# Patient Record
Sex: FEMALE | Race: BLACK OR AFRICAN AMERICAN | Employment: FULL TIME | URBAN - METROPOLITAN AREA
[De-identification: names, ages, dates, MRNs, and addresses within clinical notes are randomized per-mention and may not be internally consistent; named-entity substitution may affect disease eponyms.]

---

## 2017-05-01 ENCOUNTER — HOSPITAL ENCOUNTER (EMERGENCY)
Age: 37
Discharge: HOME | End: 2017-05-01
Attending: EMERGENCY MEDICINE
Payer: MEDICAID

## 2017-05-01 VITALS
DIASTOLIC BLOOD PRESSURE: 77 MMHG | HEART RATE: 86 BPM | TEMPERATURE: 98.7 F | SYSTOLIC BLOOD PRESSURE: 136 MMHG | WEIGHT: 205 LBS | OXYGEN SATURATION: 100 % | HEIGHT: 60 IN | BODY MASS INDEX: 40.25 KG/M2 | RESPIRATION RATE: 18 BRPM

## 2017-05-01 DIAGNOSIS — B96.89 BV (BACTERIAL VAGINOSIS): Primary | ICD-10-CM

## 2017-05-01 DIAGNOSIS — N76.0 BV (BACTERIAL VAGINOSIS): Primary | ICD-10-CM

## 2017-05-01 LAB
APPEARANCE UR: CLEAR
BILIRUB UR QL: NEGATIVE
COLOR UR: YELLOW
GLUCOSE UR STRIP.AUTO-MCNC: NEGATIVE MG/DL
HGB UR QL STRIP: NEGATIVE
KETONES UR QL STRIP.AUTO: NEGATIVE MG/DL
LEUKOCYTE ESTERASE UR QL STRIP.AUTO: NEGATIVE
NITRITE UR QL STRIP.AUTO: NEGATIVE
PH UR STRIP: 5.5 [PH] (ref 5–8)
PROT UR STRIP-MCNC: NEGATIVE MG/DL
SERVICE CMNT-IMP: NORMAL
SP GR UR REFRACTOMETRY: 1.03 (ref 1–1.03)
UROBILINOGEN UR QL STRIP.AUTO: 1 EU/DL (ref 0.2–1)
WET PREP GENITAL: NORMAL

## 2017-05-01 PROCEDURE — 87491 CHLMYD TRACH DNA AMP PROBE: CPT | Performed by: EMERGENCY MEDICINE

## 2017-05-01 PROCEDURE — 87210 SMEAR WET MOUNT SALINE/INK: CPT | Performed by: EMERGENCY MEDICINE

## 2017-05-01 PROCEDURE — 81003 URINALYSIS AUTO W/O SCOPE: CPT | Performed by: EMERGENCY MEDICINE

## 2017-05-01 PROCEDURE — 99284 EMERGENCY DEPT VISIT MOD MDM: CPT

## 2017-05-01 RX ORDER — METRONIDAZOLE 500 MG/1
500 TABLET ORAL 3 TIMES DAILY
Qty: 21 TAB | Refills: 0 | Status: SHIPPED | OUTPATIENT
Start: 2017-05-01 | End: 2017-05-08

## 2017-05-01 NOTE — ED TRIAGE NOTES
Patient reports vaginal discharge for 1 weeks with some odor noted.  Patient denies urination problem, patient denies abdominal pain  LMP 4/20/2017

## 2017-05-01 NOTE — DISCHARGE INSTRUCTIONS
Bacterial Vaginosis: Care Instructions  Your Care Instructions    Bacterial vaginosis is a type of vaginal infection. It is caused by excess growth of certain bacteria that are normally found in the vagina. Symptoms can include itching, swelling, pain when you urinate or have sex, and a gray or yellow discharge with a \"fishy\" odor. It is not considered an infection that is spread through sexual contact. Although symptoms can be annoying and uncomfortable, bacterial vaginosis does not usually cause other health problems. However, if you have it while you are pregnant, it can cause complications. While the infection may go away on its own, most doctors use antibiotics to treat it. You may have been prescribed pills or vaginal cream. With treatment, bacterial vaginosis usually clears up in 5 to 7 days. Follow-up care is a key part of your treatment and safety. Be sure to make and go to all appointments, and call your doctor if you are having problems. It's also a good idea to know your test results and keep a list of the medicines you take. How can you care for yourself at home? · Take your antibiotics as directed. Do not stop taking them just because you feel better. You need to take the full course of antibiotics. · Do not eat or drink anything that contains alcohol if you are taking metronidazole (Flagyl). · Keep using your medicine if you start your period. Use pads instead of tampons while using a vaginal cream or suppository. Tampons can absorb the medicine. · Wear loose cotton clothing. Do not wear nylon and other materials that hold body heat and moisture close to the skin. · Do not scratch. Relieve itching with a cold pack or a cool bath. · Do not wash your vaginal area more than once a day. Use plain water or a mild, unscented soap. Do not douche. When should you call for help?   Watch closely for changes in your health, and be sure to contact your doctor if:  · You have unexpected vaginal bleeding. · You have a fever. · You have new or increased pain in your vagina or pelvis. · You are not getting better after 1 week. · Your symptoms return after you finish the course of your medicine. Where can you learn more? Go to http://kim-tristin.info/. Angi Lackey in the search box to learn more about \"Bacterial Vaginosis: Care Instructions. \"  Current as of: October 13, 2016  Content Version: 11.2  © 2529-1474 [employer]. Care instructions adapted under license by [employer] (which disclaims liability or warranty for this information). If you have questions about a medical condition or this instruction, always ask your healthcare professional. [address] any warranty or liability for your use of this information. Vaginitis: Care Instructions  Your Care Instructions    Vaginitis is soreness or infection of the vagina. This common problem can cause itching and burning. And it can cause a change in vaginal discharge. Sometimes it can cause pain during sex. Vaginitis may be caused by bacteria, yeast, or other germs. Some infections that cause it are caught from a sexual partner. Bath products, spermicides, and douches can irritate the vagina too. Some women have this problem during and after menopause. A drop in estrogen levels during this time can cause dryness, soreness, and pain during sex. Your doctor can give you medicine to treat an infection. And home care may help you feel better. For certain types of infections, your sex partner must be treated too. Follow-up care is a key part of your treatment and safety. Be sure to make and go to all appointments, and call your doctor if you are having problems. It's also a good idea to know your test results and keep a list of the medicines you take. How can you care for yourself at home? · If your doctor prescribed antibiotics, take them as directed.  Do not stop taking them just because you feel better. You need to take the full course of antibiotics. · Take your medicines exactly as prescribed. Call your doctor if you think you are having a problem with your medicine. · Do not eat or drink anything that has alcohol if you are taking metronidazole (Flagyl). · If you have a yeast infection, use over-the-counter products as your doctor tells you to. Or take medicine your doctor prescribes exactly as directed. · Wash your vaginal area daily with water. You also can use a mild, unscented soap if you want. · Do not use scented bath products. And do not use vaginal sprays or douches. · Put a washcloth soaked in cool water on the area to relieve itching. Or you can take cool baths. · If you have dryness because of menopause, use estrogen cream or pills that your doctor prescribes. · Ask your doctor about when it is okay to have sex. · Use a personal lubricant before sex if you have dryness. Examples are Astroglide, K-Y Jelly, and Wet Lubricant Gel. · Ask your doctor if your sex partner also needs treatment. When should you call for help? Call your doctor now or seek immediate medical care if:  · You have a fever and pelvic pain. Watch closely for changes in your health, and be sure to contact your doctor if:  · You have bleeding other than your period. · You do not get better as expected. Where can you learn more? Go to http://kim-tristin.info/. Enter F613 in the search box to learn more about \"Vaginitis: Care Instructions. \"  Current as of: October 13, 2016  Content Version: 11.2  © 7062-7723 [employer]. Care instructions adapted under license by [employer] (which disclaims liability or warranty for this information). If you have questions about a medical condition or this instruction, always ask your healthcare professional. [address] any warranty or liability for your use of this information.

## 2017-05-01 NOTE — ED PROVIDER NOTES
Patient is a 43 y.o. female presenting with vaginal discharge. The history is provided by the patient. Vaginal Discharge    This is a new problem. The current episode started more than 1 week ago. The problem occurs constantly. The problem has been gradually worsening. The discharge was malodorous, clear, copious and thin. She is not pregnant. She has not missed her period. Associated symptoms include perineal odor. Pertinent negatives include no anorexia, no diaphoresis, no fever, no abdominal swelling, no abdominal pain, no constipation, no diarrhea, no nausea, no vomiting, no dyspareunia, no dysuria, no frequency, no genital burning, no genital itching, no genital lesions, no perineal pain and no painful intercourse. She has tried a warm bath for the symptoms. The treatment provided mild relief. Past Medical History:   Diagnosis Date    Cardiac echocardiogram 2016    Sm LV cavity. EF 65-70%. No RWMA. Mod-marked LVH w/dynamic obstruction, mid-cavity obliteration & peak grad of 25 mmHg. Indeterminate diastolic fx. No significant valvular pathology.  Cardiac nuclear imaging test 2016    Low risk. Very sm distal anterior & apical partially reversible defect, likely artifact, but sm area of ischemia not excluded. No RWMA. EF 66%.   Neg EKG on pharm stress test.    Diverticulitis     GERD (gastroesophageal reflux disease)     Hypertension     Infectious disease     Bacteria vaginosis    Non-ST elevated myocardial infarction [facility])        Past Surgical History:   Procedure Laterality Date    HX  SECTION  2008    HX ORTHOPAEDIC      HX TUBAL LIGATION  2008         Family History:   Problem Relation Age of Onset    Depression Mother     Asthma Mother     Migraines Mother     Hypertension Mother     Stroke Mother     Heart Attack Mother     Arthritis-osteo Mother     Depression Brother     Alcohol abuse Father     Substance Abuse Father      tobacco    Drug Abuse Father     Hypertension Father     High Cholesterol Father     Stroke Father     Rashes/Skin Problems Father     Substance Abuse Brother      tobacco    Drug Abuse Brother     Asthma Brother     Migraines Brother     Hypertension Brother     High Cholesterol Brother     Hypertension Paternal Grandmother     Diabetes Paternal Grandmother     Hypertension Maternal Grandmother     Colon Cancer Maternal Grandmother        Social History     Social History    Marital status: SINGLE     Spouse name: N/A    Number of children: N/A    Years of education: N/A     Occupational History    Not on file. Social History Main Topics    Smoking status: Current Every Day Smoker     Packs/day: 0.25     Years: 20.00    Smokeless tobacco: Never Used    Alcohol use Yes      Comment: rare/holidays    Drug use: No    Sexual activity: Yes     Partners: Male     Birth control/ protection: Surgical      Comment: tubal ligation     Other Topics Concern    Not on file     Social History Narrative         ALLERGIES: Vaccine adjuvant emulsion combination no. 1    Review of Systems   Constitutional: Negative for chills, diaphoresis and fever. HENT: Negative for ear pain, rhinorrhea and sore throat. Eyes: Negative for pain and redness. Respiratory: Negative for cough and shortness of breath. Cardiovascular: Negative for chest pain. Gastrointestinal: Negative for abdominal pain, anorexia, constipation, diarrhea, nausea and vomiting. Genitourinary: Positive for vaginal discharge. Negative for dyspareunia, dysuria, frequency, vaginal bleeding and vaginal pain. Musculoskeletal: Negative for back pain, myalgias, neck pain and neck stiffness. Skin: Negative. Neurological: Negative for dizziness, light-headedness and headaches. Psychiatric/Behavioral: Negative.         Vitals:    05/01/17 1225   BP: 136/77   Pulse: 86   Resp: 18   Temp: 98.7 °F (37.1 °C)   SpO2: 100%   Weight: 93 kg (205 lb) Height: 5' (1.524 m)            Physical Exam   Constitutional: She is oriented to person, place, and time. She appears well-developed and well-nourished. No distress. HENT:   Head: Normocephalic and atraumatic. Right Ear: Tympanic membrane, external ear and ear canal normal.   Left Ear: Tympanic membrane, external ear and ear canal normal.   Nose: Nose normal.   Mouth/Throat: Oropharynx is clear and moist and mucous membranes are normal. No oropharyngeal exudate. Eyes: Conjunctivae and EOM are normal. Pupils are equal, round, and reactive to light. Neck: Normal range of motion. Neck supple. Cardiovascular: Normal rate, regular rhythm, normal heart sounds and intact distal pulses. Exam reveals no gallop and no friction rub. No murmur heard. Pulmonary/Chest: Effort normal and breath sounds normal. No respiratory distress. She has no wheezes. She has no rales. Abdominal: Soft. Bowel sounds are normal. She exhibits no distension. There is no tenderness. There is no rebound and no guarding. Musculoskeletal: Normal range of motion. She exhibits no edema. Lymphadenopathy:     She has no cervical adenopathy. Neurological: She is alert and oriented to person, place, and time. Skin: Skin is warm and dry. She is not diaphoretic. Psychiatric: She has a normal mood and affect. Her behavior is normal. Judgment and thought content normal.   Nursing note and vitals reviewed. MDM  Number of Diagnoses or Management Options  BV (bacterial vaginosis): new and requires workup  Diagnosis management comments: ED COURSE:  vaginal specimen(s) may have been collected to check for gonorrhea and chlamydia.      DDx: UTI, BV, yeast, STD (herpes, GC, trich), PID, Xhgp-Cqxe-Amzpbt syndrome, pyelo, preg, ectopic, ovarian cyst, ovarian torsion, tubo-ovarian abscess, FB, malignancy      IMPRESSION AND MEDICAL DECISION MAKING:  Based on the patient's history of presenting illness, physical examination, laboratory, radiographic, and/or tests results, and response to medical interventions, I believe the patient most likely is having bacterial vaginosis. Follow-up Activity limitations:  None  Condition on Discharge:  Stable    SPECIFIC PATIENT INSTRUCTIONS FROM THE PROVIDER WHO TREATED YOU IN THE ER TODAY:  Finish Flagyl as directed. DO NOT consume alcohol when taking this medication until at least 24 hours after completion of this medication! Reevaluation by your OB/GYN doctor or regular doctor if symptoms persist after finishing flagyl. Return to emergency room for worsening or new symptoms. Pt results have been reviewed with them. They have been counseled regarding diagnosis, treatment, and plan. Pt verbally conveys understanding and agreement of the signs, symptoms, diagnosis, treatment and prognosis and additionally agrees to follow up as discussed. Pt also agrees with the care-plan and conveys that all of their questions have been answered. I have also provided discharge instructions for them that include: educational information regarding their diagnosis and treatment, and list of reasons why they would want to return to the ED prior to their follow-up appointment, should their condition change.    Tye Alas PA-C 2:15 PM          Amount and/or Complexity of Data Reviewed  Clinical lab tests: ordered and reviewed  Tests in the medicine section of CPT®: ordered and reviewed  Discussion of test results with the performing providers: yes  Decide to obtain previous medical records or to obtain history from someone other than the patient: yes  Obtain history from someone other than the patient: yes  Review and summarize past medical records: yes  Discuss the patient with other providers: yes  Independent visualization of images, tracings, or specimens: yes    Risk of Complications, Morbidity, and/or Mortality  Presenting problems: moderate  Diagnostic procedures: moderate  Management options: moderate    Patient Progress  Patient progress: stable    ED Course       Pelvic Exam  Date/Time: 5/1/2017 1:59 PM  Performed by: PA  Procedure duration:  5 minutes. Exam assisted by:  ALECIA Ennis. Type of exam performed: bimanual and speculum. External genitalia appearance: normal.    Vaginal exam:  discharge. The amount of discharge was:  mild. The discharge was milky and malodorous. Cervical exam:  normal and no cervical motion tenderness. Specimen(s) collected:  GC and vaginal culture. Bimanual exam:  normal.    Patient tolerance: Patient tolerated the procedure well with no immediate complications        Labs Reviewed   WET PREP   URINALYSIS W/ RFLX MICROSCOPIC   CHLAMYDIA/NEISSERIA AMPLIFICATION       Diagnosis:   1. BV (bacterial vaginosis)          Disposition: home    Follow-up Information     Follow up With Details Comments [place] [id number] EMERGENCY DEPT  As needed, If symptoms worsen [address] [place] [address] [place] [facility]    Alcides Turner MD Go in 3 days  [address]  [phone]            Patient's Medications   Start Taking    METRONIDAZOLE (FLAGYL) 500 MG TABLET    Take 1 Tab by mouth three (3) times daily for 7 days. Continue Taking    ASPIRIN 81 MG CHEWABLE TABLET    Take 1 Tab by mouth daily. CARVEDILOL (COREG) 6.25 MG TABLET    Take 1 Tab by mouth every twelve (12) hours. CLOPIDOGREL (PLAVIX) 75 MG TABLET    Take 1 Tab by mouth daily. NITROGLYCERIN (NITROSTAT) 0.4 MG SL TABLET    1 Tab by SubLINGual route every five (5) minutes as needed for Chest Pain. These Medications have changed    No medications on file   Stop Taking    LISINOPRIL (PRINIVIL, ZESTRIL) 20 MG TABLET    Take 1 Tab by mouth daily. METRONIDAZOLE (FLAGYL) 500 MG TABLET    Take 1 Tab by mouth two (2) times a day. NORGESTREL-ETHINYL ESTRADIOL (LO/OVRAL) 0.3-30 MG-MCG TAB    Take 1 Tab by mouth daily.  Indications: MENORRHAGIA

## 2017-05-02 LAB
C TRACH RRNA SPEC QL NAA+PROBE: POSITIVE
N GONORRHOEA RRNA SPEC QL NAA+PROBE: NEGATIVE
SPECIMEN SOURCE: ABNORMAL

## 2017-05-03 NOTE — PROGRESS NOTES
Mary Celeste PA-C May 3, 2017 4:00 PM  Pt with +chlamydia, called patient and gave her results. She wanted rx called in to [facility] in [place], [address] [facility] Called in Doxycycline 100mg BID orally for 7 days. Pt to go and  today. Pt given STD instructions and precautions via phone, indicated understanding.

## 2017-05-03 NOTE — ED NOTES
Tye Alas PA-C May 3, 2017 4:00 PM  Pt with +chlamydia, called patient and gave her results. [facility] [place] [place]. Called in Doxycycline 100mg BID orally for 7 days. Pt to go and  today. Pt given STD instructions and precautions via phone, indicated understanding.

## 2017-06-16 ENCOUNTER — HOSPITAL ENCOUNTER (EMERGENCY)
Age: 37
Discharge: HOME | End: 2017-06-16
Attending: EMERGENCY MEDICINE
Payer: MEDICAID

## 2017-06-16 VITALS
WEIGHT: 210 LBS | OXYGEN SATURATION: 98 % | TEMPERATURE: 98.2 F | RESPIRATION RATE: 19 BRPM | DIASTOLIC BLOOD PRESSURE: 89 MMHG | HEART RATE: 72 BPM | SYSTOLIC BLOOD PRESSURE: 150 MMHG | HEIGHT: 60 IN | BODY MASS INDEX: 41.23 KG/M2

## 2017-06-16 DIAGNOSIS — N76.0 BV (BACTERIAL VAGINOSIS): ICD-10-CM

## 2017-06-16 DIAGNOSIS — K08.89 DENTALGIA: Primary | ICD-10-CM

## 2017-06-16 DIAGNOSIS — B96.89 BV (BACTERIAL VAGINOSIS): ICD-10-CM

## 2017-06-16 PROCEDURE — 99282 EMERGENCY DEPT VISIT SF MDM: CPT

## 2017-06-16 RX ORDER — PENICILLIN V POTASSIUM 500 MG/1
500 TABLET, FILM COATED ORAL 2 TIMES DAILY
Qty: 20 TAB | Refills: 0 | Status: SHIPPED | OUTPATIENT
Start: 2017-06-16 | End: 2017-06-26

## 2017-06-16 RX ORDER — HYDROCODONE BITARTRATE AND ACETAMINOPHEN 5; 325 MG/1; MG/1
TABLET ORAL
Qty: 6 TAB | Refills: 0 | Status: SHIPPED | OUTPATIENT
Start: 2017-06-16 | End: 2017-10-19

## 2017-06-16 RX ORDER — METRONIDAZOLE 500 MG/1
500 TABLET ORAL 2 TIMES DAILY
Qty: 14 TAB | Refills: 0 | Status: SHIPPED | OUTPATIENT
Start: 2017-06-16 | End: 2017-06-23

## 2017-06-16 RX ORDER — NAPROXEN 500 MG/1
500 TABLET ORAL 2 TIMES DAILY WITH MEALS
Qty: 20 TAB | Refills: 0 | Status: SHIPPED | OUTPATIENT
Start: 2017-06-16 | End: 2017-10-19

## 2017-06-16 NOTE — DISCHARGE INSTRUCTIONS
Bacterial Vaginosis: Care Instructions  Your Care Instructions    Bacterial vaginosis is a type of vaginal infection. It is caused by excess growth of certain bacteria that are normally found in the vagina. Symptoms can include itching, swelling, pain when you urinate or have sex, and a gray or yellow discharge with a \"fishy\" odor. It is not considered an infection that is spread through sexual contact. Although symptoms can be annoying and uncomfortable, bacterial vaginosis does not usually cause other health problems. However, if you have it while you are pregnant, it can cause complications. While the infection may go away on its own, most doctors use antibiotics to treat it. You may have been prescribed pills or vaginal cream. With treatment, bacterial vaginosis usually clears up in 5 to 7 days. Follow-up care is a key part of your treatment and safety. Be sure to make and go to all appointments, and call your doctor if you are having problems. It's also a good idea to know your test results and keep a list of the medicines you take. How can you care for yourself at home? · Take your antibiotics as directed. Do not stop taking them just because you feel better. You need to take the full course of antibiotics. · Do not eat or drink anything that contains alcohol if you are taking metronidazole (Flagyl). · Keep using your medicine if you start your period. Use pads instead of tampons while using a vaginal cream or suppository. Tampons can absorb the medicine. · Wear loose cotton clothing. Do not wear nylon and other materials that hold body heat and moisture close to the skin. · Do not scratch. Relieve itching with a cold pack or a cool bath. · Do not wash your vaginal area more than once a day. Use plain water or a mild, unscented soap. Do not douche. When should you call for help?   Watch closely for changes in your health, and be sure to contact your doctor if:  · You have unexpected vaginal bleeding. · You have a fever. · You have new or increased pain in your vagina or pelvis. · You are not getting better after 1 week. · Your symptoms return after you finish the course of your medicine. Where can you learn more? Go to http://kim-tristin.info/. [address] in the search box to learn more about \"Bacterial Vaginosis: Care Instructions. \"  Current as of: October 13, 2016  Content Version: 11.2  © 8572-4500 [employer]. Care instructions adapted under license by [employer] (which disclaims liability or warranty for this information). If you have questions about a medical condition or this instruction, always ask your healthcare professional. [address] any warranty or liability for your use of this information. Dental Pain: After Your Visit  Your Care Instructions  The most common cause of dental pain is tooth decay. It can also be caused by an infection of the tooth (abscess) or gum, a tooth that has not broken all the way through the gum (impacted tooth), or a problem with the nerve-filled center of the tooth. Follow-up care is a key part of your treatment and safety. Be sure to make and go to all appointments, and call your doctor if you are having problems. Its also a good idea to know your test results and keep a list of the medicines you take. How can you care for yourself at home? · Contact a dentist for follow-up care. · Put ice or a cold pack on the outside of your mouth for 10 to 20 minutes at a time to reduce pain and swelling. Put a thin cloth between the ice and your skin. · Take an over-the-counter pain medicine, such as acetaminophen (Tylenol), ibuprofen (Advil, Motrin), or naproxen (Aleve). Read and follow all instructions on the label. · Do not take two or more pain medicines at the same time unless the doctor told you to. Many pain medicines have acetaminophen, which is Tylenol.  Too much acetaminophen (Tylenol) can be harmful. · Rinse your mouth with warm salt water every 2 hours to help relieve pain and swelling from an infected tooth. Mix 1 teaspoon of salt in 8 ounces of water. · If your doctor prescribed antibiotics, take them as directed. Do not stop taking them just because you feel better. You need to take the full course of antibiotics. When should you call for help? Call your doctor now or seek immediate medical care if:  · You have signs of infection, such as:  ¨ Increased pain, swelling, warmth, or redness. ¨ Pus draining from the gum, tooth, or face. ¨ A fever. Watch closely for changes in your health, and be sure to contact your doctor if:  · You do not get better as expected. Where can you learn more? Go to ALPHAThrottle.com.be  Enter V264 in the search box to learn more about \"Dental Pain: After Your Visit. \"   © 4132-6036 [employer]. Care instructions adapted under license by [employer] (which disclaims liability or warranty for this information). This care instruction is for use with your licensed healthcare professional. If you have questions about a medical condition or this instruction, always ask your healthcare professional. [place] [address] any warranty or liability for your use of this information. Content Version: 71.6.925929;  Last Revised: May 17, 2013

## 2017-06-16 NOTE — ED PROVIDER NOTES
HPI Comments: 1:13 AM Kayleigh Golden is a 43 y.o. Female with a hx of a tubal ligation, GERD, and an MI who presents to the ED c/o left lower and right upper dental pain that started three days ago. She reports that she chipped the tooth \"a while ago\" but it was not bothering her till recently. She has additional complaints of malodorous vaginal discharge that started a week ago. She thought the discharge may have been due to her period starting the following day; however, when her period ended the discharge persisted. She states that the discharge is similar to her past episodes of BV. She made an appointment with her OB/GYN next month. The pt denies fever, chest pain, cough, dysuria, hematuria, and any further sx. The history is provided by the patient. Past Medical History:   Diagnosis Date    Cardiac echocardiogram 2016    Sm LV cavity. EF 65-70%. No RWMA. Mod-marked LVH w/dynamic obstruction, mid-cavity obliteration & peak grad of 25 mmHg. Indeterminate diastolic fx. No significant valvular pathology.  Cardiac nuclear imaging test 2016    Low risk. Very sm distal anterior & apical partially reversible defect, likely artifact, but sm area of ischemia not excluded. No RWMA. EF 66%.   Neg EKG on pharm stress test.    Diverticulitis     GERD (gastroesophageal reflux disease)     Hypertension     Infectious disease     Bacteria vaginosis    Non-ST elevated myocardial infarction [facility])        Past Surgical History:   Procedure Laterality Date    HX  SECTION  2008    HX ORTHOPAEDIC      HX TUBAL LIGATION  2008         Family History:   Problem Relation Age of Onset    Depression Mother     Asthma Mother     Migraines Mother     Hypertension Mother     Stroke Mother     Heart Attack Mother     Arthritis-osteo Mother     Depression Brother     Alcohol abuse Father     Substance Abuse Father      tobacco    Drug Abuse Father     Hypertension Father     High Cholesterol Father     Stroke Father     Rashes/Skin Problems Father     Substance Abuse Brother      tobacco    Drug Abuse Brother     Asthma Brother     Migraines Brother     Hypertension Brother     High Cholesterol Brother     Hypertension Paternal Grandmother     Diabetes Paternal Grandmother     Hypertension Maternal Grandmother     Colon Cancer Maternal Grandmother        Social History     Social History    Marital status: SINGLE     Spouse name: N/A    Number of children: N/A    Years of education: N/A     Occupational History    Not on file. Social History Main Topics    Smoking status: Current Every Day Smoker     Packs/day: 0.25     Years: 20.00    Smokeless tobacco: Never Used    Alcohol use Yes      Comment: rare/holidays    Drug use: No    Sexual activity: Yes     Partners: Male     Birth control/ protection: Surgical      Comment: tubal ligation     Other Topics Concern    Not on file     Social History Narrative         ALLERGIES: Vaccine adjuvant emulsion combination no. 1    Review of Systems   Constitutional: Negative for chills and fever. HENT: Positive for dental problem. Negative for congestion and sore throat. Respiratory: Negative for cough and shortness of breath. Cardiovascular: Negative for chest pain and leg swelling. Gastrointestinal: Negative for abdominal pain and nausea. Genitourinary: Positive for vaginal discharge. Negative for dysuria and hematuria. Musculoskeletal: Negative for back pain. Skin: Negative for rash and wound. Neurological: Negative for syncope, light-headedness and headaches. Psychiatric/Behavioral: Negative for behavioral problems. The patient is not nervous/anxious. All other systems reviewed and are negative. There were no vitals filed for this visit. Physical Exam   Constitutional: She appears well-developed and well-nourished. Non-toxic appearance.  She does not have a sickly appearance. She does not appear ill. No distress. HENT:   Head: Normocephalic and atraumatic. Mouth/Throat: Uvula is midline, oropharynx is clear and moist and mucous membranes are normal. Abnormal dentition. Dental caries present. No dental abscesses or uvula swelling. No oropharyngeal exudate, posterior oropharyngeal edema, posterior oropharyngeal erythema or tonsillar abscesses. Eyes: Conjunctivae and EOM are normal. Pupils are equal, round, and reactive to light. No scleral icterus. Neck: Normal range of motion. Neck supple. No hepatojugular reflux and no JVD present. No tracheal deviation present. No thyromegaly present. Cardiovascular: Normal rate, regular rhythm, S1 normal, S2 normal, normal heart sounds, intact distal pulses and normal pulses. Exam reveals no gallop, no S3 and no S4. No murmur heard. Pulses:       Radial pulses are 2+ on the right side, and 2+ on the left side. Dorsalis pedis pulses are 2+ on the right side, and 2+ on the left side. Pulmonary/Chest: Effort normal and breath sounds normal. No respiratory distress. She has no decreased breath sounds. She has no wheezes. She has no rhonchi. She has no rales. Abdominal: Soft. Normal appearance and bowel sounds are normal. She exhibits no distension and no mass. There is no hepatosplenomegaly. There is no tenderness. There is no rigidity, no rebound, no guarding, no CVA tenderness, no tenderness at McBurney's point and negative Schneider's sign. Musculoskeletal: Normal range of motion. Strength 5/5 throughout    Lymphadenopathy:        Head (right side): No submental, no submandibular, no preauricular and no occipital adenopathy present. Head (left side): No submental, no submandibular, no preauricular and no occipital adenopathy present. She has no cervical adenopathy. Right: No supraclavicular adenopathy present. Left: No supraclavicular adenopathy present. Neurological: She is alert. She has normal strength and normal reflexes. She is not disoriented. No cranial nerve deficit or sensory deficit. Coordination and gait normal. GCS eye subscore is 4. GCS verbal subscore is 5. GCS motor subscore is 6. Grossly intact    Skin: Skin is warm, dry and intact. No rash noted. She is not diaphoretic. Psychiatric: She has a normal mood and affect. Her speech is normal and behavior is normal. Judgment and thought content normal. Cognition and memory are normal.   Nursing note and vitals reviewed. MDM  Number of Diagnoses or Management Options  BV (bacterial vaginosis):   Dentalgia:     ED Course       Procedures    Vitals:  Patient Vitals for the past 12 hrs:   Temp Pulse Resp BP SpO2   06/16/17 0116 98.2 °F (36.8 °C) 72 19 150/89 98 %     Pulse ox reviewed     Progress notes, Consult notes or additional Procedure notes:     Patient will be prescribed Flagyl, Naproxen,Veetid, and Norco.  Patient was discharged in stable condition. Patient is to return to emergency department if any new or worsening condition. Disposition:  Diagnosis:   1. Dentalgia        Disposition: Discharge    SCRIBE ATTESTATION STATEMENT  Documented by: Mariola Manning for, and in the presence of,Doron Small DO   1:20 AM     Signed by: Anita Mojica, 06/16/17 1:20 AM    PROVIDER ATTESTATION STATEMENT  I personally performed the services described in the documentation, reviewed the documentation, as recorded by the scribe in my presence, and it accurately and completely records my words and actions.   Daniele Thomas DO

## 2017-10-19 ENCOUNTER — HOSPITAL ENCOUNTER (EMERGENCY)
Age: 37
Discharge: HOME | End: 2017-10-19
Attending: EMERGENCY MEDICINE
Payer: MEDICAID

## 2017-10-19 VITALS
SYSTOLIC BLOOD PRESSURE: 155 MMHG | WEIGHT: 205 LBS | OXYGEN SATURATION: 99 % | DIASTOLIC BLOOD PRESSURE: 88 MMHG | TEMPERATURE: 98.7 F | HEART RATE: 90 BPM | BODY MASS INDEX: 40.25 KG/M2 | RESPIRATION RATE: 18 BRPM | HEIGHT: 60 IN

## 2017-10-19 DIAGNOSIS — K04.7 INFECTED DENTAL CARIES: ICD-10-CM

## 2017-10-19 DIAGNOSIS — K08.89 DENTALGIA: Primary | ICD-10-CM

## 2017-10-19 DIAGNOSIS — K02.9 INFECTED DENTAL CARIES: ICD-10-CM

## 2017-10-19 PROCEDURE — 99282 EMERGENCY DEPT VISIT SF MDM: CPT

## 2017-10-19 RX ORDER — IBUPROFEN 800 MG/1
800 TABLET ORAL
Qty: 20 TAB | Refills: 0 | Status: SHIPPED | OUTPATIENT
Start: 2017-10-19 | End: 2017-10-26

## 2017-10-19 RX ORDER — PENICILLIN V POTASSIUM 500 MG/1
500 TABLET, FILM COATED ORAL 4 TIMES DAILY
Qty: 28 TAB | Refills: 0 | Status: SHIPPED | OUTPATIENT
Start: 2017-10-19 | End: 2017-10-26

## 2017-10-19 RX ORDER — CHLORHEXIDINE GLUCONATE 1.2 MG/ML
15 RINSE ORAL 2 TIMES DAILY
Qty: 420 ML | Refills: 0 | Status: SHIPPED | OUTPATIENT
Start: 2017-10-19 | End: 2017-11-02

## 2017-10-19 RX ORDER — FLUCONAZOLE 150 MG/1
150 TABLET ORAL
Qty: 1 TAB | Refills: 0 | Status: SHIPPED | OUTPATIENT
Start: 2017-10-19 | End: 2017-10-19

## 2017-10-19 RX ORDER — ACETAMINOPHEN AND CODEINE PHOSPHATE 300; 30 MG/1; MG/1
1 TABLET ORAL
Qty: 12 TAB | Refills: 0 | Status: SHIPPED | OUTPATIENT
Start: 2017-10-19 | End: 2017-11-14

## 2017-10-19 NOTE — ED PROVIDER NOTES
Patient is a 43 y.o. female presenting with dental problem. The history is provided by the patient. Dental Pain    This is a new problem. The current episode started 2 days ago. The problem has been gradually worsening. The pain is located in the left lower mouth. The quality of the pain is constant and aching. The pain is at a severity of 9/10. Associated symptoms include swelling and gum redness. There was no vomiting, no nausea, no fever, no chest pain, no shortness of breath, no headaches and no drainage. Treatments tried: Aleve, tramadol. The treatment provided no relief. Past Medical History:   Diagnosis Date    Cardiac echocardiogram 2016    Sm LV cavity. EF 65-70%. No RWMA. Mod-marked LVH w/dynamic obstruction, mid-cavity obliteration & peak grad of 25 mmHg. Indeterminate diastolic fx. No significant valvular pathology.  Cardiac nuclear imaging test 2016    Low risk. Very sm distal anterior & apical partially reversible defect, likely artifact, but sm area of ischemia not excluded. No RWMA. EF 66%.   Neg EKG on pharm stress test.    Diverticulitis     GERD (gastroesophageal reflux disease)     Hypertension     Infectious disease     Bacteria vaginosis    Non-ST elevated myocardial infarction [facility])        Past Surgical History:   Procedure Laterality Date    HX  SECTION  2008    HX ORTHOPAEDIC      HX TUBAL LIGATION  2008         Family History:   Problem Relation Age of Onset    Depression Mother     Asthma Mother     Migraines Mother     Hypertension Mother     Stroke Mother     Heart Attack Mother     Alida Fontenot Mother     Depression Brother     Alcohol abuse Father     Substance Abuse Father      tobacco    Drug Abuse Father     Hypertension Father     High Cholesterol Father     Stroke Father     Rashes/Skin Problems Father     Substance Abuse Brother      tobacco    Drug Abuse Brother     Asthma Brother     Migraines Brother     Hypertension Brother     High Cholesterol Brother     Hypertension Paternal Grandmother     Diabetes Paternal Grandmother     Hypertension Maternal Grandmother     Colon Cancer Maternal Grandmother        Social History     Social History    Marital status: SINGLE     Spouse name: N/A    Number of children: N/A    Years of education: N/A     Occupational History    Not on file. Social History Main Topics    Smoking status: Current Every Day Smoker     Packs/day: 0.25     Years: 20.00    Smokeless tobacco: Never Used    Alcohol use Yes      Comment: rare/holidays    Drug use: No    Sexual activity: Yes     Partners: Male     Birth control/ protection: Surgical      Comment: tubal ligation     Other Topics Concern    Not on file     Social History Narrative         ALLERGIES: Vaccine adjuvant emulsion combination no. 1    Review of Systems   Constitutional: Negative for chills and fever. HENT: Positive for dental problem. Negative for congestion, drooling, ear pain, facial swelling, rhinorrhea, sore throat, trouble swallowing and voice change. Eyes: Negative for pain and redness. Respiratory: Negative for cough and shortness of breath. Cardiovascular: Negative for chest pain. Gastrointestinal: Negative for abdominal pain, constipation, diarrhea, nausea and vomiting. Genitourinary: Negative for dysuria. Musculoskeletal: Negative for neck pain and neck stiffness. Skin: Negative. Neurological: Negative for dizziness, light-headedness and headaches. Psychiatric/Behavioral: Negative. Vitals:    10/19/17 1908   BP: 155/88   Pulse: 90   Resp: 18   Temp: 98.7 °F (37.1 °C)   SpO2: 99%   Weight: 93 kg (205 lb)   Height: 5' (1.524 m)            Physical Exam   Constitutional: She is oriented to person, place, and time. She appears well-developed and well-nourished. No distress. HENT:   Head: Normocephalic and atraumatic.    Right Ear: Tympanic membrane, external ear and ear canal normal.   Left Ear: Tympanic membrane, external ear and ear canal normal.   Nose: Nose normal.   Mouth/Throat: Uvula is midline, oropharynx is clear and moist and mucous membranes are normal. No trismus in the jaw. Abnormal dentition. Dental caries present. No dental abscesses or uvula swelling. No oropharyngeal exudate, posterior oropharyngeal edema or posterior oropharyngeal erythema. Dental pain at # 18. There IS evidence of dental decay. There IS tenderness on palpation. The airway IS patent. NO adjacent mucobuccal soft tissue swelling, fluctuance or gingival bleeding. NO pus/drainage. NO swelling or elevation of the tongue. NO sublingual swelling or TTP. NO facial swelling. NO neck swelling. Eyes: Conjunctivae and EOM are normal. Pupils are equal, round, and reactive to light. Neck: Normal range of motion. Neck supple. Cardiovascular: Normal rate, regular rhythm and normal heart sounds. Pulmonary/Chest: Effort normal and breath sounds normal.   Musculoskeletal: Normal range of motion. Lymphadenopathy:     She has no cervical adenopathy. Neurological: She is alert and oriented to person, place, and time. Skin: Skin is warm. She is not diaphoretic. Psychiatric: She has a normal mood and affect. Her behavior is normal. Judgment and thought content normal.   Nursing note and vitals reviewed. MDM  Number of Diagnoses or Management Options  Dentalgia: new and requires workup  Infected dental caries: new and requires workup  Diagnosis management comments: DDx: Odontalgia, Dental caries,  Periodontal disease, Periapical abscess, Trigeminal neuralgia,  space infection, Jose's angina, Retropharyngeal space infection, Infection after root canal, Dry Socket, Acute Necrotizing Ulcerative Gingivitis (ANUG). Pt requests rx for diflucan to take after pcn as it commonly gives her yeast infection.      IMPRESSION AND MEDICAL DECISION MAKING:  Based upon the patient's presentation with noted HPI and PE, along with the work up done in the emergency department, I believe that the patient is having a toothache at tooth # 18 due to dental decay. No evidence of abscess at this time. No airway compromise. Will discharge to home with antibiotic coverage and symptomatic treatment. Discussed care, f/u and s/s warranting return to ED. Pt understood and agreed to plan. Collin Wing PA-C     DIAGNOSIS:  Dentalgia  Dental Decay    SPECIFIC PATIENT INSTRUCTIONS FROM THE PROVIDER WHO TREATED YOU IN THE ER TODAY  Finish antibiotics as directed. Rinse your mouth with mouth wash after each meal or more often. Rinse mouth with Peridex as prescribed, once in the morning and once in the evening after brushing your teeth. Take Motrin as prescribed for pain as needed. Take Tylenol 3 for pain not controlled by motrin. Return for any concerns, changes in condition, or as needed. Follow-up in 7-10 days with your dentist or one of the provided dental clinics below:  [facility] [address] [phone]  [address] [place] [address] [phone]  [facility] [phone]  [address] [place] [address] [phone]   [facility] [phone]   [address] .. [phone]   *Cleaning only  [address] .. [phone]  *Preventive care only  *No insurance required-cash/check only    Call to schedule an appointment. Pt results have been reviewed with them. They have been counseled regarding diagnosis, treatment, and plan. Pt verbally conveys understanding and agreement of the signs, symptoms, diagnosis, treatment and prognosis and additionally agrees to follow up as discussed. Pt also agrees with the care-plan and conveys that all of their questions have been answered.  I have also provided discharge instructions for them that include: educational information regarding their diagnosis and treatment, and list of reasons why they would want to return to the ED prior to their follow-up appointment, should their condition change. SHOLA Day PA-C         Amount and/or Complexity of Data Reviewed  Review and summarize past medical records: yes  Discuss the patient with other providers: yes    Risk of Complications, Morbidity, and/or Mortality  Presenting problems: low  Diagnostic procedures: low  Management options: low    Patient Progress  Patient progress: stable    ED Course       Procedures      Diagnosis:   1. Dentalgia    2. Infected dental caries          Disposition: Discharge to home. Follow-up Information     Follow up With Details Comments [facility] 5 EMERGENCY DEPT  As needed, If symptoms worsen [address]    Angela Izaguirre MD Go in 2 days  Count includes the [facility] [address]  [address] in 1 week  [facility] [id number]  [phone]          Patient's Medications   Start Taking    ACETAMINOPHEN-CODEINE (TYLENOL-CODEINE #3) 300-30 MG PER TABLET    Take 1 Tab by mouth every four (4) hours as needed for Pain. Max Daily Amount: 6 Tabs. CHLORHEXIDINE (PERIDEX) 0.12 % SOLUTION    15 mL by Swish and Spit route two (2) times a day for 14 days. FLUCONAZOLE (DIFLUCAN) 150 MG TABLET    Take 1 Tab by mouth now for 1 dose. FDA advises cautious prescribing of oral fluconazole in pregnancy. IBUPROFEN (MOTRIN) 800 MG TABLET    Take 1 Tab by mouth every six (6) hours as needed for Pain for up to 7 days. PENICILLIN V POTASSIUM (VEETID) 500 MG TABLET    Take 1 Tab by mouth four (4) times daily for 7 days. Continue Taking    ASPIRIN 81 MG CHEWABLE TABLET    Take 1 Tab by mouth daily. CARVEDILOL (COREG) 6.25 MG TABLET    Take 1 Tab by mouth every twelve (12) hours. CLOPIDOGREL (PLAVIX) 75 MG TABLET    Take 1 Tab by mouth daily.     LISINOPRIL PO    Take by mouth daily. NITROGLYCERIN (NITROSTAT) 0.4 MG SL TABLET    1 Tab by SubLINGual route every five (5) minutes as needed for Chest Pain. These Medications have changed    No medications on file   Stop Taking    HYDROCODONE-ACETAMINOPHEN (NORCO) 5-325 MG PER TABLET    Take 1-2 tablets PO every 4-6 hours as needed for pain control. If over the counter ibuprofen or acetaminophen was suggested, then only take the vicodin for pain not well controlled with the over the counter medication. NAPROXEN (NAPROSYN) 500 MG TABLET    Take 1 Tab by mouth two (2) times daily (with meals).

## 2017-10-19 NOTE — ED TRIAGE NOTES
C/o left bottom rear tooth pain x 2 days progressively worsening. States tried Tramadol & Aleve without relief from pain.   Pt does not have a dentist.

## 2017-11-14 ENCOUNTER — HOSPITAL ENCOUNTER (EMERGENCY)
Age: 37
Discharge: HOME | End: 2017-11-14
Attending: EMERGENCY MEDICINE
Payer: MEDICAID

## 2017-11-14 VITALS
HEART RATE: 81 BPM | RESPIRATION RATE: 16 BRPM | HEIGHT: 60 IN | TEMPERATURE: 98.2 F | WEIGHT: 205 LBS | BODY MASS INDEX: 40.25 KG/M2 | OXYGEN SATURATION: 100 % | SYSTOLIC BLOOD PRESSURE: 157 MMHG | DIASTOLIC BLOOD PRESSURE: 96 MMHG

## 2017-11-14 DIAGNOSIS — K04.7 INFECTED DENTAL CARIES: ICD-10-CM

## 2017-11-14 DIAGNOSIS — K02.9 INFECTED DENTAL CARIES: ICD-10-CM

## 2017-11-14 DIAGNOSIS — K08.89 DENTALGIA: Primary | ICD-10-CM

## 2017-11-14 PROCEDURE — 99282 EMERGENCY DEPT VISIT SF MDM: CPT

## 2017-11-14 RX ORDER — PENICILLIN V POTASSIUM 500 MG/1
500 TABLET, FILM COATED ORAL 4 TIMES DAILY
Qty: 28 TAB | Refills: 0 | Status: SHIPPED | OUTPATIENT
Start: 2017-11-14 | End: 2017-11-21

## 2017-11-14 RX ORDER — CHLORHEXIDINE GLUCONATE 1.2 MG/ML
15 RINSE ORAL 2 TIMES DAILY
Qty: 420 ML | Refills: 0 | Status: SHIPPED | OUTPATIENT
Start: 2017-11-14 | End: 2017-11-28

## 2017-11-14 NOTE — ED NOTES
Current Discharge Medication List      START taking these medications    Details   penicillin v potassium (VEETID) 500 mg tablet Take 1 Tab by mouth four (4) times daily for 7 days. Qty: 28 Tab, Refills: 0      chlorhexidine (PERIDEX) 0.12 % solution 15 mL by Swish and Spit route two (2) times a day for 14 days. Qty: 420 mL, Refills: 0           Patient armband removed and shredded  Prescriptions given and reviewed with patient.

## 2017-11-14 NOTE — ED TRIAGE NOTES
C/O dental pain. Pt states that she was seen here a few weeks ago for same. Pt states that pain is now radiating to right ear and sinuses.

## 2017-11-14 NOTE — ED PROVIDER NOTES
Patient is a 43 y.o. female presenting with dental problem. The history is provided by the patient. Dental Pain    This is a recurrent problem. The current episode started 2 days ago. The problem occurs constantly. The problem has been gradually worsening (now radiating to ear and jaw). The pain is located in the right lower mouth. The quality of the pain is aching. The pain is at a severity of 9/10. Associated symptoms include swelling and gum redness. There was no vomiting, no nausea, no fever, no chest pain, no shortness of breath, no headaches and no drainage. Treatments tried: Was seen 1 month ago, took PCN, resolved, now returned, has now tried [facility] [employer] powder with moderate relief. Past Medical History:   Diagnosis Date    Cardiac echocardiogram 2016    Sm LV cavity. EF 65-70%. No RWMA. Mod-marked LVH w/dynamic obstruction, mid-cavity obliteration & peak grad of 25 mmHg. Indeterminate diastolic fx. No significant valvular pathology.  Cardiac nuclear imaging test 2016    Low risk. Very sm distal anterior & apical partially reversible defect, likely artifact, but sm area of ischemia not excluded. No RWMA. EF 66%.   Neg EKG on pharm stress test.    Diverticulitis     GERD (gastroesophageal reflux disease)     Hypertension     Infectious disease     Bacteria vaginosis    Non-ST elevated myocardial infarction [facility])        Past Surgical History:   Procedure Laterality Date    HX  SECTION  2008    HX ORTHOPAEDIC      HX TUBAL LIGATION  2008         Family History:   Problem Relation Age of Onset    Depression Mother     Asthma Mother     Migraines Mother     Hypertension Mother     Stroke Mother     Heart Attack Mother     Arthritis-osteo Mother     Depression Brother     Alcohol abuse Father     Substance Abuse Father      tobacco    Drug Abuse Father     Hypertension Father     High Cholesterol Father     Stroke Father     Rashes/Skin Problems Father     Substance Abuse Brother      tobacco    Drug Abuse Brother     Asthma Brother     Migraines Brother     Hypertension Brother     High Cholesterol Brother     Hypertension Paternal Grandmother     Diabetes Paternal Grandmother     Hypertension Maternal Grandmother     Colon Cancer Maternal Grandmother        Social History     Social History    Marital status: SINGLE     Spouse name: N/A    Number of children: N/A    Years of education: N/A     Occupational History    Not on file. Social History Main Topics    Smoking status: Current Every Day Smoker     Packs/day: 0.25     Years: 20.00    Smokeless tobacco: Never Used    Alcohol use Yes      Comment: rare/holidays    Drug use: No    Sexual activity: Yes     Partners: Male     Birth control/ protection: Surgical      Comment: tubal ligation     Other Topics Concern    Not on file     Social History Narrative         ALLERGIES: Vaccine adjuvant emulsion combination no. 1    Review of Systems   Constitutional: Negative for chills and fever. HENT: Positive for dental problem, ear pain and sinus pain. Negative for congestion, ear discharge, facial swelling, rhinorrhea, sinus pressure, sore throat, trouble swallowing and voice change. Eyes: Negative for pain and redness. Respiratory: Negative for cough, shortness of breath and wheezing. Cardiovascular: Negative for chest pain and palpitations. Gastrointestinal: Negative for abdominal pain, constipation, diarrhea, nausea and vomiting. Genitourinary: Negative for dysuria. Musculoskeletal: Negative for neck pain and neck stiffness. Skin: Negative. Neurological: Negative for dizziness, weakness, light-headedness and headaches. Psychiatric/Behavioral: Negative.         Vitals:    11/14/17 1115   BP: (!) 157/96   Pulse: 81   Resp: 16   Temp: 98.2 °F (36.8 °C)   SpO2: 100%   Weight: 93 kg (205 lb)   Height: 5' (1.524 m)            Physical Exam   Constitutional: She is oriented to person, place, and time. She appears well-developed and well-nourished. No distress. HENT:   Head: Normocephalic and atraumatic. Right Ear: Hearing, tympanic membrane, external ear and ear canal normal. Tympanic membrane is not erythematous and not bulging. Left Ear: Tympanic membrane, external ear and ear canal normal. Tympanic membrane is not erythematous and not bulging. Nose: Nose normal. No rhinorrhea or sinus tenderness. Right sinus exhibits no maxillary sinus tenderness and no frontal sinus tenderness. Left sinus exhibits no maxillary sinus tenderness and no frontal sinus tenderness. Mouth/Throat: Uvula is midline, oropharynx is clear and moist and mucous membranes are normal. Abnormal dentition. Dental caries present. No dental abscesses. No oropharyngeal exudate, posterior oropharyngeal edema or posterior oropharyngeal erythema. Dental pain at # 31. Missing multiple teeth. There IS evidence of dental decay. There IS tenderness on palpation. The airway IS patent. NO adjacent mucobuccal soft tissue swelling, fluctuance or gingival bleeding. NO pus/drainage. NO swelling or elevation of the tongue. NO sublingual swelling or TTP. NO facial swelling. NO neck swelling. Eyes: Conjunctivae and EOM are normal. Pupils are equal, round, and reactive to light. Neck: Normal range of motion. Neck supple. Cardiovascular: Normal rate, regular rhythm, normal heart sounds and intact distal pulses. Exam reveals no gallop and no friction rub. No murmur heard. Pulmonary/Chest: Effort normal and breath sounds normal. No respiratory distress. She has no wheezes. She has no rales. Abdominal: Soft. Bowel sounds are normal. There is no tenderness. Musculoskeletal: Normal range of motion. Lymphadenopathy:     She has no cervical adenopathy. Neurological: She is alert and oriented to person, place, and time. Skin: Skin is warm and dry. She is not diaphoretic.    Psychiatric: She has a normal mood and affect. Her behavior is normal. Judgment and thought content normal.   Nursing note and vitals reviewed. MDM  Number of Diagnoses or Management Options  Dentalgia: new and requires workup  Infected dental caries: new and requires workup  Diagnosis management comments: DDx: Odontalgia, Dental caries,  Periodontal disease, Periapical abscess, Trigeminal neuralgia,  space infection, Jose's angina, Retropharyngeal space infection, Infection after root canal, Dry Socket, Acute Necrotizing Ulcerative Gingivitis (ANUG). IMPRESSION AND MEDICAL DECISION MAKING:  Based upon the patients presentation with noted HPI and PE, along with the work up done in the emergency department, I believe that the patient is having a toothache at tooth # 31 due to dental decay. No evidence of abscess at this time. No airway compromise. Patient given non-narcotic pain control medications in the ED. Will discharge to home with antibiotic coverage and symptomatic treatment. Discussed care, f/u and s/s warranting return to ED. Pt understood and agreed to plan. Marcy Craft PA-C     DIAGNOSIS:  Dentalgia  Dental Decay    SPECIFIC PATIENT INSTRUCTIONS FROM THE PROVIDER WHO TREATED YOU IN THE ER TODAY  Finish antibiotics as directed. Rinse your mouth with mouth wash after each meal or more often. Rinse mouth with Peridex as prescribed, once in the morning and once in the evening after brushing your teeth. Take over the counter BC powder or motrin for pain as needed. Return for any concerns, changes in condition, or as needed. Follow-up in 7-10 days with your dentist or one of the provided dental clinics below:  [facility] [phone]  [facility] [phone]  [facility] [phone] [address] [facility] [phone]   [facility] [phone]   [address] .. [phone]   *Cleaning only  [facility] [address]. .. [phone]  *Preventive care only  *No insurance required-cash/check only    Call to schedule an appointment. Pt results have been reviewed with them. They have been counseled regarding diagnosis, treatment, and plan. Pt verbally conveys understanding and agreement of the signs, symptoms, diagnosis, treatment and prognosis and additionally agrees to follow up as discussed. Pt also agrees with the care-plan and conveys that all of their questions have been answered. I have also provided discharge instructions for them that include: educational information regarding their diagnosis and treatment, and list of reasons why they would want to return to the ED prior to their follow-up appointment, should their condition change. Jose Manuel Hudson PA-C          Amount and/or Complexity of Data Reviewed  Review and summarize past medical records: yes  Discuss the patient with other providers: yes    Risk of Complications, Morbidity, and/or Mortality  Presenting problems: low  Diagnostic procedures: low  Management options: low    Patient Progress  Patient progress: stable    ED Course       Procedures    Diagnosis:   1. Dentalgia    2. Infected dental caries          Disposition: Discharge to home. Follow-up Information     Follow up With Details Comments [place] [address] EMERGENCY DEPT  As needed, If symptoms worsen [id number] [address]    Sally White MD Go in 2 days  [address] Go in 3 days  [facility] [address] [id number] [phone]          Patient's Medications   Start Taking    CHLORHEXIDINE (PERIDEX) 0.12 % SOLUTION    15 mL by Swish and Spit route two (2) times a day for 14 days. PENICILLIN V POTASSIUM (VEETID) 500 MG TABLET    Take 1 Tab by mouth four (4) times daily for 7 days.    Continue Taking    ASPIRIN 81 MG CHEWABLE TABLET Take 1 Tab by mouth daily. CARVEDILOL (COREG) 6.25 MG TABLET    Take 1 Tab by mouth every twelve (12) hours. CLOPIDOGREL (PLAVIX) 75 MG TABLET    Take 1 Tab by mouth daily. LISINOPRIL PO    Take  by mouth daily. NITROGLYCERIN (NITROSTAT) 0.4 MG SL TABLET    1 Tab by SubLINGual route every five (5) minutes as needed for Chest Pain. These Medications have changed    No medications on file   Stop Taking    ACETAMINOPHEN-CODEINE (TYLENOL-CODEINE #3) 300-30 MG PER TABLET    Take 1 Tab by mouth every four (4) hours as needed for Pain. Max Daily Amount: 6 Tabs.

## 2018-01-10 ENCOUNTER — APPOINTMENT (OUTPATIENT)
Dept: GENERAL RADIOLOGY | Age: 38
End: 2018-01-10
Attending: PHYSICIAN ASSISTANT
Payer: MEDICAID

## 2018-01-10 ENCOUNTER — HOSPITAL ENCOUNTER (EMERGENCY)
Age: 38
Discharge: HOME | End: 2018-01-10
Attending: EMERGENCY MEDICINE
Payer: MEDICAID

## 2018-01-10 VITALS
TEMPERATURE: 98.2 F | RESPIRATION RATE: 18 BRPM | OXYGEN SATURATION: 99 % | WEIGHT: 200 LBS | SYSTOLIC BLOOD PRESSURE: 146 MMHG | HEART RATE: 84 BPM | DIASTOLIC BLOOD PRESSURE: 81 MMHG | BODY MASS INDEX: 39.27 KG/M2 | HEIGHT: 60 IN

## 2018-01-10 DIAGNOSIS — R10.33 PERIUMBILICAL ABDOMINAL PAIN: Primary | ICD-10-CM

## 2018-01-10 LAB
ALBUMIN SERPL-MCNC: 3.1 G/DL (ref 3.4–5)
ALBUMIN/GLOB SERPL: 0.7 {RATIO} (ref 0.8–1.7)
ALP SERPL-CCNC: 79 U/L (ref 45–117)
ALT SERPL-CCNC: 24 U/L (ref 13–56)
ANION GAP SERPL CALC-SCNC: 7 MMOL/L (ref 3–18)
APPEARANCE UR: CLEAR
AST SERPL-CCNC: 17 U/L (ref 15–37)
BASOPHILS # BLD: 0 K/UL (ref 0–0.06)
BASOPHILS NFR BLD: 0 % (ref 0–2)
BILIRUB SERPL-MCNC: 0.2 MG/DL (ref 0.2–1)
BILIRUB UR QL: NEGATIVE
BUN SERPL-MCNC: 7 MG/DL (ref 7–18)
BUN/CREAT SERPL: 11 (ref 12–20)
CALCIUM SERPL-MCNC: 8.6 MG/DL (ref 8.5–10.1)
CHLORIDE SERPL-SCNC: 104 MMOL/L (ref 100–108)
CO2 SERPL-SCNC: 29 MMOL/L (ref 21–32)
COLOR UR: YELLOW
CREAT SERPL-MCNC: 0.62 MG/DL (ref 0.6–1.3)
DIFFERENTIAL METHOD BLD: ABNORMAL
EOSINOPHIL # BLD: 0.2 K/UL (ref 0–0.4)
EOSINOPHIL NFR BLD: 3 % (ref 0–5)
ERYTHROCYTE [DISTWIDTH] IN BLOOD BY AUTOMATED COUNT: 18.1 % (ref 11.6–14.5)
GLOBULIN SER CALC-MCNC: 4.2 G/DL (ref 2–4)
GLUCOSE SERPL-MCNC: 102 MG/DL (ref 74–99)
GLUCOSE UR STRIP.AUTO-MCNC: NEGATIVE MG/DL
HCG UR QL: NEGATIVE
HCT VFR BLD AUTO: 34.3 % (ref 35–45)
HGB BLD-MCNC: 10.2 G/DL (ref 12–16)
HGB UR QL STRIP: NEGATIVE
KETONES UR QL STRIP.AUTO: NEGATIVE MG/DL
LEUKOCYTE ESTERASE UR QL STRIP.AUTO: NEGATIVE
LYMPHOCYTES # BLD: 1.7 K/UL (ref 0.9–3.6)
LYMPHOCYTES NFR BLD: 21 % (ref 21–52)
MCH RBC QN AUTO: 22 PG (ref 24–34)
MCHC RBC AUTO-ENTMCNC: 29.7 G/DL (ref 31–37)
MCV RBC AUTO: 74.1 FL (ref 74–97)
MONOCYTES # BLD: 0.6 K/UL (ref 0.05–1.2)
MONOCYTES NFR BLD: 7 % (ref 3–10)
NEUTS SEG # BLD: 5.5 K/UL (ref 1.8–8)
NEUTS SEG NFR BLD: 69 % (ref 40–73)
NITRITE UR QL STRIP.AUTO: NEGATIVE
PH UR STRIP: 6 [PH] (ref 5–8)
PLATELET # BLD AUTO: 335 K/UL (ref 135–420)
PLATELET COMMENTS,PCOM: ABNORMAL
PMV BLD AUTO: 10.2 FL (ref 9.2–11.8)
POTASSIUM SERPL-SCNC: 3.8 MMOL/L (ref 3.5–5.5)
PROT SERPL-MCNC: 7.3 G/DL (ref 6.4–8.2)
PROT UR STRIP-MCNC: NEGATIVE MG/DL
RBC # BLD AUTO: 4.63 M/UL (ref 4.2–5.3)
RBC MORPH BLD: ABNORMAL
RBC MORPH BLD: ABNORMAL
SODIUM SERPL-SCNC: 140 MMOL/L (ref 136–145)
SP GR UR REFRACTOMETRY: 1.02 (ref 1–1.03)
UROBILINOGEN UR QL STRIP.AUTO: 1 EU/DL (ref 0.2–1)
WBC # BLD AUTO: 8 K/UL (ref 4.6–13.2)

## 2018-01-10 PROCEDURE — 99283 EMERGENCY DEPT VISIT LOW MDM: CPT

## 2018-01-10 PROCEDURE — 81003 URINALYSIS AUTO W/O SCOPE: CPT

## 2018-01-10 PROCEDURE — 85025 COMPLETE CBC W/AUTO DIFF WBC: CPT

## 2018-01-10 PROCEDURE — 81025 URINE PREGNANCY TEST: CPT

## 2018-01-10 PROCEDURE — 74022 RADEX COMPL AQT ABD SERIES: CPT

## 2018-01-10 PROCEDURE — 80053 COMPREHEN METABOLIC PANEL: CPT

## 2018-01-10 NOTE — ED TRIAGE NOTES
\"I think my diverticulitis is flaring up again\". Patient states she has had abdominal cramping and gas x 2 days.  BM has been normal

## 2018-01-10 NOTE — ED PROVIDER NOTES
EMERGENCY DEPARTMENT HISTORY AND PHYSICAL EXAM    10:25 AM      Date: 1/10/2018  Patient Name: Chante Weston    History of Presenting Illness     Chief Complaint   Patient presents with    Abdominal Cramping    Gas         History Provided By: Patient    Chief Complaint: abdominal discomfort mid region \"cramping\" x 3 days. Duration:  as noted above   Timing:  Gradual  Location: as noted above   Quality: Cramping  Severity: 6 out of 10  Modifying Factors: NONE  Associated Symptoms: NONE       Additional History (Context): Chante Weston is a 42 y.o. female with hx of MI and HTN who presents with abdominal cramping x 3 days gradual onset intermittent but denies of any fever, chills, n/v diarrhea, chest pain, SOB , dysuria, vaginal discharge or bleeding. LMP Dec 28 th, [place]. Denies being on any new medication over the past 1 week. PCP: Bhavik Renee MD    Current Outpatient Prescriptions   Medication Sig Dispense Refill    aspirin 81 mg chewable tablet Take 1 Tab by mouth daily. 90 Tab 3    LISINOPRIL PO Take  by mouth daily.  carvedilol (COREG) 6.25 mg tablet Take 1 Tab by mouth every twelve (12) hours. 180 Tab 3    clopidogrel (PLAVIX) 75 mg tablet Take 1 Tab by mouth daily. 90 Tab 3    nitroglycerin (NITROSTAT) 0.4 mg SL tablet 1 Tab by SubLINGual route every five (5) minutes as needed for Chest Pain. 25 Tab 3       Past History     Past Medical History:  Past Medical History:   Diagnosis Date    Cardiac echocardiogram 07/21/2016    Sm LV cavity. EF 65-70%. No RWMA. Mod-marked LVH w/dynamic obstruction, mid-cavity obliteration & peak grad of 25 mmHg. Indeterminate diastolic fx. No significant valvular pathology.  Cardiac nuclear imaging test 07/22/2016    Low risk. Very sm distal anterior & apical partially reversible defect, likely artifact, but sm area of ischemia not excluded. No RWMA. EF 66%.   Neg EKG on pharm stress test.    Diverticulitis     GERD (gastroesophageal reflux disease)     Hypertension     Infectious disease     Bacteria vaginosis    Non-ST elevated myocardial infarction [facility])        Past Surgical History:  Past Surgical History:   Procedure Laterality Date    HX  SECTION  2008    HX ORTHOPAEDIC      HX TUBAL LIGATION  2008       Family History:  Family History   Problem Relation Age of Onset    Depression Mother     Asthma Mother    [facility] Migraines Mother     Hypertension Mother    [facility] Stroke Mother     Heart Attack Mother     Arthritis-osteo Mother     Depression Brother     Alcohol abuse Father     Substance Abuse Father      tobacco    Drug Abuse Father     Hypertension Father     High Cholesterol Father     Stroke Father     Rashes/Skin Problems Father     Substance Abuse Brother      tobacco    Drug Abuse Brother     Asthma Brother     Migraines Brother     Hypertension Brother     High Cholesterol Brother     Hypertension Paternal Grandmother     Diabetes Paternal Grandmother     Hypertension Maternal Grandmother     Colon Cancer Maternal Grandmother        Social History:  Social History   Substance Use Topics    Smoking status: Current Every Day Smoker     Packs/day: 0.25     Years: 20.00    Smokeless tobacco: Never Used    Alcohol use Yes      Comment: rare/holidays       Allergies: Allergies   Allergen Reactions    Vaccine Adjuvant Emulsion Combination No. 1 Seizures     MMR         Review of Systems       Review of Systems   Gastrointestinal: Positive for abdominal distention. All other systems reviewed and are negative. Physical Exam     Visit Vitals    /81 (BP 1 Location: Left arm, BP Patient Position: At rest)    Pulse 84    Temp 98.2 °F (36.8 °C)    Resp 18    Ht 5' (1.524 m)    Wt 90.7 kg (200 lb)    SpO2 99%    BMI 39.06 kg/m2         Physical Exam   Constitutional: She is oriented to person, place, and time. She appears well-developed and well-nourished. No distress.    HENT: Head: Normocephalic and atraumatic. Eyes: Conjunctivae and EOM are normal. Pupils are equal, round, and reactive to light. Neck: Normal range of motion. Cardiovascular: Normal rate, regular rhythm and normal heart sounds. Pulmonary/Chest: Effort normal and breath sounds normal. No respiratory distress. She has no wheezes. She has no rales. She exhibits no tenderness. Abdominal: Soft. Bowel sounds are normal. She exhibits no distension. There is no rebound and no guarding. Mild tenderness noted over the umbilical region but no guarding    No mc soco point tenderness or shields sign noted. Musculoskeletal: Normal range of motion. Neurological: She is alert and oriented to person, place, and time. Skin: Skin is warm. She is not diaphoretic. Psychiatric: She has a normal mood and affect.  Her behavior is normal. Judgment and thought content normal.         Diagnostic Study Results     Labs -  Recent Results (from the past 12 hour(s))   URINALYSIS W/ RFLX MICROSCOPIC    Collection Time: 01/10/18 10:40 AM   Result Value Ref Range    Color YELLOW      Appearance CLEAR      Specific gravity 1.018 1.005 - 1.030      pH (UA) 6.0 5.0 - 8.0      Protein NEGATIVE  NEG mg/dL    Glucose NEGATIVE  NEG mg/dL    Ketone NEGATIVE  NEG mg/dL    Bilirubin NEGATIVE  NEG      Blood NEGATIVE  NEG      Urobilinogen 1.0 0.2 - 1.0 EU/dL    Nitrites NEGATIVE  NEG      Leukocyte Esterase NEGATIVE  NEG     HCG URINE, QL    Collection Time: 01/10/18 10:40 AM   Result Value Ref Range    HCG urine, Ql. NEGATIVE  NEG     CBC WITH AUTOMATED DIFF    Collection Time: 01/10/18 10:55 AM   Result Value Ref Range    WBC 8.0 4.6 - 13.2 K/uL    RBC 4.63 4.20 - 5.30 M/uL    HGB 10.2 (L) 12.0 - 16.0 g/dL    HCT 34.3 (L) 35.0 - 45.0 %    MCV 74.1 74.0 - 97.0 FL    MCH 22.0 (L) 24.0 - 34.0 PG    MCHC 29.7 (L) 31.0 - 37.0 g/dL    RDW 18.1 (H) 11.6 - 14.5 %    PLATELET 463 681 - 465 K/uL    MPV 10.2 9.2 - 11.8 FL    NEUTROPHILS 69 40 - 73 % LYMPHOCYTES 21 21 - 52 %    MONOCYTES 7 3 - 10 %    EOSINOPHILS 3 0 - 5 %    BASOPHILS 0 0 - 2 %    ABS. NEUTROPHILS 5.5 1.8 - 8.0 K/UL    ABS. LYMPHOCYTES 1.7 0.9 - 3.6 K/UL    ABS. MONOCYTES 0.6 0.05 - 1.2 K/UL    ABS. EOSINOPHILS 0.2 0.0 - 0.4 K/UL    ABS. BASOPHILS 0.0 0.0 - 0.06 K/UL    DF SMEAR SCANNED      PLATELET COMMENTS ADEQUATE PLATELETS      RBC COMMENTS ANISOCYTOSIS  1+        RBC COMMENTS OVALOCYTES  1+       METABOLIC PANEL, COMPREHENSIVE    Collection Time: 01/10/18 10:55 AM   Result Value Ref Range    Sodium 140 136 - 145 mmol/L    Potassium 3.8 3.5 - 5.5 mmol/L    Chloride 104 100 - 108 mmol/L    CO2 29 21 - 32 mmol/L    Anion gap 7 3.0 - 18 mmol/L    Glucose 102 (H) 74 - 99 mg/dL    BUN 7 7.0 - 18 MG/DL    Creatinine 0.62 0.6 - 1.3 MG/DL    BUN/Creatinine ratio 11 (L) 12 - 20      GFR est AA >60 >60 ml/min/1.73m2    GFR est non-AA >60 >60 ml/min/1.73m2    Calcium 8.6 8.5 - 10.1 MG/DL    Bilirubin, total 0.2 0.2 - 1.0 MG/DL    ALT (SGPT) 24 13 - 56 U/L    AST (SGOT) 17 15 - 37 U/L    Alk. phosphatase 79 45 - 117 U/L    Protein, total 7.3 6.4 - 8.2 g/dL    Albumin 3.1 (L) 3.4 - 5.0 g/dL    Globulin 4.2 (H) 2.0 - 4.0 g/dL    A-G Ratio 0.7 (L) 0.8 - 1.7       Radiologic Studies -   XR ABD ACUTE W 1 V CHEST   Final Result            Medical Decision Making   I am the first provider for this patient. I reviewed the vital signs, available nursing notes, past medical history, past surgical history, family history and social history. Vital Signs-Reviewed the patient's vital signs. Provider Notes (Medical Decision Making):     Updated patient on the labs and finding. Will discharge. Diagnosis     Clinical Impression:   1.  Periumbilical abdominal pain        Disposition: HOME    Follow-up Information     Follow up With Details Em MD Juju In 1 day  [facility] [address]  [address] [facility]      [id number] [place] [address] EMERGENCY DEPT  As needed [address] [facility]  [phone]           Discharge Medication List as of 1/10/2018 12:25 PM      CONTINUE these medications which have NOT CHANGED    Details   aspirin 81 mg chewable tablet Take 1 Tab by mouth daily. , Normal, Disp-90 Tab, R-3      LISINOPRIL PO Take  by mouth daily. , Historical Med      carvedilol (COREG) 6.25 mg tablet Take 1 Tab by mouth every twelve (12) hours. , Normal, Disp-180 Tab, R-3      clopidogrel (PLAVIX) 75 mg tablet Take 1 Tab by mouth daily. , Normal, Disp-90 Tab, R-3      nitroglycerin (NITROSTAT) 0.4 mg SL tablet 1 Tab by SubLINGual route every five (5) minutes as needed for Chest Pain., Normal, Disp-25 Tab, R-3

## 2018-03-18 ENCOUNTER — APPOINTMENT (OUTPATIENT)
Dept: GENERAL RADIOLOGY | Age: 38
End: 2018-03-18
Attending: EMERGENCY MEDICINE
Payer: MEDICAID

## 2018-03-18 ENCOUNTER — HOSPITAL ENCOUNTER (EMERGENCY)
Age: 38
Discharge: HOME | End: 2018-03-18
Attending: EMERGENCY MEDICINE
Payer: MEDICAID

## 2018-03-18 VITALS
TEMPERATURE: 98.3 F | DIASTOLIC BLOOD PRESSURE: 65 MMHG | OXYGEN SATURATION: 100 % | RESPIRATION RATE: 18 BRPM | BODY MASS INDEX: 40.25 KG/M2 | HEIGHT: 60 IN | SYSTOLIC BLOOD PRESSURE: 124 MMHG | HEART RATE: 85 BPM | WEIGHT: 205 LBS

## 2018-03-18 DIAGNOSIS — S99.922A INJURY OF LEFT FOOT, INITIAL ENCOUNTER: ICD-10-CM

## 2018-03-18 DIAGNOSIS — G58.9 MONONEUROPATHY: Primary | ICD-10-CM

## 2018-03-18 PROCEDURE — 73630 X-RAY EXAM OF FOOT: CPT

## 2018-03-18 PROCEDURE — 99282 EMERGENCY DEPT VISIT SF MDM: CPT

## 2018-03-18 RX ORDER — GABAPENTIN 100 MG/1
100 CAPSULE ORAL 2 TIMES DAILY
Qty: 30 CAP | Refills: 0 | Status: SHIPPED | OUTPATIENT
Start: 2018-03-18 | End: 2018-04-26

## 2018-03-19 NOTE — ED PROVIDER NOTES
EMERGENCY DEPARTMENT HISTORY AND PHYSICAL EXAM    10:49 PM      Date: 3/18/2018  Patient Name: Bassam Menjivar    History of Presenting Illness     Chief Complaint   Patient presents with    Foot Injury     left         History Provided By: Patient    Chief Complaint: Foot Injury; Foot Pain   Duration:  Months  Timing:  Worsening  Location: Left foot   Quality: N/A  Severity: N/A  Modifying Factors: Unable to bear weight    Associated Symptoms: denies any other associated signs or symptoms      Additional History (Context): Bassam Menjivar is a 42 y.o. female presenting to the ED c/o worsening left foot pain after she dropped a clock on her left foot 1.5 months ago. States she is now unable to bear weight on her left foot. Denies any other symptoms or complaints. PCP: Mino Scales MD    Current Outpatient Prescriptions   Medication Sig Dispense Refill    gabapentin (NEURONTIN) 100 mg capsule Take 1 Cap by mouth two (2) times a day. 30 Cap 0    aspirin 81 mg chewable tablet Take 1 Tab by mouth daily. 90 Tab 3    carvedilol (COREG) 6.25 mg tablet Take 1 Tab by mouth every twelve (12) hours. 180 Tab 3    clopidogrel (PLAVIX) 75 mg tablet Take 1 Tab by mouth daily. 90 Tab 3    nitroglycerin (NITROSTAT) 0.4 mg SL tablet 1 Tab by SubLINGual route every five (5) minutes as needed for Chest Pain. 25 Tab 3       Past History     Past Medical History:  Past Medical History:   Diagnosis Date    Cardiac echocardiogram 07/21/2016    Sm LV cavity. EF 65-70%. No RWMA. Mod-marked LVH w/dynamic obstruction, mid-cavity obliteration & peak grad of 25 mmHg. Indeterminate diastolic fx. No significant valvular pathology.  Cardiac nuclear imaging test 07/22/2016    Low risk. Very sm distal anterior & apical partially reversible defect, likely artifact, but sm area of ischemia not excluded. No RWMA. EF 66%.   Neg EKG on pharm stress test.    Diverticulitis     GERD (gastroesophageal reflux disease)     Hypertension     Infectious disease     Bacteria vaginosis    Non-ST elevated myocardial infarction ([facility] [place] [address]     STEMI (ST elevation myocardial infarction) ([facility] [place] [address]        Past Surgical History:  Past Surgical History:   Procedure Laterality Date    HX  SECTION  2008    HX CORONARY STENT PLACEMENT      HX ORTHOPAEDIC      HX TUBAL LIGATION  2008       Family History:  Family History   Problem Relation Age of Onset    Depression Mother     Asthma Mother    [facility] Migraines Mother     Hypertension Mother    [facility] Stroke Mother     Heart Attack Mother     Arthritis-osteo Mother     Depression Brother     Alcohol abuse Father     Substance Abuse Father      tobacco    Drug Abuse Father     Hypertension Father     High Cholesterol Father     Stroke Father     Rashes/Skin Problems Father     Substance Abuse Brother      tobacco    Drug Abuse Brother     Asthma Brother     Migraines Brother     Hypertension Brother     High Cholesterol Brother     Hypertension Paternal Grandmother     Diabetes Paternal Grandmother     Hypertension Maternal Grandmother     Colon Cancer Maternal Grandmother        Social History:  Social History   Substance Use Topics    Smoking status: Current Every Day Smoker     Packs/day: 0.25     Years: 20.00    Smokeless tobacco: Never Used    Alcohol use Yes      Comment: rare/holidays       Allergies: Allergies   Allergen Reactions    Vaccine Adjuvant Emulsion Combination No. 1 Seizures     MMR         Review of Systems       Review of Systems   Constitutional: Negative. Negative for fever. HENT: Negative. Eyes: Negative. Respiratory: Negative. Cardiovascular: Negative. Gastrointestinal: Negative. Endocrine: Negative. Genitourinary: Negative. Musculoskeletal:        + Left foot pain   Skin: Negative. Allergic/Immunologic: Negative. Neurological: Negative. Hematological: Negative. Psychiatric/Behavioral: Negative.     All other systems reviewed and are negative. Physical Exam     Visit Vitals    /65 (BP 1 Location: Left arm, BP Patient Position: At rest)    Pulse 85    Temp 98.3 °F (36.8 °C)    Resp 18    Ht 5' (1.524 m)    Wt 93 kg (205 lb)    LMP 03/11/2018    SpO2 100%    BMI 40.04 kg/m2         Physical Exam   Constitutional: She is oriented to person, place, and time. She appears well-developed and well-nourished. No distress. HENT:   Head: Normocephalic. Right Ear: External ear normal.   Left Ear: External ear normal.   Mouth/Throat: No oropharyngeal exudate. Eyes: Conjunctivae and EOM are normal. Pupils are equal, round, and reactive to light. Right eye exhibits no discharge. Left eye exhibits no discharge. No scleral icterus. Neck: Normal range of motion. Neck supple. No JVD present. No tracheal deviation present. No thyromegaly present. Cardiovascular: Normal rate, regular rhythm, normal heart sounds and intact distal pulses. Exam reveals no gallop and no friction rub. No murmur heard. Pulmonary/Chest: Effort normal and breath sounds normal. No stridor. No respiratory distress. She has no wheezes. She has no rales. She exhibits no tenderness. Abdominal: Soft. Bowel sounds are normal. She exhibits no distension and no mass. There is no tenderness. There is no rebound and no guarding. Musculoskeletal: Normal range of motion. She exhibits no edema. Tenderness over dorsal surface of left foot. No erythema. No edema. Normal pulse and sensory. Lymphadenopathy:     She has no cervical adenopathy. Neurological: She is alert and oriented to person, place, and time. She displays normal reflexes. No cranial nerve deficit. She exhibits normal muscle tone. Coordination normal.   Skin: Skin is warm and dry. No rash noted. She is not diaphoretic. No erythema. No pallor. Nursing note and vitals reviewed.         Diagnostic Study Results     Labs -  No results found for this or any previous visit (from the past 12 hour(s)). Radiologic Studies -   XR FOOT LT MIN 3 V    (Results Pending)   10:52 PM No fracture. Interpreted by Gagandeep Montague MD       Medical Decision Making   I am the first provider for this patient. I reviewed the vital signs, available nursing notes, past medical history, past surgical history, family history and social history. Vital Signs-Reviewed the patient's vital signs. Pulse Oximetry Analysis -  100% on room air, normal     Records Reviewed: Nursing Notes (Time of Review: 10:49 PM)    ED Course: Progress Notes, Reevaluation, and Consults:  Patient remained stable'    Provider Notes (Medical Decision Making): neuropathy, contusion, fracture    Diagnosis     Clinical Impression:   1. Mononeuropathy    2. Injury of left foot, initial encounter        Disposition: Discharged     Follow-up Information     Follow up With Details Comments Peter Albrecht MD Call in 2 days For follow up  [address]  [facility]      [id number] [place] [address] EMERGENCY DEPT  As needed, If symptoms worsen [address] [place] [address] [place] [phone]           Patient's Medications   Start Taking    GABAPENTIN (NEURONTIN) 100 MG CAPSULE    Take 1 Cap by mouth two (2) times a day. Continue Taking    ASPIRIN 81 MG CHEWABLE TABLET    Take 1 Tab by mouth daily. CARVEDILOL (COREG) 6.25 MG TABLET    Take 1 Tab by mouth every twelve (12) hours. CLOPIDOGREL (PLAVIX) 75 MG TABLET    Take 1 Tab by mouth daily. NITROGLYCERIN (NITROSTAT) 0.4 MG SL TABLET    1 Tab by SubLINGual route every five (5) minutes as needed for Chest Pain. These Medications have changed    No medications on file   Stop Taking    LISINOPRIL PO    Take  by mouth daily.      _______________________________    Attestations:  Scribe Attestation     Mike Cintron acting as a scribe for and in the presence of Gagandeep Montague MD      March 18, 2018 at 10:52 PM       Provider Attestation:      I personally performed the services described in the documentation, reviewed the documentation, as recorded by the scribe in my presence, and it accurately and completely records my words and actions.  March 18, 2018 at 10:52 PM - Mellisa Lange MD    _______________________________

## 2018-03-19 NOTE — ED NOTES
Chante Weston is a 42 y.o. female that was discharged in stable condition. The patients diagnosis, condition and treatment were explained to  patient and aftercare instructions were given. The patient verbalized understanding. Patient armband removed and shredded.

## 2018-03-19 NOTE — ED TRIAGE NOTES
Patient states she dropped an clock on left foot 1.5 months ago and feels as though pain and swelling not improving and needs an xray

## 2018-03-19 NOTE — DISCHARGE INSTRUCTIONS
Neuropathic Pain: Care Instructions  Your Care Instructions    Neuropathic pain is caused by pressure on or damage to your nerves. It's often simply called nerve pain. Some people feel this type of pain all the time. For others, it comes and goes. Diabetes, shingles, or an injury can cause nerve pain. Many people say the pain feels sharp, burning, or stabbing. But some people feel it as a dull ache. In some cases, it makes your skin very sensitive. So touch, pressure, and other sensations that did not hurt before may now cause pain. It's important to know that this kind of pain is real and can affect your quality of life. It's also important to know that treatment can help. Treatment includes pain medicines, exercise, and physical therapy. Medicines can help reduce the number of pain signals that travel over the nerves. This can make the painful areas less sensitive. It can also help you sleep better and improve your mood. But medicines are only one part of successful treatment. Most people do best with more than one kind of treatment. Your doctor may recommend that you try cognitive-behavioral therapy and stress management. Or, if needed, you may decide to try to quit smoking, lower your blood pressure, or better control blood sugar. These kinds of healthy changes can also make a difference. If you feel that your treatment is not working, talk to your doctor. And be sure to tell your doctor if you think you might be depressed or anxious. These are common problems that can also be treated. Follow-up care is a key part of your treatment and safety. Be sure to make and go to all appointments, and call your doctor if you are having problems. It's also a good idea to know your test results and keep a list of the medicines you take. How can you care for yourself at home? · Be safe with medicines. Read and follow all instructions on the label.   ¨ If the doctor gave you a prescription medicine for pain, take it as prescribed. ¨ If you are not taking a prescription pain medicine, ask your doctor if you can take an over-the-counter medicine. · Save hard tasks for days when you have less pain. Follow a hard task with an easy task. And remember to take breaks. · Relax, and reduce stress. You may want to try deep breathing or meditation. These can help. · Keep moving. Gentle, daily exercise can help reduce pain. Your doctor or physical therapist can tell you what type of exercise is best for you. This may include walking, swimming, and stationary biking. It may also include stretches and range-of-motion exercises. · Try heat, cold packs, and massage. · Get enough sleep. Constant pain can make you more tired. If the pain makes it hard to sleep, talk with your doctor. · Think positively. Your thoughts can affect your pain. Do fun things to distract yourself from the pain. See a movie, read a book, listen to music, or spend time with a friend. · Keep a pain diary. Try to write down how strong your pain is and what it feels like. Also try to notice and write down how your moods, thoughts, sleep, activities, and medicine affect your pain. These notes can help you and your doctor find the best ways to treat your pain. Reducing constipation caused by pain medicine  Pain medicines often cause constipation. To reduce constipation:  · Include fruits, vegetables, beans, and whole grains in your diet each day. These foods are high in fiber. · Drink plenty of fluids, enough so that your urine is light yellow or clear like water. If you have kidney, heart, or liver disease and have to limit fluids, talk with your doctor before you increase the amount of fluids you drink. · Get some exercise every day. Build up slowly to 30 to 60 minutes a day on 5 or more days of the week. · Take a fiber supplement, such as Citrucel or Metamucil, every day if needed. Read and follow all instructions on the label.   · Schedule time each day for a bowel movement. Having a daily routine may help. Take your time and do not strain when having a bowel movement. · Ask your doctor about a laxative. The goal is to have one easy bowel movement every 1 to 2 days. Do not let constipation go untreated for more than 3 days. When should you call for help? Call your doctor now or seek immediate medical care if:  ? · You feel sad, anxious, or hopeless for more than a few days. This could mean you are depressed. Depression is common in people who have a lot of pain. But it can be treated. ? · You have trouble with bowel movements, such as:  ¨ No bowel movement in 3 days. ¨ Blood in the anal area, in your stool, or on the toilet paper. ¨ Diarrhea for more than 24 hours. ? Watch closely for changes in your health, and be sure to contact your doctor if:  ? · Your pain is getting worse. ? · You can't sleep because of pain. ? · You are very worried or anxious about your pain. ? · You have trouble taking your pain medicine. ? · You have any concerns about your pain medicine or its side effects. ? · You have vomiting or cramps for more than 2 hours. Where can you learn more? Go to http://kim-tristin.info/. Enter D646 in the search box to learn more about \"Neuropathic Pain: Care Instructions. \"  Current as of: October 14, 2016  Content Version: 11.4  © 0649-3313 [employer]. Care instructions adapted under license by [employer] (which disclaims liability or warranty for this information). If you have questions about a medical condition or this instruction, always ask your healthcare professional. [place] [address] any warranty or liability for your use of this information.

## 2018-04-26 ENCOUNTER — OFFICE VISIT (OUTPATIENT)
Dept: FAMILY MEDICINE CLINIC | Age: 38
End: 2018-04-26

## 2018-04-26 ENCOUNTER — TELEPHONE (OUTPATIENT)
Dept: FAMILY MEDICINE CLINIC | Age: 38
End: 2018-04-26

## 2018-04-26 VITALS
WEIGHT: 210 LBS | HEART RATE: 76 BPM | HEIGHT: 60 IN | DIASTOLIC BLOOD PRESSURE: 78 MMHG | OXYGEN SATURATION: 100 % | TEMPERATURE: 98.1 F | SYSTOLIC BLOOD PRESSURE: 124 MMHG | RESPIRATION RATE: 16 BRPM | BODY MASS INDEX: 41.23 KG/M2

## 2018-04-26 DIAGNOSIS — D64.9 NORMOCYTIC ANEMIA: ICD-10-CM

## 2018-04-26 DIAGNOSIS — R73.03 PREDIABETES: ICD-10-CM

## 2018-04-26 DIAGNOSIS — Z72.0 TOBACCO USE: ICD-10-CM

## 2018-04-26 DIAGNOSIS — I10 ESSENTIAL HYPERTENSION: ICD-10-CM

## 2018-04-26 DIAGNOSIS — I25.118 CORONARY ARTERY DISEASE OF NATIVE ARTERY OF NATIVE HEART WITH STABLE ANGINA PECTORIS (HCC): ICD-10-CM

## 2018-04-26 DIAGNOSIS — I21.3 ST ELEVATION MYOCARDIAL INFARCTION (STEMI), UNSPECIFIED ARTERY (HCC): Primary | ICD-10-CM

## 2018-04-26 PROBLEM — E66.01 OBESITY, MORBID (HCC): Status: ACTIVE | Noted: 2018-04-26

## 2018-04-26 RX ORDER — AMLODIPINE BESYLATE 5 MG/1
5 TABLET ORAL
COMMUNITY
Start: 2018-01-30 | End: 2018-11-21 | Stop reason: SDUPTHER

## 2018-04-26 RX ORDER — METOPROLOL SUCCINATE 50 MG/1
25 TABLET, EXTENDED RELEASE ORAL DAILY
COMMUNITY
Start: 2018-03-22 | End: 2018-12-14

## 2018-04-26 RX ORDER — NITROGLYCERIN 0.4 MG/1
0.4 TABLET SUBLINGUAL
COMMUNITY
Start: 2016-07-22

## 2018-04-26 RX ORDER — SIMVASTATIN 40 MG/1
40 TABLET, FILM COATED ORAL
COMMUNITY
Start: 2018-01-29 | End: 2018-12-14

## 2018-04-26 RX ORDER — GUAIFENESIN 100 MG/5ML
81 LIQUID (ML) ORAL DAILY
COMMUNITY
Start: 2018-01-30 | End: 2022-10-05 | Stop reason: SDUPTHER

## 2018-04-26 RX ORDER — CLOPIDOGREL BISULFATE 75 MG/1
75 TABLET ORAL
COMMUNITY
Start: 2018-01-30 | End: 2019-12-30

## 2018-04-26 NOTE — TELEPHONE ENCOUNTER
Pt called and stated she didn't received her script for her muscle relaxer as discussed. Please advise.

## 2018-04-26 NOTE — PROGRESS NOTES
Danae Painter, 42 y.o.,  female    SUBJECTIVE  CAD    Have not seen pt for > 2 years    Presented with vomiting, chest pain, found to have STEMI s/p PCI 1/18 did not get to pursue cardiac rehab. Following dr. Napoleon Ramirez. Denies CP, sob, palpitations. Continues to smoke 1/2  ppd. HTN- says better with med compliance    H/o prediabetes    ROS:  See HPI, all others negative        Patient Active Problem List   Diagnosis Code    Tobacco use Z72.0    Obesity E66.9    Hypertension I10    Prediabetes R73.03    Low HDL (under 40) E78.6    Obesity, morbid (HCC) E66.01    ST elevation myocardial infarction (STEMI) (HCC) I21.3       Current Outpatient Prescriptions   Medication Sig Dispense Refill    clopidogrel (PLAVIX) 75 mg tab 75 mg.      amLODIPine (NORVASC) 5 mg tablet 5 mg.  metoprolol succinate (TOPROL-XL) 50 mg XL tablet 25 mg daily.  simvastatin (ZOCOR) 40 mg tablet 40 mg.      aspirin 81 mg chewable tablet 81 mg.      nitroglycerin (NITROSTAT) 0.4 mg SL tablet 0.4 mg. Allergies   Allergen Reactions    Lisinopril Cough    Vaccine Adjuvant Emulsion Combination No. 1 Seizures     MMR       Past Medical History:   Diagnosis Date    Cardiac echocardiogram 07/21/2016    Sm LV cavity. EF 65-70%. No RWMA. Mod-marked LVH w/dynamic obstruction, mid-cavity obliteration & peak grad of 25 mmHg. Indeterminate diastolic fx. No significant valvular pathology.  Cardiac nuclear imaging test 07/22/2016    Low risk. Very sm distal anterior & apical partially reversible defect, likely artifact, but sm area of ischemia not excluded. No RWMA. EF 66%.   Neg EKG on pharm stress test.    Diverticulitis     GERD (gastroesophageal reflux disease)     Hypertension     Infectious disease     Bacteria vaginosis    Non-ST elevated myocardial infarction [facility])     STEMI (ST elevation myocardial infarction) ([facility] [place] [address] 01/2018       Social History     Social History    Marital status: SINGLE     Spouse name: N/A    Number of children: N/A    Years of education: N/A     Occupational History    Not on file. Social History Main Topics    Smoking status: Current Every Day Smoker     Packs/day: 0.25     Years: 20.00    Smokeless tobacco: Never Used    Alcohol use Yes      Comment: rare/holidays    Drug use: No    Sexual activity: Yes     Partners: Male     Birth control/ protection: Surgical      Comment: tubal ligation     Other Topics Concern    Not on file     Social History Narrative       Family History   Problem Relation Age of Onset   [facility] Depression Mother     Asthma Mother    [facility] Migraines Mother     Hypertension Mother     Stroke Mother     Heart Attack Mother     Arthritis-osteo Mother     Depression Brother     Alcohol abuse Father     Substance Abuse Father      tobacco    Drug Abuse Father     Hypertension Father     High Cholesterol Father     Stroke Father     Rashes/Skin Problems Father     Substance Abuse Brother      tobacco    Drug Abuse Brother     Asthma Brother     Migraines Brother     Hypertension Brother     High Cholesterol Brother     Hypertension Paternal Grandmother     Diabetes Paternal Grandmother     Hypertension Maternal Grandmother     Colon Cancer Maternal Grandmother          OBJECTIVE    Physical Exam:     Visit Vitals    /78 (BP 1 Location: Left arm, BP Patient Position: Sitting)    Pulse 76    Temp 98.1 °F (36.7 °C) (Oral)    Resp 16    Ht 5' (1.524 m)    Wt 210 lb (95.3 kg)    SpO2 100%    BMI 41.01 kg/m2       General: alert, well-appearing, obese,AA, in no apparent distress or pain  Head: atraumatic.  Non-tender maxillary and frontal sinuses  Neck: supple, no adenopathy palpated  CVS: normal rate, regular rhythm, distinct S1 and S2  Lungs:clear to ausculation bilaterally, no crackles, wheezing or rhonchi noted  Abdomen: normoactive bowel sounds, soft, non-tender  Extremities: no edema, no cyanosis,  Skin: warm, no lesions, rashes noted  Psych:  mood and affect normal        ASSESSMENT/PLAN  Diagnoses and all orders for this visit:    1. ST elevation myocardial infarction (STEMI), unspecified artery (HCC)  S/p stent  Asymptomatic  Cont BB, nitrates, ASA, plavix  Following dr. Parsi Chinchilla smoking cessation, weight loss    2. BMI 40.0-44.9, adult ([facility])  Encouraged wt loss    3. Prediabetes  -     CBC WITH AUTOMATED DIFF; Future  -     HEMOGLOBIN A1C W/O EAG; Future    4. Tobacco use  Counseled on cessation    5. Essential hypertension  Controlled, cont BB  -     CBC WITH AUTOMATED DIFF; Future  -     METABOLIC PANEL, COMPREHENSIVE; Future  -     LIPID PANEL; Future    6. Normocytic anemia  -     CBC WITH AUTOMATED DIFF; Future  -     IRON PROFILE; Future  -     FERRITIN; Future  -     VITAMIN B12 & FOLATE; Future    7. Coronary artery disease of native artery of native heart with stable angina pectoris ([facility] [address]        Follow-up Disposition:  Return in about 2 weeks (around 5/10/2018), or if symptoms worsen or fail to improve. Patient understands plan of care. Patient has provided input and agrees with goals.

## 2018-04-26 NOTE — PATIENT INSTRUCTIONS

## 2018-04-26 NOTE — MR AVS SNAPSHOT
[address] [place] [address] 
[phone] Patient: Jessica Carvalho MRN: [id number] Visit Information Date & Time Provider Department Dept. Phone Encounter #  
 4/26/2018  1:45 PM Kristi Rodriguez, [address] [id number] Follow-up Instructions Return in about 2 weeks (around 5/10/2018), or if symptoms worsen or fail to improve. Upcoming Health Maintenance Date Due Influenza Age 5 to Adult 8/1/2017 PAP AKA CERVICAL CYTOLOGY 9/1/2017 DTaP/Tdap/Td series (2 - Td) 4/1/2021 Allergies as of 4/26/2018  Review Complete On: 4/26/2018 By: Kristi Rodriguez MD  
  
 Severity Noted Reaction Type Reactions Lisinopril  04/26/2018    Cough Vaccine Adjuvant Emulsion Combination No. 1  12/23/2012    Seizures MMR Current Immunizations  Never Reviewed Name Date Tdap 4/1/2011 Not reviewed this visit You Were Diagnosed With   
  
 Codes Comments ST elevation myocardial infarction (STEMI), unspecified artery ([facility] [address]    -  Primary ICD-10-CM: I21.3 ICD-9-CM: 410.90 BMI 40.0-44.9, adult [facility])     ICD-10-CM: Z68.41 
ICD-9-CM: V85.41 Prediabetes     ICD-10-CM: R73.03 
ICD-9-CM: 790.29 Tobacco use     ICD-10-CM: Z72.0 ICD-9-CM: 305.1 Essential hypertension     ICD-10-CM: I10 
ICD-9-CM: 401.9 Normocytic anemia     ICD-10-CM: D64.9 ICD-9-CM: 294. 9 Vitals BP Pulse Temp Resp Height(growth percentile) Weight(growth percentile) 124/78 (BP 1 Location: Left arm, BP Patient Position: Sitting) 76 98.1 °F (36.7 °C) (Oral) 16 5' (1.524 m) 210 lb (95.3 kg) SpO2 BMI OB Status Smoking Status 100% 41.01 kg/m2 Having regular periods Current Every Day Smoker Vitals History BMI and BSA Data Body Mass Index Body Surface Area 41.01 kg/m 2 2.01 m 2 Preferred Pharmacy Pharmacy Name Phone [phone] [phone] Your Updated Medication List  
  
   
This list is accurate as of 4/26/18  2:40 PM.  Always use your most recent med list. amLODIPine 5 mg tablet Commonly known as:  Piter Nichelle 5 mg. aspirin 81 mg chewable tablet 81 mg.  
  
 clopidogrel 75 mg Tab Commonly known as:  PLAVIX 75 mg.  
  
 metoprolol succinate 50 mg XL tablet Commonly known as:  TOPROL-XL 25 mg daily. nitroglycerin 0.4 mg SL tablet Commonly known as:  NITROSTAT  
0.4 mg.  
  
 simvastatin 40 mg tablet Commonly known as:  ZOCOR 40 mg. Follow-up Instructions Return in about 2 weeks (around 5/10/2018), or if symptoms worsen or fail to improve. To-Do List   
 04/26/2018 Lab:  CBC WITH AUTOMATED DIFF   
  
 04/26/2018 Lab:  FERRITIN   
  
 04/26/2018 Lab:  HEMOGLOBIN A1C W/O EAG   
  
 04/26/2018 Lab:  IRON PROFILE   
  
 04/26/2018 Lab:  LIPID PANEL   
  
 04/26/2018 Lab:  METABOLIC PANEL, COMPREHENSIVE   
  
 04/26/2018 Lab:  VITAMIN B12 & FOLATE Patient Instructions Stopping Smoking: Care Instructions Your Care Instructions Cigarette smokers crave the nicotine in cigarettes. Giving it up is much harder than simply changing a habit. Your body has to stop craving the nicotine. It is hard to quit, but you can do it. There are many tools that people use to quit smoking. You may find that combining tools works best for you. There are several steps to quitting. First you get ready to quit. Then you get support to help you. After that, you learn new skills and behaviors to become a nonsmoker. For many people, a necessary step is getting and using medicine. Your doctor will help you set up the plan that best meets your needs. You may want to attend a smoking cessation program to help you quit smoking. When you choose a program, look for one that has proven success. Ask your doctor for ideas. You will greatly increase your chances of success if you take medicine as well as get counseling or join a cessation program. 
Some of the changes you feel when you first quit tobacco are uncomfortable. Your body will miss the nicotine at first, and you may feel short-tempered and grumpy. You may have trouble sleeping or concentrating. Medicine can help you deal with these symptoms. You may struggle with changing your smoking habits and rituals. The last step is the tricky one: Be prepared for the smoking urge to continue for a time. This is a lot to deal with, but keep at it. You will feel better. Follow-up care is a key part of your treatment and safety. Be sure to make and go to all appointments, and call your doctor if you are having problems. It's also a good idea to know your test results and keep a list of the medicines you take. How can you care for yourself at home? · Ask your family, friends, and coworkers for support. You have a better chance of quitting if you have help and support. · Join a support group, such as Nicotine Anonymous, for people who are trying to quit smoking. · Consider signing up for a smoking cessation program, such as the American Lung Association's Freedom from Smoking program. 
· Set a quit date. Pick your date carefully so that it is not right in the middle of a big deadline or stressful time. Once you quit, do not even take a puff. Get rid of all ashtrays and lighters after your last cigarette. Clean your house and your clothes so that they do not smell of smoke. · Learn how to be a nonsmoker. Think about ways you can avoid those things that make you reach for a cigarette. ¨ Avoid situations that put you at greatest risk for smoking. For some people, it is hard to have a drink with friends without smoking. For others, they might skip a coffee break with coworkers who smoke. ¨ Change your daily routine. Take a different route to work or eat a meal in a different place. · Cut down on stress. Calm yourself or release tension by doing an activity you enjoy, such as reading a book, taking a hot bath, or gardening. · Talk to your doctor or pharmacist about nicotine replacement therapy, which replaces the nicotine in your body. You still get nicotine but you do not use tobacco. Nicotine replacement products help you slowly reduce the amount of nicotine you need. These products come in several forms, many of them available over-the-counter: ¨ Nicotine patches ¨ Nicotine gum and lozenges ¨ Nicotine inhaler · Ask your doctor about bupropion (Wellbutrin) or varenicline (Chantix), which are prescription medicines. They do not contain nicotine. They help you by reducing withdrawal symptoms, such as stress and anxiety. · Some people find hypnosis, acupuncture, and massage helpful for ending the smoking habit. · Eat a healthy diet and get regular exercise. Having healthy habits will help your body move past its craving for nicotine. · Be prepared to keep trying. Most people are not successful the first few times they try to quit. Do not get mad at yourself if you smoke again. Make a list of things you learned and think about when you want to try again, such as next week, next month, or next year. Where can you learn more? Go to http://kim-tristin.info/. Enter R900 in the search box to learn more about \"Stopping Smoking: Care Instructions. \" Current as of: March 20, 2017 Content Version: 11.4 © 5386-4798 Healthwise, Incorporated. Care instructions adapted under license by [employer] (which disclaims liability or warranty for this information). If you have questions about a medical condition or this instruction, always ask your healthcare professional. [address] any warranty or liability for your use of this information. Introducing [facility]! Eduardo Bowers introduces [employer] patient portal. Now you can access parts of your medical record, email your doctor's office, and request medication refills online. 1. In your internet browser, go to [email or web address] 2. Click on the First Time User? Click Here link in the Sign In box. You will see the New Member Sign Up page. 3. Enter your [employer] Access Code exactly as it appears below. You will not need to use this code after youve completed the sign-up process. If you do not sign up before the expiration date, you must request a new code. · [employer] Access Code: [email or web address] Expires: 6/16/2018 10:52 PM 
 
4. Enter the last four digits of your Social Security Number (xxxx) and Date of Birth (mm/dd/yyyy) as indicated and click Submit. You will be taken to the next sign-up page. 5. Create a [employer] ID. This will be your [employer] login ID and cannot be changed, so think of one that is secure and easy to remember. 6. Create a [employer] password. You can change your password at any time. 7. Enter your Password Reset Question and Answer. This can be used at a later time if you forget your password. 8. Enter your e-mail address. You will receive e-mail notification when new information is available in [address] 9. Click Sign Up. You can now view and download portions of your medical record. 10. Click the Download Summary menu link to download a portable copy of your medical information. If you have questions, please visit the Frequently Asked Questions section of the [employer] website. Remember, [employer] is NOT to be used for urgent needs. For medical emergencies, dial 911. Now available from your iPhone and Android! Please provide this summary of care documentation to your next provider. Your primary care clinician is listed as Jn Vaca. If you have any questions after today's visit, please call [phone].

## 2018-04-27 RX ORDER — CYCLOBENZAPRINE HCL 10 MG
10 TABLET ORAL
Qty: 12 TAB | Refills: 0 | Status: SHIPPED | OUTPATIENT
Start: 2018-04-27 | End: 2018-07-05

## 2018-05-03 ENCOUNTER — HOSPITAL ENCOUNTER (OUTPATIENT)
Dept: LAB | Age: 38
Discharge: HOME | End: 2018-05-03

## 2018-05-03 PROCEDURE — 99001 SPECIMEN HANDLING PT-LAB: CPT | Performed by: FAMILY MEDICINE

## 2018-05-04 LAB
ALBUMIN SERPL-MCNC: 3.8 G/DL (ref 3.5–5.5)
ALBUMIN/GLOB SERPL: 1.3 {RATIO} (ref 1.2–2.2)
ALP SERPL-CCNC: 69 IU/L (ref 39–117)
ALT SERPL-CCNC: 15 IU/L (ref 0–32)
AST SERPL-CCNC: 13 IU/L (ref 0–40)
BASOPHILS # BLD AUTO: 0 X10E3/UL (ref 0–0.2)
BASOPHILS NFR BLD AUTO: 0 %
BILIRUB SERPL-MCNC: <0.2 MG/DL (ref 0–1.2)
BUN SERPL-MCNC: 8 MG/DL (ref 6–20)
BUN/CREAT SERPL: 14 (ref 9–23)
CALCIUM SERPL-MCNC: 8.9 MG/DL (ref 8.7–10.2)
CHLORIDE SERPL-SCNC: 102 MMOL/L (ref 96–106)
CHOLEST SERPL-MCNC: 85 MG/DL (ref 100–199)
CO2 SERPL-SCNC: 25 MMOL/L (ref 18–29)
CREAT SERPL-MCNC: 0.58 MG/DL (ref 0.57–1)
EOSINOPHIL # BLD AUTO: 0.2 X10E3/UL (ref 0–0.4)
EOSINOPHIL NFR BLD AUTO: 4 %
ERYTHROCYTE [DISTWIDTH] IN BLOOD BY AUTOMATED COUNT: 17.4 % (ref 12.3–15.4)
FERRITIN SERPL-MCNC: 8 NG/ML (ref 15–150)
FOLATE SERPL-MCNC: 6.5 NG/ML
GFR SERPLBLD CREATININE-BSD FMLA CKD-EPI: 116 ML/MIN/1.73
GFR SERPLBLD CREATININE-BSD FMLA CKD-EPI: 134 ML/MIN/1.73
GLOBULIN SER CALC-MCNC: 2.9 G/DL (ref 1.5–4.5)
GLUCOSE SERPL-MCNC: 90 MG/DL (ref 65–99)
HBA1C MFR BLD: 5.7 % (ref 4.8–5.6)
HCT VFR BLD AUTO: 33 % (ref 34–46.6)
HDLC SERPL-MCNC: 35 MG/DL
HGB BLD-MCNC: 9.7 G/DL (ref 11.1–15.9)
IMM GRANULOCYTES # BLD: 0 X10E3/UL (ref 0–0.1)
IMM GRANULOCYTES NFR BLD: 0 %
INTERPRETATION, 910389: NORMAL
IRON SATN MFR SERPL: 7 % (ref 15–55)
IRON SERPL-MCNC: 22 UG/DL (ref 27–159)
LDLC SERPL CALC-MCNC: 35 MG/DL (ref 0–99)
LYMPHOCYTES # BLD AUTO: 2.6 X10E3/UL (ref 0.7–3.1)
LYMPHOCYTES NFR BLD AUTO: 48 %
MCH RBC QN AUTO: 22.4 PG (ref 26.6–33)
MCHC RBC AUTO-ENTMCNC: 29.4 G/DL (ref 31.5–35.7)
MCV RBC AUTO: 76 FL (ref 79–97)
MONOCYTES # BLD AUTO: 0.2 X10E3/UL (ref 0.1–0.9)
MONOCYTES NFR BLD AUTO: 4 %
NEUTROPHILS # BLD AUTO: 2.4 X10E3/UL (ref 1.4–7)
NEUTROPHILS NFR BLD AUTO: 44 %
PLATELET # BLD AUTO: 465 X10E3/UL (ref 150–379)
POTASSIUM SERPL-SCNC: 4.3 MMOL/L (ref 3.5–5.2)
PROT SERPL-MCNC: 6.7 G/DL (ref 6–8.5)
RBC # BLD AUTO: 4.34 X10E6/UL (ref 3.77–5.28)
SODIUM SERPL-SCNC: 139 MMOL/L (ref 134–144)
TIBC SERPL-MCNC: 338 UG/DL (ref 250–450)
TRIGL SERPL-MCNC: 75 MG/DL (ref 0–149)
UIBC SERPL-MCNC: 316 UG/DL (ref 131–425)
VIT B12 SERPL-MCNC: 467 PG/ML (ref 232–1245)
VLDLC SERPL CALC-MCNC: 15 MG/DL (ref 5–40)
WBC # BLD AUTO: 5.4 X10E3/UL (ref 3.4–10.8)

## 2018-05-07 ENCOUNTER — TELEPHONE (OUTPATIENT)
Dept: FAMILY MEDICINE CLINIC | Age: 38
End: 2018-05-07

## 2018-05-08 NOTE — PROGRESS NOTES
Labs + iron deficiency anemia and prediabetes  Cholesterol looks good  Will discuss further on next appt 5/10/18  pls notify pt.

## 2018-05-08 NOTE — PROGRESS NOTES
Patient identified with 2 identifiers (name and ). Patient aware of Labs + iron deficiency anemia and prediabetes   Cholesterol looks good.  Dr. Adrianna Najera will discuss further on 05/10/2018 appt

## 2018-05-10 ENCOUNTER — OFFICE VISIT (OUTPATIENT)
Dept: FAMILY MEDICINE CLINIC | Age: 38
End: 2018-05-10

## 2018-05-10 VITALS
OXYGEN SATURATION: 98 % | BODY MASS INDEX: 41.82 KG/M2 | RESPIRATION RATE: 15 BRPM | HEART RATE: 79 BPM | TEMPERATURE: 98.3 F | SYSTOLIC BLOOD PRESSURE: 126 MMHG | DIASTOLIC BLOOD PRESSURE: 78 MMHG | WEIGHT: 213 LBS | HEIGHT: 60 IN

## 2018-05-10 DIAGNOSIS — N92.0 MENORRHAGIA WITH REGULAR CYCLE: ICD-10-CM

## 2018-05-10 DIAGNOSIS — G89.29 CHRONIC MIDLINE LOW BACK PAIN WITHOUT SCIATICA: ICD-10-CM

## 2018-05-10 DIAGNOSIS — D50.9 IRON DEFICIENCY ANEMIA, UNSPECIFIED IRON DEFICIENCY ANEMIA TYPE: Primary | ICD-10-CM

## 2018-05-10 DIAGNOSIS — K08.89 PAIN, DENTAL: ICD-10-CM

## 2018-05-10 DIAGNOSIS — R73.03 PREDIABETES: ICD-10-CM

## 2018-05-10 DIAGNOSIS — I21.3 ST ELEVATION MYOCARDIAL INFARCTION (STEMI), UNSPECIFIED ARTERY (HCC): ICD-10-CM

## 2018-05-10 DIAGNOSIS — M54.50 CHRONIC MIDLINE LOW BACK PAIN WITHOUT SCIATICA: ICD-10-CM

## 2018-05-10 DIAGNOSIS — Z72.0 TOBACCO USE: ICD-10-CM

## 2018-05-10 RX ORDER — FERROUS SULFATE 325(65) MG
325 TABLET, DELAYED RELEASE (ENTERIC COATED) ORAL
Qty: 90 TAB | Refills: 1 | Status: SHIPPED | OUTPATIENT
Start: 2018-05-10 | End: 2018-12-14

## 2018-05-10 RX ORDER — CLINDAMYCIN HYDROCHLORIDE 300 MG/1
300 CAPSULE ORAL 3 TIMES DAILY
Qty: 30 CAP | Refills: 0 | Status: SHIPPED | OUTPATIENT
Start: 2018-05-10 | End: 2018-05-20

## 2018-05-10 RX ORDER — ASCORBIC ACID 500 MG
500 TABLET ORAL 3 TIMES DAILY
Qty: 90 TAB | Refills: 0 | Status: SHIPPED | OUTPATIENT
Start: 2018-05-10 | End: 2018-06-28 | Stop reason: SDUPTHER

## 2018-05-10 RX ORDER — PENICILLIN V POTASSIUM 500 MG/1
TABLET, FILM COATED ORAL 4 TIMES DAILY
COMMUNITY
End: 2018-07-05

## 2018-05-10 NOTE — PROGRESS NOTES
Chief Complaint   Patient presents with    Coronary Artery Disease    Hypertension    Abdominal Pain     x 3 days, on/off         1. Have you been to the ER, urgent care clinic since your last visit? Hospitalized since your last visit? Bhavin Jaimes, 5/7/18 knot in mouth    2. Have you seen or consulted any other health care providers outside of the [facility] since your last visit? Include any pap smears or colon screening.  No

## 2018-05-10 NOTE — MR AVS SNAPSHOT
[facility] [address] 
[phone] Patient: Naz Kay MRN: [id number] Visit Information Date & Time Provider Department Dept. Phone Encounter #  
 5/10/2018 11:30 AM Bebo Diallo, [address] [place] [address] Road [id number] Follow-up Instructions Return in about 4 weeks (around 6/7/2018). Upcoming Health Maintenance Date Due  
 PAP AKA CERVICAL CYTOLOGY 9/1/2017 Influenza Age 5 to Adult 8/1/2018 DTaP/Tdap/Td series (2 - Td) 4/1/2021 Allergies as of 5/10/2018  Review Complete On: 5/10/2018 By: Ernie Marvin LPN Severity Noted Reaction Type Reactions Lisinopril  04/26/2018    Cough Vaccine Adjuvant Emulsion Combination No. 1  12/23/2012    Seizures MMR Current Immunizations  Never Reviewed Name Date Tdap 4/1/2011 Not reviewed this visit You Were Diagnosed With   
  
 Codes Comments Iron deficiency anemia, unspecified iron deficiency anemia type    -  Primary ICD-10-CM: D50.9 ICD-9-CM: 280.9 Prediabetes     ICD-10-CM: R73.03 
ICD-9-CM: 790.29 Tobacco use     ICD-10-CM: Z72.0 ICD-9-CM: 305.1 BMI 40.0-44.9, adult [facility])     ICD-10-CM: Z68.41 
ICD-9-CM: V85.41 ST elevation myocardial [facility] ([facility]I), [facility]     ICD-10-CM: I21.3 ICD-9-CM: 410.90 Menorrhagia with regular cycle     ICD-10-CM: N92.0 ICD-9-CM: 626.2 Chronic midline low back pain without sciatica     ICD-10-CM: M54.5, G89.29 ICD-9-CM: 724.2, 338.29 Vitals BP Pulse Temp Resp Height(growth percentile) Weight(growth percentile) 126/78 (BP 1 Location: Left arm, BP Patient Position: Sitting) 79 98.3 °F (36.8 °C) (Oral) 15 5' (1.524 m) 213 lb (96.6 kg) SpO2 BMI OB Status Smoking Status 98% 41.6 kg/m2 Having regular periods Current Every Day Smoker Vitals History BMI and BSA Data Body Mass Index Body Surface Area  
 41.6 kg/m 2 2.02 m 2 Preferred Pharmacy Pharmacy Name Phone [facility] [phone] Your Updated Medication List  
  
   
This list is accurate as of 5/10/18 11:55 AM.  Always use your most recent med list. amLODIPine 5 mg tablet Commonly known as:  Evi Gerry 5 mg.  
  
 ascorbic acid (vitamin C) 500 mg tablet Commonly known as:  VITAMIN C Take 1 Tab by mouth three (3) times daily. aspirin 81 mg chewable tablet 81 mg.  
  
 clindamycin 300 mg capsule Commonly known as:  CLEOCIN Take 1 Cap by mouth three (3) times daily for 10 days. clopidogrel 75 mg Tab Commonly known as:  PLAVIX 75 mg.  
  
 cyclobenzaprine 10 mg tablet Commonly known as:  FLEXERIL Take 1 Tab by mouth three (3) times daily as needed for Muscle Spasm(s). ferrous sulfate 325 mg (65 mg iron) EC tablet Commonly known as:  IRON Take 1 Tab by mouth three (3) times daily (with meals). metoprolol succinate 50 mg XL tablet Commonly known as:  TOPROL-XL 25 mg daily. nitroglycerin 0.4 mg SL tablet Commonly known as:  NITROSTAT  
0.4 mg.  
  
 penicillin v potassium 500 mg tablet Commonly known as:  VEETID Take  by mouth four (4) times daily. simvastatin 40 mg tablet Commonly known as:  ZOCOR 40 mg.  
  
  
  
  
Prescriptions Sent to Pharmacy Refills  
 ferrous sulfate (IRON) 325 mg (65 mg iron) EC tablet 1 Sig: Take 1 Tab by mouth three (3) times daily (with meals). Class: Normal  
 Pharmacy: [address] [place] [address] [address] [place] [address] #: [phone] Route: Oral  
 ascorbic acid, vitamin C, (VITAMIN C) 500 mg tablet 0 Sig: Take 1 Tab by mouth three (3) times daily.   
 Class: Normal  
 Pharmacy: [facility] [address] AT [facility] [address] Ph #: [phone] Route: Oral  
 clindamycin (CLEOCIN) 300 mg capsule 0 Sig: Take 1 Cap by mouth three (3) times daily for 10 days. Class: Normal  
 Pharmacy: [id number] [facility], [address] [place] [address] Ph #: [phone] Route: Oral  
  
We Performed the Following REFERRAL TO OBSTETRICS AND GYNECOLOGY [REF51 Custom] Follow-up Instructions Return in about 4 weeks (around 6/7/2018). To-Do List   
 05/10/2018 Imaging:  XR SPINE LUMB 2 OR 3 V Referral Information Referral ID Referred By Referred To  
  
 [id number] [address], [address] [place] [address] Street Phone: [phone] Fax: [phone] Visits Status Start Date End Date 1 New Request 5/10/18 5/10/19 If your referral has a status of pending review or denied, additional information will be sent to support the outcome of this decision. Patient Instructions Low Back Pain: Exercises Your Care Instructions Here are some examples of typical rehabilitation exercises for your condition. Start each exercise slowly. Ease off the exercise if you start to have pain. Your doctor or physical therapist will tell you when you can start these exercises and which ones will work best for you. How to do the exercises Press-up 1. Lie on your stomach, supporting your body with your forearms. 2. Press your elbows down into the floor to raise your upper back. As you do this, relax your stomach muscles and allow your back to arch without using your back muscles. As your press up, do not let your hips or pelvis come off the floor. 3. Hold for 15 to 30 seconds, then relax. 4. Repeat 2 to 4 times. Alternate arm and leg (bird dog) exercise Do this exercise slowly.  Try to keep your body straight at all times, and do not let one hip drop lower than the other. 1. Start on the floor, on your hands and knees. 2. Tighten your belly muscles. 3. Raise one leg off the floor, and hold it straight out behind you. Be careful not to let your hip drop down, because that will twist your trunk. 4. Hold for about 6 seconds, then lower your leg and switch to the other leg. 5. Repeat 8 to 12 times on each leg. 6. Over time, work up to holding for 10 to 30 seconds each time. 7. If you feel stable and secure with your leg raised, try raising the opposite arm straight out in front of you at the same time. Knee-to-chest exercise 1. Lie on your back with your knees bent and your feet flat on the floor. 2. Bring one knee to your chest, keeping the other foot flat on the floor (or keeping the other leg straight, whichever feels better on your lower back). 3. Keep your lower back pressed to the floor. Hold for at least 15 to 30 seconds. 4. Relax, and lower the knee to the starting position. 5. Repeat with the other leg. Repeat 2 to 4 times with each leg. 6. To get more stretch, put your other leg flat on the floor while pulling your knee to your chest. 
Curl-ups 1. Lie on the floor on your back with your knees bent at a 90-degree angle. Your feet should be flat on the floor, about 12 inches from your buttocks. 2. Cross your arms over your chest. If this bothers your neck, try putting your hands behind your neck (not your head), with your elbows spread apart. 3. Slowly tighten your belly muscles and raise your shoulder blades off the floor. 4. Keep your head in line with your body, and do not press your chin to your chest. 
5. Hold this position for 1 or 2 seconds, then slowly lower yourself back down to the floor. 6. Repeat 8 to 12 times. Pelvic tilt exercise 1. Lie on your back with your knees bent. 2. \"Brace\" your stomach.  This means to tighten your muscles by pulling in and imagining your belly button moving toward your spine. You should feel like your back is pressing to the floor and your hips and pelvis are rocking back. 3. Hold for about 6 seconds while you breathe smoothly. 4. Repeat 8 to 12 times. Heel dig bridging 1. Lie on your back with both knees bent and your ankles bent so that only your heels are digging into the floor. Your knees should be bent about 90 degrees. 2. Then push your heels into the floor, squeeze your buttocks, and lift your hips off the floor until your shoulders, hips, and knees are all in a straight line. 3. Hold for about 6 seconds as you continue to breathe normally, and then slowly lower your hips back down to the floor and rest for up to 10 seconds. 4. Do 8 to 12 repetitions. Hamstring stretch in doorway 1. Lie on your back in a doorway, with one leg through the open door. 2. Slide your leg up the wall to straighten your knee. You should feel a gentle stretch down the back of your leg. 3. Hold the stretch for at least 15 to 30 seconds. Do not arch your back, point your toes, or bend either knee. Keep one heel touching the floor and the other heel touching the wall. 4. Repeat with your other leg. 5. Do 2 to 4 times for each leg. Hip flexor stretch 1. Kneel on the floor with one knee bent and one leg behind you. Place your forward knee over your foot. Keep your other knee touching the floor. 2. Slowly push your hips forward until you feel a stretch in the upper thigh of your rear leg. 3. Hold the stretch for at least 15 to 30 seconds. Repeat with your other leg. 4. Do 2 to 4 times on each side. Wall sit 1. Stand with your back 10 to 12 inches away from a wall. 2. Lean into the wall until your back is flat against it. 3. Slowly slide down until your knees are slightly bent, pressing your lower back into the wall. 4. Hold for about 6 seconds, then slide back up the wall. 5. Repeat 8 to 12 times. Follow-up care is a key part of your treatment and safety. Be sure to make and go to all appointments, and call your doctor if you are having problems. It's also a good idea to know your test results and keep a list of the medicines you take. Where can you learn more? Go to http://kim-tristin.info/. Enter J221 in the search box to learn more about \"Low Back Pain: Exercises. \" Current as of: March 21, 2017 Content Version: 11.4 © 0657-6732 [employer]. Care instructions adapted under license by [employer] (which disclaims liability or warranty for this information). If you have questions about a medical condition or this instruction, always ask your healthcare professional. [address] any warranty or liability for your use of this information. Introducing [facility]! [employer] introduces [employer] patient portal. Now you can access parts of your medical record, email your doctor's office, and request medication refills online. 1. In your internet browser, go to [email or web address] 2. Click on the First Time User? Click Here link in the Sign In box. You will see the New Member Sign Up page. 3. Enter your [employer] Access Code exactly as it appears below. You will not need to use this code after youve completed the sign-up process. If you do not sign up before the expiration date, you must request a new code. · [employer] Access Code: [email or web address] Expires: 6/16/2018 10:52 PM 
 
4. Enter the last four digits of your Social Security Number (xxxx) and Date of Birth (mm/dd/yyyy) as indicated and click Submit. You will be taken to the next sign-up page. 5. Create a [employer] ID. This will be your [employer] login ID and cannot be changed, so think of one that is secure and easy to remember. 6. Create a [employer] password. You can change your password at any time. 7. Enter your Password Reset Question and Answer. This can be used at a later time if you forget your password. 8. Enter your e-mail address. You will receive e-mail notification when new information is available in [address] 9. Click Sign Up. You can now view and download portions of your medical record. 10. Click the Download Summary menu link to download a portable copy of your medical information. If you have questions, please visit the Frequently Asked Questions section of the [employer] website. Remember, [employer] is NOT to be used for urgent needs. For medical emergencies, dial 911. Now available from your iPhone and Android! Please provide this summary of care documentation to your next provider. Your primary care clinician is listed as Frida Taveras. If you have any questions after today's visit, please call [phone].

## 2018-05-10 NOTE — PROGRESS NOTES
Jong Londono, 42 y.o.,  female    SUBJECTIVE  Ff-up    CAD, STEMI s/p PCI 1/18 - denies chest pain, sob. Following dr. Maggie Rondon. Continues to smoke 1/2  ppd. HTN- taking medication, reviewed labs. Prediabetes persistent. Anemia noted during admission- on ff-up labs c/w iron def. She reports heavy menses, multiple pads, clots. Monthly usually 5-7 days. Minimal cramping. Denies melena. Denies fatigue, sob, pica. C/o low back pain on and off for years. Says worse past few months. No previous injury, no paresthesia, weakness, incontinence. She was taking ibuprofen with relief previously, however now being plavix was told to d/c. Tried flexeril last month with some relief. Seen in ED for dental pain, was given penicillin and norco, says had nausea and upset stomach. Says discontinued both medication. No fever, says initially had discharge on R lower molar that has resolved. She follows a dentist for dental caries, hold off on intervention until cleared by cardiologist she says. ROS:  See HPI, all others negative        Patient Active Problem List   Diagnosis Code    Tobacco use Z72.0    Obesity E66.9    Hypertension I10    Prediabetes R73.03    Low HDL (under 40) E78.6    Obesity, morbid (HCC) E66.01    ST elevation myocardial infarction (STEMI) (HCC) I21.3       Current Outpatient Prescriptions   Medication Sig Dispense Refill    penicillin v potassium (VEETID) 500 mg tablet Take  by mouth four (4) times daily.  ferrous sulfate (IRON) 325 mg (65 mg iron) EC tablet Take 1 Tab by mouth three (3) times daily (with meals). 90 Tab 1    ascorbic acid, vitamin C, (VITAMIN C) 500 mg tablet Take 1 Tab by mouth three (3) times daily. 90 Tab 0    clindamycin (CLEOCIN) 300 mg capsule Take 1 Cap by mouth three (3) times daily for 10 days. 30 Cap 0    cyclobenzaprine (FLEXERIL) 10 mg tablet Take 1 Tab by mouth three (3) times daily as needed for Muscle Spasm(s).  12 Tab 0    clopidogrel (PLAVIX) 75 mg tab 75 mg.      amLODIPine (NORVASC) 5 mg tablet 5 mg.  metoprolol succinate (TOPROL-XL) 50 mg XL tablet 25 mg daily.  simvastatin (ZOCOR) 40 mg tablet 40 mg.      aspirin 81 mg chewable tablet 81 mg.      nitroglycerin (NITROSTAT) 0.4 mg SL tablet 0.4 mg. Allergies   Allergen Reactions    Lisinopril Cough    Vaccine Adjuvant Emulsion Combination No. 1 Seizures     MMR       Past Medical History:   Diagnosis Date    Cardiac echocardiogram 07/21/2016    Sm LV cavity. EF 65-70%. No RWMA. Mod-marked LVH w/dynamic obstruction, mid-cavity obliteration & peak grad of 25 mmHg. Indeterminate diastolic fx. No significant valvular pathology.  Cardiac nuclear imaging test 07/22/2016    Low risk. Very sm distal anterior & apical partially reversible defect, likely artifact, but sm area of ischemia not excluded. No RWMA. EF 66%. Neg EKG on pharm stress test.    Diverticulitis     GERD (gastroesophageal reflux disease)     Hypertension     Infectious disease     Bacteria vaginosis    Non-ST elevated myocardial infarction ([facility] [address]     STEMI (ST elevation myocardial infarction) ([facility] [address] 01/2018       Social History     Social History    Marital status: SINGLE     Spouse name: N/A    Number of children: N/A    Years of education: N/A     Occupational History    Not on file.      Social History Main Topics    Smoking status: Current Every Day Smoker     Packs/day: 0.25     Years: 20.00    Smokeless tobacco: Never Used    Alcohol use Yes      Comment: rare/holidays    Drug use: No    Sexual activity: Yes     Partners: Male     Birth control/ protection: Surgical      Comment: tubal ligation     Other Topics Concern    Not on file     Social History Narrative       Family History   Problem Relation Age of Onset    Depression Mother     Asthma Mother     Migraines Mother     Hypertension Mother     Stroke Mother     Heart Attack Mother     Arthritis-osteo Mother     Depression Brother     Alcohol abuse Father     Substance Abuse Father      tobacco    Drug Abuse Father     Hypertension Father     High Cholesterol Father     Stroke Father     Rashes/Skin Problems Father     Substance Abuse Brother      tobacco    Drug Abuse Brother     Asthma Brother     Migraines Brother     Hypertension Brother     High Cholesterol Brother     Hypertension Paternal Grandmother     Diabetes Paternal Grandmother     Hypertension Maternal Grandmother     Colon Cancer Maternal Grandmother          OBJECTIVE    Physical Exam:     Visit Vitals    /78 (BP 1 Location: Left arm, BP Patient Position: Sitting)    Pulse 79    Temp 98.3 °F (36.8 °C) (Oral)    Resp 15    Ht 5' (1.524 m)    Wt 213 lb (96.6 kg)    SpO2 98%    BMI 41.6 kg/m2       General: alert, well-appearing, obese,AA, in no apparent distress or pain  Head: atraumatic. Non-tender maxillary and frontal sinuses,  Neck: supple, no adenopathy palpated  Mouth: poor dentition R lower molar broken, without discharge  CVS: normal rate, regular rhythm, distinct S1 and S2  Lungs:clear to ausculation bilaterally, no crackles, wheezing or rhonchi noted  Abdomen: normoactive bowel sounds, soft, non-tender  Back: no gross deformity, no tenderness  Extremities: no edema, no cyanosis,  Skin: warm, no lesions, rashes noted  Psych:  mood and affect normal    Results for orders placed or performed in visit on 04/26/18   CBC WITH AUTOMATED DIFF   Result Value Ref Range    WBC 5.4 3.4 - 10.8 x10E3/uL    RBC 4.34 3.77 - 5.28 x10E6/uL    HGB 9.7 (L) 11.1 - 15.9 g/dL    HCT 33.0 (L) 34.0 - 46.6 %    MCV 76 (L) 79 - 97 fL    MCH 22.4 (L) 26.6 - 33.0 pg    MCHC 29.4 (L) 31.5 - 35.7 g/dL    RDW 17.4 (H) 12.3 - 15.4 %    PLATELET 580 (H) 407 - 379 x10E3/uL    NEUTROPHILS 44 Not Estab. %    Lymphocytes 48 Not Estab. %    MONOCYTES 4 Not Estab. %    EOSINOPHILS 4 Not Estab. %    BASOPHILS 0 Not Estab. %    ABS.  NEUTROPHILS 2.4 1.4 - 7.0 x10E3/uL    Abs Lymphocytes 2.6 0.7 - 3.1 x10E3/uL    ABS. MONOCYTES 0.2 0.1 - 0.9 x10E3/uL    ABS. EOSINOPHILS 0.2 0.0 - 0.4 x10E3/uL    ABS. BASOPHILS 0.0 0.0 - 0.2 x10E3/uL    IMMATURE GRANULOCYTES 0 Not Estab. %    ABS. IMM. GRANS. 0.0 0.0 - 0.1 U79E5/XR   METABOLIC PANEL, COMPREHENSIVE   Result Value Ref Range    Glucose 90 65 - 99 mg/dL    BUN 8 6 - 20 mg/dL    Creatinine 0.58 0.57 - 1.00 mg/dL    GFR est non- >59 mL/min/1.73    GFR est  >59 mL/min/1.73    BUN/Creatinine ratio 14 9 - 23    Sodium 139 134 - 144 mmol/L    Potassium 4.3 3.5 - 5.2 mmol/L    Chloride 102 96 - 106 mmol/L    CO2 25 18 - 29 mmol/L    Calcium 8.9 8.7 - 10.2 mg/dL    Protein, total 6.7 6.0 - 8.5 g/dL    Albumin 3.8 3.5 - 5.5 g/dL    GLOBULIN, TOTAL 2.9 1.5 - 4.5 g/dL    A-G Ratio 1.3 1.2 - 2.2    Bilirubin, total <0.2 0.0 - 1.2 mg/dL    Alk.  phosphatase 69 39 - 117 IU/L    AST (SGOT) 13 0 - 40 IU/L    ALT (SGPT) 15 0 - 32 IU/L   LIPID PANEL   Result Value Ref Range    Cholesterol, total 85 (L) 100 - 199 mg/dL    Triglyceride 75 0 - 149 mg/dL    HDL Cholesterol 35 (L) >39 mg/dL    VLDL, calculated 15 5 - 40 mg/dL    LDL, calculated 35 0 - 99 mg/dL   HEMOGLOBIN A1C W/O EAG   Result Value Ref Range    Hemoglobin A1c 5.7 (H) 4.8 - 5.6 %   IRON PROFILE   Result Value Ref Range    TIBC 338 250 - 450 ug/dL    UIBC 316 131 - 425 ug/dL    Iron 22 (L) 27 - 159 ug/dL    Iron % saturation 7 (LL) 15 - 55 %   FERRITIN   Result Value Ref Range    Ferritin 8 (L) 15 - 150 ng/mL   VITAMIN B12 & FOLATE   Result Value Ref Range    Vitamin B12 467 232 - 1245 pg/mL    Folate 6.5 >3.0 ng/mL   CVD REPORT   Result Value Ref Range    INTERPRETATION Note          ASSESSMENT/PLAN  Diagnoses and all orders for this visit:    ST elevation myocardial infarction (STEMI), unspecified artery (HCC)  S/p stent 1/18  Asymptomatic  Cont BB, nitrates, ASA, plavix  Following dr. Evi Marques smoking cessation, weight loss     BMI 40.0-44.9, adult ([facility] [address]  Encouraged wt loss     Prediabetes  Persistent, encouraged wt loss     Tobacco use  Counseled on cessation  declines PCV vaccine     Essential hypertension  Controlled, cont BB    Iron deficiency anemia  Likely due to heavy menses, referral to gyne  Start po replacement feso4 325 mg tid with ascorbic acid  monitoring    Coronary artery disease of native artery of native heart with stable angina pectoris (HCC)    Low back pain  Lumbosacral xray  HEP, cont prn flexeril    Dental pain  Switch penicillin to clindamycin  Cont care with dentist    Follow-up Disposition:  Return in about 4 weeks (around 6/7/2018). Patient understands plan of care. Patient has provided input and agrees with goals.

## 2018-05-10 NOTE — PATIENT INSTRUCTIONS

## 2018-05-14 ENCOUNTER — HOSPITAL ENCOUNTER (OUTPATIENT)
Dept: GENERAL RADIOLOGY | Age: 38
Discharge: HOME | End: 2018-05-14
Payer: MEDICAID

## 2018-05-14 DIAGNOSIS — M54.50 CHRONIC MIDLINE LOW BACK PAIN WITHOUT SCIATICA: ICD-10-CM

## 2018-05-14 DIAGNOSIS — G89.29 CHRONIC MIDLINE LOW BACK PAIN WITHOUT SCIATICA: ICD-10-CM

## 2018-05-14 PROCEDURE — 72100 X-RAY EXAM L-S SPINE 2/3 VWS: CPT

## 2018-05-15 ENCOUNTER — TELEPHONE (OUTPATIENT)
Dept: FAMILY MEDICINE CLINIC | Age: 38
End: 2018-05-15

## 2018-05-15 NOTE — TELEPHONE ENCOUNTER
Patient called returning nurse phone call regarding recent lab results. Please call patient back at your earliest convenience.

## 2018-06-07 ENCOUNTER — OFFICE VISIT (OUTPATIENT)
Dept: FAMILY MEDICINE CLINIC | Age: 38
End: 2018-06-07

## 2018-06-07 VITALS
RESPIRATION RATE: 16 BRPM | WEIGHT: 215.2 LBS | HEIGHT: 60 IN | BODY MASS INDEX: 42.25 KG/M2 | DIASTOLIC BLOOD PRESSURE: 80 MMHG | OXYGEN SATURATION: 98 % | HEART RATE: 69 BPM | TEMPERATURE: 98.1 F | SYSTOLIC BLOOD PRESSURE: 126 MMHG

## 2018-06-07 DIAGNOSIS — R73.03 PREDIABETES: ICD-10-CM

## 2018-06-07 DIAGNOSIS — I10 ESSENTIAL HYPERTENSION: Primary | ICD-10-CM

## 2018-06-07 DIAGNOSIS — G47.9 SLEEPING DIFFICULTY: ICD-10-CM

## 2018-06-07 DIAGNOSIS — N92.0 MENORRHAGIA WITH REGULAR CYCLE: ICD-10-CM

## 2018-06-07 DIAGNOSIS — I21.3 ST ELEVATION MYOCARDIAL INFARCTION (STEMI), UNSPECIFIED ARTERY (HCC): ICD-10-CM

## 2018-06-07 DIAGNOSIS — R06.83 SNORING: ICD-10-CM

## 2018-06-07 DIAGNOSIS — D50.0 IRON DEFICIENCY ANEMIA DUE TO CHRONIC BLOOD LOSS: ICD-10-CM

## 2018-06-07 NOTE — PATIENT INSTRUCTIONS

## 2018-06-07 NOTE — PROGRESS NOTES
Vita Michelle, 42 y.o.,  female    SUBJECTIVE  Ff-up    CAD, STEMI s/p PCI 1/18 - denies chest pain, sob. Following dr. China Lemon. She has quit smoking since last visit  Noticed difficutly sleeping, waking up about every 1-2 hours for a few months now. She says she snores and wakes up tired and exhausted. HTN- taking medication, reviewed labs. Prediabetes persistent. Anemia-being evaluated by dr Justo Mandel for heavy menses, hgb 9.7, she has upcoming pelvic US. She is tolerating po fe. Low back pain and dental pain has resolved. ROS:  See HPI, all others negative        Patient Active Problem List   Diagnosis Code    Tobacco use Z72.0    Obesity E66.9    Hypertension I10    Prediabetes R73.03    Low HDL (under 40) E78.6    Obesity, morbid (HCC) E66.01    ST elevation myocardial infarction (STEMI) (HCC) I21.3    Iron deficiency anemia due to chronic blood loss D50.0       Current Outpatient Prescriptions   Medication Sig Dispense Refill    ferrous sulfate (IRON) 325 mg (65 mg iron) EC tablet Take 1 Tab by mouth three (3) times daily (with meals). 90 Tab 1    ascorbic acid, vitamin C, (VITAMIN C) 500 mg tablet Take 1 Tab by mouth three (3) times daily. 90 Tab 0    clopidogrel (PLAVIX) 75 mg tab 75 mg.      amLODIPine (NORVASC) 5 mg tablet 5 mg.  metoprolol succinate (TOPROL-XL) 50 mg XL tablet 25 mg daily.  simvastatin (ZOCOR) 40 mg tablet 40 mg.      aspirin 81 mg chewable tablet 81 mg.      nitroglycerin (NITROSTAT) 0.4 mg SL tablet 0.4 mg.  penicillin v potassium (VEETID) 500 mg tablet Take  by mouth four (4) times daily.  cyclobenzaprine (FLEXERIL) 10 mg tablet Take 1 Tab by mouth three (3) times daily as needed for Muscle Spasm(s). 12 Tab 0       Allergies   Allergen Reactions    Lisinopril Cough    Vaccine Adjuvant Emulsion Combination No. 1 Seizures     MMR       Past Medical History:   Diagnosis Date    Cardiac echocardiogram 07/21/2016    Sm LV cavity.   EF 65-70%. No RWMA. Mod-marked LVH w/dynamic obstruction, mid-cavity obliteration & peak grad of 25 mmHg. Indeterminate diastolic fx. No significant valvular pathology.  Cardiac nuclear imaging test 07/22/2016    Low risk. Very sm distal anterior & apical partially reversible defect, likely artifact, but sm area of ischemia not excluded. No RWMA. EF 66%. Neg EKG on pharm stress test.    Diverticulitis     GERD (gastroesophageal reflux disease)     Hypertension     Infectious disease     Bacteria vaginosis    Non-ST elevated myocardial infarction ([facility] [address]     STEMI (ST elevation myocardial infarction) ([facility] [address] 01/2018       Social History     Social History    Marital status: SINGLE     Spouse name: N/A    Number of children: N/A    Years of education: N/A     Occupational History    Not on file.      Social History Main Topics    Smoking status: Current Every Day Smoker     Packs/day: 0.25     Years: 20.00    Smokeless tobacco: Never Used    Alcohol use Yes      Comment: rare/holidays    Drug use: No    Sexual activity: Yes     Partners: Male     Birth control/ protection: Surgical      Comment: tubal ligation     Other Topics Concern    Not on file     Social History Narrative       Family History   Problem Relation Age of Onset    Depression Mother     Asthma Mother     Migraines Mother     Hypertension Mother    Jannell Erb Stroke Mother     Heart Attack Mother     Arthritis-osteo Mother     Depression Brother     Alcohol abuse Father     Substance Abuse Father      tobacco    Drug Abuse Father     Hypertension Father     High Cholesterol Father     Stroke Father     Rashes/Skin Problems Father     Substance Abuse Brother      tobacco    Drug Abuse Brother     Asthma Brother     Migraines Brother     Hypertension Brother     High Cholesterol Brother     Hypertension Paternal Grandmother     Diabetes Paternal Grandmother     Hypertension Maternal Grandmother     Colon Cancer Maternal Grandmother          OBJECTIVE    Physical Exam:     Visit Vitals    /80 (BP 1 Location: Left arm, BP Patient Position: Sitting)    Pulse 69    Temp 98.1 °F (36.7 °C) (Oral)    Resp 16    Ht 5' (1.524 m)    Wt 215 lb 3.2 oz (97.6 kg)    SpO2 98%    BMI 42.03 kg/m2       General: alert, well-appearing, obese,AA, in no apparent distress or pain  CVS: normal rate, regular rhythm, distinct S1 and S2  Lungs:clear to ausculation bilaterally, no crackles, wheezing or rhonchi noted  Extremities: no edema, no cyanosis,  Skin: warm, no lesions, rashes noted  Psych:  mood and affect normal    Results for orders placed or performed in visit on 04/26/18   CBC WITH AUTOMATED DIFF   Result Value Ref Range    WBC 5.4 3.4 - 10.8 x10E3/uL    RBC 4.34 3.77 - 5.28 x10E6/uL    HGB 9.7 (L) 11.1 - 15.9 g/dL    HCT 33.0 (L) 34.0 - 46.6 %    MCV 76 (L) 79 - 97 fL    MCH 22.4 (L) 26.6 - 33.0 pg    MCHC 29.4 (L) 31.5 - 35.7 g/dL    RDW 17.4 (H) 12.3 - 15.4 %    PLATELET 328 (H) 399 - 379 x10E3/uL    NEUTROPHILS 44 Not Estab. %    Lymphocytes 48 Not Estab. %    MONOCYTES 4 Not Estab. %    EOSINOPHILS 4 Not Estab. %    BASOPHILS 0 Not Estab. %    ABS. NEUTROPHILS 2.4 1.4 - 7.0 x10E3/uL    Abs Lymphocytes 2.6 0.7 - 3.1 x10E3/uL    ABS. MONOCYTES 0.2 0.1 - 0.9 x10E3/uL    ABS. EOSINOPHILS 0.2 0.0 - 0.4 x10E3/uL    ABS. BASOPHILS 0.0 0.0 - 0.2 x10E3/uL    IMMATURE GRANULOCYTES 0 Not Estab. %    ABS. IMM.  GRANS. 0.0 0.0 - 0.1 [id number]/VF   METABOLIC PANEL, COMPREHENSIVE   Result Value Ref Range    Glucose 90 65 - 99 mg/dL    BUN 8 6 - 20 mg/dL    Creatinine 0.58 0.57 - 1.00 mg/dL    GFR est non- >59 mL/min/1.73    GFR est  >59 mL/min/1.73    BUN/Creatinine ratio 14 9 - 23    Sodium 139 134 - 144 mmol/L    Potassium 4.3 3.5 - 5.2 mmol/L    Chloride 102 96 - 106 mmol/L    CO2 25 18 - 29 mmol/L    Calcium 8.9 8.7 - 10.2 mg/dL    Protein, total 6.7 6.0 - 8.5 g/dL    Albumin 3.8 3.5 - 5.5 g/dL    GLOBULIN, TOTAL 2.9 1.5 - 4.5 g/dL A-G Ratio 1.3 1.2 - 2.2    Bilirubin, total <0.2 0.0 - 1.2 mg/dL    Alk. phosphatase 69 39 - 117 IU/L    AST (SGOT) 13 0 - 40 IU/L    ALT (SGPT) 15 0 - 32 IU/L   LIPID PANEL   Result Value Ref Range    Cholesterol, total 85 (L) 100 - 199 mg/dL    Triglyceride 75 0 - 149 mg/dL    HDL Cholesterol 35 (L) >39 mg/dL    VLDL, calculated 15 5 - 40 mg/dL    LDL, calculated 35 0 - 99 mg/dL   HEMOGLOBIN A1C W/O EAG   Result Value Ref Range    Hemoglobin A1c 5.7 (H) 4.8 - 5.6 %   IRON PROFILE   Result Value Ref Range    TIBC 338 250 - 450 ug/dL    UIBC 316 131 - 425 ug/dL    Iron 22 (L) 27 - 159 ug/dL    Iron % saturation 7 (LL) 15 - 55 %   FERRITIN   Result Value Ref Range    Ferritin 8 (L) 15 - 150 ng/mL   VITAMIN B12 & FOLATE   Result Value Ref Range    Vitamin B12 467 232 - 1245 pg/mL    Folate 6.5 >3.0 ng/mL   CVD REPORT   Result Value Ref Range    INTERPRETATION Note          ASSESSMENT/PLAN  Diagnoses and all orders for this visit:    ST elevation myocardial infarction (STEMI), unspecified artery (HCC)  S/p stent 1/18  Asymptomatic  Cont BB, nitrates, ASA, plavix  Following dr. Alysia Boxer on smoking cessation     BMI 40.0-44.9, adult ([facility] [address]  Encouraged wt loss     Prediabetes  Persistent, encouraged wt loss     Tobacco use  Counseled on cessation  declines PCV vaccine     Essential hypertension  Controlled, cont BB    Iron deficiency anemia  Likely due to heavy menses, now following gyne dr. Christopher Shown  Cont  replacement feso4 325 mg tid with ascorbic acid  monitoring  Recheck CBC next month prior to next visit    Coronary artery disease of native artery of native heart with stable angina pectoris (HCC)    Sleep difficulty  Sleep hygiene discussed    Snoring  Referral to dr. Noelle Flores, concern for ARTUR      Follow-up Disposition:  Return in about 4 weeks (around 7/5/2018). Patient understands plan of care. Patient has provided input and agrees with goals.

## 2018-06-07 NOTE — PROGRESS NOTES
1. Have you been to the ER, urgent care clinic since your last visit? Hospitalized since your last visit? No    2. Have you seen or consulted any other health care providers outside of the [facility] since your last visit? Include any pap smears or colon screening.  Dr. Yeimi Shaw 5/14/18 OB/GYN    Chief Complaint   Patient presents with    Dental Pain     improved    Other     iron deficiency    Other     Pre-diabetes    Hypertension    Sleep Problem     \"up every hour at night\", times 2 weeks

## 2018-06-07 NOTE — MR AVS SNAPSHOT
[address] [address] 068 [facility] 
[phone] Patient: Alejandra Gardner MRN: [id number] OVC:[id number] Visit Information Date & Time Provider Department Dept. Phone Encounter #  
 6/7/2018 10:00 AM Vianey Tovar, [address] [id number] Follow-up Instructions Return in about 4 weeks (around 7/5/2018). Upcoming Health Maintenance Date Due  
 PAP AKA CERVICAL CYTOLOGY 9/1/2017 Influenza Age 5 to Adult 8/1/2018 DTaP/Tdap/Td series (2 - Td) 4/1/2021 Allergies as of 6/7/2018  Review Complete On: 6/7/2018 By: Fidel Larsen LPN Severity Noted Reaction Type Reactions Lisinopril  04/26/2018    Cough Vaccine Adjuvant Emulsion Combination No. 1  12/23/2012    Seizures MMR Current Immunizations  Never Reviewed Name Date Tdap 4/1/2011 Not reviewed this visit You Were Diagnosed With   
  
 Codes Comments Essential hypertension    -  Primary ICD-10-CM: I10 
ICD-9-CM: 401.9 ST elevation myocardial infarction (STEMI), unspecified artery ([facility] [address]     ICD-10-CM: I21.3 ICD-9-CM: 410.90 Prediabetes     ICD-10-CM: R73.03 
ICD-9-CM: 790.29 Iron deficiency anemia due to chronic blood loss     ICD-10-CM: D50.0 ICD-9-CM: 280.0 Menorrhagia with regular cycle     ICD-10-CM: N92.0 ICD-9-CM: 626.2 Snoring     ICD-10-CM: R06.83 
ICD-9-CM: 786.09 Sleeping difficulty     ICD-10-CM: G47.9 ICD-9-CM: 780.50 Vitals BP Pulse Temp Resp Height(growth percentile) Weight(growth percentile) 126/80 (BP 1 Location: Left arm, BP Patient Position: Sitting) 69 98.1 °F (36.7 °C) (Oral) 16 5' (1.524 m) 215 lb 3.2 oz (97.6 kg) SpO2 BMI OB Status Smoking Status 98% 42.03 kg/m2 Having regular periods Current Every Day Smoker Vitals History BMI and BSA Data Body Mass Index Body Surface Area  42.03 kg/m 2 2.03 m 2  
 Preferred Pharmacy Pharmacy Name Phone [facility], [address] [facility] [address] [facility] [phone] Your Updated Medication List  
  
   
This list is accurate as of 6/7/18 10:42 AM.  Always use your most recent med list. amLODIPine 5 mg tablet Commonly known as:  Leigh Leo 5 mg.  
  
 ascorbic acid (vitamin C) 500 mg tablet Commonly known as:  VITAMIN C Take 1 Tab by mouth three (3) times daily. aspirin 81 mg chewable tablet 81 mg.  
  
 clopidogrel 75 mg Tab Commonly known as:  PLAVIX 75 mg.  
  
 cyclobenzaprine 10 mg tablet Commonly known as:  FLEXERIL Take 1 Tab by mouth three (3) times daily as needed for Muscle Spasm(s). ferrous sulfate 325 mg (65 mg iron) EC tablet Commonly known as:  IRON Take 1 Tab by mouth three (3) times daily (with meals). metoprolol succinate 50 mg XL tablet Commonly known as:  TOPROL-XL 25 mg daily. nitroglycerin 0.4 mg SL tablet Commonly known as:  NITROSTAT  
0.4 mg.  
  
 penicillin v potassium 500 mg tablet Commonly known as:  VEETID Take  by mouth four (4) times daily. simvastatin 40 mg tablet Commonly known as:  ZOCOR 40 mg. We Performed the Following REFERRAL TO SLEEP STUDIES [REF99 Custom] Follow-up Instructions Return in about 4 weeks (around 7/5/2018). To-Do List   
 06/07/2018 Lab:  CBC WITH AUTOMATED DIFF Referral Information Referral ID Referred By Referred To  
  
 [id number] Abby Moody, DO   
   [address] [place] [address], [address] [facility] [address] Phone: [phone] Fax: [phone] Visits Status Start Date End Date 1 New Request 6/7/18 6/7/19 If your referral has a status of pending review or denied, additional information will be sent to support the outcome of this decision. Patient Instructions Learning About Sleeping Well What does sleeping well mean? Sleeping well means getting enough sleep. How much sleep is enough varies among people. The number of hours you sleep is not as important as how you feel when you wake up. If you do not feel refreshed, you probably need more sleep. Another sign of not getting enough sleep is feeling tired during the day. The average total nightly sleep time is 7½ to 8 hours. Healthy adults may need a little more or a little less than this. Why is getting enough sleep important? Getting enough quality sleep is a basic part of good health. When your sleep suffers, your mood and your thoughts can suffer too. You may find yourself feeling more grumpy or stressed. Not getting enough sleep also can lead to serious problems, including injury, accidents, anxiety, and depression. What might cause poor sleeping? Many things can cause sleep problems, including: · Stress. Stress can be caused by fear about a single event, such as giving a speech. Or you may have ongoing stress, such as worry about work or school. · Depression, anxiety, and other mental or emotional conditions. · Changes in your sleep habits or surroundings. This includes changes that happen where you sleep, such as noise, light, or sleeping in a different bed. It also includes changes in your sleep pattern, such as having jet lag or working a late shift. · Health problems, such as pain, breathing problems, and restless legs syndrome. · Lack of regular exercise. How can you help yourself? Here are some tips that may help you sleep more soundly and wake up feeling more refreshed. Your sleeping area · Use your bedroom only for sleeping and sex. A bit of light reading may help you fall asleep. But if it doesn't, do your reading elsewhere in the house. Don't watch TV in bed. · Be sure your bed is big enough to stretch out comfortably, especially if you have a sleep partner. · Keep your bedroom quiet, dark, and cool. Use curtains, blinds, or a sleep mask to block out light. To block out noise, use earplugs, soothing music, or a \"white noise\" machine. Your evening and bedtime routine · Create a relaxing bedtime routine. You might want to take a warm shower or bath, listen to soothing music, or drink a cup of noncaffeinated tea. · Go to bed at the same time every night. And get up at the same time every morning, even if you feel tired. What to avoid · Limit caffeine (coffee, tea, caffeinated sodas) during the day, and don't have any for at least 4 to 6 hours before bedtime. · Don't drink alcohol before bedtime. Alcohol can cause you to wake up more often during the night. · Don't smoke or use tobacco, especially in the evening. Nicotine can keep you awake. · Don't take naps during the day, especially close to bedtime. · Don't lie in bed awake for too long. If you can't fall asleep, or if you wake up in the middle of the night and can't get back to sleep within 15 minutes or so, get out of bed and go to another room until you feel sleepy. · Don't take medicine right before bed that may keep you awake or make you feel hyper or energized. Your doctor can tell you if your medicine may do this and if you can take it earlier in the day. If you can't sleep · Imagine yourself in a peaceful, pleasant scene. Focus on the details and feelings of being in a place that is relaxing. · Get up and do a quiet or boring activity until you feel sleepy. · Don't drink any liquids after 6 p.m. if you wake up often because you have to go to the bathroom. Where can you learn more? Go to http://kim-tristin.info/. Enter S901 in the search box to learn more about \"Learning About Sleeping Well. \" Current as of: May 12, 2017 Content Version: 11.4 © 4543-1531 Healthwise, [employer].  Care instructions adapted under license by [address] (which disclaims liability or warranty for this information). If you have questions about a medical condition or this instruction, always ask your healthcare professional. [address] any warranty or liability for your use of this information. Introducing [facility] & HEALTH SERVICES! [employer] introduces Wish patient portal. Now you can access parts of your medical record, email your doctor's office, and request medication refills online. 1. In your internet browser, go to [email or web address] 2. Click on the First Time User? Click Here link in the Sign In box. You will see the New Member Sign Up page. 3. Enter your Wish Access Code exactly as it appears below. You will not need to use this code after youve completed the sign-up process. If you do not sign up before the expiration date, you must request a new code. · Wish Access Code: [email or web address] Expires: 6/16/2018 10:52 PM 
 
4. Enter the last four digits of your Social Security Number (xxxx) and Date of Birth (mm/dd/yyyy) as indicated and click Submit. You will be taken to the next sign-up page. 5. Create a Wish ID. This will be your Wish login ID and cannot be changed, so think of one that is secure and easy to remember. 6. Create a Wish password. You can change your password at any time. 7. Enter your Password Reset Question and Answer. This can be used at a later time if you forget your password. 8. Enter your e-mail address. You will receive e-mail notification when new information is available in [address] 9. Click Sign Up. You can now view and download portions of your medical record. 10. Click the Download Summary menu link to download a portable copy of your medical information. If you have questions, please visit the Frequently Asked Questions section of the Wish website.  Remember, Wish is NOT to be used for urgent needs. For medical emergencies, dial 911. Now available from your iPhone and Android! Please provide this summary of care documentation to your next provider. Your primary care clinician is listed as Bebo Diallo. If you have any questions after today's visit, please call [phone].

## 2018-06-15 ENCOUNTER — TELEPHONE (OUTPATIENT)
Dept: FAMILY MEDICINE CLINIC | Age: 38
End: 2018-06-15

## 2018-06-15 NOTE — TELEPHONE ENCOUNTER
Patient identified with 2 identifiers (name and ). Patient is requesting something for pain due to passing large clots. Patient wanted to know if she should call her gyn or could Dr. Moe Byrd call something into pharmacy. Patient was advised to call her GYN for pain medication.

## 2018-06-15 NOTE — TELEPHONE ENCOUNTER
Pt called and would like to discuss with Dr. Jeff Barakat about the gyn medication that was discussed during the last appt. Please call pt at your earliest convenience.

## 2018-06-28 NOTE — TELEPHONE ENCOUNTER
This pharmacy faxed over request for the following prescriptions to be filled:    Medication requested :   Requested Prescriptions     Pending Prescriptions Disp Refills    ascorbic acid, vitamin C, (VITAMIN C) 500 mg tablet 90 Tab 0     Sig: Take 1 Tab by mouth three (3) times daily.        PCP: [address] [place] [address] Street or Print: [facility]   Mail order or Local pharmacy [address]     Scheduled appointment if not seen by current providers in office: LOV 6/7/2018 f/u 7/5/2018

## 2018-06-29 RX ORDER — ASCORBIC ACID 500 MG
500 TABLET ORAL 3 TIMES DAILY
Qty: 90 TAB | Refills: 0 | Status: SHIPPED | OUTPATIENT
Start: 2018-06-29 | End: 2018-12-14

## 2018-07-05 ENCOUNTER — HOSPITAL ENCOUNTER (OUTPATIENT)
Dept: LAB | Age: 38
Discharge: HOME | End: 2018-07-05
Payer: MEDICAID

## 2018-07-05 ENCOUNTER — OFFICE VISIT (OUTPATIENT)
Dept: FAMILY MEDICINE CLINIC | Age: 38
End: 2018-07-05

## 2018-07-05 ENCOUNTER — HOSPITAL ENCOUNTER (OUTPATIENT)
Dept: GENERAL RADIOLOGY | Age: 38
Discharge: HOME | End: 2018-07-05
Payer: MEDICAID

## 2018-07-05 VITALS
RESPIRATION RATE: 16 BRPM | WEIGHT: 217 LBS | DIASTOLIC BLOOD PRESSURE: 74 MMHG | OXYGEN SATURATION: 99 % | HEIGHT: 60 IN | BODY MASS INDEX: 42.6 KG/M2 | SYSTOLIC BLOOD PRESSURE: 120 MMHG | TEMPERATURE: 98.3 F | HEART RATE: 66 BPM

## 2018-07-05 DIAGNOSIS — M79.672 LEFT FOOT PAIN: Primary | ICD-10-CM

## 2018-07-05 DIAGNOSIS — Z87.891 FORMER SMOKER: ICD-10-CM

## 2018-07-05 DIAGNOSIS — E66.01 OBESITY, MORBID (HCC): ICD-10-CM

## 2018-07-05 DIAGNOSIS — R73.03 PREDIABETES: ICD-10-CM

## 2018-07-05 DIAGNOSIS — I10 ESSENTIAL HYPERTENSION: ICD-10-CM

## 2018-07-05 DIAGNOSIS — I25.119 CORONARY ARTERY DISEASE INVOLVING NATIVE CORONARY ARTERY OF NATIVE HEART WITH ANGINA PECTORIS (HCC): ICD-10-CM

## 2018-07-05 DIAGNOSIS — M79.672 LEFT FOOT PAIN: ICD-10-CM

## 2018-07-05 DIAGNOSIS — D50.0 IRON DEFICIENCY ANEMIA DUE TO CHRONIC BLOOD LOSS: ICD-10-CM

## 2018-07-05 PROCEDURE — 99001 SPECIMEN HANDLING PT-LAB: CPT | Performed by: FAMILY MEDICINE

## 2018-07-05 PROCEDURE — 73630 X-RAY EXAM OF FOOT: CPT

## 2018-07-05 NOTE — MR AVS SNAPSHOT
[address] 
[phone] Patient: Philipp Valdez MRN: [id number] FAA:85/[id number] Visit Information Date & Time Provider Department Dept. Phone Encounter #  
 7/5/2018 11:00 AM Joey Alicea, [address] [facility] [address] [id number] Follow-up Instructions Return in about 3 months (around 10/5/2018), or if symptoms worsen or fail to improve. Upcoming Health Maintenance Date Due  
 PAP AKA CERVICAL CYTOLOGY 9/1/2017 Influenza Age 5 to Adult 8/1/2018 DTaP/Tdap/Td series (2 - Td) 4/1/2021 Allergies as of 7/5/2018  Review Complete On: 7/5/2018 By: Joey Alicea MD  
  
 Severity Noted Reaction Type Reactions Lisinopril  04/26/2018    Cough Vaccine Adjuvant Emulsion Combination No. 1  12/23/2012    Seizures MMR Current Immunizations  Never Reviewed Name Date Tdap 4/1/2011 Not reviewed this visit You Were Diagnosed With   
  
 Codes Comments Left foot pain    -  Primary ICD-10-CM: W91.768 ICD-9-CM: 729.5 Obesity, morbid ([facility] [address]     ICD-10-CM: E66.01 
ICD-9-CM: 278.01 Iron deficiency anemia due to chronic blood loss     ICD-10-CM: D50.0 ICD-9-CM: 280.0 Prediabetes     ICD-10-CM: R73.03 
ICD-9-CM: 790.29 Essential hypertension     ICD-10-CM: I10 
ICD-9-CM: 401.9 Former smoker     ICD-10-CM: C46.396 ICD-9-CM: V15.82 Vitals BP Pulse Temp Resp Height(growth percentile) Weight(growth percentile) 120/74 (BP 1 Location: Left arm, BP Patient Position: Sitting) 66 98.3 °F (36.8 °C) (Oral) 16 5' (1.524 m) 217 lb (98.4 kg) LMP SpO2 BMI OB Status Smoking Status 06/15/2018 99% 42.38 kg/m2 Having regular periods Current Every Day Smoker Vitals History BMI and BSA Data Body Mass Index Body Surface Area  
 42.38 kg/m 2 2.04 m 2 Preferred Pharmacy Pharmacy Name Phone [address], [address] [phone] Your Updated Medication List  
  
   
This list is accurate as of 7/5/18 11:22 AM.  Always use your most recent med list. amLODIPine 5 mg tablet Commonly known as:  Wandra Munira 5 mg.  
  
 ascorbic acid (vitamin C) 500 mg tablet Commonly known as:  VITAMIN C Take 1 Tab by mouth three (3) times daily. aspirin 81 mg chewable tablet 81 mg.  
  
 clopidogrel 75 mg Tab Commonly known as:  PLAVIX 75 mg.  
  
 ferrous sulfate 325 mg (65 mg iron) EC tablet Commonly known as:  IRON Take 1 Tab by mouth three (3) times daily (with meals). metoprolol succinate 50 mg XL tablet Commonly known as:  TOPROL-XL 25 mg daily. nitroglycerin 0.4 mg SL tablet Commonly known as:  NITROSTAT  
0.4 mg.  
  
 simvastatin 40 mg tablet Commonly known as:  ZOCOR 40 mg. Follow-up Instructions Return in about 3 months (around 10/5/2018), or if symptoms worsen or fail to improve. To-Do List   
 07/05/2018 Imaging:  XR FOOT LT MIN 3 V Patient Instructions Heart-Healthy Diet: Care Instructions Your Care Instructions A heart-healthy diet has lots of vegetables, fruits, nuts, beans, and whole grains, and is low in salt. It limits foods that are high in saturated fat, such as meats, cheeses, and fried foods. It may be hard to change your diet, but even small changes can lower your risk of heart attack and heart disease. Follow-up care is a key part of your treatment and safety. Be sure to make and go to all appointments, and call your doctor if you are having problems. It's also a good idea to know your test results and keep a list of the medicines you take. How can you care for yourself at home? Watch your portions · Learn what a serving is.  A \"serving\" and a \"portion\" are not always the same thing. Make sure that you are not eating larger portions than are recommended. For example, a serving of pasta is ½ cup. A serving size of meat is 2 to 3 ounces. A 3-ounce serving is about the size of a deck of cards. Measure serving sizes until you are good at [place]" them. Keep in mind that restaurants often serve portions that are 2 or 3 times the size of one serving. · To keep your energy level up and keep you from feeling hungry, eat often but in smaller portions. · Eat only the number of calories you need to stay at a healthy weight. If you need to lose weight, eat fewer calories than your body burns (through exercise and other physical activity). Eat more fruits and vegetables · Eat a variety of fruit and vegetables every day. Dark green, deep orange, red, or yellow fruits and vegetables are especially good for you. Examples include spinach, carrots, peaches, and berries. · Keep carrots, celery, and other veggies handy for snacks. Buy fruit that is in season and store it where you can see it so that you will be tempted to eat it. · Cook dishes that have a lot of veggies in them, such as stir-fries and soups. Limit saturated and trans fat · Read food labels, and try to avoid saturated and trans fats. They increase your risk of heart disease. Trans fat is found in many processed foods such as cookies and crackers. · Use olive or canola oil when you cook. Try cholesterol-lowering spreads, such as Benecol or Take Control. · Bake, broil, grill, or steam foods instead of frying them. · Choose lean meats instead of high-fat meats such as hot dogs and sausages. Cut off all visible fat when you prepare meat. · Eat fish, skinless poultry, and meat alternatives such as soy products instead of high-fat meats. Soy products, such as tofu, may be especially good for your heart. · Choose low-fat or fat-free milk and dairy products. Eat fish · Eat at least two servings of fish a week. Certain fish, such as salmon and tuna, contain omega-3 fatty acids, which may help reduce your risk of heart attack. Eat foods high in fiber · Eat a variety of grain products every day. Include whole-grain foods that have lots of fiber and nutrients. Examples of whole-grain foods include oats, whole wheat bread, and brown rice. · Buy whole-grain breads and cereals, instead of white bread or pastries. Limit salt and sodium · Limit how much salt and sodium you eat to help lower your blood pressure. · Taste food before you salt it. Add only a little salt when you think you need it. With time, your taste buds will adjust to less salt. · Eat fewer snack items, fast foods, and other high-salt, processed foods. Check food labels for the amount of sodium in packaged foods. · Choose low-sodium versions of canned goods (such as soups, vegetables, and beans). Limit sugar · Limit drinks and foods with added sugar. These include candy, desserts, and soda pop. Limit alcohol · Limit alcohol to no more than 2 drinks a day for men and 1 drink a day for women. Too much alcohol can cause health problems. When should you call for help? Watch closely for changes in your health, and be sure to contact your doctor if: 
? · You would like help planning heart-healthy meals. Where can you learn more? Go to http://kim-tristin.info/. Enter V137 in the search box to learn more about \"Heart-Healthy Diet: Care Instructions. \" Current as of: September 21, 2016 Content Version: 11.4 © 8480-5307 [employer]. Care instructions adapted under license by [employer] (which disclaims liability or warranty for this information). If you have questions about a medical condition or this instruction, always ask your healthcare professional. [address] any warranty or liability for your use of this information. Introducing hospitals & HEALTH SERVICES! [employer] introduces Member Desk patient portal. Now you can access parts of your medical record, email your doctor's office, and request medication refills online. 1. In your internet browser, go to [email or web address] 2. Click on the First Time User? Click Here link in the Sign In box. You will see the New Member Sign Up page. 3. Enter your Member Desk Access Code exactly as it appears below. You will not need to use this code after youve completed the sign-up process. If you do not sign up before the expiration date, you must request a new code. · Member Desk Access Code: [email or web address] Expires: 10/3/2018 11:22 AM 
 
4. Enter the last four digits of your Social Security Number (xxxx) and Date of Birth (mm/dd/yyyy) as indicated and click Submit. You will be taken to the next sign-up page. 5. Create a Member Desk ID. This will be your Member Desk login ID and cannot be changed, so think of one that is secure and easy to remember. 6. Create a Member Desk password. You can change your password at any time. 7. Enter your Password Reset Question and Answer. This can be used at a later time if you forget your password. 8. Enter your e-mail address. You will receive e-mail notification when new information is available in [address] 9. Click Sign Up. You can now view and download portions of your medical record. 10. Click the Download Summary menu link to download a portable copy of your medical information. If you have questions, please visit the Frequently Asked Questions section of the Member Desk website. Remember, Member Desk is NOT to be used for urgent needs. For medical emergencies, dial 911. Now available from your iPhone and Android! Please provide this summary of care documentation to your next provider. Your primary care clinician is listed as Feng Ron. If you have any questions after today's visit, please call [phone].

## 2018-07-05 NOTE — PATIENT INSTRUCTIONS

## 2018-07-05 NOTE — PROGRESS NOTES
Dennie Dillon, 42 y.o.,  female    SUBJECTIVE  Ff-up and concerns    L top of foot pain for 6 months, Says phone accidentally fell on her foot and has had pain since. Reports swelling initially that has resolved. Anemia-being evaluated by dr Zana Jiang for heavy menses, hgb 9.7, says they are exploring options with her cardiac history in mind. She is tolerating po fe. Forgot to have repeat cbc done today    CAD, STEMI s/p PCI 1/18 - denies chest pain, sob. Following dr. Tiffany Serrano. She continues to be off cigarettes past 6 weeks. Reports recent cardiac stress test was normal.   Noticed difficutly sleeping, waking up about every 1-2 hours for a few months now. She says she snores and wakes up tired and exhausted. Was referred to sleep specialist on last visit- has not pursued, given #    HTN- taking medication, reviewed labs. Prediabetes persistent. ROS:  See HPI, all others negative        Patient Active Problem List   Diagnosis Code    Tobacco use Z72.0    Obesity E66.9    Hypertension I10    Prediabetes R73.03    Low HDL (under 40) E78.6    Obesity, morbid (HCC) E66.01    ST elevation myocardial infarction (STEMI) (HCC) I21.3    Iron deficiency anemia due to chronic blood loss D50.0       Current Outpatient Prescriptions   Medication Sig Dispense Refill    ascorbic acid, vitamin C, (VITAMIN C) 500 mg tablet Take 1 Tab by mouth three (3) times daily. 90 Tab 0    ferrous sulfate (IRON) 325 mg (65 mg iron) EC tablet Take 1 Tab by mouth three (3) times daily (with meals). 90 Tab 1    clopidogrel (PLAVIX) 75 mg tab 75 mg.      amLODIPine (NORVASC) 5 mg tablet 5 mg.  metoprolol succinate (TOPROL-XL) 50 mg XL tablet 25 mg daily.  simvastatin (ZOCOR) 40 mg tablet 40 mg.      aspirin 81 mg chewable tablet 81 mg.      nitroglycerin (NITROSTAT) 0.4 mg SL tablet 0.4 mg.          Allergies   Allergen Reactions    Lisinopril Cough    Vaccine Adjuvant Emulsion Combination No. 1 Seizures     MMR Past Medical History:   Diagnosis Date    Cardiac echocardiogram 07/21/2016    Sm LV cavity. EF 65-70%. No RWMA. Mod-marked LVH w/dynamic obstruction, mid-cavity obliteration & peak grad of 25 mmHg. Indeterminate diastolic fx. No significant valvular pathology.  Cardiac nuclear imaging test 07/22/2016    Low risk. Very sm distal anterior & apical partially reversible defect, likely artifact, but sm area of ischemia not excluded. No RWMA. EF 66%. Neg EKG on pharm stress test.    Diverticulitis     GERD (gastroesophageal reflux disease)     Hypertension     Infectious disease     Bacteria vaginosis    Non-ST elevated [facility] infarction ([facility]     STEMI (ST elevation myocardial infarction) ([facility] 01/2018       Social History     Social History    Marital status: SINGLE     Spouse name: N/A    Number of children: N/A    Years of education: N/A     Occupational History    Not on file.      Social History Main Topics    Smoking status: Current Every Day Smoker     Packs/day: 0.25     Years: 20.00    Smokeless tobacco: Never Used    Alcohol use Yes      Comment: rare/holidays    Drug use: No    Sexual activity: Yes     Partners: Male     Birth control/ protection: Surgical      Comment: tubal ligation     Other Topics Concern    Not on file     Social History Narrative       Family History   Problem Relation Age of Onset    Depression Mother     Asthma Mother     Migraines Mother     Hypertension Mother    [facility] Stroke Mother     Heart Attack Mother     Arthritis-osteo Mother     Depression Brother     Alcohol abuse Father     Substance Abuse Father      tobacco    Drug Abuse Father     Hypertension Father     High Cholesterol Father     Stroke Father     Rashes/Skin Problems Father     Substance Abuse Brother      tobacco    Drug Abuse Brother     Asthma Brother     Migraines Brother     Hypertension Brother     High Cholesterol Brother     Hypertension Paternal Grandmother     Diabetes Paternal Grandmother     Hypertension Maternal Grandmother     Colon Cancer Maternal Grandmother          OBJECTIVE    Physical Exam:     Visit Vitals    /74 (BP 1 Location: Left arm, BP Patient Position: Sitting)    Pulse 66    Temp 98.3 °F (36.8 °C) (Oral)    Resp 16    Ht 5' (1.524 m)    Wt 217 lb (98.4 kg)    LMP 06/15/2018    SpO2 99%    BMI 42.38 kg/m2       General: alert, well-appearing, obese,AA, in no apparent distress or pain  CVS: normal rate, regular rhythm, distinct S1 and S2  Lungs:clear to ausculation bilaterally, no crackles, wheezing or rhonchi noted  Extremities: L top of midfoot tenderness, no swelling. DP pulse palpable. no cyanosis,  Skin: warm, no lesions, rashes noted  Psych:  mood and affect normal    Results for orders placed or performed in visit on 04/26/18   CBC WITH AUTOMATED DIFF   Result Value Ref Range    WBC 5.4 3.4 - 10.8 x10E3/uL    RBC 4.34 3.77 - 5.28 x10E6/uL    HGB 9.7 (L) 11.1 - 15.9 g/dL    HCT 33.0 (L) 34.0 - 46.6 %    MCV 76 (L) 79 - 97 fL    MCH 22.4 (L) 26.6 - 33.0 pg    MCHC 29.4 (L) 31.5 - 35.7 g/dL    RDW 17.4 (H) 12.3 - 15.4 %    PLATELET 503 (H) 006 - 379 x10E3/uL    NEUTROPHILS 44 Not Estab. %    Lymphocytes 48 Not Estab. %    MONOCYTES 4 Not Estab. %    EOSINOPHILS 4 Not Estab. %    BASOPHILS 0 Not Estab. %    ABS. NEUTROPHILS 2.4 1.4 - 7.0 x10E3/uL    Abs Lymphocytes 2.6 0.7 - 3.1 x10E3/uL    ABS. MONOCYTES 0.2 0.1 - 0.9 x10E3/uL    ABS. EOSINOPHILS 0.2 0.0 - 0.4 x10E3/uL    ABS. BASOPHILS 0.0 0.0 - 0.2 x10E3/uL    IMMATURE GRANULOCYTES 0 Not Estab. %    ABS. IMM.  GRANS. 0.0 0.0 - 0.1 [id number]   METABOLIC PANEL, COMPREHENSIVE   Result Value Ref Range    Glucose 90 65 - 99 mg/dL    BUN 8 6 - 20 mg/dL    Creatinine 0.58 0.57 - 1.00 mg/dL    GFR est non- >59 mL/min/1.73    GFR est  >59 mL/min/1.73    BUN/Creatinine ratio 14 9 - 23    Sodium 139 134 - 144 mmol/L    Potassium 4.3 3.5 - 5.2 mmol/L    Chloride 102 96 - 106 mmol/L    CO2 25 18 - 29 mmol/L    Calcium 8.9 8.7 - 10.2 mg/dL    Protein, total 6.7 6.0 - 8.5 g/dL    Albumin 3.8 3.5 - 5.5 g/dL    GLOBULIN, TOTAL 2.9 1.5 - 4.5 g/dL    A-G Ratio 1.3 1.2 - 2.2    Bilirubin, total <0.2 0.0 - 1.2 mg/dL    Alk.  phosphatase 69 39 - 117 IU/L    AST (SGOT) 13 0 - 40 IU/L    ALT (SGPT) 15 0 - 32 IU/L   LIPID PANEL   Result Value Ref Range    Cholesterol, total 85 (L) 100 - 199 mg/dL    Triglyceride 75 0 - 149 mg/dL    HDL Cholesterol 35 (L) >39 mg/dL    VLDL, calculated 15 5 - 40 mg/dL    LDL, calculated 35 0 - 99 mg/dL   HEMOGLOBIN A1C W/O EAG   Result Value Ref Range    Hemoglobin A1c 5.7 (H) 4.8 - 5.6 %   IRON PROFILE   Result Value Ref Range    TIBC 338 250 - 450 ug/dL    UIBC 316 131 - 425 ug/dL    Iron 22 (L) 27 - 159 ug/dL    Iron % saturation 7 (LL) 15 - 55 %   FERRITIN   Result Value Ref Range    Ferritin 8 (L) 15 - 150 ng/mL   VITAMIN B12 & FOLATE   Result Value Ref Range    Vitamin B12 467 232 - 1245 pg/mL    Folate 6.5 >3.0 ng/mL   CVD REPORT   Result Value Ref Range    INTERPRETATION Note          ASSESSMENT/PLAN  Diagnoses and all orders for this visit:    Left foot pain  Xray Left foot    Iron deficiency anemia  Likely due to heavy menses, now following gyne dr. Marisela Weldon  Cont  replacement feso4 325 mg tid with ascorbic acid  monitoring  Recheck CBC today    ST elevation myocardial infarction (STEMI), unspecified artery (HCC)  S/p stent 1/18  Asymptomatic  Cont BB, nitrates, ASA, plavix  Following dr. Carlotta Tiwari on smoking cessation     BMI 40.0-44.9, adult ([address]  Encouraged wt loss     Prediabetes  Persistent, encouraged wt loss\    Essential hypertension  Controlled, cont BB    Coronary artery disease of native artery of native heart with stable angina pectoris (HCC)    Sleep difficulty  Sleep hygiene discussed    Snoring  Referral to dr. Marlene Garrett, concern for ARTUR  Given # again today to pursue    Follow-up Disposition:  Return in about 3 months (around 10/5/2018), or if symptoms worsen or fail to improve. Patient understands plan of care. Patient has provided input and agrees with goals.

## 2018-07-06 LAB
BASOPHILS # BLD AUTO: 0 X10E3/UL (ref 0–0.2)
BASOPHILS NFR BLD AUTO: 0 %
EOSINOPHIL # BLD AUTO: 0.3 X10E3/UL (ref 0–0.4)
EOSINOPHIL NFR BLD AUTO: 4 %
ERYTHROCYTE [DISTWIDTH] IN BLOOD BY AUTOMATED COUNT: 22.4 % (ref 12.3–15.4)
HCT VFR BLD AUTO: 36.9 % (ref 34–46.6)
HGB BLD-MCNC: 11.7 G/DL (ref 11.1–15.9)
IMM GRANULOCYTES # BLD: 0 X10E3/UL (ref 0–0.1)
IMM GRANULOCYTES NFR BLD: 0 %
LYMPHOCYTES # BLD AUTO: 2.9 X10E3/UL (ref 0.7–3.1)
LYMPHOCYTES NFR BLD AUTO: 43 %
MCH RBC QN AUTO: 25.2 PG (ref 26.6–33)
MCHC RBC AUTO-ENTMCNC: 31.7 G/DL (ref 31.5–35.7)
MCV RBC AUTO: 79 FL (ref 79–97)
MONOCYTES # BLD AUTO: 0.4 X10E3/UL (ref 0.1–0.9)
MONOCYTES NFR BLD AUTO: 7 %
MORPHOLOGY BLD-IMP: ABNORMAL
NEUTROPHILS # BLD AUTO: 3.2 X10E3/UL (ref 1.4–7)
NEUTROPHILS NFR BLD AUTO: 46 %
PLATELET # BLD AUTO: 377 X10E3/UL (ref 150–379)
RBC # BLD AUTO: 4.65 X10E6/UL (ref 3.77–5.28)
WBC # BLD AUTO: 6.8 X10E3/UL (ref 3.4–10.8)

## 2018-07-12 NOTE — PROGRESS NOTES
Contacted patient and verified identity using name and date of birth (2- identifiers). Patient advised anemia has improved, now wnl. Cont daily po fe supplement. Patient informed per Dr. Andrez Harrington to take iron once a day now. Patient voiced understanding.

## 2018-08-22 ENCOUNTER — TELEPHONE (OUTPATIENT)
Dept: FAMILY MEDICINE CLINIC | Age: 38
End: 2018-08-22

## 2018-08-22 NOTE — TELEPHONE ENCOUNTER
Patient identified with 2 identifiers (name and ). Spoke with patient and advised per Dr. Patrick Raymond recommendation patient would need appointment.  Patient has been scheduled for 2018

## 2018-08-22 NOTE — TELEPHONE ENCOUNTER
Pt called and would like to discuss an medicine that her cardiologist recommend. Please call pt at your earliest convenience.

## 2018-10-11 ENCOUNTER — OFFICE VISIT (OUTPATIENT)
Dept: FAMILY MEDICINE CLINIC | Age: 38
End: 2018-10-11

## 2018-10-11 ENCOUNTER — HOSPITAL ENCOUNTER (OUTPATIENT)
Dept: LAB | Age: 38
Discharge: HOME | End: 2018-10-11

## 2018-10-11 VITALS
TEMPERATURE: 98.7 F | WEIGHT: 217 LBS | SYSTOLIC BLOOD PRESSURE: 126 MMHG | RESPIRATION RATE: 16 BRPM | OXYGEN SATURATION: 99 % | HEART RATE: 76 BPM | BODY MASS INDEX: 42.6 KG/M2 | DIASTOLIC BLOOD PRESSURE: 80 MMHG | HEIGHT: 60 IN

## 2018-10-11 DIAGNOSIS — F43.21 ADJUSTMENT DISORDER WITH DEPRESSED MOOD: ICD-10-CM

## 2018-10-11 DIAGNOSIS — D50.0 IRON DEFICIENCY ANEMIA DUE TO CHRONIC BLOOD LOSS: ICD-10-CM

## 2018-10-11 DIAGNOSIS — R73.03 PREDIABETES: ICD-10-CM

## 2018-10-11 DIAGNOSIS — R10.13 EPIGASTRIC PAIN: ICD-10-CM

## 2018-10-11 DIAGNOSIS — I10 ESSENTIAL HYPERTENSION: ICD-10-CM

## 2018-10-11 DIAGNOSIS — Z87.891 FORMER SMOKER: ICD-10-CM

## 2018-10-11 DIAGNOSIS — I25.119 CORONARY ARTERY DISEASE INVOLVING NATIVE CORONARY ARTERY OF NATIVE HEART WITH ANGINA PECTORIS (HCC): Primary | ICD-10-CM

## 2018-10-11 LAB — XX-LABCORP SPECIMEN COL,LCBCF: NORMAL

## 2018-10-11 PROCEDURE — 99001 SPECIMEN HANDLING PT-LAB: CPT | Performed by: FAMILY MEDICINE

## 2018-10-11 RX ORDER — NITROGLYCERIN 0.4 MG/1
0.4 TABLET SUBLINGUAL
Status: CANCELLED | OUTPATIENT
Start: 2018-10-11

## 2018-10-11 RX ORDER — FLUTICASONE PROPIONATE 50 MCG
2 SPRAY, SUSPENSION (ML) NASAL DAILY
Qty: 1 BOTTLE | Refills: 0 | Status: SHIPPED | OUTPATIENT
Start: 2018-10-11 | End: 2020-02-21 | Stop reason: SDUPTHER

## 2018-10-11 RX ORDER — CETIRIZINE HCL 10 MG
10 TABLET ORAL
Qty: 90 TAB | Refills: 0 | Status: SHIPPED | OUTPATIENT
Start: 2018-10-11 | End: 2020-02-21 | Stop reason: SDUPTHER

## 2018-10-11 RX ORDER — SERTRALINE HYDROCHLORIDE 25 MG/1
25 TABLET, FILM COATED ORAL DAILY
Qty: 60 TAB | Refills: 0 | Status: SHIPPED | OUTPATIENT
Start: 2018-10-11 | End: 2018-11-09 | Stop reason: DRUGHIGH

## 2018-10-11 RX ORDER — PHENOL/SODIUM PHENOLATE
20 AEROSOL, SPRAY (ML) MUCOUS MEMBRANE DAILY
Qty: 30 TAB | Refills: 0 | Status: SHIPPED | OUTPATIENT
Start: 2018-10-11 | End: 2018-11-09

## 2018-10-11 NOTE — PROGRESS NOTES
1. Have you been to the ER, urgent care clinic since your last visit? Hospitalized since your last visit? No 
 
2. Have you seen or consulted any other health care providers outside of the [address] [place] [address] since your last visit? Include any pap smears or colon screening.  No

## 2018-10-11 NOTE — PROGRESS NOTES
Berenice Lees, 42 y.o.,  female SUBJECTIVE Ff-up and concerns Anemia-improving, taking otc prenatal vitamins. Following gyne  dr Piotr Duran for heavy menses CAD, STEMI s/p PCI 1/18 - denies chest pain, sob. Following dr. Jl Verdugo. She has completely stopped smoking in May 2018. She is doing cardiac rehab. Says medications are being modified due to side effect profile. Off simvastatin and metoprolol, due to pains and palpitations respectively. Has upcoming appt. Noticed difficutly sleeping, waking up about every 1-2 hours for a few months now. She says she snores and wakes up tired and exhausted. She has sleep specialist appt 10/12 HTN- taking norvasc. Pt has h/o prediabetes C/o increased depressed mood, unmotivated, freq crying past few months. Reports concerned about her health. She has not taken antidepressants in the past, willing to try C/o on and off abdminal cramping, epigastric and Left side. With nausea. Not related to meals, but woke up  In the middle of the night last month and went to ED due to pain. Urinalysis, kub negative. She says taking tums without much relief. Says no new meds, no nsaid use, she consumes high soda/caffeine, trying to cut down. Reports no change in BMs, no fever, no pelvic or urinary symptoms. C/o sinus pressure, freq sneezing, nasal congestion. No fever. ROS: 
See HPI, all others negative Patient Active Problem List  
Diagnosis Code  Tobacco use Z72.0  Obesity E66.9  Hypertension I10  
 Prediabetes R73.03  
 Low HDL (under 40) E78.6  Obesity, morbid ([address] E66.01  
 ST elevation myocardial infarction (STEMI) ([address] I21.3  Iron deficiency anemia due to chronic blood loss D50.0  Coronary artery disease involving native coronary artery of native heart with angina pectoris ([address] I25.119 Current Outpatient Prescriptions Medication Sig Dispense Refill  sertraline (ZOLOFT) 25 mg tablet Take 1 Tab by mouth daily. 60 Tab 0  
 fluticasone (FLONASE) 50 mcg/actuation nasal spray 2 Sprays by Both Nostrils route daily. 1 Bottle 0  
 cetirizine (ZYRTEC) 10 mg tablet Take 1 Tab by mouth daily as needed for Allergies. 90 Tab 0  
 Omeprazole delayed release (PRILOSEC D/R) 20 mg tablet Take 1 Tab by mouth daily. 30 Tab 0  clopidogrel (PLAVIX) 75 mg tab 75 mg.    
 amLODIPine (NORVASC) 5 mg tablet 5 mg.  aspirin 81 mg chewable tablet 81 mg.    
 ascorbic acid, vitamin C, (VITAMIN C) 500 mg tablet Take 1 Tab by mouth three (3) times daily. 90 Tab 0  
 ferrous sulfate (IRON) 325 mg (65 mg iron) EC tablet Take 1 Tab by mouth three (3) times daily (with meals). 90 Tab 1  
 metoprolol succinate (TOPROL-XL) 50 mg XL tablet 25 mg daily.  simvastatin (ZOCOR) 40 mg tablet 40 mg.    
 nitroglycerin (NITROSTAT) 0.4 mg SL tablet 0.4 mg. Allergies Allergen Reactions  Lisinopril Cough  Vaccine Adjuvant Emulsion Combination No. 1 Seizures MMR Past Medical History:  
Diagnosis Date  Cardiac echocardiogram 07/21/2016 Sm LV cavity. EF 65-70%. No RWMA. Mod-marked LVH w/dynamic obstruction, mid-cavity obliteration & peak grad of 25 mmHg. Indeterminate diastolic fx. No significant valvular pathology.  Cardiac nuclear imaging test 07/22/2016 Low risk. Very sm distal anterior & apical partially reversible defect, likely artifact, but sm area of ischemia not excluded. No RWMA. EF 66%. Neg EKG on pharm stress test.  
 Diverticulitis  GERD (gastroesophageal reflux disease)  Hypertension  Infectious disease Bacteria vaginosis  Non-ST elevated myocardial infarction ([facility]  STEMI (ST elevation myocardial infarction) ([facility] [address] 01/2018 Social History Social History  Marital status: SINGLE Spouse name: N/A  
 Number of children: N/A  
 Years of education: N/A Occupational History  Not on file. Social History Main Topics  Smoking status: Current Every Day Smoker Packs/day: 0.25 Years: 20.00  Smokeless tobacco: Never Used  Alcohol use Yes Comment: rare/holidays  Drug use: No  
 Sexual activity: Yes  
  Partners: Male Birth control/ protection: Surgical  
   Comment: tubal ligation Other Topics Concern  Not on file Social History Narrative Family History Problem Relation Age of Onset  Depression Mother  Asthma Mother  Migraines Mother  Hypertension Mother  Stroke Mother  Heart Attack Mother [facility] Arthritis-osteo Mother  Depression Brother  Alcohol abuse Father  Substance Abuse Father   
  tobacco  
 Drug Abuse Father  Hypertension Father  High Cholesterol Father  Stroke Father  Rashes/Skin Problems Father  Substance Abuse Brother   
  tobacco  
 Drug Abuse Brother  Asthma Brother  Migraines Brother  Hypertension Brother  High Cholesterol Brother  Hypertension Paternal Grandmother  Diabetes Paternal Grandmother  Hypertension Maternal Grandmother  Colon Cancer Maternal Grandmother OBJECTIVE Physical Exam:  
 
Visit Vitals  /80 (BP 1 Location: Left arm, BP Patient Position: Sitting)  Pulse 76  Temp 98.7 °F (37.1 °C) (Oral)  Resp 16  
 Ht 5' (1.524 m)  Wt 217 lb (98.4 kg)  LMP 09/21/2018 (Approximate)  SpO2 99%  BMI 42.38 kg/m2 General: alert, well-appearing, obese,AA, in no apparent distress or pain HEENT: throat, pharynx clear, eac patent, TM intact CVS: normal rate, regular rhythm, distinct S1 and S2 Lungs:clear to ausculation bilaterally, no crackles, wheezing or rhonchi noted Abdomen: soft, NT, ND, neg [place] Skin: warm, no lesions, rashes noted Psych:  mood and affect normal 
 
Results for orders placed or performed in visit on 06/07/18 CBC WITH AUTOMATED DIFF Result Value Ref Range WBC 6.8 3.4 - 10.8 x10E3/uL  
 RBC 4.65 3.77 - 5.28 x10E6/uL HGB 11.7 11.1 - 15.9 g/dL HCT 36.9 34.0 - 46.6 % MCV 79 79 - 97 fL  
 MCH 25.2 (L) 26.6 - 33.0 pg  
 MCHC 31.7 31.5 - 35.7 g/dL RDW 22.4 (H) 12.3 - 15.4 % PLATELET 740 319 - 715 x10E3/uL NEUTROPHILS 46 Not Estab. % Lymphocytes 43 Not Estab. % MONOCYTES 7 Not Estab. % EOSINOPHILS 4 Not Estab. % BASOPHILS 0 Not Estab. %  
 ABS. NEUTROPHILS 3.2 1.4 - 7.0 x10E3/uL Abs Lymphocytes 2.9 0.7 - 3.1 x10E3/uL  
 ABS. MONOCYTES 0.4 0.1 - 0.9 x10E3/uL  
 ABS. EOSINOPHILS 0.3 0.0 - 0.4 x10E3/uL  
 ABS. BASOPHILS 0.0 0.0 - 0.2 x10E3/uL IMMATURE GRANULOCYTES 0 Not Estab. %  
 ABS. IMM. GRANS. 0.0 0.0 - 0.1 x10E3/uL Hematology comments: Note:   
 
 
 
ASSESSMENT/PLAN Diagnoses and all orders for this visit: 
 
 
 
Iron deficiency anemia Likely due to heavy menses, now following gyne dr. Mckayla West Cont  PNV Check  CBC ST elevation myocardial infarction (STEMI), [facility] S/p stent 1/18 Asymptomatic On cardiac rehab Cards is modifying meds, currently off statin/BB due to se On  nitrates, ASA, plavix Following dr. Bri Schuster Commended on smoking cessation BMI 40.0-44.9, adult ([facility] Encouraged wt loss Prediabetes Persistent, encouraged wt loss\ Essential hypertension Controlled, cont BB Coronary artery disease of native artery of native heart with stable angina pectoris ([facility] Adjustment disorder, depressed mood Start zoloft Epigastric pain Trial prilosec Check RUQ US for gb pathology Advised low fat, low acid diet, avoid nsaids. Snoring Upcoming appt with dr. Angela Nearing Sinus pressure Start flonase, zyrtec Follow-up Disposition: 
Return in about 4 weeks (around 11/8/2018), or if symptoms worsen or fail to improve. Patient understands plan of care. Patient has provided input and agrees with goals.

## 2018-10-11 NOTE — MR AVS SNAPSHOT
[address] [address] [address] [facility] 
[phone] Patient: Rayna Harrington MRN: [id number] Visit Information Date & Time Provider Department Dept. Phone Encounter #  
 10/11/2018  8:45 AM Harris Mendoza, [address] [place] [address] [id number] Follow-up Instructions Return in about 4 weeks (around 11/8/2018), or if symptoms worsen or fail to improve. Upcoming Health Maintenance Date Due Influenza Age 5 to Adult 9/1/2019* PAP AKA CERVICAL CYTOLOGY 10/24/2019 DTaP/Tdap/Td series (2 - Td) 4/1/2021 *Topic was postponed. The date shown is not the original due date. Allergies as of 10/11/2018  Review Complete On: 10/11/2018 By: Harris Mendoza MD  
  
 Severity Noted Reaction Type Reactions Lisinopril  04/26/2018    Cough Vaccine Adjuvant Emulsion Combination No. 1  12/23/2012    Seizures MMR Current Immunizations  Never Reviewed Name Date Tdap 4/1/2011 Not reviewed this visit You Were Diagnosed With   
  
 Codes Comments Coronary artery disease involving native coronary artery of native heart with angina pectoris ([facility] [address]    -  Primary ICD-10-CM: I25.119 ICD-9-CM: 414.01, 413.9 Former smoker     ICD-10-CM: R56.132 ICD-9-CM: V15.82 Essential hypertension     ICD-10-CM: I10 
ICD-9-CM: 401.9 Prediabetes     ICD-10-CM: R73.03 
ICD-9-CM: 790.29 Adjustment disorder with depressed mood     ICD-10-CM: F43.21 ICD-9-CM: 309.0 Iron deficiency anemia due to chronic blood loss     ICD-10-CM: D50.0 ICD-9-CM: 280.0 Epigastric pain     ICD-10-CM: R10.13 ICD-9-CM: 789.06 Vitals BP Pulse Temp Resp Height(growth percentile) Weight(growth percentile) 126/80 (BP 1 Location: Left arm, BP Patient Position: Sitting) 76 98.7 °F (37.1 °C) (Oral) 16 5' (1.524 m) 217 lb (98.4 kg) LMP SpO2 BMI OB Status Smoking Status 09/21/2018 (Approximate) 99% 42.38 kg/m2 Having regular periods Current Every Day Smoker Vitals History BMI and BSA Data Body Mass Index Body Surface Area  
 42.38 kg/m 2 2.04 m 2 Preferred Pharmacy Pharmacy Name Phone [facility] [address] [phone] Your Updated Medication List  
  
   
This list is accurate as of 10/11/18  9:11 AM.  Always use your most recent med list. amLODIPine 5 mg tablet Commonly known as:  Lue Cherri 5 mg.  
  
 ascorbic acid (vitamin C) 500 mg tablet Commonly known as:  VITAMIN C Take 1 Tab by mouth three (3) times daily. aspirin 81 mg chewable tablet 81 mg.  
  
 cetirizine 10 mg tablet Commonly known as:  ZYRTEC Take 1 Tab by mouth daily as needed for Allergies. clopidogrel 75 mg Tab Commonly known as:  PLAVIX 75 mg.  
  
 ferrous sulfate 325 mg (65 mg iron) EC tablet Commonly known as:  IRON Take 1 Tab by mouth three (3) times daily (with meals). fluticasone 50 mcg/actuation nasal spray Commonly known as:  Pam Parlor 2 Sprays by Both Nostrils route daily. metoprolol succinate 50 mg XL tablet Commonly known as:  TOPROL-XL 25 mg daily. nitroglycerin 0.4 mg SL tablet Commonly known as:  NITROSTAT  
0.4 mg. Omeprazole delayed release 20 mg tablet Commonly known as:  PRILOSEC D/R Take 1 Tab by mouth daily. sertraline 25 mg tablet Commonly known as:  ZOLOFT Take 1 Tab by mouth daily. simvastatin 40 mg tablet Commonly known as:  ZOCOR 40 mg.  
  
  
  
  
Prescriptions Sent to Pharmacy Refills  
 sertraline (ZOLOFT) 25 mg tablet 0 Sig: Take 1 Tab by mouth daily. Class: Normal  
 Pharmacy: [id number], [address] Ph #: [phone]  Route: Oral  
 fluticasone (FLONASE) 50 mcg/actuation nasal spray 0  
 Si Sprays by Both Nostrils route daily. Class: Normal  
 Pharmacy: [address] [place] [address], [address] [place] [address] Ph #: [phone] Route: Both Nostrils  
 cetirizine (ZYRTEC) 10 mg tablet 0 Sig: Take 1 Tab by mouth daily as needed for Allergies. Class: Normal  
 Pharmacy: [address] [place] [address], [address] [place] [address] Ph #: [phone] Route: Oral  
 Omeprazole delayed release (PRILOSEC D/R) 20 mg tablet 0 Sig: Take 1 Tab by mouth daily. Class: Normal  
 Pharmacy: [address] [place] [address], [address] [place] [address] Ph #: [phone] Route: Oral  
  
Follow-up Instructions Return in about 4 weeks (around 2018), or if symptoms worsen or fail to improve. To-Do List   
 10/11/2018 Lab:  CBC WITH AUTOMATED DIFF   
  
 10/11/2018 Lab:  HEMOGLOBIN A1C W/O EAG   
  
 10/11/2018 Lab:  METABOLIC PANEL, BASIC   
  
 10/11/2018 Imaging:  [facility] Patient Instructions DASH Diet: Care Instructions Your Care Instructions The DASH diet is an eating plan that can help lower your blood pressure. DASH stands for Dietary Approaches to Stop Hypertension. Hypertension is high blood pressure. The DASH diet focuses on eating foods that are high in calcium, potassium, and magnesium. These nutrients can lower blood pressure. The foods that are highest in these nutrients are fruits, vegetables, low-fat dairy products, nuts, seeds, and legumes. But taking calcium, potassium, and magnesium supplements instead of eating foods that are high in those nutrients does not have the same effect. The DASH diet also includes whole grains, fish, and poultry. The DASH diet is one of several lifestyle changes your doctor may recommend to lower your high blood pressure.  Your doctor may also want you to decrease the amount of sodium in your diet. Lowering sodium while following the DASH diet can lower blood pressure even further than just the DASH diet alone. Follow-up care is a key part of your treatment and safety. Be sure to make and go to all appointments, and call your doctor if you are having problems. It's also a good idea to know your test results and keep a list of the medicines you take. How can you care for yourself at home? Following the DASH diet · Eat 4 to 5 servings of fruit each day. A serving is 1 medium-sized piece of fruit, ½ cup chopped or canned fruit, 1/4 cup dried fruit, or 4 ounces (½ cup) of fruit juice. Choose fruit more often than fruit juice. · Eat 4 to 5 servings of vegetables each day. A serving is 1 cup of lettuce or raw leafy vegetables, ½ cup of chopped or cooked vegetables, or 4 ounces (½ cup) of vegetable juice. Choose vegetables more often than vegetable juice. · Get 2 to 3 servings of low-fat and fat-free dairy each day. A serving is 8 ounces of milk, 1 cup of yogurt, or 1 ½ ounces of cheese. · Eat 6 to 8 servings of grains each day. A serving is 1 slice of bread, 1 ounce of dry cereal, or ½ cup of cooked rice, pasta, or cooked cereal. Try to choose whole-grain products as much as possible. · Limit lean meat, poultry, and fish to 2 servings each day. A serving is 3 ounces, about the size of a deck of cards. · Eat 4 to 5 servings of nuts, seeds, and legumes (cooked dried beans, lentils, and split peas) each week. A serving is 1/3 cup of nuts, 2 tablespoons of seeds, or ½ cup of cooked beans or peas. · Limit fats and oils to 2 to 3 servings each day. A serving is 1 teaspoon of vegetable oil or 2 tablespoons of salad dressing. · Limit sweets and added sugars to 5 servings or less a week. A serving is 1 tablespoon jelly or jam, ½ cup sorbet, or 1 cup of lemonade. · Eat less than 2,300 milligrams (mg) of sodium a day.  If you limit your sodium to 1,500 mg a day, you can lower your blood pressure even more. Tips for success · Start small. Do not try to make dramatic changes to your diet all at once. You might feel that you are missing out on your favorite foods and then be more likely to not follow the plan. Make small changes, and stick with them. Once those changes become habit, add a few more changes. · Try some of the following: ¨ Make it a goal to eat a fruit or vegetable at every meal and at snacks. This will make it easy to get the recommended amount of fruits and vegetables each day. ¨ Try yogurt topped with fruit and nuts for a snack or healthy dessert. ¨ Add lettuce, tomato, cucumber, and onion to sandwiches. ¨ Combine a ready-made pizza crust with low-fat mozzarella cheese and lots of vegetable toppings. Try using tomatoes, squash, spinach, broccoli, carrots, cauliflower, and onions. ¨ Have a variety of cut-up vegetables with a low-fat dip as an appetizer instead of chips and dip. ¨ Sprinkle sunflower seeds or chopped almonds over salads. Or try adding chopped walnuts or almonds to cooked vegetables. ¨ Try some vegetarian meals using beans and peas. Add garbanzo or kidney beans to salads. Make burritos and tacos with mashed beal beans or black beans. Where can you learn more? Go to http://kim-tristin.info/. Enter K942 in the search box to learn more about \"DASH Diet: Care Instructions. \" Current as of: December 6, 2017 Content Version: 11.8 © 7532-3669 [employer]. Care instructions adapted under license by [employer] (which disclaims liability or warranty for this information). If you have questions about a medical condition or this instruction, always ask your healthcare professional. [address] any warranty or liability for your use of this information. Introducing [facility]! Alexis Mehta introduces Insightly patient portal. Now you can access parts of your medical record, email your doctor's office, and request medication refills online. 1. In your internet browser, go to [email or web address] 2. Click on the First Time User? Click Here link in the Sign In box. You will see the New Member Sign Up page. 3. Enter your Insightly Access Code exactly as it appears below. You will not need to use this code after youve completed the sign-up process. If you do not sign up before the expiration date, you must request a new code. · Insightly Access Code: [email or web address] Expires: 1/8/2019  4:32 PM 
 
4. Enter the last four digits of your Social Security Number (xxxx) and Date of Birth (mm/dd/yyyy) as indicated and click Submit. You will be taken to the next sign-up page. 5. Create a Insightly ID. This will be your Insightly login ID and cannot be changed, so think of one that is secure and easy to remember. 6. Create a Insightly password. You can change your password at any time. 7. Enter your Password Reset Question and Answer. This can be used at a later time if you forget your password. 8. Enter your e-mail address. You will receive e-mail notification when new information is available in [address] 9. Click Sign Up. You can now view and download portions of your medical record. 10. Click the Download Summary menu link to download a portable copy of your medical information. If you have questions, please visit the Frequently Asked Questions section of the Insightly website. Remember, Insightly is NOT to be used for urgent needs. For medical emergencies, dial 911. Now available from your iPhone and Android! Please provide this summary of care documentation to your next provider. Your primary care clinician is listed as Carlos Moore. If you have any questions after today's visit, please call [phone].

## 2018-10-11 NOTE — PATIENT INSTRUCTIONS

## 2018-10-12 LAB
BASOPHILS # BLD AUTO: 0 X10E3/UL (ref 0–0.2)
BASOPHILS NFR BLD AUTO: 0 %
BUN SERPL-MCNC: 9 MG/DL (ref 6–20)
BUN/CREAT SERPL: 13 (ref 9–23)
CALCIUM SERPL-MCNC: 9.2 MG/DL (ref 8.7–10.2)
CHLORIDE SERPL-SCNC: 100 MMOL/L (ref 96–106)
CO2 SERPL-SCNC: 27 MMOL/L (ref 20–29)
CREAT SERPL-MCNC: 0.68 MG/DL (ref 0.57–1)
EOSINOPHIL # BLD AUTO: 0.3 X10E3/UL (ref 0–0.4)
EOSINOPHIL NFR BLD AUTO: 5 %
ERYTHROCYTE [DISTWIDTH] IN BLOOD BY AUTOMATED COUNT: 16.7 % (ref 12.3–15.4)
GLUCOSE SERPL-MCNC: 100 MG/DL (ref 65–99)
HBA1C MFR BLD: 5.5 % (ref 4.8–5.6)
HCT VFR BLD AUTO: 38.4 % (ref 34–46.6)
HGB BLD-MCNC: 11.9 G/DL (ref 11.1–15.9)
IMM GRANULOCYTES # BLD: 0 X10E3/UL (ref 0–0.1)
IMM GRANULOCYTES NFR BLD: 0 %
LYMPHOCYTES # BLD AUTO: 1.8 X10E3/UL (ref 0.7–3.1)
LYMPHOCYTES NFR BLD AUTO: 32 %
MCH RBC QN AUTO: 26.4 PG (ref 26.6–33)
MCHC RBC AUTO-ENTMCNC: 31 G/DL (ref 31.5–35.7)
MCV RBC AUTO: 85 FL (ref 79–97)
MONOCYTES # BLD AUTO: 0.4 X10E3/UL (ref 0.1–0.9)
MONOCYTES NFR BLD AUTO: 6 %
NEUTROPHILS # BLD AUTO: 3.3 X10E3/UL (ref 1.4–7)
NEUTROPHILS NFR BLD AUTO: 57 %
PLATELET # BLD AUTO: 438 X10E3/UL (ref 150–379)
POTASSIUM SERPL-SCNC: 4.3 MMOL/L (ref 3.5–5.2)
RBC # BLD AUTO: 4.51 X10E6/UL (ref 3.77–5.28)
SODIUM SERPL-SCNC: 142 MMOL/L (ref 134–144)
WBC # BLD AUTO: 5.8 X10E3/UL (ref 3.4–10.8)

## 2018-10-12 NOTE — PROGRESS NOTES
Patient identified with 2 identifiers (name and ). Patient aware of prediabetes has improved and anemia is resolved.

## 2018-11-03 ENCOUNTER — HOSPITAL ENCOUNTER (OUTPATIENT)
Dept: ULTRASOUND IMAGING | Age: 38
Discharge: HOME | End: 2018-11-03
Attending: FAMILY MEDICINE
Payer: MEDICAID

## 2018-11-03 DIAGNOSIS — R10.13 EPIGASTRIC PAIN: ICD-10-CM

## 2018-11-03 PROCEDURE — 76705 ECHO EXAM OF ABDOMEN: CPT

## 2018-11-05 ENCOUNTER — TELEPHONE (OUTPATIENT)
Dept: FAMILY MEDICINE CLINIC | Age: 38
End: 2018-11-05

## 2018-11-05 DIAGNOSIS — K80.10 GALLBLADDER STONE WITH NONACUTE CHOLECYSTITIS: ICD-10-CM

## 2018-11-05 DIAGNOSIS — R10.13 EPIGASTRIC PAIN: Primary | ICD-10-CM

## 2018-11-05 NOTE — PROGRESS NOTES
Patient identified with 2 identifiers (name and ). patient aware of     + gallbladder stone, refer to gen surgery.

## 2018-11-09 ENCOUNTER — OFFICE VISIT (OUTPATIENT)
Dept: FAMILY MEDICINE CLINIC | Age: 38
End: 2018-11-09

## 2018-11-09 ENCOUNTER — OFFICE VISIT (OUTPATIENT)
Dept: SURGERY | Age: 38
End: 2018-11-09

## 2018-11-09 VITALS
DIASTOLIC BLOOD PRESSURE: 92 MMHG | OXYGEN SATURATION: 98 % | WEIGHT: 222.2 LBS | SYSTOLIC BLOOD PRESSURE: 150 MMHG | BODY MASS INDEX: 43.62 KG/M2 | HEIGHT: 60 IN | TEMPERATURE: 98.6 F | HEART RATE: 73 BPM | RESPIRATION RATE: 15 BRPM

## 2018-11-09 VITALS
RESPIRATION RATE: 16 BRPM | DIASTOLIC BLOOD PRESSURE: 86 MMHG | WEIGHT: 217 LBS | HEIGHT: 60 IN | BODY MASS INDEX: 42.6 KG/M2 | HEART RATE: 80 BPM | SYSTOLIC BLOOD PRESSURE: 146 MMHG

## 2018-11-09 DIAGNOSIS — K80.20 GALLSTONES: Primary | ICD-10-CM

## 2018-11-09 DIAGNOSIS — I25.119 CORONARY ARTERY DISEASE INVOLVING NATIVE CORONARY ARTERY OF NATIVE HEART WITH ANGINA PECTORIS (HCC): ICD-10-CM

## 2018-11-09 DIAGNOSIS — D50.0 IRON DEFICIENCY ANEMIA DUE TO CHRONIC BLOOD LOSS: ICD-10-CM

## 2018-11-09 DIAGNOSIS — F43.21 ADJUSTMENT DISORDER WITH DEPRESSED MOOD: Primary | ICD-10-CM

## 2018-11-09 DIAGNOSIS — R10.13 EPIGASTRIC PAIN: ICD-10-CM

## 2018-11-09 DIAGNOSIS — K80.20 CALCULUS OF GALLBLADDER WITHOUT CHOLECYSTITIS WITHOUT OBSTRUCTION: ICD-10-CM

## 2018-11-09 RX ORDER — PANTOPRAZOLE SODIUM 40 MG/1
40 TABLET, DELAYED RELEASE ORAL DAILY
Qty: 90 TAB | Refills: 0 | Status: SHIPPED | OUTPATIENT
Start: 2018-11-09 | End: 2019-04-08

## 2018-11-09 RX ORDER — SERTRALINE HYDROCHLORIDE 50 MG/1
50 TABLET, FILM COATED ORAL DAILY
Qty: 90 TAB | Refills: 0 | Status: SHIPPED | OUTPATIENT
Start: 2018-11-09 | End: 2019-04-08

## 2018-11-09 NOTE — PROGRESS NOTES
Progress Note    Patient: Jaycee Bennett  MRN: [id number]  SSN: [id number]   YOB: 1980  Age: 42 y.o. Sex: female     Chief Complaint   Patient presents with    Abdominal Pain     US       HPI    Ms. Erik So is a 38-year-old woman with a long history of daily epigastric type tenderness on both sides of her abdomen. This does not seem to be related to food or any other activity. She has had nausea but relatively rare mostly is just pain. Her biggest issue is she had a MI this year in January and is on Plavix. She also has had diverticulitis GERD and hypertension. She tells me that she still has occasional angina. Past Medical History:   Diagnosis Date    Cardiac echocardiogram 2016    Sm LV cavity. EF 65-70%. No RWMA. Mod-marked LVH w/dynamic obstruction, mid-cavity obliteration & peak grad of 25 mmHg. Indeterminate diastolic fx. No significant valvular pathology.  Cardiac nuclear imaging test 2016    Low risk. Very sm distal anterior & apical partially reversible defect, likely artifact, but sm area of ischemia not excluded. No RWMA. EF 66%. Neg EKG on pharm stress test.    Diverticulitis     GERD (gastroesophageal reflux disease)     Hypertension     Infectious disease     Bacteria vaginosis    Non-ST elevated myocardial infarction ([address]     STEMI (ST elevation myocardial infarction) ([facility] [address] 2018     Past Surgical History:   Procedure Laterality Date    HX  SECTION  2008    HX CORONARY STENT PLACEMENT      HX ORTHOPAEDIC      HX TUBAL LIGATION  2008     Allergies   Allergen Reactions    Lisinopril Cough    Vaccine Adjuvant Emulsion Combination No. 1 Seizures     MMR     Current Outpatient Medications   Medication Sig Dispense Refill    sertraline (ZOLOFT) 25 mg tablet Take 1 Tab by mouth daily. 60 Tab 0    fluticasone (FLONASE) 50 mcg/actuation nasal spray 2 Sprays by Both Nostrils route daily.  1 Bottle 0    clopidogrel (PLAVIX) 75 mg tab 75 mg.      amLODIPine (NORVASC) 5 mg tablet 5 mg.  aspirin 81 mg chewable tablet 81 mg.      cetirizine (ZYRTEC) 10 mg tablet Take 1 Tab by mouth daily as needed for Allergies. 90 Tab 0    Omeprazole delayed release (PRILOSEC D/R) 20 mg tablet Take 1 Tab by mouth daily. 30 Tab 0    ascorbic acid, vitamin C, (VITAMIN C) 500 mg tablet Take 1 Tab by mouth three (3) times daily. 90 Tab 0    ferrous sulfate (IRON) 325 mg (65 mg iron) EC tablet Take 1 Tab by mouth three (3) times daily (with meals). 90 Tab 1    metoprolol succinate (TOPROL-XL) 50 mg XL tablet 25 mg daily.  simvastatin (ZOCOR) 40 mg tablet 40 mg.      nitroglycerin (NITROSTAT) 0.4 mg SL tablet 0.4 mg.        Social History     Socioeconomic History    Marital status: SINGLE     Spouse name: Not on file    Number of children: Not on file    Years of education: Not on file    Highest education level: Not on file   Social Needs    Financial resource strain: Not on file    Food insecurity - worry: Not on file    Food insecurity - inability: Not on file    Transportation needs - medical: Not on file   Acrecent Financial needs - non-medical: Not on file   Occupational History    Not on file   Tobacco Use    Smoking status: Former Smoker     Packs/day: 0.25     Years: 20.00     Pack years: 5.00    Smokeless tobacco: Never Used   Substance and Sexual Activity    Alcohol use: Yes     Comment: rare/holidays    Drug use: No    Sexual activity: Yes     Partners: Male     Birth control/protection: Surgical     Comment: tubal ligation   Other Topics Concern    Not on file   Social History Narrative    Not on file     Family History   Problem Relation Age of Onset    Depression Mother     Asthma Mother     Migraines Mother     Hypertension Mother     Stroke Mother     Heart Attack Mother     Arthritis-osteo Mother     Depression Brother     Alcohol abuse Father     Substance Abuse Father         tobacco    Drug Abuse Father     Hypertension Father     High Cholesterol Father     Stroke Father     Rashes/Skin Problems Father     Substance Abuse Brother         tobacco    Drug Abuse Brother     Asthma Brother     Migraines Brother     Hypertension Brother     High Cholesterol Brother     Hypertension Paternal Grandmother     Diabetes Paternal Grandmother     Hypertension Maternal Grandmother     Colon Cancer Maternal Grandmother          Review of systems:  Patient denies any reflux, emesis, abdominal pain, change in bowel habits, hematochezia, melena, fever, weight loss, fatigue chills, dermatitis, abnormal moles, change in vision, vertigo, epistaxis, dysphagia, hoarseness, chest pain, palpitations, hypertension, edema, cough, shortness of breath, wheezing, hemoptysis, snoring, hematuria, diabetes, thyroid disease, anemia, bruising, history of blood transfusion, dizziness, headache, or fainting.     Physical Examination    Well developed well nourished female in no apparent distress  Visit Vitals  /86   Pulse 80   Resp 16   Ht 5' (1.524 m)   Wt 98.4 kg (217 lb)   BMI 42.38 kg/m²      Head: normocephalic, atraumatic  Mouth: Clear, no overt lesions, oral mucosa pink and moist  Neck: supple, no masses, no adenopathy or carotid bruits, trachea midline  Resp: clear to auscultation bilaterally, no wheeze, rhonchi or rales, excursions normal and symmetrical  Cardio: Regular rate and rhythm, no murmurs, clicks, gallops or rubs, no edema or varicosities  Abdomen: soft, nontender, nondistended, normoactive bowel sounds, no hernias, no hepatosplenomegaly,   Back: Deferred  Extremeties: warm, well-perfused, no tenderness or swelling, normal gait/station  Neuro: sensation and strength grossly intact and symmetrical  Psych: alert and oriented to person, place and time  Breast exam deferred    IMPRESSION  Epigastric pain with known gallstones by ultrasound    PLAN  No orders of the defined types were placed in this encounter.     HIDA scan with AG Orr MD

## 2018-11-09 NOTE — PATIENT INSTRUCTIONS
DASH Diet: Care Instructions  Your Care Instructions    The DASH diet is an eating plan that can help lower your blood pressure. DASH stands for Dietary Approaches to Stop Hypertension. Hypertension is high blood pressure. The DASH diet focuses on eating foods that are high in calcium, potassium, and magnesium. These nutrients can lower blood pressure. The foods that are highest in these nutrients are fruits, vegetables, low-fat dairy products, nuts, seeds, and legumes. But taking calcium, potassium, and magnesium supplements instead of eating foods that are high in those nutrients does not have the same effect. The DASH diet also includes whole grains, fish, and poultry. The DASH diet is one of several lifestyle changes your doctor may recommend to lower your high blood pressure. Your doctor may also want you to decrease the amount of sodium in your diet. Lowering sodium while following the DASH diet can lower blood pressure even further than just the DASH diet alone. Follow-up care is a key part of your treatment and safety. Be sure to make and go to all appointments, and call your doctor if you are having problems. It's also a good idea to know your test results and keep a list of the medicines you take. How can you care for yourself at home? Following the DASH diet  · Eat 4 to 5 servings of fruit each day. A serving is 1 medium-sized piece of fruit, ½ cup chopped or canned fruit, 1/4 cup dried fruit, or 4 ounces (½ cup) of fruit juice. Choose fruit more often than fruit juice. · Eat 4 to 5 servings of vegetables each day. A serving is 1 cup of lettuce or raw leafy vegetables, ½ cup of chopped or cooked vegetables, or 4 ounces (½ cup) of vegetable juice. Choose vegetables more often than vegetable juice. · Get 2 to 3 servings of low-fat and fat-free dairy each day. A serving is 8 ounces of milk, 1 cup of yogurt, or 1 ½ ounces of cheese. · Eat 6 to 8 servings of grains each day.  A serving is 1 slice of bread, 1 ounce of dry cereal, or ½ cup of cooked rice, pasta, or cooked cereal. Try to choose whole-grain products as much as possible. · Limit lean meat, poultry, and fish to 2 servings each day. A serving is 3 ounces, about the size of a deck of cards. · Eat 4 to 5 servings of nuts, seeds, and legumes (cooked dried beans, lentils, and split peas) each week. A serving is 1/3 cup of nuts, 2 tablespoons of seeds, or ½ cup of cooked beans or peas. · Limit fats and oils to 2 to 3 servings each day. A serving is 1 teaspoon of vegetable oil or 2 tablespoons of salad dressing. · Limit sweets and added sugars to 5 servings or less a week. A serving is 1 tablespoon jelly or jam, ½ cup sorbet, or 1 cup of lemonade. · Eat less than 2,300 milligrams (mg) of sodium a day. If you limit your sodium to 1,500 mg a day, you can lower your blood pressure even more. Tips for success  · Start small. Do not try to make dramatic changes to your diet all at once. You might feel that you are missing out on your favorite foods and then be more likely to not follow the plan. Make small changes, and stick with them. Once those changes become habit, add a few more changes. · Try some of the following:  ? Make it a goal to eat a fruit or vegetable at every meal and at snacks. This will make it easy to get the recommended amount of fruits and vegetables each day. ? Try yogurt topped with fruit and nuts for a snack or healthy dessert. ? Add lettuce, tomato, cucumber, and onion to sandwiches. ? Combine a ready-made pizza crust with low-fat mozzarella cheese and lots of vegetable toppings. Try using tomatoes, squash, spinach, broccoli, carrots, cauliflower, and onions. ? Have a variety of cut-up vegetables with a low-fat dip as an appetizer instead of chips and dip. ? Sprinkle sunflower seeds or chopped almonds over salads. Or try adding chopped walnuts or almonds to cooked vegetables.   ? Try some vegetarian meals using beans and peas. Add garbanzo or kidney beans to salads. Make burritos and tacos with mashed beal beans or black beans. Where can you learn more? Go to http://kim-tristin.info/. Enter P881 in the search box to learn more about \"DASH Diet: Care Instructions. \"  Current as of: December 6, 2017  Content Version: 11.8  © 2728-1338 [employer]. Care instructions adapted under license by [employer] (which disclaims liability or warranty for this information). If you have questions about a medical condition or this instruction, always ask your healthcare professional. [address] any warranty or liability for your use of this information.

## 2018-11-09 NOTE — PROGRESS NOTES
Jose Alberto Adams, 42 y.o.,  female    SUBJECTIVE  Ff-up    Depression- started zoloft last month and reports improved mood and sleep. No longer with crying spells. Epigastric pain- persistent, reports only slightly better after starting prilosec last month. On and off, not related to meals or BMs. RUQ US shows GB stones. Has seen dr. Severiano Factor, plan HIDA scan. CAD, STEMI s/p PCI 1/18 - denies chest pain, sob. Ongoing cardiac rehab. Reports normal BP during sessions. Following dr. Andrews Hernández. She has completely stopped smoking in May 2018. She is Off simvastatin and metoprolol, due to pains and palpitations respectively. Reports has not taken nitrostat since last visit. HTN- taking norvasc. Pt has h/o prediabetes    Anemia-improving, taking otc prenatal vitamins. Following gyne  dr Fredis Moura for heavy menses    ROS:  See HPI, all others negative        Patient Active Problem List   Diagnosis Code    Tobacco use Z72.0    Obesity E66.9    Hypertension I10    Prediabetes R73.03    Low HDL (under 36) E78.6    Obesity, morbid ([address] E66.01    ST elevation myocardial infarction (STEMI) ([facility] [facility] I21.3    Iron deficiency anemia due to chronic blood loss D50.0    Coronary artery disease involving native coronary artery of native heart with angina pectoris (HCC) I25.119       Current Outpatient Medications   Medication Sig Dispense Refill    multivit with minerals/lutein (MULTIVITAMIN 50 PLUS PO) Take  by mouth.  sertraline (ZOLOFT) 50 mg tablet Take 1 Tab by mouth daily. 90 Tab 0    pantoprazole (PROTONIX) 40 mg tablet Take 1 Tab by mouth daily. 90 Tab 0    fluticasone (FLONASE) 50 mcg/actuation nasal spray 2 Sprays by Both Nostrils route daily. 1 Bottle 0    cetirizine (ZYRTEC) 10 mg tablet Take 1 Tab by mouth daily as needed for Allergies. 90 Tab 0    clopidogrel (PLAVIX) 75 mg tab 75 mg.      amLODIPine (NORVASC) 5 mg tablet 5 mg.       aspirin 81 mg chewable tablet 81 mg.      nitroglycerin (NITROSTAT) 0.4 mg SL tablet 0.4 mg.      ascorbic acid, vitamin C, (VITAMIN C) 500 mg tablet Take 1 Tab by mouth three (3) times daily. 90 Tab 0    ferrous sulfate (IRON) 325 mg (65 mg iron) EC tablet Take 1 Tab by mouth three (3) times daily (with meals). 90 Tab 1    metoprolol succinate (TOPROL-XL) 50 mg XL tablet 25 mg daily.  simvastatin (ZOCOR) 40 mg tablet 40 mg. Allergies   Allergen Reactions    Lisinopril Cough    Vaccine Adjuvant Emulsion Combination No. 1 Seizures     MMR       Past Medical History:   Diagnosis Date    Cardiac echocardiogram 07/21/2016    Sm LV cavity. EF 65-70%. No RWMA. Mod-marked LVH w/dynamic obstruction, mid-cavity obliteration & peak grad of 25 mmHg. Indeterminate diastolic fx. No significant valvular pathology.  Cardiac nuclear imaging test 07/22/2016    Low risk. Very sm distal anterior & apical partially reversible defect, likely artifact, but sm area of ischemia not excluded. No RWMA. EF 66%.   Neg EKG on pharm stress test.    Diverticulitis     GERD (gastroesophageal reflux disease)     Hypertension     Infectious disease     Bacteria vaginosis    Non-ST elevated [facility]     STEMI (ST elevation myocardial infarction) ([facility] 01/2018       Social History     Socioeconomic History    Marital status: SINGLE     Spouse name: Not on file    Number of children: Not on file    Years of education: Not on file    Highest education level: Not on file   Social Needs    Financial resource strain: Not on file    Food insecurity - worry: Not on file    Food insecurity - inability: Not on file   [employer] needs - medical: Not on file   [employer] needs - non-medical: Not on file   Occupational History    Not on file   Tobacco Use    Smoking status: Former Smoker     Packs/day: 0.25     Years: 20.00     Pack years: 5.00    Smokeless tobacco: Never Used   Substance and Sexual Activity    Alcohol use: Yes     Comment: rare/holidays    Drug use: No    Sexual activity: Yes     Partners: Male     Birth control/protection: Surgical     Comment: tubal ligation   Other Topics Concern    Not on file   Social History Narrative    Not on file       Family History   Problem Relation Age of Onset    Depression Mother     Asthma Mother     Migraines Mother     Hypertension Mother     Stroke Mother     Heart Attack Mother     Arthritis-osteo Mother     Depression Brother     Alcohol abuse Father     Substance Abuse Father         tobacco    Drug Abuse Father     Hypertension Father     High Cholesterol Father     Stroke Father     Rashes/Skin Problems Father     Substance Abuse Brother         tobacco    Drug Abuse Brother     Asthma Brother     Migraines Brother     Hypertension Brother     High Cholesterol Brother     Hypertension Paternal Grandmother     Diabetes Paternal Grandmother     Hypertension Maternal Grandmother     Colon Cancer Maternal Grandmother          OBJECTIVE    Physical Exam:     Visit Vitals  BP (!) 150/92 (BP 1 Location: Left arm, BP Patient Position: Sitting)   Pulse 73   Temp 98.6 °F (37 °C) (Oral)   Resp 15   Ht 5' (1.524 m)   Wt 222 lb 3.2 oz (100.8 kg)   SpO2 98%   BMI 43.40 kg/m²       General: alert, well-appearing, obese,AA, in no apparent distress or pain  HEENT: throat, pharynx clear, eac patent, TM intact  CVS: normal rate, regular rhythm, distinct S1 and S2  Lungs:clear to ausculation bilaterally, no crackles, wheezing or rhonchi noted  Abdomen: soft, NT, ND, neg murphys  Skin: warm, no lesions, rashes noted  Psych:  mood and affect normal    Results for orders placed or performed during the hospital encounter of 10/11/18   [facility] SPECIMEN COL   Result Value Ref Range    [facility] SPEC[facility]. Specimens collected/sent to [facility]. Please direct inquiries to ([phone]).          ASSESSMENT/PLAN  Diagnoses and all orders for this visit:    Adjustment reaction with depressed mood  Improving, increase zoloft to 50 mg qday    Epigastric pain  Persistent  Switch prilosec to protonix  Refer to GI at this point  GB stones found on abdominal US, following dr. Ana Quevedo, recommending HIDA scan  Advised low fat, low acid diet, avoid nsaids. Iron deficiency anemia  Likely due to heavy menses, now following gyne dr. John Barrera  PNV w/ fe    ST elevation myocardial infarction (STEMI), unspecified artery ([facility]  S/p stent 1/18  Asymptomatic  On cardiac rehab  Cards is modifying meds, currently off statin/BB due to se  On  nitrates, ASA, plavix  Following dr. Bola Graves on smoking cessation     BMI 40.0-44.9, adult ([facility]  Encouraged wt loss     Prediabetes  Persistent, encouraged wt loss    Essential hypertension  Elevated today, normotensive of other doctor visits  Recommended monitoring, and increasing norvasc dose to 10 mg    Coronary artery disease of native artery of native heart with stable angina pectoris ([facility]    Snoring  Upcoming appt with dr. Chester Robert      Follow-up Disposition:  Return in about 4 weeks (around 12/7/2018), or if symptoms worsen or fail to improve. Patient understands plan of care. Patient has provided input and agrees with goals.

## 2018-11-21 RX ORDER — AMLODIPINE BESYLATE 5 MG/1
5 TABLET ORAL DAILY
Qty: 90 TAB | Refills: 0 | Status: SHIPPED | OUTPATIENT
Start: 2018-11-21 | End: 2019-01-17 | Stop reason: DRUGHIGH

## 2018-12-14 ENCOUNTER — OFFICE VISIT (OUTPATIENT)
Dept: FAMILY MEDICINE CLINIC | Age: 38
End: 2018-12-14

## 2018-12-14 VITALS
TEMPERATURE: 98.4 F | BODY MASS INDEX: 42.37 KG/M2 | RESPIRATION RATE: 15 BRPM | HEART RATE: 87 BPM | WEIGHT: 215.8 LBS | OXYGEN SATURATION: 99 % | SYSTOLIC BLOOD PRESSURE: 138 MMHG | DIASTOLIC BLOOD PRESSURE: 94 MMHG | HEIGHT: 60 IN

## 2018-12-14 DIAGNOSIS — I25.119 CORONARY ARTERY DISEASE INVOLVING NATIVE CORONARY ARTERY OF NATIVE HEART WITH ANGINA PECTORIS (HCC): ICD-10-CM

## 2018-12-14 DIAGNOSIS — I10 ESSENTIAL HYPERTENSION: ICD-10-CM

## 2018-12-14 DIAGNOSIS — R73.03 PREDIABETES: ICD-10-CM

## 2018-12-14 DIAGNOSIS — F43.21 ADJUSTMENT DISORDER WITH DEPRESSED MOOD: ICD-10-CM

## 2018-12-14 DIAGNOSIS — L03.114 CELLULITIS OF LEFT UPPER EXTREMITY: Primary | ICD-10-CM

## 2018-12-14 RX ORDER — CEPHALEXIN 500 MG/1
500 CAPSULE ORAL 3 TIMES DAILY
Qty: 30 CAP | Refills: 0 | Status: SHIPPED | OUTPATIENT
Start: 2018-12-14 | End: 2018-12-24

## 2018-12-14 NOTE — PROGRESS NOTES
1. Have you been to the ER, urgent care clinic since your last visit? Hospitalized since your last visit? Bhavin Jaimes 12/4/18 dental infection    2. Have you seen or consulted any other health care providers outside of the [address] [place] [address] since your last visit? Include any pap smears or colon screening.  No    Chief Complaint   Patient presents with    Abdominal Pain     followup, not improved    Anemia    Hypertension    Coronary Artery Disease    Shoulder Pain     LT shoulder pain, times 3 weeks    Back Pain     times 3 weeks

## 2018-12-14 NOTE — PATIENT INSTRUCTIONS

## 2018-12-14 NOTE — PROGRESS NOTES
Berenice Lees, 34 y.o.,  female    SUBJECTIVE  Ff-up and acute concern    C/o L shoulder area swelling, redness. Trauma or fever    Depression- doing well on zoloft. Epigastric pain- persistent, tried protonix from prilosec last month no improvement. On and off, not related to meals or BMs. RUQ US shows GB stones. Has seen dr. Patricia Hawkins, plan for HIDA scan still pending. Has upcoming appt with GI.     CAD, STEMI s/p PCI 1/18 - denies chest pain, sob. Following dr. Jl Verdugo. She has completely stopped smoking in May 2018. She is Off simvastatin and metoprolol due to med intolerance. HTN- taking norvasc. Pt has h/o prediabetes    Anemia-improving, taking otc prenatal vitamins. Following gyne  dr Piotr Duran for heavy menses    ROS:  See HPI, all others negative        Patient Active Problem List   Diagnosis Code    Tobacco use Z72.0    Obesity E66.9    Hypertension I10    Prediabetes R73.03    Low HDL (under 36) E78.6    Obesity, morbid ([address] E66.01    ST elevation myocardial infarction (STEMI) ([address] I21.3    Iron deficiency anemia due to chronic blood loss D50.0    Coronary artery disease involving native coronary artery of native heart with angina pectoris (HCC) I25.119    Adjustment disorder with depressed mood F43.21    Epigastric pain R10.13    Calculus of gallbladder without cholecystitis without obstruction K80.20       Current Outpatient Medications   Medication Sig Dispense Refill    cephALEXin (KEFLEX) 500 mg capsule Take 1 Cap by mouth three (3) times daily for 10 days. 30 Cap 0    amLODIPine (NORVASC) 5 mg tablet Take 1 Tab by mouth daily. 90 Tab 0    sertraline (ZOLOFT) 50 mg tablet Take 1 Tab by mouth daily. 90 Tab 0    pantoprazole (PROTONIX) 40 mg tablet Take 1 Tab by mouth daily. 90 Tab 0    fluticasone (FLONASE) 50 mcg/actuation nasal spray 2 Sprays by Both Nostrils route daily. 1 Bottle 0    cetirizine (ZYRTEC) 10 mg tablet Take 1 Tab by mouth daily as needed for Allergies.  80 Tab 0    clopidogrel (PLAVIX) 75 mg tab 75 mg.  aspirin 81 mg chewable tablet 81 mg.      nitroglycerin (NITROSTAT) 0.4 mg SL tablet 0.4 mg. Allergies   Allergen Reactions    Lisinopril Cough    Vaccine Adjuvant Emulsion Combination No. 1 Seizures     MMR       Past Medical History:   Diagnosis Date    Cardiac echocardiogram 07/21/2016    Sm LV cavity. EF 65-70%. No RWMA. Mod-marked LVH w/dynamic obstruction, mid-cavity obliteration & peak grad of 25 mmHg. Indeterminate diastolic fx. No significant valvular pathology.  Cardiac nuclear imaging test 07/22/2016    Low risk. Very sm distal anterior & apical partially reversible defect, likely artifact, but sm area of ischemia not excluded. No RWMA. EF 66%.   Neg EKG on pharm stress test.    Diverticulitis     GERD (gastroesophageal reflux disease)     Hypertension     Infectious disease     Bacteria vaginosis    Non-[facility] elevated [facility]     [facility] 01/2018       Social History     Socioeconomic History    Marital status: SINGLE     Spouse name: Not on file    Number of children: Not on file    Years of education: Not on file    Highest education level: Not on file   Social Needs    Financial resource strain: Not on file    Food insecurity - worry: Not on file    Food insecurity - inability: Not on file   [employer] needs - medical: Not on file   Prized needs - non-medical: Not on file   Occupational History    Not on file   Tobacco Use    Smoking status: Former Smoker     Packs/day: 0.25     Years: 20.00     Pack years: 5.00    Smokeless tobacco: Never Used   Substance and Sexual Activity    Alcohol use: Yes     Comment: rare/holidays    Drug use: No    Sexual activity: Yes     Partners: Male     Birth control/protection: Surgical     Comment: tubal ligation   Other Topics Concern    Not on file   Social History Narrative    Not on file Family History   Problem Relation Age of Onset    Depression Mother     Asthma Mother     Migraines Mother     Hypertension Mother    [facility] Stroke Mother     Heart Attack Mother     Arthritis-osteo Mother     Depression Brother     Alcohol abuse Father     Substance Abuse Father         tobacco    Drug Abuse Father     Hypertension Father     High Cholesterol Father     Stroke Father     Rashes/Skin Problems Father     Substance Abuse Brother         tobacco    Drug Abuse Brother     Asthma Brother     Migraines Brother     Hypertension Brother     High Cholesterol Brother     Hypertension Paternal Grandmother     Diabetes Paternal Grandmother     Hypertension Maternal Grandmother     Colon Cancer Maternal Grandmother          OBJECTIVE    Physical Exam:     Visit Vitals  BP (!) 138/94 (BP 1 Location: Left arm, BP Patient Position: Sitting)   Pulse 87   Temp 98.4 °F (36.9 °C) (Oral)   Resp 15   Ht 5' (1.524 m)   Wt 215 lb 12.8 oz (97.9 kg)   SpO2 99%   BMI 42.15 kg/m²       General: alert, well-appearing, obese,AA, in no apparent distress or pain  HEENT: throat, pharynx clear, eac patent, TM intact  CVS: normal rate, regular rhythm, distinct S1 and S2  Lungs:clear to ausculation bilaterally, no crackles, wheezing or rhonchi noted  Abdomen: soft, NT, ND, neg murphys  Extremities: L shoulder area appears red and tender  Skin: warm, no lesions, rashes noted  Psych:  mood and affect normal    Results for orders placed or performed during the hospital encounter of 10/11/18   [facility] SPECIMEN COL   Result Value Ref Range    [facility] SPEC[facility]. Specimens collected/sent to [facility]. Please direct inquiries to ([phone]).          ASSESSMENT/PLAN  Diagnoses and all orders for this visit:    Adjustment reaction with depressed mood  Improving, cont zoloft to 50 mg qday    Epigastric pain  Persistent  GB stones found on abdominal US, following dr. August Delgado, recommending HIDA scan, reminded to schedule   Advised low fat, low acid diet, avoid nsaids. Iron deficiency anemia  Likely due to heavy menses, now following gyne dr. Danielson Grade  PNV w/ fe    ST elevation myocardial infarction (STEMI), unspecified artery ([facility]  S/p stent 1/18  Asymptomatic  Elevated BP today, possibly due to shoulder pain, currently off statin/BB due to se  On  nitrates, ASA, plavix  Following dr. Rina Conde on smoking cessation     BMI 40.0-44.9, adult ([facility]  Encouraged wt loss     Prediabetes  Persistent, encouraged wt loss    Essential hypertension  Elevated today, pain induced? Monitoring  DASH Diet  Plan on  increasing norvasc dose to 10 mg if persistent  Ff-up next month    Coronary artery disease of native artery of native heart with stable angina pectoris ([facility]    Snoring  Upcoming appt with dr. Mauricio Danielle    Cellulitis  Start keflex    Follow-up Disposition:  Return in about 4 weeks (around 1/11/2019), or if symptoms worsen or fail to improve. Patient understands plan of care. Patient has provided input and agrees with goals.

## 2019-01-02 ENCOUNTER — TELEPHONE (OUTPATIENT)
Dept: FAMILY MEDICINE CLINIC | Age: 39
End: 2019-01-02

## 2019-01-02 NOTE — TELEPHONE ENCOUNTER
Pt states that her cardiologist increased her Amlodipine to 10 mg. She states that she is still having issues with he BP being high and would like to know if Dr Rashawn Montalvo would increase it again. Advised that she should talk to her cardiologist and she stated that Dr Rashawn Montalvo and the cardiologist both handle this medication. Please advise.

## 2019-01-02 NOTE — TELEPHONE ENCOUNTER
Contacted patient and verified identity using name and date of birth (2- identifiers). Patient states she takes her BP around 8-9AM and it has been running 130/85-89. She states when she takes it in the evenings it has been elevated and running 140-150/90. She is requesting her amlodipine 10 mg to be increased. Patient informed Norvasc 10 mg is the highest dose of this medication. Advised to cut down on salt, log BP, schedule sooner appt than scheduled appt 1/17 if  > 160/100. Patient voiced understanding.

## 2019-01-02 NOTE — TELEPHONE ENCOUNTER
norvasc 10 mg is the highest dose for this medication.  Advise to cut down on salt, log BP, see me sooner than scheduled appt 1/17  if  > 160/100

## 2019-01-17 ENCOUNTER — OFFICE VISIT (OUTPATIENT)
Dept: FAMILY MEDICINE CLINIC | Age: 39
End: 2019-01-17

## 2019-01-17 ENCOUNTER — HOSPITAL ENCOUNTER (OUTPATIENT)
Dept: CT IMAGING | Age: 39
Discharge: HOME | End: 2019-01-17
Attending: FAMILY MEDICINE
Payer: MEDICAID

## 2019-01-17 ENCOUNTER — DOCUMENTATION ONLY (OUTPATIENT)
Dept: FAMILY MEDICINE CLINIC | Age: 39
End: 2019-01-17

## 2019-01-17 ENCOUNTER — HOSPITAL ENCOUNTER (OUTPATIENT)
Dept: LAB | Age: 39
Discharge: HOME | End: 2019-01-17

## 2019-01-17 VITALS
OXYGEN SATURATION: 99 % | SYSTOLIC BLOOD PRESSURE: 140 MMHG | HEIGHT: 60 IN | WEIGHT: 221.8 LBS | DIASTOLIC BLOOD PRESSURE: 82 MMHG | TEMPERATURE: 98.2 F | HEART RATE: 85 BPM | RESPIRATION RATE: 18 BRPM | BODY MASS INDEX: 43.55 KG/M2

## 2019-01-17 DIAGNOSIS — Z87.19 HISTORY OF DIVERTICULITIS: ICD-10-CM

## 2019-01-17 DIAGNOSIS — I25.119 CORONARY ARTERY DISEASE INVOLVING NATIVE CORONARY ARTERY OF NATIVE HEART WITH ANGINA PECTORIS (HCC): ICD-10-CM

## 2019-01-17 DIAGNOSIS — R10.12 ACUTE LUQ PAIN: ICD-10-CM

## 2019-01-17 DIAGNOSIS — I21.3 ST ELEVATION MYOCARDIAL INFARCTION (STEMI), UNSPECIFIED ARTERY (HCC): ICD-10-CM

## 2019-01-17 DIAGNOSIS — I10 ESSENTIAL HYPERTENSION: ICD-10-CM

## 2019-01-17 DIAGNOSIS — R10.12 LEFT UPPER QUADRANT PAIN: Primary | ICD-10-CM

## 2019-01-17 DIAGNOSIS — R11.0 NAUSEA: ICD-10-CM

## 2019-01-17 DIAGNOSIS — K80.20 CALCULUS OF GALLBLADDER WITHOUT CHOLECYSTITIS WITHOUT OBSTRUCTION: ICD-10-CM

## 2019-01-17 DIAGNOSIS — R10.12 LEFT UPPER QUADRANT PAIN: ICD-10-CM

## 2019-01-17 LAB
ALBUMIN SERPL-MCNC: 3.4 G/DL (ref 3.4–5)
ALBUMIN/GLOB SERPL: 0.8 {RATIO} (ref 0.8–1.7)
ALP SERPL-CCNC: 67 U/L (ref 45–117)
ALT SERPL-CCNC: 22 U/L (ref 13–56)
ANION GAP SERPL CALC-SCNC: 8 MMOL/L (ref 3–18)
AST SERPL-CCNC: 13 U/L (ref 15–37)
BASOPHILS # BLD: 0 K/UL (ref 0–0.1)
BASOPHILS NFR BLD: 0 % (ref 0–2)
BILIRUB SERPL-MCNC: 0.2 MG/DL (ref 0.2–1)
BUN SERPL-MCNC: 6 MG/DL (ref 7–18)
BUN/CREAT SERPL: 9 (ref 12–20)
CALCIUM SERPL-MCNC: 9 MG/DL (ref 8.5–10.1)
CHLORIDE SERPL-SCNC: 103 MMOL/L (ref 100–108)
CO2 SERPL-SCNC: 29 MMOL/L (ref 21–32)
CREAT SERPL-MCNC: 0.66 MG/DL (ref 0.6–1.3)
DIFFERENTIAL METHOD BLD: ABNORMAL
EOSINOPHIL # BLD: 0.2 K/UL (ref 0–0.4)
EOSINOPHIL NFR BLD: 3 % (ref 0–5)
ERYTHROCYTE [DISTWIDTH] IN BLOOD BY AUTOMATED COUNT: 16.3 % (ref 11.6–14.5)
GLOBULIN SER CALC-MCNC: 4.3 G/DL (ref 2–4)
GLUCOSE SERPL-MCNC: 98 MG/DL (ref 74–99)
HCT VFR BLD AUTO: 37.5 % (ref 35–45)
HGB BLD-MCNC: 11.6 G/DL (ref 12–16)
LIPASE SERPL-CCNC: 151 U/L (ref 73–393)
LYMPHOCYTES # BLD: 2.1 K/UL (ref 0.9–3.6)
LYMPHOCYTES NFR BLD: 37 % (ref 21–52)
MCH RBC QN AUTO: 25.1 PG (ref 24–34)
MCHC RBC AUTO-ENTMCNC: 30.9 G/DL (ref 31–37)
MCV RBC AUTO: 81.2 FL (ref 74–97)
MONOCYTES # BLD: 0.3 K/UL (ref 0.05–1.2)
MONOCYTES NFR BLD: 5 % (ref 3–10)
NEUTS SEG # BLD: 3.2 K/UL (ref 1.8–8)
NEUTS SEG NFR BLD: 55 % (ref 40–73)
PLATELET # BLD AUTO: 444 K/UL (ref 135–420)
PMV BLD AUTO: 9.6 FL (ref 9.2–11.8)
POTASSIUM SERPL-SCNC: 3.7 MMOL/L (ref 3.5–5.5)
PROT SERPL-MCNC: 7.7 G/DL (ref 6.4–8.2)
RBC # BLD AUTO: 4.62 M/UL (ref 4.2–5.3)
SODIUM SERPL-SCNC: 140 MMOL/L (ref 136–145)
WBC # BLD AUTO: 5.8 K/UL (ref 4.6–13.2)

## 2019-01-17 PROCEDURE — 74177 CT ABD & PELVIS W/CONTRAST: CPT

## 2019-01-17 PROCEDURE — 83690 ASSAY OF LIPASE: CPT

## 2019-01-17 PROCEDURE — 85025 COMPLETE CBC W/AUTO DIFF WBC: CPT

## 2019-01-17 PROCEDURE — 36415 COLL VENOUS BLD VENIPUNCTURE: CPT

## 2019-01-17 PROCEDURE — 80053 COMPREHEN METABOLIC PANEL: CPT

## 2019-01-17 PROCEDURE — 74011636320 HC RX REV CODE- 636/320: Performed by: FAMILY MEDICINE

## 2019-01-17 RX ORDER — LOSARTAN POTASSIUM 50 MG/1
50 TABLET ORAL DAILY
Qty: 90 TAB | Refills: 0 | Status: SHIPPED | OUTPATIENT
Start: 2019-01-17 | End: 2019-01-21

## 2019-01-17 RX ORDER — CIPROFLOXACIN 500 MG/1
500 TABLET ORAL 2 TIMES DAILY
Qty: 20 TAB | Refills: 0 | Status: SHIPPED | OUTPATIENT
Start: 2019-01-17 | End: 2019-01-27

## 2019-01-17 RX ORDER — METRONIDAZOLE 500 MG/1
500 TABLET ORAL 3 TIMES DAILY
Qty: 30 TAB | Refills: 0 | Status: SHIPPED | OUTPATIENT
Start: 2019-01-17 | End: 2019-01-27

## 2019-01-17 RX ORDER — AMLODIPINE BESYLATE 10 MG/1
10 TABLET ORAL DAILY
Qty: 90 TAB | Refills: 0 | Status: SHIPPED | OUTPATIENT
Start: 2019-01-17 | End: 2019-12-30

## 2019-01-17 RX ADMIN — IOPAMIDOL 100 ML: 612 INJECTION, SOLUTION INTRAVENOUS at 16:50

## 2019-01-17 NOTE — PROGRESS NOTES
1. Have you been to the ER, urgent care clinic since your last visit? Hospitalized since your last visit? StephenJesus Manuel [address] 12/16/18 LT shoulder pain, neck pain    2. Have you seen or consulted any other health care providers outside of the [address] [place] [address] since your last visit? Include any pap smears or colon screening.  No    Chief Complaint   Patient presents with    Hypertension    Coronary Artery Disease    Other     Pre-DM    Rib Pain     times 2 week    Dizziness     times 2 week    Nausea

## 2019-01-17 NOTE — PROGRESS NOTES
Orin Agee, 34 y.o.,  female    SUBJECTIVE  Ff-up and acute concern    C/o LUQ pain and tenderness, feeling nauseous past 4 days. No fever, no vomiting. And not similar to her more chronic epigastric pain (known GB stones, ongoing eval by surg scheduled HIDA scan/no showed GI appt in dec due to fathers illness). She reports similar symptoms to diverticulitis even 2016, reviewed sigmoid area haziness on CT then     Depression- doing well on zoloft. CAD, STEMI s/p PCI 1/18 - denies chest pain, sob. Following dr. Debbie Colunga. She has completely stopped smoking in May 2018. She is Off simvastatin and metoprolol due to med intolerance palpitations. Her BP is elevated and her cardiologist increased her norvasc to 10 mg last month she says. HTN- taking norvasc. Pt has h/o prediabetes    Anemia-improving, taking otc prenatal vitamins. Following gyne  dr Bijal Pichardo for heavy menses    ROS:  See HPI, all others negative        Patient Active Problem List   Diagnosis Code    Tobacco use Z72.0    Obesity E66.9    Hypertension I10    Prediabetes R73.03    Low HDL (under 36) E78.6    Obesity, morbid ([address] E66.01    ST elevation myocardial infarction (STEMI) ([address] I21.3    Iron deficiency anemia due to chronic blood loss D50.0    Coronary artery disease involving native coronary artery of native heart with angina pectoris (HCC) I25.119    Adjustment disorder with depressed mood F43.21    Epigastric pain R10.13    Calculus of gallbladder without cholecystitis without obstruction K80.20       Current Outpatient Medications   Medication Sig Dispense Refill    amLODIPine (NORVASC) 10 mg tablet Take 1 Tab by mouth daily. 90 Tab 0    losartan (COZAAR) 50 mg tablet Take 1 Tab by mouth daily. 90 Tab 0    ciprofloxacin HCl (CIPRO) 500 mg tablet Take 1 Tab by mouth two (2) times a day for 10 days. 20 Tab 0    metroNIDAZOLE (FLAGYL) 500 mg tablet Take 1 Tab by mouth three (3) times daily for 10 days.  30 Tab 0    pantoprazole (PROTONIX) 40 mg tablet Take 1 Tab by mouth daily. 90 Tab 0    fluticasone (FLONASE) 50 mcg/actuation nasal spray 2 Sprays by Both Nostrils route daily. 1 Bottle 0    clopidogrel (PLAVIX) 75 mg tab 75 mg.  aspirin 81 mg chewable tablet 81 mg.      nitroglycerin (NITROSTAT) 0.4 mg SL tablet 0.4 mg.      sertraline (ZOLOFT) 50 mg tablet Take 1 Tab by mouth daily. 90 Tab 0    cetirizine (ZYRTEC) 10 mg tablet Take 1 Tab by mouth daily as needed for Allergies. 90 Tab 0       Allergies   Allergen Reactions    Lisinopril Cough    Vaccine Adjuvant Emulsion Combination No. 1 Seizures     MMR       Past Medical History:   Diagnosis Date    Cardiac echocardiogram 07/21/2016    Sm LV cavity. EF 65-70%. No RWMA. Mod-marked LVH w/dynamic obstruction, mid-cavity obliteration & peak grad of 25 mmHg. Indeterminate diastolic fx. No significant valvular pathology.  Cardiac nuclear imaging test 07/22/2016    Low risk. Very sm distal anterior & apical partially reversible defect, likely artifact, but sm area of ischemia not excluded. No RWMA. EF 66%.   Neg EKG on pharm stress test.    Diverticulitis     GERD (gastroesophageal reflux disease)     Hypertension     Infectious disease     Bacteria vaginosis    Non-[facility] elevated [facility]     STEMI (ST elevation myocardial infarction) ([facility] 01/2018       Social History     Socioeconomic History    Marital status: SINGLE     Spouse name: Not on file    Number of children: Not on file    Years of education: Not on file    Highest education level: Not on file   Social Needs    Financial resource strain: Not on file    Food insecurity - worry: Not on file    Food insecurity - inability: Not on file   [employer] needs - medical: Not on file   [employer] needs - non-medical: Not on file   Occupational History    Not on file   Tobacco Use    Smoking status: Former Smoker     Packs/day: 0.25     Years: 20.00     Pack years:  5.00    Smokeless tobacco: Never Used   Substance and Sexual Activity    Alcohol use: Yes     Comment: rare/holidays    Drug use: No    Sexual activity: Yes     Partners: Male     Birth control/protection: Surgical     Comment: tubal ligation   Other Topics Concern    Not on file   Social History Narrative    Not on file       Family History   Problem Relation Age of Onset    Depression Mother     Asthma Mother     Migraines Mother     Hypertension Mother    [facility] Stroke Mother     Heart Attack Mother     Arthritis-osteo Mother     Depression Brother     Alcohol abuse Father     Substance Abuse Father         tobacco    Drug Abuse Father     Hypertension Father     High Cholesterol Father     Stroke Father     Rashes/Skin Problems Father     Substance Abuse Brother         tobacco    Drug Abuse Brother     Asthma Brother     Migraines Brother     Hypertension Brother     High Cholesterol Brother     Hypertension Paternal Grandmother     Diabetes Paternal Grandmother     Hypertension Maternal Grandmother     Colon Cancer Maternal Grandmother          OBJECTIVE    Physical Exam:     Visit Vitals  /82 (BP 1 Location: Left arm, BP Patient Position: Sitting)   Pulse 85   Temp 98.2 °F (36.8 °C) (Oral)   Resp 18   Ht 5' (1.524 m)   Wt 221 lb 12.8 oz (100.6 kg)   SpO2 99%   BMI 43.32 kg/m²       General: alert, well-appearing, obese,AA, in no apparent distress or pain  HEENT: throat, pharynx clear, eac patent, TM intact, no jaundice  CVS: normal rate, regular rhythm, distinct S1 and S2  Lungs:clear to ausculation bilaterally, no crackles, wheezing or rhonchi noted  Abdomen: soft, LUQ tenderness, no rebound,  neg murphys  Extremities: L shoulder area appears red and tender  Skin: warm, no lesions, rashes noted  Psych:  mood and affect normal    Results for orders placed or performed during the hospital encounter of 10/11/18   [facility] SPECIMEN COL   Result Value Ref Range    [facility] SPECIMEN [facility]LL[facility]. Specimens collected/sent to [email or web address]. Please direct inquiries to ([phone]). ASSESSMENT/PLAN  Diagnoses and all orders for this visit:    LUQ abdominal pain  With tenderness and h/o previous diverticulitis  Empirically treat for acute diverticulitis with flagyl, cipro  Stat ct/labs today cbc/lipase/cmp (w/ h/o GB stones)  Close ff-up    Adjustment reaction with depressed mood  stable, cont zoloft to 50 mg qday    ST elevation myocardial infarction (STEMI), unspecified artery (HCC)  S/p stent 1/18  Asymptomatic  On  nitrates, ASA, plavix  Following dr. Jyotsna Mason on smoking cessation     BMI 40.0-44.9, adult ([facility]  Encouraged wt loss     Prediabetes  Persistent, encouraged wt loss    Essential hypertension  Needs better control  Add cozaar 50 mg to norvasc 10    Coronary artery disease of native artery of native heart with stable angina pectoris ([facility]      Follow-up Disposition:  Return in about 4 days (around 1/21/2019), or if symptoms worsen or fail to improve. Patient understands plan of care. Patient has provided input and agrees with goals.

## 2019-01-17 NOTE — PROGRESS NOTES
[place] [facility] [place][phone][place] [id number]. CT abd pelvic prior authorization obtained. [facility]#[id number]. Eff: 1/17/19-3/3/2019. Scheduling department notified of approval as well.

## 2019-01-17 NOTE — PATIENT INSTRUCTIONS
Abdominal Pain: Care Instructions  Your Care Instructions    Abdominal pain has many possible causes. Some aren't serious and get better on their own in a few days. Others need more testing and treatment. If your pain continues or gets worse, you need to be rechecked and may need more tests to find out what is wrong. You may need surgery to correct the problem. Don't ignore new symptoms, such as fever, nausea and vomiting, urination problems, pain that gets worse, and dizziness. These may be signs of a more serious problem. Your doctor may have recommended a follow-up visit in the next 8 to 12 hours. If you are not getting better, you may need more tests or treatment. The doctor has checked you carefully, but problems can develop later. If you notice any problems or new symptoms, get medical treatment right away. Follow-up care is a key part of your treatment and safety. Be sure to make and go to all appointments, and call your doctor if you are having problems. It's also a good idea to know your test results and keep a list of the medicines you take. How can you care for yourself at home? · Rest until you feel better. · To prevent dehydration, drink plenty of fluids, enough so that your urine is light yellow or clear like water. Choose water and other caffeine-free clear liquids until you feel better. If you have kidney, heart, or liver disease and have to limit fluids, talk with your doctor before you increase the amount of fluids you drink. · If your stomach is upset, eat mild foods, such as rice, dry toast or crackers, bananas, and applesauce. Try eating several small meals instead of two or three large ones. · Wait until 48 hours after all symptoms have gone away before you have spicy foods, alcohol, and drinks that contain caffeine. · Do not eat foods that are high in fat. · Avoid anti-inflammatory medicines such as aspirin, ibuprofen (Advil, Motrin), and naproxen (Aleve).  These can cause stomach upset. Talk to your doctor if you take daily aspirin for another health problem. When should you call for help? Call 911 anytime you think you may need emergency care. For example, call if:    · You passed out (lost consciousness).     · You pass maroon or very bloody stools.     · You vomit blood or what looks like coffee grounds.     · You have new, severe belly pain.    Call your doctor now or seek immediate medical care if:    · Your pain gets worse, especially if it becomes focused in one area of your belly.     · You have a new or higher fever.     · Your stools are black and look like tar, or they have streaks of blood.     · You have unexpected vaginal bleeding.     · You have symptoms of a urinary tract infection. These may include:  ? Pain when you urinate. ? Urinating more often than usual.  ? Blood in your urine.     · You are dizzy or lightheaded, or you feel like you may faint.    Watch closely for changes in your health, and be sure to contact your doctor if:    · You are not getting better after 1 day (24 hours). Where can you learn more? Go to http://kim-tristin.info/. Enter M384 in the search box to learn more about \"Abdominal Pain: Care Instructions. \"  Current as of: November 20, 2017  Content Version: 11.8  © 5990-7088 [employer]. Care instructions adapted under license by [employer] (which disclaims liability or warranty for this information). If you have questions about a medical condition or this instruction, always ask your healthcare professional. [place] [address] any warranty or liability for your use of this information. DASH Diet: Care Instructions  Your Care Instructions    The DASH diet is an eating plan that can help lower your blood pressure. DASH stands for Dietary Approaches to Stop Hypertension. Hypertension is high blood pressure.   The DASH diet focuses on eating foods that are high in calcium, potassium, and magnesium. These nutrients can lower blood pressure. The foods that are highest in these nutrients are fruits, vegetables, low-fat dairy products, nuts, seeds, and legumes. But taking calcium, potassium, and magnesium supplements instead of eating foods that are high in those nutrients does not have the same effect. The DASH diet also includes whole grains, fish, and poultry. The DASH diet is one of several lifestyle changes your doctor may recommend to lower your high blood pressure. Your doctor may also want you to decrease the amount of sodium in your diet. Lowering sodium while following the DASH diet can lower blood pressure even further than just the DASH diet alone. Follow-up care is a key part of your treatment and safety. Be sure to make and go to all appointments, and call your doctor if you are having problems. It's also a good idea to know your test results and keep a list of the medicines you take. How can you care for yourself at home? Following the DASH diet  · Eat 4 to 5 servings of fruit each day. A serving is 1 medium-sized piece of fruit, ½ cup chopped or canned fruit, 1/4 cup dried fruit, or 4 ounces (½ cup) of fruit juice. Choose fruit more often than fruit juice. · Eat 4 to 5 servings of vegetables each day. A serving is 1 cup of lettuce or raw leafy vegetables, ½ cup of chopped or cooked vegetables, or 4 ounces (½ cup) of vegetable juice. Choose vegetables more often than vegetable juice. · Get 2 to 3 servings of low-fat and fat-free dairy each day. A serving is 8 ounces of milk, 1 cup of yogurt, or 1 ½ ounces of cheese. · Eat 6 to 8 servings of grains each day. A serving is 1 slice of bread, 1 ounce of dry cereal, or ½ cup of cooked rice, pasta, or cooked cereal. Try to choose whole-grain products as much as possible. · Limit lean meat, poultry, and fish to 2 servings each day. A serving is 3 ounces, about the size of a deck of cards.   · Eat 4 to 5 servings of nuts, seeds, and legumes (cooked dried beans, lentils, and split peas) each week. A serving is 1/3 cup of nuts, 2 tablespoons of seeds, or ½ cup of cooked beans or peas. · Limit fats and oils to 2 to 3 servings each day. A serving is 1 teaspoon of vegetable oil or 2 tablespoons of salad dressing. · Limit sweets and added sugars to 5 servings or less a week. A serving is 1 tablespoon jelly or jam, ½ cup sorbet, or 1 cup of lemonade. · Eat less than 2,300 milligrams (mg) of sodium a day. If you limit your sodium to 1,500 mg a day, you can lower your blood pressure even more. Tips for success  · Start small. Do not try to make dramatic changes to your diet all at once. You might feel that you are missing out on your favorite foods and then be more likely to not follow the plan. Make small changes, and stick with them. Once those changes become habit, add a few more changes. · Try some of the following:  ? Make it a goal to eat a fruit or vegetable at every meal and at snacks. This will make it easy to get the recommended amount of fruits and vegetables each day. ? Try yogurt topped with fruit and nuts for a snack or healthy dessert. ? Add lettuce, tomato, cucumber, and onion to sandwiches. ? Combine a ready-made pizza crust with low-fat mozzarella cheese and lots of vegetable toppings. Try using tomatoes, squash, spinach, broccoli, carrots, cauliflower, and onions. ? Have a variety of cut-up vegetables with a low-fat dip as an appetizer instead of chips and dip. ? Sprinkle sunflower seeds or chopped almonds over salads. Or try adding chopped walnuts or almonds to cooked vegetables. ? Try some vegetarian meals using beans and peas. Add garbanzo or kidney beans to salads. Make burritos and tacos with mashed beal beans or black beans. Where can you learn more? Go to http://kim-tristin.info/. Enter Z404 in the search box to learn more about \"DASH Diet: Care Instructions. \"  Current as of: December 6, 2017  Content Version: 11.8  © 7646-7812 [employer]. Care instructions adapted under license by [employer] (which disclaims liability or warranty for this information). If you have questions about a medical condition or this instruction, always ask your healthcare professional. [address] any warranty or liability for your use of this information.

## 2019-01-18 NOTE — PROGRESS NOTES
Patient identified with 2 identifiers (name and ). Patient aware of CT scan showing diverticulitis.  Continue treatment and keep appt 2019

## 2019-01-21 ENCOUNTER — OFFICE VISIT (OUTPATIENT)
Dept: FAMILY MEDICINE CLINIC | Age: 39
End: 2019-01-21

## 2019-01-21 VITALS
DIASTOLIC BLOOD PRESSURE: 78 MMHG | OXYGEN SATURATION: 98 % | BODY MASS INDEX: 43.19 KG/M2 | HEIGHT: 60 IN | HEART RATE: 77 BPM | TEMPERATURE: 98.6 F | RESPIRATION RATE: 16 BRPM | WEIGHT: 220 LBS | SYSTOLIC BLOOD PRESSURE: 130 MMHG

## 2019-01-21 DIAGNOSIS — I10 ESSENTIAL HYPERTENSION: ICD-10-CM

## 2019-01-21 DIAGNOSIS — I25.119 CORONARY ARTERY DISEASE INVOLVING NATIVE CORONARY ARTERY OF NATIVE HEART WITH ANGINA PECTORIS (HCC): ICD-10-CM

## 2019-01-21 DIAGNOSIS — F43.21 ADJUSTMENT DISORDER WITH DEPRESSED MOOD: ICD-10-CM

## 2019-01-21 DIAGNOSIS — K57.92 ACUTE DIVERTICULITIS: Primary | ICD-10-CM

## 2019-01-21 NOTE — PROGRESS NOTES
Ric Garibay, 34 y.o.,  female    SUBJECTIVE  Ff-up abdominal pain and ED visit. C/o LUQ pain and tenderness, and nausea has resolved. CT scan reviewed c/w uncomplicated diverticulitis and diverticulosis. This is her 2nd episode. She was supposed to see GI for recurrent epigastric pain/GB stones previously, advised to pursue/given # today to evaluate recurrent diverticulitis as well. HTN- added cozaar on last visit with elevated BP and h/o CAD. She felt lightheaded over the weekend and went to ED. She continues to take norvasc 10 mg. She denies fever, melena, abdominal pain. Depression- doing well on zoloft. CAD, STEMI s/p PCI 1/18 - denies chest pain, sob. Following dr. [facility]. She has completely stopped smoking in May 2018. She is Off simvastatin and metoprolol due to med intolerance palpitations. Her BP is elevated and her cardiologist increased her norvasc to 10 mg last month she says. HTN- taking norvasc. Pt has h/o prediabetes    Anemia-improving, taking otc prenatal vitamins. Following gyne  dr Mario Sloan for heavy menses    ROS:  See HPI, all others negative        Patient Active Problem List   Diagnosis Code    Tobacco use Z72.0    Obesity E66.9    Hypertension I10    Prediabetes R73.03    Low HDL (under 36) E78.6    Obesity, morbid ([address] E66.01    ST elevation myocardial infarction (STEMI) ([address] I21.3    Iron deficiency anemia due to chronic blood loss D50.0    Coronary artery disease involving native coronary artery of native heart with angina pectoris (HCC) I25.119    Adjustment disorder with depressed mood F43.21    Epigastric pain R10.13    Calculus of gallbladder without cholecystitis without obstruction K80.20       Current Outpatient Medications   Medication Sig Dispense Refill    amLODIPine (NORVASC) 10 mg tablet Take 1 Tab by mouth daily.  (Patient taking differently: Take 5 mg by mouth daily.) 90 Tab 0    ciprofloxacin HCl (CIPRO) 500 mg tablet Take 1 Tab by mouth two (2) times a day for 10 days. 20 Tab 0    metroNIDAZOLE (FLAGYL) 500 mg tablet Take 1 Tab by mouth three (3) times daily for 10 days. 30 Tab 0    sertraline (ZOLOFT) 50 mg tablet Take 1 Tab by mouth daily. 90 Tab 0    pantoprazole (PROTONIX) 40 mg tablet Take 1 Tab by mouth daily. 90 Tab 0    fluticasone (FLONASE) 50 mcg/actuation nasal spray 2 Sprays by Both Nostrils route daily. 1 Bottle 0    cetirizine (ZYRTEC) 10 mg tablet Take 1 Tab by mouth daily as needed for Allergies. 90 Tab 0    clopidogrel (PLAVIX) 75 mg tab 75 mg.  aspirin 81 mg chewable tablet 81 mg.      nitroglycerin (NITROSTAT) 0.4 mg SL tablet 0.4 mg. Allergies   Allergen Reactions    Lisinopril Cough    Vaccine Adjuvant Emulsion Combination No. 1 Seizures     MMR       Past Medical History:   Diagnosis Date    Cardiac echocardiogram 07/21/2016    Sm LV cavity. EF 65-70%. No RWMA. Mod-marked LVH w/dynamic obstruction, mid-cavity obliteration & peak grad of 25 mmHg. Indeterminate diastolic fx. No significant valvular pathology.  Cardiac nuclear imaging test 07/22/2016    Low risk. Very sm distal anterior & apical partially reversible defect, likely artifact, but sm area of ischemia not excluded. No RWMA. EF 66%.   Neg EKG on pharm stress test.    Diverticulitis     GERD (gastroesophageal reflux disease)     Hypertension     Infectious disease     Bacteria vaginosis    Non-ST elevated myocardial infarction [facility]     STEMI (ST elevation myocardial infarction) ([facility] [facility] 01/2018       Social History     Socioeconomic History    Marital status: SINGLE     Spouse name: Not on file    Number of children: Not on file    Years of education: Not on file    Highest education level: Not on file   Social Needs    Financial resource strain: Not on file    Food insecurity - worry: Not on file    Food insecurity - inability: Not on file    Transportation needs - medical: Not on file   legalPAD needs - non-medical: Not on file   Occupational History    Not on file   Tobacco Use    Smoking status: Former Smoker     Packs/day: 0.25     Years: 20.00     Pack years: 5.00    Smokeless tobacco: Never Used   Substance and Sexual Activity    Alcohol use: Yes     Comment: rare/holidays    Drug use: No    Sexual activity: Yes     Partners: Male     Birth control/protection: Surgical     Comment: tubal ligation   Other Topics Concern    Not on file   Social History Narrative    Not on file       Family History   Problem Relation Age of Onset    Depression Mother     Asthma Mother     Migraines Mother     Hypertension Mother     Stroke Mother     Heart Attack Mother     Arthritis-osteo Mother     Depression Brother     Alcohol abuse Father     Substance Abuse Father         tobacco    Drug Abuse Father     Hypertension Father     High Cholesterol Father     Stroke Father     Rashes/Skin Problems Father     Substance Abuse Brother         tobacco    Drug Abuse Brother     Asthma Brother     Migraines Brother     Hypertension Brother     High Cholesterol Brother     Hypertension Paternal Grandmother     Diabetes Paternal Grandmother     Hypertension Maternal Grandmother     Colon Cancer Maternal Grandmother          OBJECTIVE    Physical Exam:     Visit Vitals  /78 (BP 1 Location: Left arm, BP Patient Position: Sitting)   Pulse 77   Temp 98.6 °F (37 °C) (Oral)   Resp 16   Ht 5' (1.524 m)   Wt 220 lb (99.8 kg)   LMP 01/07/2019 (Approximate)   SpO2 98%   BMI 42.97 kg/m²       General: alert, well-appearing, obese,AA, in no apparent distress or pain  HEENT: throat, pharynx clear, eac patent, TM intact, no jaundice  CVS: normal rate, regular rhythm, distinct S1 and S2  Lungs:clear to ausculation bilaterally, no crackles, wheezing or rhonchi noted  Abdomen: soft, LUQ no longer tender, no rebound,  neg murphys  Extremities: L shoulder area appears red and tender  Skin: warm, no lesions, rashes noted  Psych:  mood and affect normal    Results for orders placed or performed during the hospital encounter of 01/17/19   CBC WITH AUTOMATED DIFF   Result Value Ref Range    WBC 5.8 4.6 - 13.2 K/uL    RBC 4.62 4.20 - 5.30 M/uL    HGB 11.6 (L) 12.0 - 16.0 g/dL    HCT 37.5 35.0 - 45.0 %    MCV 81.2 74.0 - 97.0 FL    MCH 25.1 24.0 - 34.0 PG    MCHC 30.9 (L) 31.0 - 37.0 g/dL    RDW 16.3 (H) 11.6 - 14.5 %    PLATELET 531 (H) 997 - 420 K/uL    MPV 9.6 9.2 - 11.8 FL    NEUTROPHILS 55 40 - 73 %    LYMPHOCYTES 37 21 - 52 %    MONOCYTES 5 3 - 10 %    EOSINOPHILS 3 0 - 5 %    BASOPHILS 0 0 - 2 %    ABS. NEUTROPHILS 3.2 1.8 - 8.0 K/UL    ABS. LYMPHOCYTES 2.1 0.9 - 3.6 K/UL    ABS. MONOCYTES 0.3 0.05 - 1.2 K/UL    ABS. EOSINOPHILS 0.2 0.0 - 0.4 K/UL    ABS. BASOPHILS 0.0 0.0 - 0.1 K/UL    DF AUTOMATED     LIPASE   Result Value Ref Range    Lipase 151 73 - 447 U/L   METABOLIC PANEL, COMPREHENSIVE   Result Value Ref Range    Sodium 140 136 - 145 mmol/L    Potassium 3.7 3.5 - 5.5 mmol/L    Chloride 103 100 - 108 mmol/L    CO2 29 21 - 32 mmol/L    Anion gap 8 3.0 - 18 mmol/L    Glucose 98 74 - 99 mg/dL    BUN 6 (L) 7.0 - 18 MG/DL    Creatinine 0.66 0.6 - 1.3 MG/DL    BUN/Creatinine ratio 9 (L) 12 - 20      GFR est AA >60 >60 ml/min/1.73m2    GFR est non-AA >60 >60 ml/min/1.73m2    Calcium 9.0 8.5 - 10.1 MG/DL    Bilirubin, total 0.2 0.2 - 1.0 MG/DL    ALT (SGPT) 22 13 - 56 U/L    AST (SGOT) 13 (L) 15 - 37 U/L    Alk.  phosphatase 67 45 - 117 U/L    Protein, total 7.7 6.4 - 8.2 g/dL    Albumin 3.4 3.4 - 5.0 g/dL    Globulin 4.3 (H) 2.0 - 4.0 g/dL    A-G Ratio 0.8 0.8 - 1.7           ASSESSMENT/PLAN  Diagnoses and all orders for this visit:  Acute diverticulitis  Clinically improfved  To completed flagyl/cipro   h/o previous diverticulitis- to pursue GI consult  Advised high fiber diet    Adjustment reaction with depressed mood  stable, cont zoloft to 50 mg qday    ST [facility]STEMI), [facility]  S/p stent 1/18  Asymptomatic  On  nitrates, ASA, plavix  Following dr. Alysia Boxer on smoking cessation     BMI 40.0-44.9, adult ([facility]  Encouraged wt loss     Prediabetes  Persistent, encouraged wt loss    Essential hypertension  Lightheadedness, likely due to addition of BP meds  Cont norvasc 10  Hold losartan    Coronary artery disease of native artery of native heart with stable angina pectoris ([facility] [address] [facility]      Follow-up Disposition:  Return in about 4 weeks (around 2/18/2019), or if symptoms worsen or fail to improve. Patient understands plan of care. Patient has provided input and agrees with goals.

## 2019-01-21 NOTE — PROGRESS NOTES
1. Have you been to the ER, urgent care clinic since your last visit? Hospitalized since your last visit? Yes Bhavin Collins mitzi 01/202019  2. Have you seen or consulted any other health care providers outside of the [address] [place] [address] since your last visit? Include any pap smears or colon screening.  No

## 2019-01-21 NOTE — PATIENT INSTRUCTIONS

## 2019-02-16 ENCOUNTER — APPOINTMENT (OUTPATIENT)
Dept: GENERAL RADIOLOGY | Age: 39
End: 2019-02-16
Attending: EMERGENCY MEDICINE
Payer: MEDICAID

## 2019-02-16 ENCOUNTER — APPOINTMENT (OUTPATIENT)
Dept: CT IMAGING | Age: 39
End: 2019-02-16
Attending: EMERGENCY MEDICINE
Payer: MEDICAID

## 2019-02-16 ENCOUNTER — HOSPITAL ENCOUNTER (EMERGENCY)
Age: 39
Discharge: HOME | End: 2019-02-17
Attending: EMERGENCY MEDICINE | Admitting: EMERGENCY MEDICINE
Payer: MEDICAID

## 2019-02-16 DIAGNOSIS — R07.89 CHEST WALL PAIN: Primary | ICD-10-CM

## 2019-02-16 LAB
ANION GAP SERPL CALC-SCNC: 9 MMOL/L (ref 3–18)
APPEARANCE UR: CLEAR
BASOPHILS # BLD: 0 K/UL (ref 0–0.1)
BASOPHILS NFR BLD: 0 % (ref 0–2)
BILIRUB UR QL: NEGATIVE
BUN SERPL-MCNC: 11 MG/DL (ref 7–18)
BUN/CREAT SERPL: 15 (ref 12–20)
CALCIUM SERPL-MCNC: 9 MG/DL (ref 8.5–10.1)
CHLORIDE SERPL-SCNC: 106 MMOL/L (ref 100–108)
CK MB CFR SERPL CALC: NORMAL % (ref 0–4)
CK MB SERPL-MCNC: <1 NG/ML (ref 5–25)
CK SERPL-CCNC: 91 U/L (ref 26–192)
CO2 SERPL-SCNC: 27 MMOL/L (ref 21–32)
COLOR UR: YELLOW
CREAT SERPL-MCNC: 0.71 MG/DL (ref 0.6–1.3)
D DIMER PPP FEU-MCNC: 0.58 UG/ML(FEU)
DIFFERENTIAL METHOD BLD: ABNORMAL
EOSINOPHIL # BLD: 0.3 K/UL (ref 0–0.4)
EOSINOPHIL NFR BLD: 3 % (ref 0–5)
ERYTHROCYTE [DISTWIDTH] IN BLOOD BY AUTOMATED COUNT: 17 % (ref 11.6–14.5)
GLUCOSE SERPL-MCNC: 117 MG/DL (ref 74–99)
GLUCOSE UR STRIP.AUTO-MCNC: NEGATIVE MG/DL
HCG UR QL: NEGATIVE
HCT VFR BLD AUTO: 39.1 % (ref 35–45)
HGB BLD-MCNC: 12.2 G/DL (ref 12–16)
HGB UR QL STRIP: NEGATIVE
KETONES UR QL STRIP.AUTO: NEGATIVE MG/DL
LEUKOCYTE ESTERASE UR QL STRIP.AUTO: NEGATIVE
LYMPHOCYTES # BLD: 3.5 K/UL (ref 0.9–3.6)
LYMPHOCYTES NFR BLD: 38 % (ref 21–52)
MCH RBC QN AUTO: 25.1 PG (ref 24–34)
MCHC RBC AUTO-ENTMCNC: 31.2 G/DL (ref 31–37)
MCV RBC AUTO: 80.5 FL (ref 74–97)
MONOCYTES # BLD: 0.4 K/UL (ref 0.05–1.2)
MONOCYTES NFR BLD: 4 % (ref 3–10)
NEUTS SEG # BLD: 5 K/UL (ref 1.8–8)
NEUTS SEG NFR BLD: 55 % (ref 40–73)
NITRITE UR QL STRIP.AUTO: NEGATIVE
PH UR STRIP: 5.5 [PH] (ref 5–8)
PLATELET # BLD AUTO: 403 K/UL (ref 135–420)
PMV BLD AUTO: 10 FL (ref 9.2–11.8)
POTASSIUM SERPL-SCNC: 3.3 MMOL/L (ref 3.5–5.5)
PROT UR STRIP-MCNC: NEGATIVE MG/DL
RBC # BLD AUTO: 4.86 M/UL (ref 4.2–5.3)
SODIUM SERPL-SCNC: 142 MMOL/L (ref 136–145)
SP GR UR REFRACTOMETRY: 1.02 (ref 1–1.03)
TROPONIN I SERPL-MCNC: <0.02 NG/ML (ref 0–0.04)
UROBILINOGEN UR QL STRIP.AUTO: 0.2 EU/DL (ref 0.2–1)
WBC # BLD AUTO: 9.3 K/UL (ref 4.6–13.2)

## 2019-02-16 PROCEDURE — 99285 EMERGENCY DEPT VISIT HI MDM: CPT

## 2019-02-16 PROCEDURE — 82550 ASSAY OF CK (CPK): CPT

## 2019-02-16 PROCEDURE — 80048 BASIC METABOLIC PNL TOTAL CA: CPT

## 2019-02-16 PROCEDURE — 81025 URINE PREGNANCY TEST: CPT

## 2019-02-16 PROCEDURE — 74011250637 HC RX REV CODE- 250/637: Performed by: EMERGENCY MEDICINE

## 2019-02-16 PROCEDURE — 85025 COMPLETE CBC W/AUTO DIFF WBC: CPT

## 2019-02-16 PROCEDURE — 93005 ELECTROCARDIOGRAM TRACING: CPT

## 2019-02-16 PROCEDURE — 71045 X-RAY EXAM CHEST 1 VIEW: CPT

## 2019-02-16 PROCEDURE — 81003 URINALYSIS AUTO W/O SCOPE: CPT

## 2019-02-16 PROCEDURE — 71275 CT ANGIOGRAPHY CHEST: CPT

## 2019-02-16 PROCEDURE — 85379 FIBRIN DEGRADATION QUANT: CPT

## 2019-02-16 RX ORDER — POTASSIUM CHLORIDE 20 MEQ/1
40 TABLET, EXTENDED RELEASE ORAL
Status: COMPLETED | OUTPATIENT
Start: 2019-02-16 | End: 2019-02-16

## 2019-02-16 RX ADMIN — POTASSIUM CHLORIDE 40 MEQ: 20 TABLET, EXTENDED RELEASE ORAL at 22:43

## 2019-02-17 VITALS
OXYGEN SATURATION: 100 % | RESPIRATION RATE: 19 BRPM | SYSTOLIC BLOOD PRESSURE: 124 MMHG | WEIGHT: 221 LBS | HEART RATE: 80 BPM | DIASTOLIC BLOOD PRESSURE: 75 MMHG | TEMPERATURE: 98.4 F | BODY MASS INDEX: 43.16 KG/M2

## 2019-02-17 LAB
ATRIAL RATE: 92 BPM
CALCULATED P AXIS, ECG09: 35 DEGREES
CALCULATED R AXIS, ECG10: 20 DEGREES
CALCULATED T AXIS, ECG11: 53 DEGREES
DIAGNOSIS, 93000: NORMAL
P-R INTERVAL, ECG05: 146 MS
Q-T INTERVAL, ECG07: 352 MS
QRS DURATION, ECG06: 78 MS
QTC CALCULATION (BEZET), ECG08: 435 MS
TROPONIN I SERPL-MCNC: <0.02 NG/ML (ref 0–0.04)
VENTRICULAR RATE, ECG03: 92 BPM

## 2019-02-17 PROCEDURE — 74011250637 HC RX REV CODE- 250/637: Performed by: EMERGENCY MEDICINE

## 2019-02-17 PROCEDURE — 74011636320 HC RX REV CODE- 636/320: Performed by: EMERGENCY MEDICINE

## 2019-02-17 PROCEDURE — 84484 ASSAY OF TROPONIN QUANT: CPT

## 2019-02-17 RX ORDER — ACETAMINOPHEN 500 MG
1000 TABLET ORAL
Status: COMPLETED | OUTPATIENT
Start: 2019-02-17 | End: 2019-02-17

## 2019-02-17 RX ORDER — TRAMADOL HYDROCHLORIDE 50 MG/1
50 TABLET ORAL
Qty: 8 TAB | Refills: 0 | Status: SHIPPED | OUTPATIENT
Start: 2019-02-17 | End: 2019-03-04

## 2019-02-17 RX ADMIN — IOPAMIDOL 75 ML: 755 INJECTION, SOLUTION INTRAVENOUS at 00:06

## 2019-02-17 RX ADMIN — ACETAMINOPHEN 1000 MG: 500 TABLET ORAL at 02:14

## 2019-02-17 NOTE — ED NOTES
Pt discharged to home. Instructed to rest, hydrate, and follow up with her cardiologist and PCP Monday. Instructed to return immediately for any worsening/different chest pain, dizziness, shortness of breath, or any other concerns. Pt voiced her understanding.

## 2019-02-17 NOTE — ED NOTES
Pt resting comfortably awake without distress; respirations regular/non labored, skin warm/dry. No distress noted.

## 2019-02-17 NOTE — DISCHARGE INSTRUCTIONS

## 2019-02-17 NOTE — ED NOTES
Plan of care explained/understood; no distress noted. Affect calm/conversant, respirations regular/non labored/skin warm and dry. IV site clear. Family present at bedside.

## 2019-02-17 NOTE — ED PROVIDER NOTES
EMERGENCY DEPARTMENT HISTORY AND PHYSICAL EXAM 
 
9:01 PM 
 
 
Date: 2/16/2019 Patient Name: Cozette Kehr History of Presenting Illness Chief Complaint Patient presents with  Chest Pain History Provided By: Patient Additional History (Context): Cozette Kehr is a 34 y.o. female with PMHx of HTN and MI presenting to the ED c/o constant left rib pain for the past 5 days. States rib pain is worse with deep inspiration. Pt also reports wheezing and palpitations, which started earlier today and has resolved at this time. Notes she frequently carries laundry upstairs at home. Denies any other symptoms or complaints. PCP: Bre Patel MD 
 
Chief Complaint: Rib Pain Duration:  Days Timing:  Constant Location: Left ribs Quality: N/A Severity: N/A Modifying Factors: Worse with deep inspiration Associated Symptoms: Wheezing; Palpitations Current Facility-Administered Medications Medication Dose Route Frequency Provider Last Rate Last Dose  acetaminophen (TYLENOL) tablet 1,000 mg  1,000 mg Oral NOW Darlyn Alberts MD      
 
Current Outpatient Medications Medication Sig Dispense Refill  traMADol (ULTRAM) 50 mg tablet Take 1 Tab by mouth every six (6) hours as needed for Pain. Max Daily Amount: 200 mg. 8 Tab 0  
 amLODIPine (NORVASC) 10 mg tablet Take 1 Tab by mouth daily. (Patient taking differently: Take 5 mg by mouth daily.) 90 Tab 0  
 sertraline (ZOLOFT) 50 mg tablet Take 1 Tab by mouth daily. 90 Tab 0  
 pantoprazole (PROTONIX) 40 mg tablet Take 1 Tab by mouth daily. 90 Tab 0  
 fluticasone (FLONASE) 50 mcg/actuation nasal spray 2 Sprays by Both Nostrils route daily. 1 Bottle 0  
 cetirizine (ZYRTEC) 10 mg tablet Take 1 Tab by mouth daily as needed for Allergies. 90 Tab 0  clopidogrel (PLAVIX) 75 mg tab 75 mg.  aspirin 81 mg chewable tablet 81 mg.    
 nitroglycerin (NITROSTAT) 0.4 mg SL tablet 0.4 mg. Past History Past Medical History: 
Past Medical History:  
Diagnosis Date  Cardiac echocardiogram 2016 Sm LV cavity. EF 65-70%. No RWMA. Mod-marked LVH w/dynamic obstruction, mid-cavity obliteration & peak grad of 25 mmHg. Indeterminate diastolic fx. No significant valvular pathology.  Cardiac nuclear imaging test 2016 Low risk. Very sm distal anterior & apical partially reversible defect, likely artifact, but sm area of ischemia not excluded. No RWMA. EF 66%. Neg EKG on pharm stress test.  
 Diverticulitis  Gall stones  GERD (gastroesophageal reflux disease)  Heart attack ([address]  Hypertension  Infectious disease Bacteria vaginosis  Non-ST elevated [facility] infarction [facility]  STEMI (ST elevation myocardial infarction) ([facility] 2018 Past Surgical History: 
Past Surgical History:  
Procedure Laterality Date  HX  SECTION  2008  HX CORONARY STENT PLACEMENT    
 HX ORTHOPAEDIC    
 HX TUBAL LIGATION  2008 Family History: 
Family History Problem Relation Age of Onset  Depression Mother  Asthma Mother  Migraines Mother  Hypertension Mother  Stroke Mother  Heart Attack Mother Gelasius.Mas Arthritis-osteo Mother  Depression Brother  Alcohol abuse Father  Substance Abuse Father   
     tobacco  
 Drug Abuse Father  Hypertension Father  High Cholesterol Father  Stroke Father  Rashes/Skin Problems Father  Substance Abuse Brother   
     tobacco  
 Drug Abuse Brother  Asthma Brother  Migraines Brother  Hypertension Brother  High Cholesterol Brother  Hypertension Paternal Grandmother  Diabetes Paternal Grandmother  Hypertension Maternal Grandmother  Colon Cancer Maternal Grandmother Social History: 
Social History Tobacco Use  Smoking status: Former Smoker Packs/day: 0.25 Years: 20.00   Pack years: 5.00  
  Smokeless tobacco: Never Used Substance Use Topics  Alcohol use: Yes Comment: rare/holidays  Drug use: No  
 
 
Allergies: Allergies Allergen Reactions  Lisinopril Cough  Metoprolol Palpitations  Vaccine Adjuvant Emulsion Combination No. 1 Seizures MMR Review of Systems Review of Systems Constitutional: Negative. Negative for fever. HENT: Negative. Eyes: Negative. Respiratory: Positive for wheezing. Cardiovascular: Positive for palpitations. Gastrointestinal: Negative. Endocrine: Negative. Genitourinary: Negative. Musculoskeletal:  
     + Rib pain (left) Skin: Negative. Allergic/Immunologic: Negative. Neurological: Negative. Hematological: Negative. Psychiatric/Behavioral: Negative. All other systems reviewed and are negative. Physical Exam  
 
Visit Vitals /75 Pulse 80 Temp 98.4 °F (36.9 °C) Resp 19 Wt 100.2 kg (221 lb) SpO2 100% BMI 43.16 kg/m² Physical Exam  
Constitutional: She is oriented to person, place, and time. She appears well-developed and well-nourished. No distress. HENT:  
Head: Normocephalic. Right Ear: External ear normal.  
Left Ear: External ear normal.  
Mouth/Throat: No oropharyngeal exudate. Eyes: Conjunctivae and EOM are normal. Pupils are equal, round, and reactive to light. Right eye exhibits no discharge. Left eye exhibits no discharge. No scleral icterus. Neck: Normal range of motion. Neck supple. No JVD present. No tracheal deviation present. No thyromegaly present. Cardiovascular: Normal rate, regular rhythm, normal heart sounds and intact distal pulses. Exam reveals no gallop and no friction rub. No murmur heard. Pulmonary/Chest: Effort normal and breath sounds normal. No stridor. No respiratory distress. She has no wheezes. She has no rales. Severe tenderness with palpation over ribs 4 and 5 between mid-clavicular and mid-axillary line. Abdominal: Soft. Bowel sounds are normal. She exhibits no distension and no mass. There is no tenderness. There is no rebound and no guarding. Musculoskeletal: Normal range of motion. She exhibits no edema or tenderness. Lymphadenopathy:  
  She has no cervical adenopathy. Neurological: She is alert and oriented to person, place, and time. She displays normal reflexes. No cranial nerve deficit. She exhibits normal muscle tone. Coordination normal.  
Skin: Skin is warm and dry. No rash noted. She is not diaphoretic. No erythema. No pallor. Nursing note and vitals reviewed. Diagnostic Study Results Labs - Recent Results (from the past 12 hour(s)) EKG, 12 LEAD, INITIAL Collection Time: 02/16/19  8:18 PM  
Result Value Ref Range Ventricular Rate 92 BPM  
 Atrial Rate 92 BPM  
 P-R Interval 146 ms  
 QRS Duration 78 ms Q-T Interval 352 ms QTC Calculation (Bezet) 435 ms Calculated P Axis 35 degrees Calculated R Axis 20 degrees Calculated T Axis 53 degrees Diagnosis Normal sinus rhythm Possible Left atrial enlargement Cannot rule out Anterior infarct , age undetermined Abnormal ECG When compared with ECG of 22-JUL-2016 10:44, 
T wave inversion no longer evident in Inferior leads Nonspecific T wave abnormality, improved in Lateral leads CBC WITH AUTOMATED DIFF Collection Time: 02/16/19  8:26 PM  
Result Value Ref Range WBC 9.3 4.6 - 13.2 K/uL  
 RBC 4.86 4.20 - 5.30 M/uL  
 HGB 12.2 12.0 - 16.0 g/dL HCT 39.1 35.0 - 45.0 % MCV 80.5 74.0 - 97.0 FL  
 MCH 25.1 24.0 - 34.0 PG  
 MCHC 31.2 31.0 - 37.0 g/dL  
 RDW 17.0 (H) 11.6 - 14.5 % PLATELET 732 592 - 140 K/uL MPV 10.0 9.2 - 11.8 FL  
 NEUTROPHILS 55 40 - 73 % LYMPHOCYTES 38 21 - 52 % MONOCYTES 4 3 - 10 % EOSINOPHILS 3 0 - 5 % BASOPHILS 0 0 - 2 %  
 ABS. NEUTROPHILS 5.0 1.8 - 8.0 K/UL  
 ABS. LYMPHOCYTES 3.5 0.9 - 3.6 K/UL  
 ABS. MONOCYTES 0.4 0.05 - 1.2 K/UL ABS. EOSINOPHILS 0.3 0.0 - 0.4 K/UL  
 ABS. BASOPHILS 0.0 0.0 - 0.1 K/UL  
 DF AUTOMATED METABOLIC PANEL, BASIC Collection Time: 02/16/19  8:26 PM  
Result Value Ref Range Sodium 142 136 - 145 mmol/L Potassium 3.3 (L) 3.5 - 5.5 mmol/L Chloride 106 100 - 108 mmol/L  
 CO2 27 21 - 32 mmol/L Anion gap 9 3.0 - 18 mmol/L Glucose 117 (H) 74 - 99 mg/dL BUN 11 7.0 - 18 MG/DL Creatinine 0.71 0.6 - 1.3 MG/DL  
 BUN/Creatinine ratio 15 12 - 20 GFR est AA >60 >60 ml/min/1.73m2 GFR est non-AA >60 >60 ml/min/1.73m2 Calcium 9.0 8.5 - 10.1 MG/DL  
CARDIAC PANEL,(CK, CKMB & TROPONIN) Collection Time: 02/16/19  8:26 PM  
Result Value Ref Range CK 91 26 - 192 U/L  
 CK - MB <1.0 <3.6 ng/ml CK-MB Index  0.0 - 4.0 % CALCULATION NOT PERFORMED WHEN RESULT IS BELOW LINEAR LIMIT Troponin-I, QT <0.02 0.0 - 0.045 NG/ML  
D DIMER Collection Time: 02/16/19  8:26 PM  
Result Value Ref Range D DIMER 0.58 (H) <0.46 ug/ml(FEU) URINALYSIS W/ RFLX MICROSCOPIC Collection Time: 02/16/19  8:45 PM  
Result Value Ref Range Color YELLOW Appearance CLEAR Specific gravity 1.018 1.005 - 1.030    
 pH (UA) 5.5 5.0 - 8.0 Protein NEGATIVE  NEG mg/dL Glucose NEGATIVE  NEG mg/dL Ketone NEGATIVE  NEG mg/dL Bilirubin NEGATIVE  NEG Blood NEGATIVE  NEG Urobilinogen 0.2 0.2 - 1.0 EU/dL Nitrites NEGATIVE  NEG Leukocyte Esterase NEGATIVE  NEG    
HCG URINE, QL Collection Time: 02/16/19  8:45 PM  
Result Value Ref Range HCG urine, QL NEGATIVE  NEG    
TROPONIN I Collection Time: 02/17/19 12:29 AM  
Result Value Ref Range Troponin-I, QT <0.02 0.0 - 0.045 NG/ML Radiologic Studies -  
CTA CHEST W OR W WO CONT Final Result IMPRESSION:  
  
No evidence of PE. Report provided to the emergency department at 0021 hrs. XR CHEST PORT Final Result IMPRESSION:    
1. No acute findings Medical Decision Making It should be noted that I, Darlyn Alberts MD will be the provider of record for this patient. I reviewed the vital signs, available nursing notes, past medical history, past surgical history, family history and social history. Vital Signs-Reviewed the patient's vital signs. Pulse Oximetry Analysis -  100% on room air, normal  
 
Cardiac Monitor: 
Rate: 80 Rhythm:  Normal Sinus Rhythm EKG: Interpreted by the EP. Time Interpreted: 20:18 Rate: 92 
 Rhythm: Normal Sinus Rhythm Interpretation: No STEMI. Records Reviewed: Nursing Notes and Old Medical Records (Time of Review: 9:01 PM) 
 
ED Course: Progress Notes, Reevaluation, and Consults: 
Patient remained stable throught out her ER evaluation. Provider Notes (Medical Decision Making): Dif Dx: MI, chest wall pain, PE, pneumonia, fx ribs, pleuritis and etc. 
 
Diagnosis Clinical Impression: 1. Chest wall pain Disposition: Discharged Follow-up Information Follow up With Specialties Details Why Contact Info Bre Patel MD Family Practice Schedule an appointment as soon as possible for a visit in 2 days  [address] 
[phone] [id number] [facility] EMERGENCY DEPT Emergency Medicine  As needed, If symptoms worsen [id number] [facility] [address] [phone] Medication List  
  
START taking these medications   
traMADol 50 mg tablet Commonly known as:  ULTRAM 
Take 1 Tab by mouth every six (6) hours as needed for Pain. Max Daily Amount: 200 mg. ASK your doctor about these medications   
amLODIPine 10 mg tablet Commonly known as:  Gen[place] Adilia Take 1 Tab by mouth daily. aspirin 81 mg chewable tablet 
  
cetirizine 10 mg tablet Commonly known as:  ZYRTEC Take 1 Tab by mouth daily as needed for Allergies. clopidogrel 75 mg Tab Commonly known as:  PLAVIX 
  
fluticasone 50 mcg/actuation nasal spray Commonly known as:  Rubia Chavarria 2 Sprays by Both Nostrils route daily. nitroglycerin 0.4 mg SL tablet Commonly known as:  NITROSTAT 
  
pantoprazole 40 mg tablet Commonly known as:  PROTONIX Take 1 Tab by mouth daily. sertraline 50 mg tablet Commonly known as:  ZOLOFT Take 1 Tab by mouth daily. Where to Get Your Medications Information about where to get these medications is not yet available Ask your nurse or doctor about these medications · traMADol 50 mg tablet 
  
 
_______________________________ Scribe Attestation Branden Cabrera acting as a scribe for and in the presence of Kailee Nunes MD     
February 16, 2019 at 9:01 PM 
    
Provider Attestation:     
I personally performed the services described in the documentation, reviewed the documentation, as recorded by the scribe in my presence, and it accurately and completely records my words and actions. February 16, 2019 at 9:01 PM - Kailee Nunes MD   
 
_______________________________

## 2019-02-17 NOTE — ED NOTES
Pt awake/alert/oriented; affect calm/conversant, respirations regular/non labored, skin warm/dry. No distress noted. Plan of care explained/understood including wait time for troponin result.

## 2019-02-18 ENCOUNTER — OFFICE VISIT (OUTPATIENT)
Dept: FAMILY MEDICINE CLINIC | Age: 39
End: 2019-02-18

## 2019-02-18 ENCOUNTER — HOSPITAL ENCOUNTER (OUTPATIENT)
Dept: LAB | Age: 39
Discharge: HOME | End: 2019-02-18

## 2019-02-18 VITALS
SYSTOLIC BLOOD PRESSURE: 142 MMHG | OXYGEN SATURATION: 98 % | HEART RATE: 78 BPM | WEIGHT: 221 LBS | TEMPERATURE: 98.8 F | RESPIRATION RATE: 15 BRPM | DIASTOLIC BLOOD PRESSURE: 92 MMHG | BODY MASS INDEX: 43.39 KG/M2 | HEIGHT: 60 IN

## 2019-02-18 DIAGNOSIS — R07.89 CHEST WALL PAIN: Primary | ICD-10-CM

## 2019-02-18 DIAGNOSIS — F43.21 ADJUSTMENT DISORDER WITH DEPRESSED MOOD: ICD-10-CM

## 2019-02-18 DIAGNOSIS — I25.119 CORONARY ARTERY DISEASE INVOLVING NATIVE CORONARY ARTERY OF NATIVE HEART WITH ANGINA PECTORIS (HCC): ICD-10-CM

## 2019-02-18 DIAGNOSIS — E87.6 HYPOKALEMIA: ICD-10-CM

## 2019-02-18 DIAGNOSIS — R73.03 PREDIABETES: ICD-10-CM

## 2019-02-18 DIAGNOSIS — I10 ESSENTIAL HYPERTENSION: ICD-10-CM

## 2019-02-18 LAB — XX-LABCORP SPECIMEN COL,LCBCF: NORMAL

## 2019-02-18 PROCEDURE — 99001 SPECIMEN HANDLING PT-LAB: CPT

## 2019-02-18 NOTE — PROGRESS NOTES
Ric Garibay [address] y.o.,  female    SUBJECTIVE  Ff-up ED     Presented with L chest wall pain, she reports some post nasal drip and wheezing during this time. She denies previous inhaler use, asthma or copd. She was a former smoker. She has h/o CAD, trop neg, ekg nonspecific. D dimer elevated CT angio neg for PE or other findings. She is feeling better, taking tramadol    HTN- taking norvasc and addition of 50 mg losartan caused lighteheadedness. She has h/o CAD. Depression- doing well on zoloft. CAD, STEMI s/p PCI 1/18 - denies chest pain, sob. Following dr. Pro Gallegos. She has completely stopped smoking in May 2018. She is Off simvastatin and metoprolol due to med intolerance palpitations. Anemia-improving, taking otc prenatal vitamins. Following gyne  dr Mario Sloan for heavy menses    Still has not seen [facility] says both GI referrals did not take her insurance- advised to call us with her insurance preferred providers. Has h/o recurrent diverticulitis and GB stones. No abdominal pain currently. ROS:  See HPI, all others negative        Patient Active Problem List   Diagnosis Code    Tobacco use Z72.0    Obesity E66.9    Hypertension I10    Prediabetes R73.03    Low HDL (under 40) E78.6    Obesity, morbid (HCC) E66.01    ST elevation myocardial infarction ([facility]I) ([facility]) I21.3    Iron deficiency anemia due to chronic blood loss D50.0    Coronary artery disease involving native coronary artery of native heart with angina pectoris ([facility]) I25.119    Adjustment disorder with depressed mood F43.21    Epigastric pain R10.13    Calculus of gallbladder without cholecystitis without obstruction K80.20       Current Outpatient Medications   Medication Sig Dispense Refill    traMADol (ULTRAM) 50 mg tablet Take 1 Tab by mouth every six (6) hours as needed for Pain. Max Daily Amount: 200 mg. 8 Tab 0    amLODIPine (NORVASC) 10 mg tablet Take 1 Tab by mouth daily.  (Patient taking differently: Take 5 mg by mouth daily.) 90 Tab 0    sertraline (ZOLOFT) 50 mg tablet Take 1 Tab by mouth daily. 90 Tab 0    fluticasone (FLONASE) 50 mcg/actuation nasal spray 2 Sprays by Both Nostrils route daily. 1 Bottle 0    clopidogrel (PLAVIX) 75 mg tab 75 mg.  aspirin 81 mg chewable tablet 81 mg.      nitroglycerin (NITROSTAT) 0.4 mg SL tablet 0.4 mg.      pantoprazole (PROTONIX) 40 mg tablet Take 1 Tab by mouth daily. 90 Tab 0    cetirizine (ZYRTEC) 10 mg tablet Take 1 Tab by mouth daily as needed for Allergies. 90 Tab 0       Allergies   Allergen Reactions    Lisinopril Cough    Metoprolol Palpitations    Vaccine Adjuvant Emulsion Combination No. 1 Seizures     MMR       Past Medical History:   Diagnosis Date    Cardiac echocardiogram 07/21/2016    Sm LV cavity. EF 65-70%. No RWMA. Mod-marked LVH w/dynamic obstruction, mid-cavity obliteration & peak grad of 25 mmHg. Indeterminate diastolic fx. No significant valvular pathology.  Cardiac nuclear imaging test 07/22/2016    Low risk. Very sm distal anterior & apical partially reversible defect, likely artifact, but sm area of ischemia not excluded. No RWMA. EF 66%.   Neg EKG on pharm stress test.    Diverticulitis     Gall stones     GERD (gastroesophageal reflux disease)     [facility]     Hypertension     Infectious disease     Bacteria vaginosis    Non-ST elevated myocardial infarction ([facility]     STEMI (ST elevation myocardial infarction) ([facility] 01/2018       Social History     Socioeconomic History    Marital status: SINGLE     Spouse name: Not on file    Number of children: Not on file    Years of education: Not on file    Highest education level: Not on file   Social Needs    Financial resource strain: Not on file    Food insecurity - worry: Not on file    Food insecurity - inability: Not on file   VCharge needs - medical: Not on file   VCharge needs - non-medical: Not on file   Occupational History    Not on file Tobacco Use    Smoking status: Former Smoker     Packs/day: 0.25     Years: 20.00     Pack years: 5.00    Smokeless tobacco: Never Used   Substance and Sexual Activity    Alcohol use: Yes     Comment: rare/holidays    Drug use: No    Sexual activity: Yes     Partners: Male     Birth control/protection: Surgical     Comment: tubal ligation   Other Topics Concern    Not on file   Social History Narrative    Not on file       Family History   Problem Relation Age of Onset    Depression Mother     Asthma Mother     Migraines Mother     Hypertension Mother     Stroke Mother     Heart Attack Mother     Arthritis-osteo Mother     Depression Brother     Alcohol abuse Father     Substance Abuse Father         tobacco    Drug Abuse Father     Hypertension Father     High Cholesterol Father     Stroke Father     Rashes/Skin Problems Father     Substance Abuse Brother         tobacco    Drug Abuse Brother     Asthma Brother     Migraines Brother     Hypertension Brother     High Cholesterol Brother     Hypertension Paternal Grandmother     Diabetes Paternal Grandmother     Hypertension Maternal Grandmother     Colon Cancer Maternal Grandmother          OBJECTIVE    Physical Exam:     Visit Vitals  BP (!) 142/92 (BP 1 Location: Left arm, BP Patient Position: Sitting)   Pulse 78   Temp 98.8 °F (37.1 °C) (Oral)   Resp 15   Ht 5' (1.524 m)   Wt 221 lb (100.2 kg)   SpO2 98%   BMI 43.16 kg/m²       General: alert, well-appearing, obese,AA, in no apparent distress or pain  HEENT: throat, pharynx clear, eac patent, TM intact, no jaundice  CVS: normal rate, regular rhythm, distinct S1 and S2 +MTTP on L mid anterior wall area  Lungs:clear to ausculation bilaterally, no crackles, wheezing or rhonchi noted  Extremities: L shoulder area appears red and tender  Skin: warm, no lesions, rashes noted  Psych:  mood and affect normal    Results for orders placed or performed during the hospital encounter of 02/16/19   CBC WITH AUTOMATED DIFF   Result Value Ref Range    WBC 9.3 4.6 - 13.2 K/uL    RBC 4.86 4.20 - 5.30 M/uL    HGB 12.2 12.0 - 16.0 g/dL    HCT 39.1 35.0 - 45.0 %    MCV 80.5 74.0 - 97.0 FL    MCH 25.1 24.0 - 34.0 PG    MCHC 31.2 31.0 - 37.0 g/dL    RDW 17.0 (H) 11.6 - 14.5 %    PLATELET 569 534 - 475 K/uL    MPV 10.0 9.2 - 11.8 FL    NEUTROPHILS 55 40 - 73 %    LYMPHOCYTES 38 21 - 52 %    MONOCYTES 4 3 - 10 %    EOSINOPHILS 3 0 - 5 %    BASOPHILS 0 0 - 2 %    ABS. NEUTROPHILS 5.0 1.8 - 8.0 K/UL    ABS. LYMPHOCYTES 3.5 0.9 - 3.6 K/UL    ABS. MONOCYTES 0.4 0.05 - 1.2 K/UL    ABS. EOSINOPHILS 0.3 0.0 - 0.4 K/UL    ABS.  BASOPHILS 0.0 0.0 - 0.1 K/UL    DF AUTOMATED     METABOLIC PANEL, BASIC   Result Value Ref Range    Sodium 142 136 - 145 mmol/L    Potassium 3.3 (L) 3.5 - 5.5 mmol/L    Chloride 106 100 - 108 mmol/L    CO2 27 21 - 32 mmol/L    Anion gap 9 3.0 - 18 mmol/L    Glucose 117 (H) 74 - 99 mg/dL    BUN 11 7.0 - 18 MG/DL    Creatinine 0.71 0.6 - 1.3 MG/DL    BUN/Creatinine ratio 15 12 - 20      GFR est AA >60 >60 ml/min/1.73m2    GFR est non-AA >60 >60 ml/min/1.73m2    Calcium 9.0 8.5 - 10.1 MG/DL   CARDIAC PANEL,(CK, CKMB & TROPONIN)   Result Value Ref Range    CK 91 26 - 192 U/L    CK - MB <1.0 <3.6 ng/ml    CK-MB Index  0.0 - 4.0 %     CALCULATION NOT PERFORMED WHEN RESULT IS BELOW LINEAR LIMIT    Troponin-I, QT <0.02 0.0 - 0.045 NG/ML   URINALYSIS W/ RFLX MICROSCOPIC   Result Value Ref Range    Color YELLOW      Appearance CLEAR      Specific gravity 1.018 1.005 - 1.030      pH (UA) 5.5 5.0 - 8.0      Protein NEGATIVE  NEG mg/dL    Glucose NEGATIVE  NEG mg/dL    Ketone NEGATIVE  NEG mg/dL    Bilirubin NEGATIVE  NEG      Blood NEGATIVE  NEG      Urobilinogen 0.2 0.2 - 1.0 EU/dL    Nitrites NEGATIVE  NEG      Leukocyte Esterase NEGATIVE  NEG     HCG URINE, QL   Result Value Ref Range    HCG urine, QL NEGATIVE  NEG     D DIMER   Result Value Ref Range    D DIMER 0.58 (H) <0.46 ug/ml(FEU)   TROPONIN I Result Value Ref Range    Troponin-I, QT <0.02 0.0 - 0.045 NG/ML   EKG, 12 LEAD, INITIAL   Result Value Ref Range    Ventricular Rate 92 BPM    Atrial Rate 92 BPM    P-R Interval 146 ms    QRS Duration 78 ms    Q-T Interval 352 ms    QTC Calculation (Bezet) 435 ms    Calculated P Axis 35 degrees    Calculated R Axis 20 degrees    Calculated T Axis 53 degrees    Diagnosis       Normal sinus rhythm  Possible Left atrial enlargement  Cannot exclude anterior MI versus lead placement issue. Abnormal ECG  When compared with ECG of 22-JUL-2016 10:44,  T wave inversion no longer evident in Inferior leads  Nonspecific T wave abnormality, improved in Lateral leads  Confirmed by Richy Paiz MD, Reina Negron ([id number]) on 2/17/2019 10:26:11 AM           ASSESSMENT/PLAN  Diagnoses and all orders for this visit:    Chest wall pain  improving  Neg work up in ED  Cont prn tramadol    Adjustment reaction with depressed mood  stable, cont zoloft to 50 mg qday    ST elevation myocardial infarction (STEMI), unspecified artery ([address]  S/p stent 1/18  Asymptomatic  On  nitrates, ASA, plavix  Following dr. Alcides Gongora on smoking cessation  Intolerant to BB and simvastatin, perhaps try pravachol will offer on next visit     Prediabetes  Persistent, encouraged wt loss    Essential hypertension  Needs better control  Cont norvasc 10  start losartan  25 mg (to cut her 50 mg dose in half)  BP log, if feels lightheaded advised to d/c  Intolerant to BB and ACE    Coronary artery disease of native artery of native heart with stable angina pectoris (HCC)    Hypokalemia  Replaced in the ED  Recheck BMP today    Follow-up Disposition:  Return in about 4 weeks (around 3/18/2019), or if symptoms worsen or fail to improve. Patient understands plan of care. Patient has provided input and agrees with goals.

## 2019-02-18 NOTE — PROGRESS NOTES
1. Have you been to the ER, urgent care clinic since your last visit? Hospitalized since your last visit? [facility] ER 2/16/19 chest wall pain    2. Have you seen or consulted any other health care providers outside of the [address] [place] [address] since your last visit? Include any pap smears or colon screening.  [facility] 1/28/19    Chief Complaint   Patient presents with   [facility] Follow Up     seen 2/16/19 [facility] ER, chest wall pain    Coronary Artery Disease    Depression    Diverticulitis

## 2019-02-18 NOTE — PATIENT INSTRUCTIONS
DASH Diet: Care Instructions  Your Care Instructions    The DASH diet is an eating plan that can help lower your blood pressure. DASH stands for Dietary Approaches to Stop Hypertension. Hypertension is high blood pressure. The DASH diet focuses on eating foods that are high in calcium, potassium, and magnesium. These nutrients can lower blood pressure. The foods that are highest in these nutrients are fruits, vegetables, low-fat dairy products, nuts, seeds, and legumes. But taking calcium, potassium, and magnesium supplements instead of eating foods that are high in those nutrients does not have the same effect. The DASH diet also includes whole grains, fish, and poultry. The DASH diet is one of several lifestyle changes your doctor may recommend to lower your high blood pressure. Your doctor may also want you to decrease the amount of sodium in your diet. Lowering sodium while following the DASH diet can lower blood pressure even further than just the DASH diet alone. Follow-up care is a key part of your treatment and safety. Be sure to make and go to all appointments, and call your doctor if you are having problems. It's also a good idea to know your test results and keep a list of the medicines you take. How can you care for yourself at home? Following the DASH diet  · Eat 4 to 5 servings of fruit each day. A serving is 1 medium-sized piece of fruit, ½ cup chopped or canned fruit, 1/4 cup dried fruit, or 4 ounces (½ cup) of fruit juice. Choose fruit more often than fruit juice. · Eat 4 to 5 servings of vegetables each day. A serving is 1 cup of lettuce or raw leafy vegetables, ½ cup of chopped or cooked vegetables, or 4 ounces (½ cup) of vegetable juice. Choose vegetables more often than vegetable juice. · Get 2 to 3 servings of low-fat and fat-free dairy each day. A serving is 8 ounces of milk, 1 cup of yogurt, or 1 ½ ounces of cheese. · Eat 6 to 8 servings of grains each day.  A serving is 1 slice of bread, 1 ounce of dry cereal, or ½ cup of cooked rice, pasta, or cooked cereal. Try to choose whole-grain products as much as possible. · Limit lean meat, poultry, and fish to 2 servings each day. A serving is 3 ounces, about the size of a deck of cards. · Eat 4 to 5 servings of nuts, seeds, and legumes (cooked dried beans, lentils, and split peas) each week. A serving is 1/3 cup of nuts, 2 tablespoons of seeds, or ½ cup of cooked beans or peas. · Limit fats and oils to 2 to 3 servings each day. A serving is 1 teaspoon of vegetable oil or 2 tablespoons of salad dressing. · Limit sweets and added sugars to 5 servings or less a week. A serving is 1 tablespoon jelly or jam, ½ cup sorbet, or 1 cup of lemonade. · Eat less than 2,300 milligrams (mg) of sodium a day. If you limit your sodium to 1,500 mg a day, you can lower your blood pressure even more. Tips for success  · Start small. Do not try to make dramatic changes to your diet all at once. You might feel that you are missing out on your favorite foods and then be more likely to not follow the plan. Make small changes, and stick with them. Once those changes become habit, add a few more changes. · Try some of the following:  ? Make it a goal to eat a fruit or vegetable at every meal and at snacks. This will make it easy to get the recommended amount of fruits and vegetables each day. ? Try yogurt topped with fruit and nuts for a snack or healthy dessert. ? Add lettuce, tomato, cucumber, and onion to sandwiches. ? Combine a ready-made pizza crust with low-fat mozzarella cheese and lots of vegetable toppings. Try using tomatoes, squash, spinach, broccoli, carrots, cauliflower, and onions. ? Have a variety of cut-up vegetables with a low-fat dip as an appetizer instead of chips and dip. ? Sprinkle sunflower seeds or chopped almonds over salads. Or try adding chopped walnuts or almonds to cooked vegetables.   ? Try some vegetarian meals using beans and peas. Add garbanzo or kidney beans to salads. Make burritos and tacos with mashed beal beans or black beans. Where can you learn more? Go to http://kim-tristin.info/. Enter P977 in the search box to learn more about \"DASH Diet: Care Instructions. \"  Current as of: July 22, 2018  Content Version: 11.9  © 8930-5793 [employer]. Care instructions adapted under license by [employer] (which disclaims liability or warranty for this information). If you have questions about a medical condition or this instruction, always ask your healthcare professional. [address] any warranty or liability for your use of this information.

## 2019-02-19 LAB
BUN SERPL-MCNC: 9 MG/DL (ref 6–24)
BUN/CREAT SERPL: 15 (ref 9–23)
CALCIUM SERPL-MCNC: 9 MG/DL (ref 8.7–10.2)
CHLORIDE SERPL-SCNC: 105 MMOL/L (ref 96–106)
CO2 SERPL-SCNC: 22 MMOL/L (ref 20–29)
CREAT SERPL-MCNC: 0.6 MG/DL (ref 0.57–1)
GLUCOSE SERPL-MCNC: 94 MG/DL (ref 65–99)
POTASSIUM SERPL-SCNC: 4.4 MMOL/L (ref 3.5–5.2)
SODIUM SERPL-SCNC: 141 MMOL/L (ref 134–144)

## 2019-02-21 ENCOUNTER — TELEPHONE (OUTPATIENT)
Dept: FAMILY MEDICINE CLINIC | Age: 39
End: 2019-02-21

## 2019-02-22 NOTE — PROGRESS NOTES
Patient identified with 2 identifiers (name and ).   Patient aware of     Normal labs, specifically normal potassium level

## 2019-02-27 ENCOUNTER — TELEPHONE (OUTPATIENT)
Dept: FAMILY MEDICINE CLINIC | Age: 39
End: 2019-02-27

## 2019-02-27 RX ORDER — ALBUTEROL SULFATE 90 UG/1
2 AEROSOL, METERED RESPIRATORY (INHALATION)
Qty: 1 INHALER | Refills: 0 | Status: SHIPPED | OUTPATIENT
Start: 2019-02-27 | End: 2020-09-15

## 2019-02-27 NOTE — TELEPHONE ENCOUNTER
Pt states that she is not wheezing anymore. She just has a dry cough with some gray/yellowish mucus and earache. I went ahead and scheduled her for tomorrow in case nothing can be called in. Please advise.

## 2019-02-27 NOTE — TELEPHONE ENCOUNTER
Patient identified with 2 identifiers (name and ). Patient called this morning. She states her wheezing has now turned into a cough. She states she is also having RT ear pain as well. Patient was seen on  for wheezing symptoms. Patient requesting medication to be called into her [address]

## 2019-03-04 ENCOUNTER — HOSPITAL ENCOUNTER (EMERGENCY)
Age: 39
Discharge: HOME | End: 2019-03-04
Attending: EMERGENCY MEDICINE
Payer: MEDICAID

## 2019-03-04 VITALS
WEIGHT: 220 LBS | OXYGEN SATURATION: 100 % | SYSTOLIC BLOOD PRESSURE: 139 MMHG | RESPIRATION RATE: 20 BRPM | DIASTOLIC BLOOD PRESSURE: 78 MMHG | BODY MASS INDEX: 43.19 KG/M2 | HEIGHT: 60 IN | TEMPERATURE: 98.9 F | HEART RATE: 84 BPM

## 2019-03-04 DIAGNOSIS — K14.8 TONGUE LESION: Primary | ICD-10-CM

## 2019-03-04 PROCEDURE — 99282 EMERGENCY DEPT VISIT SF MDM: CPT

## 2019-03-04 RX ORDER — LIDOCAINE HYDROCHLORIDE 20 MG/ML
SOLUTION OROPHARYNGEAL
Qty: 200 ML | Refills: 0 | Status: SHIPPED | OUTPATIENT
Start: 2019-03-04 | End: 2019-04-08

## 2019-03-04 RX ORDER — AMOXICILLIN 500 MG/1
500 CAPSULE ORAL
COMMUNITY
End: 2019-04-08

## 2019-03-04 NOTE — ED PROVIDER NOTES
EMERGENCY DEPARTMENT HISTORY AND PHYSICAL EXAM 
 
11:59 AM 
 
 
Date: 3/4/2019 Patient Name: Danae Painter History of Presenting Illness Chief Complaint Patient presents with  Mouth Lesions History Provided By: Patient Chief Complaint: tongue lesions Duration:  Days Timing:  Constant Location: tongue Quality: Aching Severity: Mild Modifying Factors: none Associated Symptoms: denies any other associated signs or symptoms Additional History (Context): Danae Painter is a 34 y.o. female with noted PMH who presents with c/o tongue pain x 1 week. Notes \"raised bumps\" on the tongue x days. Notes she is currently taking Amoxicillin for a sinus infection. Denies fever/chills, throat swelling, n/v, rash to extremities or trunk. PCP: Vadim Montgomery MD 
 
Current Outpatient Medications Medication Sig Dispense Refill  amoxicillin (AMOXIL) 500 mg capsule Take 500 mg by mouth.  lidocaine (LIDOCAINE VISCOUS) 2 % solution Put 10 mL in mouth and swish and spit out QAC and at bedtime 200 mL 0  
 albuterol (PROVENTIL HFA, VENTOLIN HFA, PROAIR HFA) 90 mcg/actuation inhaler Take 2 Puffs by inhalation every four (4) hours as needed for Wheezing. 1 Inhaler 0  
 amLODIPine (NORVASC) 10 mg tablet Take 1 Tab by mouth daily. (Patient taking differently: Take 5 mg by mouth daily.) 90 Tab 0  
 pantoprazole (PROTONIX) 40 mg tablet Take 1 Tab by mouth daily. 90 Tab 0  
 fluticasone (FLONASE) 50 mcg/actuation nasal spray 2 Sprays by Both Nostrils route daily. 1 Bottle 0  
 cetirizine (ZYRTEC) 10 mg tablet Take 1 Tab by mouth daily as needed for Allergies. 90 Tab 0  clopidogrel (PLAVIX) 75 mg tab 75 mg.  aspirin 81 mg chewable tablet 81 mg.    
 sertraline (ZOLOFT) 50 mg tablet Take 1 Tab by mouth daily. 90 Tab 0  
 nitroglycerin (NITROSTAT) 0.4 mg SL tablet 0.4 mg. Past History Past Medical History: 
Past Medical History:  
Diagnosis Date  Cardiac echocardiogram 2016 Sm LV cavity. EF 65-70%. No RWMA. Mod-marked LVH w/dynamic obstruction, mid-cavity obliteration & peak grad of 25 mmHg. Indeterminate diastolic fx. No significant valvular pathology.  Cardiac nuclear imaging test 2016 Low risk. Very sm distal anterior & apical partially reversible defect, likely artifact, but sm area of ischemia not excluded. No RWMA. EF 66%. Neg EKG on pharm stress test.  
 Diverticulitis  Gall stones  GERD (gastroesophageal reflux disease)  Heart attack ([address]  Hypertension  Infectious disease Bacteria vaginosis  Non-[facility] elevated [facility] infarction [facility]  STEMI (ST elevation myocardial infarction) ([facility] [facility] 2018 Past Surgical History: 
Past Surgical History:  
Procedure Laterality Date  HX  SECTION  2008  HX CORONARY STENT PLACEMENT    
 HX ORTHOPAEDIC    
 HX TUBAL LIGATION  2008 Family History: 
Family History Problem Relation Age of Onset  Depression Mother  Asthma Mother  Migraines Mother  Hypertension Mother  Stroke Mother  Heart Attack Mother [place] Arthritis-osteo Mother  Depression Brother  Alcohol abuse Father  Substance Abuse Father   
     tobacco  
 Drug Abuse Father  Hypertension Father  High Cholesterol Father  Stroke Father  Rashes/Skin Problems Father  Substance Abuse Brother   
     tobacco  
 Drug Abuse Brother  Asthma Brother  Migraines Brother  Hypertension Brother  High Cholesterol Brother  Hypertension Paternal Grandmother  Diabetes Paternal Grandmother  Hypertension Maternal Grandmother  Colon Cancer Maternal Grandmother Social History: 
Social History Tobacco Use  Smoking status: Former Smoker Packs/day: 0.25 Years: 20.00 Pack years: 5.00  Smokeless tobacco: Never Used Substance Use Topics  Alcohol use:  No  
 Frequency: Never  Drug use: No  
 
 
Allergies: Allergies Allergen Reactions  Lisinopril Cough  Metoprolol Palpitations  Vaccine Adjuvant Emulsion Combination No. 1 Seizures MMR Review of Systems Review of Systems Constitutional: Negative for chills and fever. HENT: Positive for sinus pain. Respiratory: Negative for shortness of breath. Cardiovascular: Negative for chest pain. Gastrointestinal: Negative for abdominal pain, nausea and vomiting. Skin: Positive for rash. Neurological: Negative for weakness. All other systems reviewed and are negative. Physical Exam  
 
Visit Vitals /78 (BP 1 Location: Left arm) Pulse 84 Temp 98.9 °F (37.2 °C) Resp 20 Ht 5' (1.524 m) Wt 99.8 kg (220 lb) LMP 02/25/2019 SpO2 100% BMI 42.97 kg/m² Physical Exam  
Constitutional: She appears well-developed and well-nourished. No distress. HENT:  
Head: Normocephalic and atraumatic. Right Ear: Tympanic membrane and ear canal normal.  
Left Ear: Tympanic membrane and ear canal normal. No decreased hearing is noted. Nose: Nose normal.  
Mouth/Throat: Uvula is midline, oropharynx is clear and moist and mucous membranes are normal. Oral lesions present. No trismus in the jaw. Normal dentition. No dental abscesses, uvula swelling or dental caries. Neck: Normal range of motion. Neck supple. Cardiovascular: Normal rate, regular rhythm, normal heart sounds and intact distal pulses. Exam reveals no gallop and no friction rub. No murmur heard. Pulmonary/Chest: Effort normal and breath sounds normal. No stridor. No respiratory distress. She has no wheezes. She has no rales. Musculoskeletal: Normal range of motion. Lymphadenopathy:  
  She has no cervical adenopathy. Neurological: She is alert. Skin: Skin is warm. No rash noted. She is not diaphoretic. Nursing note and vitals reviewed. Diagnostic Study Results Labs - 
 No results found for this or any previous visit (from the past 12 hour(s)). Radiologic Studies - No orders to display Medical Decision Making I am the first provider for this patient. I reviewed the vital signs, available nursing notes, past medical history, past surgical history, family history and social history. Vital Signs-Reviewed the patient's vital signs. Records Reviewed: Nursing Notes and Old Medical Records (Time of Review: 11:59 AM) ED Course: Progress Notes, Reevaluation, and Consults: 
11:59 AM  Reviewed plan with patient. Discussed need for close outpatient follow-up. Discussed strict return precautions, including fever, throat swelling, increased rash, or any other medical concerns. Provider Notes (Medical Decision Making): 51 yo F who presents due to tongue discomfort and rash x days. Afebrile, VSS, non-toxic appearing, No lesions of throat, hard palate, or cheeks. No evidence of yeast, abscess, or edema. Stable for d/c with close outpatient follow-up. Diagnosis Clinical Impression: 1. Tongue lesion Disposition: home Follow-up Information Follow up With Specialties Details Why Contact Info [id number] [facility] EMERGENCY DEPT Emergency Medicine  If symptoms worsen [address] [place] [id number] 
[phone] Parul Wills MD Family Practice In 2 days  [address] [place] [address] [address] [facility] 
[phone] Medication List  
  
START taking these medications   
lidocaine 2 % solution Commonly known as:  LIDOCAINE VISCOUS Put 10 mL in mouth and swish and spit out QAC and at bedtime ASK your doctor about these medications   
albuterol 90 mcg/actuation inhaler Commonly known as:  PROVENTIL HFA, VENTOLIN HFA, PROAIR HFA Take 2 Puffs by inhalation every four (4) hours as needed for Wheezing. amLODIPine 10 mg tablet Commonly known as:  Piter Nichelle Take 1 Tab by mouth daily. amoxicillin 500 mg capsule Commonly known as:  AMOXIL 
  
aspirin 81 mg chewable tablet 
  
cetirizine 10 mg tablet Commonly known as:  ZYRTEC Take 1 Tab by mouth daily as needed for Allergies. clopidogrel 75 mg Tab Commonly known as:  PLAVIX 
  
fluticasone 50 mcg/actuation nasal spray Commonly known as:  [employer] 2 Sprays by Both Nostrils route daily. nitroglycerin 0.4 mg SL tablet Commonly known as:  NITROSTAT 
  
pantoprazole 40 mg tablet Commonly known as:  PROTONIX Take 1 Tab by mouth daily. sertraline 50 mg tablet Commonly known as:  ZOLOFT Take 1 Tab by mouth daily. Where to Get Your Medications Information about where to get these medications is not yet available Ask your nurse or doctor about these medications · lidocaine 2 % solution

## 2019-03-04 NOTE — ED NOTES
Current Discharge Medication List  
  
START taking these medications Details  
lidocaine (LIDOCAINE VISCOUS) 2 % solution Put 10 mL in mouth and swish and spit out QAC and at bedtime 
Qty: 200 mL, Refills: 0 Patient armband removed and shredded. Prescription given and reviewed with patient.

## 2019-03-04 NOTE — DISCHARGE INSTRUCTIONS
Take medication as prescribed. Follow-up with your primary care physician in 2 days for reassessment. Bring the results from this visit with your for their review. Return to the ED immediately for any new, worsening, or persistent symptoms, including swelling of tongue, throat swelling, increased rash, or any other medical concerns.

## 2019-03-21 ENCOUNTER — HOSPITAL ENCOUNTER (OUTPATIENT)
Dept: LAB | Age: 39
Discharge: HOME | End: 2019-03-21

## 2019-03-21 ENCOUNTER — OFFICE VISIT (OUTPATIENT)
Dept: FAMILY MEDICINE CLINIC | Age: 39
End: 2019-03-21

## 2019-03-21 VITALS
SYSTOLIC BLOOD PRESSURE: 126 MMHG | WEIGHT: 223 LBS | HEIGHT: 60 IN | OXYGEN SATURATION: 98 % | TEMPERATURE: 98.1 F | RESPIRATION RATE: 16 BRPM | HEART RATE: 90 BPM | BODY MASS INDEX: 43.78 KG/M2 | DIASTOLIC BLOOD PRESSURE: 78 MMHG

## 2019-03-21 DIAGNOSIS — I10 ESSENTIAL HYPERTENSION: ICD-10-CM

## 2019-03-21 DIAGNOSIS — K57.92 ACUTE DIVERTICULITIS: Primary | ICD-10-CM

## 2019-03-21 DIAGNOSIS — I25.119 CORONARY ARTERY DISEASE INVOLVING NATIVE CORONARY ARTERY OF NATIVE HEART WITH ANGINA PECTORIS (HCC): ICD-10-CM

## 2019-03-21 DIAGNOSIS — R73.03 PREDIABETES: ICD-10-CM

## 2019-03-21 DIAGNOSIS — E87.6 HYPOKALEMIA: ICD-10-CM

## 2019-03-21 DIAGNOSIS — F43.21 ADJUSTMENT DISORDER WITH DEPRESSED MOOD: ICD-10-CM

## 2019-03-21 LAB — XX-LABCORP SPECIMEN COL,LCBCF: NORMAL

## 2019-03-21 PROCEDURE — 99001 SPECIMEN HANDLING PT-LAB: CPT

## 2019-03-21 RX ORDER — CIPROFLOXACIN 500 MG/1
500 TABLET ORAL 2 TIMES DAILY
Qty: 20 TAB | Refills: 0 | Status: SHIPPED | OUTPATIENT
Start: 2019-03-21 | End: 2019-03-31

## 2019-03-21 RX ORDER — METRONIDAZOLE 500 MG/1
500 TABLET ORAL 3 TIMES DAILY
Qty: 30 TAB | Refills: 0 | Status: SHIPPED | OUTPATIENT
Start: 2019-03-21 | End: 2019-03-31

## 2019-03-21 RX ORDER — ONDANSETRON 4 MG/1
4 TABLET, ORALLY DISINTEGRATING ORAL
Qty: 6 TAB | Refills: 1 | Status: SHIPPED | OUTPATIENT
Start: 2019-03-21 | End: 2020-02-21

## 2019-03-21 NOTE — PATIENT INSTRUCTIONS

## 2019-03-21 NOTE — PROGRESS NOTES
Ric Graibay, 34 y.o.,  female    SUBJECTIVE  Ff-up ED 4 visits this march    3/4- ER for mouth lesions that has resolved    3/9, 3/11/,3/12- LLQ pain and vomiting, found to have sigmoid diverticulitis confirmed by CT scan. Persistent vomiting noted with neutrophilia, hypokalemia. Was given cipro and flagyl, only able to tolerate taking this for only 4 days, due to recurrent vomiting. She says emesis has subsided last week 3/14. She has denies fever. Persistent pain and bloating sensation. She has had similar sigmoid diverticulosis a few months ago, difficulty getting in to see GI with insurance issues. HTN- taking medications,  She has h/o CAD,  STEMI s/p PCI 1/18 - denies chest pain, sob. Following dr. Pro Gallegos. She has completely stopped smoking in May 2018. She is Off simvastatin and metoprolol due to med intolerance palpitations. Depression- doing well on zoloft. Anemia-improving, taking otc prenatal vitamins. Following gyne  dr Mario Sloan for heavy menses    ROS:  See HPI, all others negative        Patient Active Problem List   Diagnosis Code    Tobacco use Z72.0    Obesity E66.9    Hypertension I10    Prediabetes R73.03    Low HDL (under 36) E78.6    Obesity, morbid ([address] E66.01    ST elevation myocardial infarction (STEMI) ([facility] [address] I21.3    Iron deficiency anemia due to chronic blood loss D50.0    Coronary artery disease involving native coronary artery of native heart with angina pectoris (HCC) I25.119    Adjustment disorder with depressed mood F43.21    Epigastric pain R10.13    Calculus of gallbladder without cholecystitis without obstruction K80.20       Current Outpatient Medications   Medication Sig Dispense Refill    ciprofloxacin HCl (CIPRO) 500 mg tablet Take 1 Tab by mouth two (2) times a day for 10 days. 20 Tab 0    metroNIDAZOLE (FLAGYL) 500 mg tablet Take 1 Tab by mouth three (3) times daily for 10 days.  30 Tab 0    ondansetron (ZOFRAN ODT) 4 mg disintegrating tablet Take 1 Tab by mouth every eight (8) hours as needed for Nausea. 6 Tab 1    albuterol (PROVENTIL HFA, VENTOLIN HFA, PROAIR HFA) 90 mcg/actuation inhaler Take 2 Puffs by inhalation every four (4) hours as needed for Wheezing. 1 Inhaler 0    amLODIPine (NORVASC) 10 mg tablet Take 1 Tab by mouth daily. (Patient taking differently: Take 5 mg by mouth daily.) 90 Tab 0    fluticasone (FLONASE) 50 mcg/actuation nasal spray 2 Sprays by Both Nostrils route daily. 1 Bottle 0    cetirizine (ZYRTEC) 10 mg tablet Take 1 Tab by mouth daily as needed for Allergies. 90 Tab 0    clopidogrel (PLAVIX) 75 mg tab 75 mg.  aspirin 81 mg chewable tablet 81 mg.      nitroglycerin (NITROSTAT) 0.4 mg SL tablet 0.4 mg.      amoxicillin (AMOXIL) 500 mg capsule Take 500 mg by mouth.  lidocaine (LIDOCAINE VISCOUS) 2 % solution Put 10 mL in mouth and swish and spit out QAC and at bedtime 200 mL 0    sertraline (ZOLOFT) 50 mg tablet Take 1 Tab by mouth daily. 90 Tab 0    pantoprazole (PROTONIX) 40 mg tablet Take 1 Tab by mouth daily. 90 Tab 0       Allergies   Allergen Reactions    Lisinopril Cough    Metoprolol Palpitations    Vaccine Adjuvant Emulsion Combination No. 1 Seizures     MMR       Past Medical History:   Diagnosis Date    Cardiac echocardiogram 07/21/2016    Sm LV cavity. EF 65-70%. No RWMA. Mod-marked LVH w/dynamic obstruction, mid-cavity obliteration & peak grad of 25 mmHg. Indeterminate diastolic fx. No significant valvular pathology.  Cardiac nuclear imaging test 07/22/2016    Low risk. Very sm distal anterior & apical partially reversible defect, likely artifact, but sm area of ischemia not excluded. No RWMA. EF 66%.   Neg EKG on pharm stress test.    Diverticulitis     Gall stones     GERD (gastroesophageal reflux disease)     [facility] ([facility]     Hypertension     Infectious disease     Bacteria vaginosis    Non-ST elevated myocardial infarction ([facility] [address]     STEMI (ST elevation myocardial infarction) ([facility] 01/2018       Social History     Socioeconomic History    Marital status: SINGLE     Spouse name: Not on file    Number of children: Not on file    Years of education: Not on file    Highest education level: Not on file   Occupational History    Not on file   Social Needs    Financial resource strain: Not on file    Food insecurity:     Worry: Not on file     Inability: Not on file    Transportation needs:     Medical: Not on file     Non-medical: Not on file   Tobacco Use    Smoking status: Former Smoker     Packs/day: 0.25     Years: 20.00     Pack years: 5.00    Smokeless tobacco: Never Used   Substance and Sexual Activity    Alcohol use: No     Frequency: Never    Drug use: No    Sexual activity: Yes     Partners: Male     Birth control/protection: Surgical     Comment: tubal ligation   Lifestyle    Physical activity:     Days per week: Not on file     Minutes per session: Not on file    Stress: Not on file   Relationships    Social connections:     Talks on phone: Not on file     Gets together: Not on file     Attends Confucianism service: Not on file     Active member of club or organization: Not on file     Attends meetings of clubs or organizations: Not on file     Relationship status: Not on file    Intimate partner violence:     Fear of current or ex partner: Not on file     Emotionally abused: Not on file     Physically abused: Not on file     Forced sexual activity: Not on file   Other Topics Concern    Not on file   Social History Narrative    Not on file       Family History   Problem Relation Age of Onset    Depression Mother     Asthma Mother     Migraines Mother     Hypertension Mother     Stroke Mother     Heart Attack Mother     Arthritis-osteo Mother     Depression Brother     Alcohol abuse Father     Substance Abuse Father         tobacco    Drug Abuse Father     Hypertension Father     High Cholesterol Father     Stroke Father     Rashes/Skin Problems Father     Substance Abuse Brother         tobacco    Drug Abuse Brother     Asthma Brother     Migraines Brother     Hypertension Brother     High Cholesterol Brother     Hypertension Paternal Grandmother     Diabetes Paternal Grandmother     Hypertension Maternal Grandmother     Colon Cancer Maternal Grandmother          OBJECTIVE    Physical Exam:     Visit Vitals  /78 (BP 1 Location: Left arm, BP Patient Position: Sitting)   Pulse 90   Temp 98.1 °F (36.7 °C) (Oral)   Resp 16   Ht 5' (1.524 m)   Wt 223 lb (101.2 kg)   LMP 02/25/2019   SpO2 98%   BMI 43.55 kg/m²       General: alert, well-appearing, obese,AA, in no apparent distress or pain  HEENT: throat, pharynx clear, eac patent, TM intact, no jaundice  CVS: normal rate, regular rhythm, distinct S1 and S2 +MTTP on L mid anterior wall area  Lungs:clear to ausculation bilaterally, no crackles, wheezing or rhonchi noted  Abdomen: soft, +LLQ tenderness, no rebound  Extremities: L shoulder area appears red and tender  Skin: warm, no lesions, rashes noted  Psych:  mood and affect normal    Results for orders placed or performed during the hospital encounter of 02/18/19   [facility] SPECIM[facility] COL   Result Value Ref Range    [facility]. Specimens collected/sent to [facility]. Please direct inquiries to ([phone]).          ASSESSMENT/PLAN  Diagnoses and all orders for this visit:    Acute diverticulitis  Recurrent  Mild,   Resume metronidazole and flagyl for 10 more days  Given zofran for nausea  Recheck BMP/CBC  Referral to dr. Steff Quintana colorectal w/ recurrent episodes, no previous h/o colonoscopy    Adjustment reaction with depressed mood  stable, cont zoloft to 50 mg qday         Prediabetes  Persistent, encouraged wt loss    Essential hypertension  controlled   norvasc 10,  losartan  25 mg   BP log, if feels lightheaded advised to d/c  Intolerant to BB and ACE    Coronary artery disease of native artery of native heart with stable angina pectoris ([facility] [address]  S/p stent 1/18  Asymptomatic  On  nitrates, ASA, plavix  Following dr. Fermin Bates on smoking cessation  Intolerant to BB and simvastatin, perhaps try pravachol will offer on next visit    Hypokalemia      Follow-up and Dispositions    · Return in about 1 week (around 3/28/2019), or if symptoms worsen or fail to improve. Patient understands plan of care. Patient has provided input and agrees with goals.

## 2019-03-21 NOTE — PROGRESS NOTES
1. Have you been to the ER, urgent care clinic since your last visit? Hospitalized since your last visit? Yes [facility] x 5     2. Have you seen or consulted any other health care providers outside of the [address] [place] [address] since your last visit? Include any pap smears or colon screening.  No

## 2019-03-22 LAB
BASOPHILS # BLD AUTO: 0 X10E3/UL (ref 0–0.2)
BASOPHILS NFR BLD AUTO: 0 %
BUN SERPL-MCNC: 6 MG/DL (ref 6–24)
BUN/CREAT SERPL: 13 (ref 9–23)
CALCIUM SERPL-MCNC: 8.5 MG/DL (ref 8.7–10.2)
CHLORIDE SERPL-SCNC: 105 MMOL/L (ref 96–106)
CO2 SERPL-SCNC: 25 MMOL/L (ref 20–29)
CREAT SERPL-MCNC: 0.47 MG/DL (ref 0.57–1)
EOSINOPHIL # BLD AUTO: 0.2 X10E3/UL (ref 0–0.4)
EOSINOPHIL NFR BLD AUTO: 3 %
ERYTHROCYTE [DISTWIDTH] IN BLOOD BY AUTOMATED COUNT: 17.2 % (ref 12.3–15.4)
GLUCOSE SERPL-MCNC: 93 MG/DL (ref 65–99)
HCT VFR BLD AUTO: 35 % (ref 34–46.6)
HGB BLD-MCNC: 10.8 G/DL (ref 11.1–15.9)
IMM GRANULOCYTES # BLD AUTO: 0 X10E3/UL (ref 0–0.1)
IMM GRANULOCYTES NFR BLD AUTO: 0 %
LYMPHOCYTES # BLD AUTO: 2.2 X10E3/UL (ref 0.7–3.1)
LYMPHOCYTES NFR BLD AUTO: 37 %
MCH RBC QN AUTO: 25.2 PG (ref 26.6–33)
MCHC RBC AUTO-ENTMCNC: 30.9 G/DL (ref 31.5–35.7)
MCV RBC AUTO: 82 FL (ref 79–97)
MONOCYTES # BLD AUTO: 0.3 X10E3/UL (ref 0.1–0.9)
MONOCYTES NFR BLD AUTO: 6 %
NEUTROPHILS # BLD AUTO: 3.1 X10E3/UL (ref 1.4–7)
NEUTROPHILS NFR BLD AUTO: 54 %
PLATELET # BLD AUTO: 386 X10E3/UL (ref 150–379)
POTASSIUM SERPL-SCNC: 4.2 MMOL/L (ref 3.5–5.2)
RBC # BLD AUTO: 4.28 X10E6/UL (ref 3.77–5.28)
SODIUM SERPL-SCNC: 143 MMOL/L (ref 134–144)
WBC # BLD AUTO: 5.9 X10E3/UL (ref 3.4–10.8)

## 2019-04-08 ENCOUNTER — HOSPITAL ENCOUNTER (EMERGENCY)
Age: 39
Discharge: HOME | End: 2019-04-08
Attending: EMERGENCY MEDICINE
Payer: MEDICAID

## 2019-04-08 ENCOUNTER — APPOINTMENT (OUTPATIENT)
Dept: GENERAL RADIOLOGY | Age: 39
End: 2019-04-08
Attending: EMERGENCY MEDICINE
Payer: MEDICAID

## 2019-04-08 VITALS
RESPIRATION RATE: 17 BRPM | WEIGHT: 220 LBS | TEMPERATURE: 98.3 F | HEART RATE: 78 BPM | DIASTOLIC BLOOD PRESSURE: 74 MMHG | OXYGEN SATURATION: 99 % | BODY MASS INDEX: 43.19 KG/M2 | HEIGHT: 60 IN | SYSTOLIC BLOOD PRESSURE: 140 MMHG

## 2019-04-08 DIAGNOSIS — R07.89 ATYPICAL CHEST PAIN: Primary | ICD-10-CM

## 2019-04-08 LAB
ALBUMIN SERPL-MCNC: 3.3 G/DL (ref 3.4–5)
ALBUMIN/GLOB SERPL: 0.7 {RATIO} (ref 0.8–1.7)
ALP SERPL-CCNC: 76 U/L (ref 45–117)
ALT SERPL-CCNC: 24 U/L (ref 13–56)
ANION GAP SERPL CALC-SCNC: 9 MMOL/L (ref 3–18)
APPEARANCE UR: CLEAR
AST SERPL-CCNC: 21 U/L (ref 15–37)
ATRIAL RATE: 92 BPM
BASOPHILS # BLD: 0 K/UL (ref 0–0.1)
BASOPHILS NFR BLD: 0 % (ref 0–2)
BILIRUB SERPL-MCNC: 0.2 MG/DL (ref 0.2–1)
BILIRUB UR QL: NEGATIVE
BUN SERPL-MCNC: 7 MG/DL (ref 7–18)
BUN/CREAT SERPL: 12 (ref 12–20)
CALCIUM SERPL-MCNC: 8.7 MG/DL (ref 8.5–10.1)
CALCULATED P AXIS, ECG09: 26 DEGREES
CALCULATED R AXIS, ECG10: 29 DEGREES
CALCULATED T AXIS, ECG11: 58 DEGREES
CHLORIDE SERPL-SCNC: 105 MMOL/L (ref 100–108)
CO2 SERPL-SCNC: 28 MMOL/L (ref 21–32)
COLOR UR: YELLOW
CREAT SERPL-MCNC: 0.58 MG/DL (ref 0.6–1.3)
DIAGNOSIS, 93000: NORMAL
DIFFERENTIAL METHOD BLD: ABNORMAL
EOSINOPHIL # BLD: 0.3 K/UL (ref 0–0.4)
EOSINOPHIL NFR BLD: 4 % (ref 0–5)
ERYTHROCYTE [DISTWIDTH] IN BLOOD BY AUTOMATED COUNT: 16.8 % (ref 11.6–14.5)
GLOBULIN SER CALC-MCNC: 4.5 G/DL (ref 2–4)
GLUCOSE SERPL-MCNC: 92 MG/DL (ref 74–99)
GLUCOSE UR STRIP.AUTO-MCNC: NEGATIVE MG/DL
HCT VFR BLD AUTO: 37.3 % (ref 35–45)
HGB BLD-MCNC: 11.5 G/DL (ref 12–16)
HGB UR QL STRIP: NEGATIVE
KETONES UR QL STRIP.AUTO: NEGATIVE MG/DL
LEUKOCYTE ESTERASE UR QL STRIP.AUTO: NEGATIVE
LYMPHOCYTES # BLD: 2 K/UL (ref 0.9–3.6)
LYMPHOCYTES NFR BLD: 29 % (ref 21–52)
MCH RBC QN AUTO: 24.9 PG (ref 24–34)
MCHC RBC AUTO-ENTMCNC: 30.8 G/DL (ref 31–37)
MCV RBC AUTO: 80.9 FL (ref 74–97)
MONOCYTES # BLD: 0.4 K/UL (ref 0.05–1.2)
MONOCYTES NFR BLD: 5 % (ref 3–10)
NEUTS SEG # BLD: 4.2 K/UL (ref 1.8–8)
NEUTS SEG NFR BLD: 62 % (ref 40–73)
NITRITE UR QL STRIP.AUTO: NEGATIVE
P-R INTERVAL, ECG05: 140 MS
PH UR STRIP: 7.5 [PH] (ref 5–8)
PLATELET # BLD AUTO: 443 K/UL (ref 135–420)
PMV BLD AUTO: 9.9 FL (ref 9.2–11.8)
POTASSIUM SERPL-SCNC: 3.6 MMOL/L (ref 3.5–5.5)
PROT SERPL-MCNC: 7.8 G/DL (ref 6.4–8.2)
PROT UR STRIP-MCNC: NEGATIVE MG/DL
Q-T INTERVAL, ECG07: 380 MS
QRS DURATION, ECG06: 82 MS
QTC CALCULATION (BEZET), ECG08: 469 MS
RBC # BLD AUTO: 4.61 M/UL (ref 4.2–5.3)
SODIUM SERPL-SCNC: 142 MMOL/L (ref 136–145)
SP GR UR REFRACTOMETRY: 1.01 (ref 1–1.03)
TROPONIN I SERPL-MCNC: <0.02 NG/ML (ref 0–0.04)
TROPONIN I SERPL-MCNC: <0.02 NG/ML (ref 0–0.04)
UROBILINOGEN UR QL STRIP.AUTO: 0.2 EU/DL (ref 0.2–1)
VENTRICULAR RATE, ECG03: 92 BPM
WBC # BLD AUTO: 6.9 K/UL (ref 4.6–13.2)

## 2019-04-08 PROCEDURE — 74011250637 HC RX REV CODE- 250/637: Performed by: EMERGENCY MEDICINE

## 2019-04-08 PROCEDURE — 99285 EMERGENCY DEPT VISIT HI MDM: CPT

## 2019-04-08 PROCEDURE — 81003 URINALYSIS AUTO W/O SCOPE: CPT

## 2019-04-08 PROCEDURE — 93005 ELECTROCARDIOGRAM TRACING: CPT

## 2019-04-08 PROCEDURE — 71045 X-RAY EXAM CHEST 1 VIEW: CPT

## 2019-04-08 PROCEDURE — 84484 ASSAY OF TROPONIN QUANT: CPT

## 2019-04-08 PROCEDURE — 85025 COMPLETE CBC W/AUTO DIFF WBC: CPT

## 2019-04-08 PROCEDURE — 80053 COMPREHEN METABOLIC PANEL: CPT

## 2019-04-08 RX ORDER — ACETAMINOPHEN 325 MG/1
650 TABLET ORAL
Status: COMPLETED | OUTPATIENT
Start: 2019-04-08 | End: 2019-04-08

## 2019-04-08 RX ORDER — PRAVASTATIN SODIUM 20 MG/1
20 TABLET ORAL
COMMUNITY
End: 2019-07-23

## 2019-04-08 RX ADMIN — ACETAMINOPHEN 650 MG: 325 TABLET ORAL at 13:43

## 2019-04-08 NOTE — ED PROVIDER NOTES
EMERGENCY DEPARTMENT HISTORY AND PHYSICAL EXAM 
 
10:51 AM 
 
 
Date: 4/8/2019 Patient Name: Kenny Lora History of Presenting Illness Chief Complaint Patient presents with  Chest Pain History Provided By: Patient Additional History (Context): Kenny Lora is a 34 y.o. female with a history of HTN, stent placement presenting to the ED for the evaluation of intermittent chest pain that started yesterday. Patient described the pain as squeezing in nature. Pain is not exacerbated by movement. She is noted to have a cardiac stent that was placed January of 2018 and she is taking Plavix and ASA daily. Denies any shortness of breath or cough. NKDA. Does regularly follow up with a Cardiologist and had a stress test done a few months after the stent placement last year. No other complaints or concerns at this time. PCP: Alverto Wei MD 
 
 
Chief Complaint: chest pain Duration: 1 day Timing:  Intermittent Location: not reported Quality: squeezing Severity: not reported Modifying Factors: none Associated Symptoms: no SOB or cough. No leg swelling, nausea, or vomiting. Current Outpatient Medications Medication Sig Dispense Refill  pravastatin (PRAVACHOL) 20 mg tablet Take 20 mg by mouth nightly.  ondansetron (ZOFRAN ODT) 4 mg disintegrating tablet Take 1 Tab by mouth every eight (8) hours as needed for Nausea. 6 Tab 1  
 albuterol (PROVENTIL HFA, VENTOLIN HFA, PROAIR HFA) 90 mcg/actuation inhaler Take 2 Puffs by inhalation every four (4) hours as needed for Wheezing. 1 Inhaler 0  
 amLODIPine (NORVASC) 10 mg tablet Take 1 Tab by mouth daily. (Patient taking differently: Take 5 mg by mouth daily.) 90 Tab 0  
 fluticasone (FLONASE) 50 mcg/actuation nasal spray 2 Sprays by Both Nostrils route daily. 1 Bottle 0  
 cetirizine (ZYRTEC) 10 mg tablet Take 1 Tab by mouth daily as needed for Allergies. 90 Tab 0  clopidogrel (PLAVIX) 75 mg tab 75 mg.  aspirin 81 mg chewable tablet 81 mg.    
 nitroglycerin (NITROSTAT) 0.4 mg SL tablet 0.4 mg. Past History Past Medical History: 
Past Medical History:  
Diagnosis Date  Cardiac echocardiogram 2016 Sm LV cavity. EF 65-70%. No RWMA. Mod-marked LVH w/dynamic obstruction, mid-cavity obliteration & peak grad of 25 mmHg. Indeterminate diastolic fx. No significant valvular pathology.  Cardiac nuclear imaging test 2016 Low risk. Very sm distal anterior & apical partially reversible defect, likely artifact, but sm area of ischemia not excluded. No RWMA. EF 66%. Neg EKG on pharm stress test.  
 Diverticulitis  Gall stones  GERD (gastroesophageal reflux disease)  Heart attack ([facility]  Hypertension  Infectious disease Bacteria vaginosis  Non-ST elevated myocardial infarction ([facility]  STEMI (ST elevation myocardial infarction) ([facility] 2018 Past Surgical History: 
Past Surgical History:  
Procedure Laterality Date  HX  SECTION  2008  HX CORONARY STENT PLACEMENT    
 HX ORTHOPAEDIC    
 HX TUBAL LIGATION  2008 Family History: 
Family History Problem Relation Age of Onset  Depression Mother  Asthma Mother  Migraines Mother  Hypertension Mother  Stroke Mother  Heart Attack Mother Lesia Steward Arthritis-osteo Mother  Depression Brother  Alcohol abuse Father  Substance Abuse Father   
     tobacco  
 Drug Abuse Father  Hypertension Father  High Cholesterol Father  Stroke Father  Rashes/Skin Problems Father  Substance Abuse Brother   
     tobacco  
 Drug Abuse Brother  Asthma Brother  Migraines Brother  Hypertension Brother  High Cholesterol Brother  Hypertension Paternal Grandmother  Diabetes Paternal Grandmother  Hypertension Maternal Grandmother  Colon Cancer Maternal Grandmother Social History: 
Social History Tobacco Use  
  Smoking status: Former Smoker Packs/day: 0.25 Years: 20.00 Pack years: 5.00  Smokeless tobacco: Never Used Substance Use Topics  Alcohol use: No  
  Frequency: Never  Drug use: No  
 
 
Allergies: Allergies Allergen Reactions  Lisinopril Cough  Metoprolol Palpitations  Vaccine Adjuvant Emulsion Combination No. 1 Seizures MMR Review of Systems Review of Systems Constitutional: Negative for activity change, fatigue and fever. HENT: Negative for congestion and rhinorrhea. Eyes: Negative for visual disturbance. Respiratory: Negative for cough and shortness of breath. Cardiovascular: Positive for chest pain. Negative for palpitations. Gastrointestinal: Negative for abdominal pain, diarrhea, nausea and vomiting. Genitourinary: Negative for dysuria and hematuria. Musculoskeletal: Negative for back pain. Skin: Negative for rash. Neurological: Negative for dizziness, weakness and light-headedness. All other systems reviewed and are negative. Physical Exam  
 
Visit Vitals /74 Pulse 78 Temp 98.3 °F (36.8 °C) Resp 17 Ht 5' (1.524 m) Wt 99.8 kg (220 lb) LMP 04/01/2019 (Exact Date) SpO2 99% BMI 42.97 kg/m² Physical Exam  
Constitutional: She is oriented to person, place, and time. She appears well-developed and well-nourished. No distress. HENT:  
Head: Normocephalic and atraumatic. Right Ear: External ear normal.  
Left Ear: External ear normal.  
Nose: Nose normal.  
Mouth/Throat: Oropharynx is clear and moist.  
Eyes: Pupils are equal, round, and reactive to light. Conjunctivae and EOM are normal. No scleral icterus. Neck: Normal range of motion. Neck supple. No tracheal deviation present. Cardiovascular: Normal rate, regular rhythm and intact distal pulses. Pulmonary/Chest: Effort normal and breath sounds normal. She exhibits no tenderness. Abdominal: Soft. Bowel sounds are normal. She exhibits no distension. There is no tenderness. There is no rebound and no guarding. Musculoskeletal: Normal range of motion. She exhibits no tenderness. Neurological: She is alert and oriented to person, place, and time. No cranial nerve deficit. Coordination normal.  
Skin: Skin is warm and dry. Psychiatric: She has a normal mood and affect. Her behavior is normal. Judgment and thought content normal.  
Nursing note and vitals reviewed. Diagnostic Study Results Labs - Recent Results (from the past 12 hour(s)) EKG, 12 LEAD, INITIAL Collection Time: 04/08/19 10:01 AM  
Result Value Ref Range Ventricular Rate 92 BPM  
 Atrial Rate 92 BPM  
 P-R Interval 140 ms QRS Duration 82 ms Q-T Interval 380 ms QTC Calculation (Bezet) 469 ms Calculated P Axis 26 degrees Calculated R Axis 29 degrees Calculated T Axis 58 degrees Diagnosis Normal sinus rhythm Cannot rule out Anterior infarct (cited on or before 08-APR-2019) Abnormal ECG When compared with ECG of 16-FEB-2019 20:18, No significant change was found Confirmed by Yari Crum ([id number]) on 4/8/2019 10:11:55 AM 
  
CBC WITH AUTOMATED DIFF Collection Time: 04/08/19 10:10 AM  
Result Value Ref Range WBC 6.9 4.6 - 13.2 K/uL  
 RBC 4.61 4.20 - 5.30 M/uL  
 HGB 11.5 (L) 12.0 - 16.0 g/dL HCT 37.3 35.0 - 45.0 % MCV 80.9 74.0 - 97.0 FL  
 MCH 24.9 24.0 - 34.0 PG  
 MCHC 30.8 (L) 31.0 - 37.0 g/dL  
 RDW 16.8 (H) 11.6 - 14.5 % PLATELET 424 (H) 536 - 420 K/uL MPV 9.9 9.2 - 11.8 FL  
 NEUTROPHILS 62 40 - 73 % LYMPHOCYTES 29 21 - 52 % MONOCYTES 5 3 - 10 % EOSINOPHILS 4 0 - 5 % BASOPHILS 0 0 - 2 %  
 ABS. NEUTROPHILS 4.2 1.8 - 8.0 K/UL  
 ABS. LYMPHOCYTES 2.0 0.9 - 3.6 K/UL  
 ABS. MONOCYTES 0.4 0.05 - 1.2 K/UL  
 ABS. EOSINOPHILS 0.3 0.0 - 0.4 K/UL  
 ABS. BASOPHILS 0.0 0.0 - 0.1 K/UL  
 DF AUTOMATED    
TROPONIN I  Collection Time: 04/08/19 10:10 AM  
 Result Value Ref Range Troponin-I, QT <0.02 0.0 - 2.992 NG/ML  
METABOLIC PANEL, COMPREHENSIVE Collection Time: 04/08/19 10:20 AM  
Result Value Ref Range Sodium 142 136 - 145 mmol/L Potassium 3.6 3.5 - 5.5 mmol/L Chloride 105 100 - 108 mmol/L  
 CO2 28 21 - 32 mmol/L Anion gap 9 3.0 - 18 mmol/L Glucose 92 74 - 99 mg/dL BUN 7 7.0 - 18 MG/DL Creatinine 0.58 (L) 0.6 - 1.3 MG/DL  
 BUN/Creatinine ratio 12 12 - 20 GFR est AA >60 >60 ml/min/1.73m2 GFR est non-AA >60 >60 ml/min/1.73m2 Calcium 8.7 8.5 - 10.1 MG/DL Bilirubin, total 0.2 0.2 - 1.0 MG/DL  
 ALT (SGPT) 24 13 - 56 U/L  
 AST (SGOT) 21 15 - 37 U/L Alk. phosphatase 76 45 - 117 U/L Protein, total 7.8 6.4 - 8.2 g/dL Albumin 3.3 (L) 3.4 - 5.0 g/dL Globulin 4.5 (H) 2.0 - 4.0 g/dL A-G Ratio 0.7 (L) 0.8 - 1.7 URINALYSIS W/ RFLX MICROSCOPIC Collection Time: 04/08/19 11:30 AM  
Result Value Ref Range Color YELLOW Appearance CLEAR Specific gravity 1.007 1.005 - 1.030    
 pH (UA) 7.5 5.0 - 8.0 Protein NEGATIVE  NEG mg/dL Glucose NEGATIVE  NEG mg/dL Ketone NEGATIVE  NEG mg/dL Bilirubin NEGATIVE  NEG Blood NEGATIVE  NEG Urobilinogen 0.2 0.2 - 1.0 EU/dL Nitrites NEGATIVE  NEG Leukocyte Esterase NEGATIVE  NEG    
TROPONIN I Collection Time: 04/08/19 12:34 PM  
Result Value Ref Range Troponin-I, QT <0.02 0.0 - 0.045 NG/ML Radiologic Studies -  
XR CHEST PORT Final Result IMPRESSION:  
  
Stable chest with no acute process. Medical Decision Making It should be noted that I, No att. providers found will be the provider of record for this patient. I reviewed the vital signs, available nursing notes, past medical history, past surgical history, family history and social history. Vital Signs - Reviewed the patient's vital signs. Pulse Oximetry Analysis -  100% RA (Interpretation) non-hypoxic EKG: Interpreted by the EP. Time Interpreted: 10:01 Rate: 92 bpm  
 Rhythm: NSR Interpretation: No acute changes Records Reviewed: Nursing Notes (Time of Review: 10:51 AM) Provider Notes (Medical Decision Making):  
Patient with intermittent chest pain. History of stent. Patient with no chest pain currently. She has been taking her aspirin and Plavix as prescribed. Troponin negative. EKG negative. Discussed with cardiology who states the patient can go home and follow-up with her cardiologist later this week or early next week. Patient is agreeable to this plan. Will return if she has any worsening or concerning symptoms. Stable at the time of discharge. ED Course: Progress Notes, Reevaluation, and Consults: 
11:35 AM - Patient updated and reevaluated. 1:23 PM - Patient updated and informed of the plan for discharge and instructed to follow up with her Cardiologist next week when he is back from vacation. Patient voiced understanding and was amenable to the proposed plan. All questions were answered. Diagnosis Clinical Impression: 1. Atypical chest pain Disposition: Discharge Follow-up Information Follow up With Specialties Details Why Contact Info Mayte Davis MD [facility] Call today  [address] [place] [address] [address] [facility] 
[phone] Dexter Navarro MD Cardiology Schedule an appointment as soon as possible for a visit in 3 days  [place] [address] [address] [place] [address] [phone] Discharge Medication List as of 4/8/2019  1:32 PM  
  
CONTINUE these medications which have NOT CHANGED  Details  
pravastatin (PRAVACHOL) 20 mg tablet Take 20 mg by mouth nightly., Historical Med  
  
ondansetron (ZOFRAN ODT) 4 mg disintegrating tablet Take 1 Tab by mouth every eight (8) hours as needed for Nausea., Normal, Disp-6 Tab, R-1  
  
albuterol (PROVENTIL HFA, VENTOLIN HFA, PROAIR HFA) 90 mcg/actuation inhaler Take 2 Puffs by inhalation every four (4) hours as needed for Wheezing., Normal, Disp-1 Inhaler, R-0  
  
amLODIPine (NORVASC) 10 mg tablet Take 1 Tab by mouth daily. , Normal, Disp-90 Tab, R-0  
  
fluticasone (FLONASE) 50 mcg/actuation nasal spray 2 Sprays by Both Nostrils route daily. , Normal, Disp-1 Bottle, R-0  
  
cetirizine (ZYRTEC) 10 mg tablet Take 1 Tab by mouth daily as needed for Allergies. , Normal, Disp-90 Tab, R-0  
  
clopidogrel (PLAVIX) 75 mg tab 75 mg., Historical Med  
  
aspirin 81 mg chewable tablet 81 mg., Historical Med  
  
nitroglycerin (NITROSTAT) 0.4 mg SL tablet 0.4 mg., Historical Med  
  
  
 
_______________________________ Attestations: 
Scribe Attestation Anita Chacon acting as a scribe for and in the presence of Adrián Bowie DO April 08, 2019 at 10:51 AM 
    
Provider Attestation:     
I personally performed the services described in the documentation, reviewed the documentation, as recorded by the scribe in my presence, and it accurately and completely records my words and actions. April 08, 2019 at 10:51 AM - [employer], DO  
_______________________________

## 2019-04-08 NOTE — DISCHARGE INSTRUCTIONS

## 2019-04-08 NOTE — LETTER
NOTIFICATION RETURN TO WORK / SCHOOL 
 
4/8/2019 1:32 PM 
 
Ms. Brennen Timmons [facility] [address] To Whom It May Concern: 
 
Brennen Timmons is currently under the care of [id number] [facility] EMERGENCY DEPT. She will return to work/school on:  4/9/19 If there are questions or concerns please have the patient contact our office. Sincerely, Karma Frazier, DO

## 2019-04-08 NOTE — ED TRIAGE NOTES
Pt states has had chest pain since yesterday, describes as squeezing sensation that varies in intensity. Pt has had 2 previous MI's. Pt denies any other symptoms and presents in NAD.

## 2019-04-12 ENCOUNTER — OFFICE VISIT (OUTPATIENT)
Dept: FAMILY MEDICINE CLINIC | Age: 39
End: 2019-04-12

## 2019-04-12 VITALS
HEART RATE: 80 BPM | BODY MASS INDEX: 43.59 KG/M2 | DIASTOLIC BLOOD PRESSURE: 82 MMHG | HEIGHT: 60 IN | TEMPERATURE: 98.5 F | RESPIRATION RATE: 16 BRPM | SYSTOLIC BLOOD PRESSURE: 126 MMHG | OXYGEN SATURATION: 98 % | WEIGHT: 222 LBS

## 2019-04-12 DIAGNOSIS — R73.03 PREDIABETES: ICD-10-CM

## 2019-04-12 DIAGNOSIS — D50.0 IRON DEFICIENCY ANEMIA DUE TO CHRONIC BLOOD LOSS: ICD-10-CM

## 2019-04-12 DIAGNOSIS — K57.92 ACUTE DIVERTICULITIS: Primary | ICD-10-CM

## 2019-04-12 DIAGNOSIS — I10 ESSENTIAL HYPERTENSION: ICD-10-CM

## 2019-04-12 DIAGNOSIS — E78.6 LOW HDL (UNDER 40): ICD-10-CM

## 2019-04-12 DIAGNOSIS — I25.119 CORONARY ARTERY DISEASE INVOLVING NATIVE CORONARY ARTERY OF NATIVE HEART WITH ANGINA PECTORIS (HCC): ICD-10-CM

## 2019-04-12 DIAGNOSIS — F43.21 ADJUSTMENT DISORDER WITH DEPRESSED MOOD: ICD-10-CM

## 2019-04-12 RX ORDER — CIPROFLOXACIN 500 MG/1
500 TABLET ORAL 2 TIMES DAILY
Qty: 20 TAB | Refills: 0 | Status: SHIPPED | OUTPATIENT
Start: 2019-04-12 | End: 2019-04-22

## 2019-04-12 RX ORDER — METRONIDAZOLE 500 MG/1
500 TABLET ORAL 3 TIMES DAILY
Qty: 30 TAB | Refills: 0 | Status: SHIPPED | OUTPATIENT
Start: 2019-04-12 | End: 2019-04-22

## 2019-04-12 NOTE — PROGRESS NOTES
Subjective  Tomi Vergara is a 34 y.o. female. CC: \"abdominal pain\"  HPI: Ms. Perez Borja is a 35 YO female presenting with left lower quadrant pain x2days. The pain started after she drank two strawberry smoothies for 2 consecutive days prior. She describes the pain as an intermittent crampy pain that is an 8 out of 10. She feels like she needs to pass flatus but can not. She has had normal well formed bowel movements with no melena. She denies black tarry stools. She took tylenol with moderate relief of her symptoms. She said this pain feels similar to her previous episodes of sigmoid diverticulitis in March. She has fatigue and decreased appetite. She denies nausea, vomiting and diarrhea. She remarked that the diverticulitis symptoms were alleviated by the course of metronidazole in March. HTN: The patient is taking norvasc regularly. She denies any side effects or symptoms of hypotension on the medication. Depression: The patient is no longer taking zoloft. She stopped zoloft a few months ago. She denies any symptoms of depression such as hoplessness, anhedonia, suicidal or homicidal ideation. Anemia: The patient stopped taking prenatal vitamins in January. She is not currently following up with anyone in particular for her anemia. CAD: The patient is currently taking nitrates prn, ASA po every day and plavix po every day. She is following up with cardiology. Her last set of labs in cardiology were reported to be normal, and she follows up again with cardiology in 6 months. PMH: tobacco use, obesity, HTN, prediabetes, STEMI, iron deficiency anemia, CAD, adjustment, cholecystitis   Allergies: lisinopril-cough, metoprolol-palpitations  FH:  Mother-depression, asthma, migraines, HTN, stroke, heart attack, OA   Brother-depression   Father-alcohol abuse, tobacco abuse, drug abuse, HTN, HLD, stroke   Brother-Tobacco abuse, drug abuse, asthma, migraines, HTN, HLD   Paternal Grandmother-HTN, DM   Maternal Grandmother-HTN, colon cancer  SH: 20 pack year smoking history. No alcohol use. ROS: The patient denies headaches, tinnitus, blurred vision, dysphagia, chest pain, palpitations, orthopnea, dyspnea, nausea, vomiting, diarrhea, frequency, urgency, numbness, tingling. Positive for fatigue and abdominal pain. Objective  Physical Exam   Vitals: BP: 126/82  Ht: 5'  Wt: 222 lb   BMI: 43.36  General: Well developed and well appearing female in mild distress. HEENT: Head normocephalic and atraumatic. Sclera anicteric and conjuctiva non injected. PERRLA and EOMI. Nares patent. BL TMs non bulging and non erythematous. Oropharynx clear without erythema or exudates. Neck supple and trachea midline. No cervical LAD. Cardiopulmonary: Heart RRR with normal S1 and S2. No murmurs, gallops, rubs or clikcs. Lungs CTA in all lung fields with no wheezing, rhonchi, rales or crackles. GI: Abdomen soft and non distended. Marked tenderness to palpation in LLQ. Extemities: No peripheral edema.      Assessment & Plan  Abdominal pain   -Probably recurrent sigmoid diveritculitis or abscess   -Prescribe metronidazole   -Refer to colorectal surgery/GI  HTN   -Moderate control   -Continue norvasc  Depression   -Well controlled   -RTO if symptoms return  Anemia   -Check labs  CAD   -Well controlled   -Continue nitrates, ASA, plavix   -Follow up with cardiology in 6 months  Brionna Conception

## 2019-04-15 ENCOUNTER — OFFICE VISIT (OUTPATIENT)
Dept: FAMILY MEDICINE CLINIC | Age: 39
End: 2019-04-15

## 2019-04-15 VITALS
SYSTOLIC BLOOD PRESSURE: 130 MMHG | RESPIRATION RATE: 16 BRPM | DIASTOLIC BLOOD PRESSURE: 84 MMHG | OXYGEN SATURATION: 99 % | TEMPERATURE: 98.8 F | WEIGHT: 224 LBS | BODY MASS INDEX: 43.98 KG/M2 | HEART RATE: 82 BPM | HEIGHT: 60 IN

## 2019-04-15 DIAGNOSIS — F43.21 ADJUSTMENT DISORDER WITH DEPRESSED MOOD: ICD-10-CM

## 2019-04-15 DIAGNOSIS — R73.03 PREDIABETES: ICD-10-CM

## 2019-04-15 DIAGNOSIS — I10 ESSENTIAL HYPERTENSION: ICD-10-CM

## 2019-04-15 DIAGNOSIS — D50.0 IRON DEFICIENCY ANEMIA DUE TO CHRONIC BLOOD LOSS: ICD-10-CM

## 2019-04-15 DIAGNOSIS — I25.119 CORONARY ARTERY DISEASE INVOLVING NATIVE CORONARY ARTERY OF NATIVE HEART WITH ANGINA PECTORIS (HCC): ICD-10-CM

## 2019-04-15 DIAGNOSIS — K57.92 ACUTE DIVERTICULITIS: Primary | ICD-10-CM

## 2019-04-15 DIAGNOSIS — E78.6 LOW HDL (UNDER 40): ICD-10-CM

## 2019-04-15 NOTE — PATIENT INSTRUCTIONS
Learning About Diverticulosis and Diverticulitis  What are diverticulosis and diverticulitis? In diverticulosis and diverticulitis, pouches called diverticula form in the wall of the large intestine, or colon. · In diverticulosis, the pouches do not cause any pain or other symptoms. · In diverticulitis, the pouches get inflamed or infected and cause symptoms. Doctors aren't sure what causes these pouches in the colon. But they think that a low-fiber diet may play a role. Without fiber to add bulk to the stool, the colon has to work harder than normal to push the stool forward. The pressure from this may cause pouches to form in weak spots along the colon. Some people with diverticulosis get diverticulitis. But experts don't know why this happens. What are the symptoms? · In diverticulosis, most people don't have symptoms. But pouches sometimes bleed. · In diverticulitis, symptoms may last from a few hours to a week or more. They include:  ? Belly pain. This is usually in the lower left side. It is sometimes worse when you move. This is the most common symptom. ? Fever and chills. ? Bloating and gas. ? Diarrhea or constipation. ? Nausea and sometimes vomiting.  ? Not feeling like eating. How can you prevent these problems? You may be able to lower your chance of getting diverticulitis. You can do this by taking steps to prevent constipation. · Eat fruits, vegetables, beans, and whole grains every day. These foods are high in fiber. · Drink plenty of fluids (enough so that your urine is light yellow or clear like water). If you have kidney, heart, or liver disease and have to limit fluids, talk with your doctor before you increase the amount of fluids you drink. · Get at least 30 minutes of exercise on most days of the week. Walking is a good choice. You also may want to do other activities, such as running, swimming, cycling, or playing tennis or team sports.   · Take a fiber supplement, such as Citrucel or Metamucil, every day if needed. Read and follow all instructions on the label. · Schedule time each day for a bowel movement. Having a daily routine may help. Take your time and do not strain when having a bowel movement. Some people avoid nuts, seeds, berries, and popcorn. They believe that these foods might get trapped in the diverticula and cause pain. But there is no proof that these foods cause diverticulitis or make it worse. How are these problems treated? · The best way to treat diverticulosis is to avoid constipation. (See the tips above.)  · Treatment for diverticulitis includes antibiotics and often a change in your diet. You may need only liquids at first. Your doctor may suggest pain medicines for pain or belly cramps. In some cases, surgery may be needed. Follow-up care is a key part of your treatment and safety. Be sure to make and go to all appointments, and call your doctor if you are having problems. It's also a good idea to know your test results and keep a list of the medicines you take. Where can you learn more? Go to http://kim-tristin.info/. Enter Y282 in the search box to learn more about \"Learning About Diverticulosis and Diverticulitis. \"  Current as of: March 27, 2018  Content Version: 11.9  © 4627-3913 [employer]. Care instructions adapted under license by [employer] (which disclaims liability or warranty for this information). If you have questions about a medical condition or this instruction, always ask your healthcare professional. [place] [address] any warranty or liability for your use of this information.

## 2019-04-16 NOTE — PROGRESS NOTES
Vivian Darnell, 34 y.o.,  female    SUBJECTIVE  abdominal pain x 2 days    Developed LLQ sharp abdominal pain, gassy, bloated. similar to her previous acute sigmoid diverticulitis episodes. Latest one was in march that responded to outpatient po antibiotics. She denies constipation, fever, melena, nausea, vomiting, no dysuria, hematuria, urinary freq. She has had  3 episodes of diverticulitis x few months apart. She was referred to GI for further evaluation on last visit, still awaiting scheduling. HTN- taking medications,  She has h/o CAD,  STEMI s/p PCI 1/18 - denies chest pain, sob. Following dr. Davida Vines. She has completely stopped smoking in May 2018. She is Off simvastatin and metoprolol due to med intolerance palpitations. Reviewed cards notes, started on pravachol and tolerating. Depression- she reports coming off of zoloft in January and feels well, no longer with depression symptoms of crying, feeling down, sleep difficulty. Anemia-thought to be due to HMB. Following gyne  dr Jocelyn HUNTLEY:  See HPI, all others negative        Patient Active Problem List   Diagnosis Code    Tobacco use Z72.0    Obesity E66.9    Hypertension I10    Prediabetes R73.03    Low HDL (under 36) E78.6    Obesity, morbid ([address] E66.01    ST elevation myocardial infarction (STEMI) ([facility] [address] I21.3    Iron deficiency anemia due to chronic blood loss D50.0    Coronary artery disease involving native coronary artery of native heart with angina pectoris (HCC) I25.119    Adjustment disorder with depressed mood F43.21    Epigastric pain R10.13    Calculus of gallbladder without cholecystitis without obstruction K80.20       Current Outpatient Medications   Medication Sig Dispense Refill    ciprofloxacin HCl (CIPRO) 500 mg tablet Take 1 Tab by mouth two (2) times a day for 10 days. 20 Tab 0    metroNIDAZOLE (FLAGYL) 500 mg tablet Take 1 Tab by mouth three (3) times daily for 10 days.  30 Tab 0    pravastatin (PRAVACHOL) 20 mg tablet Take 20 mg by mouth nightly.  ondansetron (ZOFRAN ODT) 4 mg disintegrating tablet Take 1 Tab by mouth every eight (8) hours as needed for Nausea. 6 Tab 1    albuterol (PROVENTIL HFA, VENTOLIN HFA, PROAIR HFA) 90 mcg/actuation inhaler Take 2 Puffs by inhalation every four (4) hours as needed for Wheezing. 1 Inhaler 0    amLODIPine (NORVASC) 10 mg tablet Take 1 Tab by mouth daily. (Patient taking differently: Take 5 mg by mouth daily.) 90 Tab 0    fluticasone (FLONASE) 50 mcg/actuation nasal spray 2 Sprays by Both Nostrils route daily. 1 Bottle 0    cetirizine (ZYRTEC) 10 mg tablet Take 1 Tab by mouth daily as needed for Allergies. 90 Tab 0    clopidogrel (PLAVIX) 75 mg tab 75 mg.  aspirin 81 mg chewable tablet 81 mg.      nitroglycerin (NITROSTAT) 0.4 mg SL tablet 0.4 mg. Allergies   Allergen Reactions    Lisinopril Cough    Metoprolol Palpitations    Vaccine Adjuvant Emulsion Combination No. 1 Seizures     MMR       Past Medical History:   Diagnosis Date    Cardiac echocardiogram 07/21/2016    Sm LV cavity. EF 65-70%. No RWMA. Mod-marked LVH w/dynamic obstruction, mid-cavity obliteration & peak grad of 25 mmHg. Indeterminate diastolic fx. No significant valvular pathology.  Cardiac nuclear imaging test 07/22/2016    Low risk. Very sm distal anterior & apical partially reversible defect, likely artifact, but sm area of ischemia not excluded. No RWMA. EF 66%.   Neg EKG on pharm stress test.    Diverticulitis     Gall stones     GERD (gastroesophageal reflux disease)     Heart attack ([facility] [address]     Hypertension     Infectious disease     Bacteria vaginosis    Non-ST elevated myocardial infarction ([facility] [address]     STEMI (ST elevation myocardial infarction) ([facility] [address] 01/2018       Social History     Socioeconomic History    Marital status: SINGLE     Spouse name: Not on file    Number of children: Not on file    Years of education: Not on file    Highest education level: Not on file   Occupational History    Not on file   Social Needs    Financial resource strain: Not on file    Food insecurity:     Worry: Not on file     Inability: Not on file    Transportation needs:     Medical: Not on file     Non-medical: Not on file   Tobacco Use    Smoking status: Former Smoker     Packs/day: 0.25     Years: 20.00     Pack years: 5.00    Smokeless tobacco: Never Used   Substance and Sexual Activity    Alcohol use: No     Frequency: Never    Drug use: No    Sexual activity: Yes     Partners: Male     Birth control/protection: Surgical     Comment: tubal ligation   Lifestyle    Physical activity:     Days per week: Not on file     Minutes per session: Not on file    Stress: Not on file   Relationships    Social connections:     Talks on phone: Not on file     Gets together: Not on file     Attends [employer] service: Not on file     Active member of club or organization: Not on file     Attends meetings of clubs or organizations: Not on file     Relationship status: Not on file    Intimate partner violence:     Fear of current or ex partner: Not on file     Emotionally abused: Not on file     Physically abused: Not on file     Forced sexual activity: Not on file   Other Topics Concern    Not on file   Social History Narrative    Not on file       Family History   Problem Relation Age of Onset    Depression Mother     Asthma Mother     Migraines Mother     Hypertension Mother     Stroke Mother     Heart Attack Mother    Fahad Jacome Mother     Depression Brother     Alcohol abuse Father     Substance Abuse Father         tobacco    Drug Abuse Father     Hypertension Father     High Cholesterol Father     Stroke Father     Rashes/Skin Problems Father     Substance Abuse Brother         tobacco    Drug Abuse Brother     Asthma Brother     Migraines Brother     Hypertension Brother     High Cholesterol Brother     Hypertension Paternal Grandmother     Diabetes Paternal Grandmother     Hypertension Maternal Grandmother     Colon Cancer Maternal Grandmother          OBJECTIVE    Physical Exam:     Visit Vitals  /82 (BP 1 Location: Left arm, BP Patient Position: Sitting)   Pulse 80   Temp 98.5 °F (36.9 °C) (Oral)   Resp 16   Ht 5' (1.524 m)   Wt 222 lb (100.7 kg)   LMP 04/01/2019 (Exact Date)   SpO2 98%   BMI 43.36 kg/m²       General: alert, well-appearing, obese,AA, in no apparent distress or pain  HEENT: throat, pharynx clear, eac patent, TM intact, no jaundice  CVS: normal rate, regular rhythm, distinct S1 and S2 +MTTP on L mid anterior wall area  Lungs:clear to ausculation bilaterally, no crackles, wheezing or rhonchi noted  Abdomen: soft, +LLQ tenderness, no rebound  Skin: warm, no lesions, rashes noted  Psych:  mood and affect normal    Results for orders placed or performed during the hospital encounter of 04/08/19   CBC WITH AUTOMATED DIFF   Result Value Ref Range    WBC 6.9 4.6 - 13.2 K/uL    RBC 4.61 4.20 - 5.30 M/uL    HGB 11.5 (L) 12.0 - 16.0 g/dL    HCT 37.3 35.0 - 45.0 %    MCV 80.9 74.0 - 97.0 FL    MCH 24.9 24.0 - 34.0 PG    MCHC 30.8 (L) 31.0 - 37.0 g/dL    RDW 16.8 (H) 11.6 - 14.5 %    PLATELET 708 (H) 963 - 420 K/uL    MPV 9.9 9.2 - 11.8 FL    NEUTROPHILS 62 40 - 73 %    LYMPHOCYTES 29 21 - 52 %    MONOCYTES 5 3 - 10 %    EOSINOPHILS 4 0 - 5 %    BASOPHILS 0 0 - 2 %    ABS. NEUTROPHILS 4.2 1.8 - 8.0 K/UL    ABS. LYMPHOCYTES 2.0 0.9 - 3.6 K/UL    ABS. MONOCYTES 0.4 0.05 - 1.2 K/UL    ABS. EOSINOPHILS 0.3 0.0 - 0.4 K/UL    ABS.  BASOPHILS 0.0 0.0 - 0.1 K/UL    DF AUTOMATED     TROPONIN I   Result Value Ref Range    Troponin-I, QT <0.02 0.0 - 0.045 NG/ML   URINALYSIS W/ RFLX MICROSCOPIC   Result Value Ref Range    Color YELLOW      Appearance CLEAR      Specific gravity 1.007 1.005 - 1.030      pH (UA) 7.5 5.0 - 8.0      Protein NEGATIVE  NEG mg/dL    Glucose NEGATIVE  NEG mg/dL    Ketone NEGATIVE  NEG mg/dL    Bilirubin NEGATIVE  NEG      Blood NEGATIVE  NEG      Urobilinogen 0.2 0.2 - 1.0 EU/dL    Nitrites NEGATIVE  NEG      Leukocyte Esterase NEGATIVE  NEG     METABOLIC PANEL, COMPREHENSIVE   Result Value Ref Range    Sodium 142 136 - 145 mmol/L    Potassium 3.6 3.5 - 5.5 mmol/L    Chloride 105 100 - 108 mmol/L    CO2 28 21 - 32 mmol/L    Anion gap 9 3.0 - 18 mmol/L    Glucose 92 74 - 99 mg/dL    BUN 7 7.0 - 18 MG/DL    Creatinine 0.58 (L) 0.6 - 1.3 MG/DL    BUN/Creatinine ratio 12 12 - 20      GFR est AA >60 >60 ml/min/1.73m2    GFR est non-AA >60 >60 ml/min/1.73m2    Calcium 8.7 8.5 - 10.1 MG/DL    Bilirubin, total 0.2 0.2 - 1.0 MG/DL    ALT (SGPT) 24 13 - 56 U/L    AST (SGOT) 21 15 - 37 U/L    Alk.  phosphatase 76 45 - 117 U/L    Protein, total 7.8 6.4 - 8.2 g/dL    Albumin 3.3 (L) 3.4 - 5.0 g/dL    Globulin 4.5 (H) 2.0 - 4.0 g/dL    A-G Ratio 0.7 (L) 0.8 - 1.7     TROPONIN I   Result Value Ref Range    Troponin-I, QT <0.02 0.0 - 0.045 NG/ML   EKG, 12 LEAD, INITIAL   Result Value Ref Range    Ventricular Rate 92 BPM    Atrial Rate 92 BPM    P-R Interval 140 ms    QRS Duration 82 ms    Q-T Interval 380 ms    QTC Calculation (Bezet) 469 ms    Calculated P Axis 26 degrees    Calculated R Axis 29 degrees    Calculated T Axis 58 degrees    Diagnosis       Normal sinus rhythm  Cannot rule out Anterior infarct (cited on or before 08-APR-2019)  Abnormal ECG  When compared with ECG of 16-FEB-2019 20:18,  No significant change was found  Confirmed by Kristel Nunez ([id number]) on 4/8/2019 10:11:55 AM           ASSESSMENT/PLAN  Diagnoses and all orders for this visit:    Acute diverticulitis  Recurrent  Mild, last episode 2 weeks ago  Check CT abdomen pelvis  Restart metronidazole and flagyl   Referral to dr. Mary Murguia colorectal w/ recurrent episodes, no previous h/o colonoscopy    Adjustment reaction with depressed mood  Improved, off zoloft, will monitor    Prediabetes  Persistent, encouraged wt loss    Essential hypertension  controlled norvasc 10,  losartan  25 mg   BP log, if feels lightheaded advised to d/c  Intolerant to BB and ACE    Coronary artery disease of native artery of native heart with stable angina pectoris ([address]  S/p stent 1/18  Asymptomatic  On  nitrates, ASA, plavix, pravachol  Following dr. Cruzito Pfeiffer on smoking cessation  Intolerant to BB and simvastatin, perhaps try pravachol will offer on next visit    Low HDL  Has started pravachol tolerating    Iron deficiency anemia due to chronic blood loss  Likely due to HMB  Recheck CBC    Ff-up in 3 days for abdominal pain ff-up    Patient understands plan of care. Patient has provided input and agrees with goals.

## 2019-04-16 NOTE — PROGRESS NOTES
Kenny Lora, 34 y.o.,  female    SUBJECTIVE  abdominal pain ff-up     LLQ sharp abdominal pain has improved, taking cipro/flagyl for the past 3 days. She continues to be afebrile. She has upcoming appt with dr. Caitlin Wilkinson next week for further eval of recurrent diverticulitis. She reports needing to strain with BM previously. She has not been scheduled for CT abdomen yet. HTN- taking medications,  She has h/o CAD,  STEMI s/p PCI 1/18 - denies chest pain, sob. Following dr. Helen Iverson. She has completely stopped smoking in May 2018. She is Off simvastatin and metoprolol due to med intolerance palpitations. Reviewed cards notes, started on pravachol and tolerating. Depression- she reports coming off of zoloft in January and feels well, no longer with depression symptoms of crying, feeling down, sleep difficulty. Anemia-thought to be due to HMB. Following gyne  dr Abi Maldonado    ROS:  See HPI, all others negative        Patient Active Problem List   Diagnosis Code    Tobacco use Z72.0    Obesity E66.9    Hypertension I10    Prediabetes R73.03    Low HDL (under 36) E78.6    Obesity, morbid ([address] E66.01    ST elevation myocardial infarction (STEMI) ([facility] [address] I21.3    Iron deficiency anemia due to chronic blood loss D50.0    Coronary artery disease involving native coronary artery of native heart with angina pectoris (HCC) I25.119    Adjustment disorder with depressed mood F43.21    Epigastric pain R10.13    Calculus of gallbladder without cholecystitis without obstruction K80.20       Current Outpatient Medications   Medication Sig Dispense Refill    ciprofloxacin HCl (CIPRO) 500 mg tablet Take 1 Tab by mouth two (2) times a day for 10 days. 20 Tab 0    metroNIDAZOLE (FLAGYL) 500 mg tablet Take 1 Tab by mouth three (3) times daily for 10 days. 30 Tab 0    pravastatin (PRAVACHOL) 20 mg tablet Take 20 mg by mouth nightly.       ondansetron (ZOFRAN ODT) 4 mg disintegrating tablet Take 1 Tab by mouth every eight (8) hours as needed for Nausea. 6 Tab 1    albuterol (PROVENTIL HFA, VENTOLIN HFA, PROAIR HFA) 90 mcg/actuation inhaler Take 2 Puffs by inhalation every four (4) hours as needed for Wheezing. 1 Inhaler 0    amLODIPine (NORVASC) 10 mg tablet Take 1 Tab by mouth daily. (Patient taking differently: Take 5 mg by mouth daily.) 90 Tab 0    fluticasone (FLONASE) 50 mcg/actuation nasal spray 2 Sprays by Both Nostrils route daily. 1 Bottle 0    cetirizine (ZYRTEC) 10 mg tablet Take 1 Tab by mouth daily as needed for Allergies. 90 Tab 0    clopidogrel (PLAVIX) 75 mg tab 75 mg.  aspirin 81 mg chewable tablet 81 mg.      nitroglycerin (NITROSTAT) 0.4 mg SL tablet 0.4 mg. Allergies   Allergen Reactions    Lisinopril Cough    Metoprolol Palpitations    Vaccine Adjuvant Emulsion Combination No. 1 Seizures     MMR       Past Medical History:   Diagnosis Date    Cardiac echocardiogram 07/21/2016    Sm LV cavity. EF 65-70%. No RWMA. Mod-marked LVH w/dynamic obstruction, mid-cavity obliteration & peak grad of 25 mmHg. Indeterminate diastolic fx. No significant valvular pathology.  Cardiac nuclear imaging test 07/22/2016    Low risk. Very sm distal anterior & apical partially reversible defect, likely artifact, but sm area of ischemia not excluded. No RWMA. EF 66%.   Neg EKG on pharm stress test.    Diverticulitis     Gall stones     GERD (gastroesophageal reflux disease)     Heart attack ([facility] [address]     Hypertension     Infectious disease     Bacteria vaginosis    Non-ST elevated myocardial infarction ([facility] [address]     STEMI (ST elevation myocardial infarction) ([facility] [address] 01/2018       Social History     Socioeconomic History    Marital status: SINGLE     Spouse name: Not on file    Number of children: Not on file    Years of education: Not on file    Highest education level: Not on file   Occupational History    Not on file   Social Needs    Financial resource strain: Not on file   [place] insecurity:     Worry: Not on file     Inability: Not on file    Transportation needs:     Medical: Not on file     Non-medical: Not on file   Tobacco Use    Smoking status: Former Smoker     Packs/day: 0.25     Years: 20.00     Pack years: 5.00    Smokeless tobacco: Never Used   Substance and Sexual Activity    Alcohol use: No     Frequency: Never    Drug use: No    Sexual activity: Yes     Partners: Male     Birth control/protection: Surgical     Comment: tubal ligation   Lifestyle    Physical activity:     Days per week: Not on file     Minutes per session: Not on file    Stress: Not on file   Relationships    Social connections:     Talks on phone: Not on file     Gets together: Not on file     Attends [employer] service: Not on file     Active member of club or organization: Not on file     Attends meetings of clubs or organizations: Not on file     Relationship status: Not on file    Intimate partner violence:     Fear of current or ex partner: Not on file     Emotionally abused: Not on file     Physically abused: Not on file     Forced sexual activity: Not on file   Other Topics Concern    Not on file   Social History Narrative    Not on file       Family History   Problem Relation Age of Onset    Depression Mother     Asthma Mother     Migraines Mother     Hypertension Mother     Stroke Mother     Heart Attack Mother    Fahad Jacome Mother     Depression Brother     Alcohol abuse Father     Substance Abuse Father         tobacco    Drug Abuse Father     Hypertension Father     High Cholesterol Father     Stroke Father     Rashes/Skin Problems Father     Substance Abuse Brother         tobacco    Drug Abuse Brother     Asthma Brother     Migraines Brother     Hypertension Brother     High Cholesterol Brother     Hypertension Paternal Grandmother     Diabetes Paternal Grandmother     Hypertension Maternal Grandmother     Colon Cancer Maternal Grandmother OBJECTIVE    Physical Exam:     Visit Vitals  /84 (BP 1 Location: Left arm, BP Patient Position: Sitting)   Pulse 82   Temp 98.8 °F (37.1 °C) (Oral)   Resp 16   Ht 5' (1.524 m)   Wt 224 lb (101.6 kg)   LMP 04/01/2019 (Exact Date)   SpO2 99%   BMI 43.75 kg/m²       General: alert, well-appearing, obese,AA, in no apparent distress or pain  HEENT: throat, pharynx clear, eac patent, TM intact, no jaundice  CVS: normal rate, regular rhythm, distinct S1 and S2 +MTTP on L mid anterior wall area  Lungs:clear to ausculation bilaterally, no crackles, wheezing or rhonchi noted  Abdomen: soft, non tender, no rebound  Skin: warm, no lesions, rashes noted  Psych:  mood and affect normal        ASSESSMENT/PLAN  Diagnoses and all orders for this visit:    Acute diverticulitis  Clinically improved,   Advised citrucel daily to hopefully address consitpation  Will cancel CT abdomen, as she is currently symptom free and recent CT in march showing sigmoid diverticulitis, similar to previous. Keep appt with dr. Mary Murguia next week    Adjustment reaction with depressed mood  Improved, off zoloft, will monitor    Prediabetes  Persistent, encouraged wt loss    Essential hypertension  controlled   norvasc 10,  losartan  25 mg   Intolerant to BB and ACE    Coronary artery disease of native artery of native heart with stable angina pectoris (HCC)  S/p stent 1/18  Asymptomatic  On  nitrates, ASA, plavix, pravachol   Following dr. Jessica Thomas on smoking cessation  Intolerant to BB and simvastatin    Low HDL  Has started pravachol tolerating  Check lipid panel/cmp/ a1c/ cbc in 4 weeks prior to next visit    Prediabetes  H/o    Iron deficiency anemia due to chronic blood loss  Likely due to HMB      Ff-up in  4 weeks or sooner prn    Patient understands plan of care. Patient has provided input and agrees with goals.

## 2019-04-22 ENCOUNTER — OFFICE VISIT (OUTPATIENT)
Dept: SURGERY | Age: 39
End: 2019-04-22

## 2019-04-22 VITALS
SYSTOLIC BLOOD PRESSURE: 128 MMHG | OXYGEN SATURATION: 97 % | TEMPERATURE: 98.2 F | HEART RATE: 76 BPM | RESPIRATION RATE: 16 BRPM | BODY MASS INDEX: 43.59 KG/M2 | WEIGHT: 222 LBS | HEIGHT: 60 IN | DIASTOLIC BLOOD PRESSURE: 82 MMHG

## 2019-04-22 DIAGNOSIS — K57.32 DIVERTICULITIS OF COLON: Primary | ICD-10-CM

## 2019-04-22 DIAGNOSIS — R10.9 ABDOMINAL CRAMPING: ICD-10-CM

## 2019-04-22 RX ORDER — HYOSCYAMINE SULFATE 0.125 MG
125 TABLET ORAL
Qty: 40 TAB | Refills: 0 | Status: SHIPPED | OUTPATIENT
Start: 2019-04-22 | End: 2019-12-30

## 2019-04-22 NOTE — PROGRESS NOTES
HPI: Waleska Waters is a 34 y.o. female presenting with chief complain of recurrent simple diverticulitis. The patient was originally diagnosed with this in 2016. CT showed extremely mild inflammation at the junction of descending and sigmoid colon. She was placed on oral antibiotics for 10 days and the pain improved. Her pain is mainly lower crampy pain she also has nausea vomiting and chills. 1 month ago she states she had recurrent nausea vomiting chills and sweats and crampy abdominal pain. She was placed on 2 course of antibiotics and is feeling much better. She has never had a colonoscopy. She has significant reflux which causes nausea. She has no unexplained weight loss. She has 1 bowel movement per day, is occasionally constipated with the flares. She denies blood per rectum or fecal incontinence. Past Medical History:   Diagnosis Date    Cardiac echocardiogram 2016    Sm LV cavity. EF 65-70%. No RWMA. Mod-marked LVH w/dynamic obstruction, mid-cavity obliteration & peak grad of 25 mmHg. Indeterminate diastolic fx. No significant valvular pathology.  Cardiac nuclear imaging test 2016    Low risk. Very sm distal anterior & apical partially reversible defect, likely artifact, but sm area of ischemia not excluded. No RWMA. EF 66%.   Neg EKG on pharm stress test.    Diverticulitis     Gall stones     GERD (gastroesophageal reflux disease)     Heart attack ([address]     Hypertension     Infectious disease     Bacteria vaginosis    Non-ST elevated myocardial infarction ([address]     STEMI (ST elevation myocardial infarction) ([address] 2018       Past Surgical History:   Procedure Laterality Date    HX  SECTION  2008    HX CORONARY STENT PLACEMENT      HX ORTHOPAEDIC      HX TUBAL LIGATION  2008       Family History   Problem Relation Age of Onset    Depression Mother     Asthma Mother     Migraines Mother     Hypertension Mother     Stroke Mother  Heart Attack Mother     Arthritis-osteo Mother     Depression Brother     Alcohol abuse Father     Substance Abuse Father         tobacco    Drug Abuse Father     Hypertension Father     High Cholesterol Father     Stroke Father     Rashes/Skin Problems Father     Substance Abuse Brother         tobacco    Drug Abuse Brother     Asthma Brother     Migraines Brother     Hypertension Brother     High Cholesterol Brother     Hypertension Paternal Grandmother     Diabetes Paternal Grandmother     Hypertension Maternal Grandmother     Colon Cancer Maternal Grandmother        Social History     Socioeconomic History    Marital status: SINGLE     Spouse name: Not on file    Number of children: Not on file    Years of education: Not on file    Highest education level: Not on file   Tobacco Use    Smoking status: Former Smoker     Packs/day: 0.25     Years: 20.00     Pack years: 5.00    Smokeless tobacco: Never Used   Substance and Sexual Activity    Alcohol use: No     Frequency: Never    Drug use: No    Sexual activity: Yes     Partners: Male     Birth control/protection: Surgical     Comment: tubal ligation       Review of Systems - Review of Systems   Constitutional: Negative. HENT: Negative. Eyes: Negative. Respiratory: Negative. Cardiovascular: Negative. Gastrointestinal: Positive for blood in stool, constipation, heartburn and melena. Negative for abdominal pain, diarrhea, nausea and vomiting. Genitourinary: Negative. Musculoskeletal: Positive for back pain and neck pain. Negative for falls, joint pain and myalgias. Skin: Negative. Neurological: Negative. Endo/Heme/Allergies: Negative. Psychiatric/Behavioral: Negative. Allergies   Allergen Reactions    Lisinopril Cough    Metoprolol Palpitations    Vaccine Adjuvant Emulsion Combination No. 1 Seizures     MMR       There were no vitals filed for this visit.     Physical Exam   Constitutional: She appears well-developed and well-nourished. HENT:   Head: Normocephalic and atraumatic. Eyes: Conjunctivae and EOM are normal.   Abdominal: Soft. She exhibits no distension. There is tenderness (Extremely mild left lower quadrant). Musculoskeletal: Normal range of motion. Lymphadenopathy:     She has no cervical adenopathy. Right: No inguinal adenopathy present. Left: No inguinal adenopathy present. Neurological: She exhibits normal muscle tone. Skin: Skin is warm and dry. No rash noted. Psychiatric: She has a normal mood and affect. Her speech is normal.   Digital rectal exam: Moderate tone, no mass    Assessment / Plan    Recurrent simple diverticulitis  Complete antibiotic course  Levsin as needed for abdominal cramping  Follow-up in 3 weeks  Will recommend high-fiber diet with fiber supplement if her pain is resolved  We will eventually schedule her for colonoscopy  Given that the diverticulitis is simple and 3 years apart, currently no plans for surgery    The diagnoses and plan were discussed with the patient. All questions answered. Plan of care agreed to by all concerned.

## 2019-04-22 NOTE — LETTER
4/22/19 Patient: Arnaldo Cuevas YOB: 1980 Date of Visit: 4/22/2019 Yuliana Sierra, [address] [id number] [address] [address] VIA In Basket Dear Shante Agosto, I saw Ms. Alvarez Friday in the office today for recurrent diverticulitis. She had one episode in 2016 which responded to outpatient antibiotics. The most recent episode has responded to outpatient antibiotics as well. Her exam today is significant only for some extremely mild left lower quadrant tenderness. She states her pain is largely resolved. I reviewed her CT imaging from earlier this year which shows minimal inflammation. I will have her complete the antibiotic therapy you already begun. I will prescribe her Levsin for any abdominal cramping. She will follow-up in the office in 3 weeks at which point I will recommend a high-fiber diet and fiber supplement if her pain is fully resolved. Subsequent to this we will schedule her for a colonoscopy. Given that her diverticulitis is simple in nature and both episodes were 3 years apart I currently do not plan any surgical intervention. If you have questions, please do not hesitate to call me. I look forward to following your patient along with you. Sincerely, Joel Harp MD

## 2019-05-13 ENCOUNTER — OFFICE VISIT (OUTPATIENT)
Dept: SURGERY | Age: 39
End: 2019-05-13

## 2019-05-13 ENCOUNTER — OFFICE VISIT (OUTPATIENT)
Dept: FAMILY MEDICINE CLINIC | Age: 39
End: 2019-05-13

## 2019-05-13 VITALS
WEIGHT: 225 LBS | SYSTOLIC BLOOD PRESSURE: 118 MMHG | OXYGEN SATURATION: 99 % | RESPIRATION RATE: 16 BRPM | HEIGHT: 60 IN | BODY MASS INDEX: 44.17 KG/M2 | TEMPERATURE: 98.6 F | HEART RATE: 79 BPM | DIASTOLIC BLOOD PRESSURE: 72 MMHG

## 2019-05-13 VITALS
WEIGHT: 226 LBS | SYSTOLIC BLOOD PRESSURE: 125 MMHG | TEMPERATURE: 98.7 F | BODY MASS INDEX: 44.37 KG/M2 | HEIGHT: 60 IN | DIASTOLIC BLOOD PRESSURE: 78 MMHG | HEART RATE: 83 BPM | OXYGEN SATURATION: 100 % | RESPIRATION RATE: 17 BRPM

## 2019-05-13 DIAGNOSIS — I10 ESSENTIAL HYPERTENSION: ICD-10-CM

## 2019-05-13 DIAGNOSIS — I25.119 CORONARY ARTERY DISEASE INVOLVING NATIVE CORONARY ARTERY OF NATIVE HEART WITH ANGINA PECTORIS (HCC): ICD-10-CM

## 2019-05-13 DIAGNOSIS — R73.03 PREDIABETES: ICD-10-CM

## 2019-05-13 DIAGNOSIS — K57.92 ACUTE DIVERTICULITIS: Primary | ICD-10-CM

## 2019-05-13 DIAGNOSIS — D50.0 IRON DEFICIENCY ANEMIA DUE TO CHRONIC BLOOD LOSS: ICD-10-CM

## 2019-05-13 DIAGNOSIS — E78.6 LOW HDL (UNDER 40): ICD-10-CM

## 2019-05-13 DIAGNOSIS — K57.32 DIVERTICULITIS OF COLON: Primary | ICD-10-CM

## 2019-05-13 NOTE — PROGRESS NOTES
1. Have you been to the ER, urgent care clinic since your last visit? Hospitalized since your last visit? No    2. Have you seen or consulted any other health care providers outside of the [address] [place] [address] since your last visit? Include any pap smears or colon screening.  Dr. Timothy Richards

## 2019-05-13 NOTE — PROGRESS NOTES
Vero Van, 34 y.o.,  female    SUBJECTIVE  Ff-up    Acute diverticulitis- recurrent mild symptoms past few months. Reports complete resolution of pain, has completed po antibiotics. Reviewed dr. Salena Harris note, plan on future colonoscopy. HTN- taking medications,  She has h/o CAD,  STEMI s/p PCI 1/18 - denies chest pain, sob. Following dr. Nicola Lozada. She has completely stopped smoking in May 2018. She is Off simvastatin and metoprolol due to med intolerance palpitations. Reviewed cards notes, she is currently on pravachol and tolerating. History of depression- reports to continue to do well, off zoloft    Anemia-thought to be due to HMB. Following gyne  dr Jose Mcnulty    Prediabetes- forgot to have labs drawn, reminded/reprinted    ROS:  See HPI, all others negative        Patient Active Problem List   Diagnosis Code    Tobacco use Z72.0    Obesity E66.9    Hypertension I10    Prediabetes R73.03    Low HDL (under 40) E78.6    Obesity, morbid ([address] E66.01    ST elevation myocardial infarction (STEMI) ([facility] I21.3    Iron deficiency anemia due to chronic blood loss D50.0    Coronary artery disease involving native coronary artery of native heart with angina pectoris (HCC) I25.119    Adjustment disorder with depressed mood F43.21    Epigastric pain R10.13    Calculus of gallbladder without cholecystitis without obstruction K80.20       Current Outpatient Medications   Medication Sig Dispense Refill    hyoscyamine (LEVSIN) 0.125 mg tablet Take 1 Tab by mouth every four (4) hours as needed for Cramping. 40 Tab 0    pravastatin (PRAVACHOL) 20 mg tablet Take 20 mg by mouth nightly.  ondansetron (ZOFRAN ODT) 4 mg disintegrating tablet Take 1 Tab by mouth every eight (8) hours as needed for Nausea. 6 Tab 1    albuterol (PROVENTIL HFA, VENTOLIN HFA, PROAIR HFA) 90 mcg/actuation inhaler Take 2 Puffs by inhalation every four (4) hours as needed for Wheezing.  1 Inhaler 0    amLODIPine (NORVASC) 10 mg tablet Take 1 Tab by mouth daily. (Patient taking differently: Take 5 mg by mouth daily.) 90 Tab 0    fluticasone (FLONASE) 50 mcg/actuation nasal spray 2 Sprays by Both Nostrils route daily. 1 Bottle 0    cetirizine (ZYRTEC) 10 mg tablet Take 1 Tab by mouth daily as needed for Allergies. 90 Tab 0    clopidogrel (PLAVIX) 75 mg tab 75 mg.  aspirin 81 mg chewable tablet 81 mg.      nitroglycerin (NITROSTAT) 0.4 mg SL tablet 0.4 mg. Allergies   Allergen Reactions    Lisinopril Cough    Metoprolol Palpitations    Vaccine Adjuvant Emulsion Combination No. 1 Seizures     MMR       Past Medical History:   Diagnosis Date    Cardiac echocardiogram 07/21/2016    Sm LV cavity. EF 65-70%. No RWMA. Mod-marked LVH w/dynamic obstruction, mid-cavity obliteration & peak grad of 25 mmHg. Indeterminate diastolic fx. No significant valvular pathology.  Cardiac nuclear imaging test 07/22/2016    Low risk. Very sm distal anterior & apical partially reversible defect, likely artifact, but sm area of ischemia not excluded. No RWMA. EF 66%.   Neg EKG on pharm stress test.    Diverticulitis     Gall stones     GERD (gastroesophageal reflux disease)     Heart attack ([facility]     Hypertension     Infectious disease     Bacteria vaginosis    Non-ST elevated myocardial infarction ([facility]     STEMI (ST elevation myocardial infarction) ([facility] 01/2018       Social History     Socioeconomic History    Marital status: SINGLE     Spouse name: Not on file    Number of children: Not on file    Years of education: Not on file    Highest education level: Not on file   Occupational History    Not on file   Social Needs    Financial resource strain: Not on file    Food insecurity:     Worry: Not on file     Inability: Not on file    Transportation needs:     Medical: Not on file     Non-medical: Not on file   Tobacco Use    Smoking status: Former Smoker     Packs/day: 0.25     Years: 20.00     Pack years: 5.00  Smokeless tobacco: Never Used   Substance and Sexual Activity    Alcohol use: No     Frequency: Never    Drug use: No    Sexual activity: Yes     Partners: Male     Birth control/protection: Surgical     Comment: tubal ligation   Lifestyle    Physical activity:     Days per week: Not on file     Minutes per session: Not on file    Stress: Not on file   Relationships    Social connections:     Talks on phone: Not on file     Gets together: Not on file     Attends Gnosticist service: Not on file     Active member of club or organization: Not on file     Attends meetings of clubs or organizations: Not on file     Relationship status: Not on file    Intimate partner violence:     Fear of current or ex partner: Not on file     Emotionally abused: Not on file     Physically abused: Not on file     Forced sexual activity: Not on file   Other Topics Concern    Not on file   Social History Narrative    Not on file       Family History   Problem Relation Age of Onset    Depression Mother     Asthma Mother     Migraines Mother     Hypertension Mother     Stroke Mother     Heart Attack Mother    Salome Kali Mother     Depression Brother     Alcohol abuse Father     Substance Abuse Father         tobacco    Drug Abuse Father     Hypertension Father     High Cholesterol Father     Stroke Father     Rashes/Skin Problems Father     Substance Abuse Brother         tobacco    Drug Abuse Brother     Asthma Brother     Migraines Brother     Hypertension Brother     High Cholesterol Brother     Hypertension Paternal Grandmother     Diabetes Paternal Grandmother     Hypertension Maternal Grandmother     Colon Cancer Maternal Grandmother          OBJECTIVE    Physical Exam:     Visit Vitals  /72 (BP 1 Location: Left arm, BP Patient Position: Sitting)   Pulse 79   Temp 98.6 °F (37 °C) (Oral)   Resp 16   Ht 5' (1.524 m)   Wt 225 lb (102.1 kg)   SpO2 99%   BMI 43.94 kg/m²       General: alert, well-appearing, obese,AA, in no apparent distress or pain  CVS: normal rate, regular rhythm, distinct S1 and S2  Lungs:clear to ausculation bilaterally, no crackles, wheezing or rhonchi noted  Abdomen: soft, non tender, no masses  Skin: warm, no lesions, rashes noted  Psych:  mood and affect normal        ASSESSMENT/PLAN  Diagnoses and all orders for this visit:    Acute diverticulitis  Mild, recurrent  Clinically resolved on po antibx  Advised citrucel daily for maintenance  Upcoming colonoscopy with dr. Mary Murguia    Prediabetes  Persistent, encouraged wt loss  monitoring    Essential hypertension  controlled   norvasc 10,  losartan  25 mg   Intolerant to BB and ACE    Coronary artery disease of native artery of native heart with stable angina pectoris (HCC)  S/p stent 1/18  Asymptomatic  On  nitrates, ASA, plavix, pravachol   Following dr. Jessica Thomas on smoking cessation  Intolerant to BB and simvastatin    Low HDL  Has started pravachol tolerating  Reminded/reprinted order for labs: lipid panel/cmp/ a1c/ cbc    Iron deficiency anemia due to chronic blood loss  Likely due to HMB      Ff-up in  12 weeks or sooner prn    Patient understands plan of care. Patient has provided input and agrees with goals.

## 2019-05-13 NOTE — PATIENT INSTRUCTIONS
DASH Diet: Care Instructions  Your Care Instructions    The DASH diet is an eating plan that can help lower your blood pressure. DASH stands for Dietary Approaches to Stop Hypertension. Hypertension is high blood pressure. The DASH diet focuses on eating foods that are high in calcium, potassium, and magnesium. These nutrients can lower blood pressure. The foods that are highest in these nutrients are fruits, vegetables, low-fat dairy products, nuts, seeds, and legumes. But taking calcium, potassium, and magnesium supplements instead of eating foods that are high in those nutrients does not have the same effect. The DASH diet also includes whole grains, fish, and poultry. The DASH diet is one of several lifestyle changes your doctor may recommend to lower your high blood pressure. Your doctor may also want you to decrease the amount of sodium in your diet. Lowering sodium while following the DASH diet can lower blood pressure even further than just the DASH diet alone. Follow-up care is a key part of your treatment and safety. Be sure to make and go to all appointments, and call your doctor if you are having problems. It's also a good idea to know your test results and keep a list of the medicines you take. How can you care for yourself at home? Following the DASH diet  · Eat 4 to 5 servings of fruit each day. A serving is 1 medium-sized piece of fruit, ½ cup chopped or canned fruit, 1/4 cup dried fruit, or 4 ounces (½ cup) of fruit juice. Choose fruit more often than fruit juice. · Eat 4 to 5 servings of vegetables each day. A serving is 1 cup of lettuce or raw leafy vegetables, ½ cup of chopped or cooked vegetables, or 4 ounces (½ cup) of vegetable juice. Choose vegetables more often than vegetable juice. · Get 2 to 3 servings of low-fat and fat-free dairy each day. A serving is 8 ounces of milk, 1 cup of yogurt, or 1 ½ ounces of cheese. · Eat 6 to 8 servings of grains each day.  A serving is 1 slice of bread, 1 ounce of dry cereal, or ½ cup of cooked rice, pasta, or cooked cereal. Try to choose whole-grain products as much as possible. · Limit lean meat, poultry, and fish to 2 servings each day. A serving is 3 ounces, about the size of a deck of cards. · Eat 4 to 5 servings of nuts, seeds, and legumes (cooked dried beans, lentils, and split peas) each week. A serving is 1/3 cup of nuts, 2 tablespoons of seeds, or ½ cup of cooked beans or peas. · Limit fats and oils to 2 to 3 servings each day. A serving is 1 teaspoon of vegetable oil or 2 tablespoons of salad dressing. · Limit sweets and added sugars to 5 servings or less a week. A serving is 1 tablespoon jelly or jam, ½ cup sorbet, or 1 cup of lemonade. · Eat less than 2,300 milligrams (mg) of sodium a day. If you limit your sodium to 1,500 mg a day, you can lower your blood pressure even more. Tips for success  · Start small. Do not try to make dramatic changes to your diet all at once. You might feel that you are missing out on your favorite foods and then be more likely to not follow the plan. Make small changes, and stick with them. Once those changes become habit, add a few more changes. · Try some of the following:  ? Make it a goal to eat a fruit or vegetable at every meal and at snacks. This will make it easy to get the recommended amount of fruits and vegetables each day. ? Try yogurt topped with fruit and nuts for a snack or healthy dessert. ? Add lettuce, tomato, cucumber, and onion to sandwiches. ? Combine a ready-made pizza crust with low-fat mozzarella cheese and lots of vegetable toppings. Try using tomatoes, squash, spinach, broccoli, carrots, cauliflower, and onions. ? Have a variety of cut-up vegetables with a low-fat dip as an appetizer instead of chips and dip. ? Sprinkle sunflower seeds or chopped almonds over salads. Or try adding chopped walnuts or almonds to cooked vegetables.   ? Try some vegetarian meals using beans and peas. Add garbanzo or kidney beans to salads. Make burritos and tacos with mashed beal beans or black beans. Where can you learn more? Go to http://kim-tristin.info/. Enter F544 in the search box to learn more about \"DASH Diet: Care Instructions. \"  Current as of: July 22, 2018  Content Version: 11.9  © 0615-3267 [employer]. Care instructions adapted under license by [employer] (which disclaims liability or warranty for this information). If you have questions about a medical condition or this instruction, always ask your healthcare professional. [address] any warranty or liability for your use of this information.

## 2019-05-13 NOTE — PROGRESS NOTES
Subjective: Tolerating diet moving bowels. Denies pain. Past medical history and ROS were reviewed and unchanged. Abdomen: Soft, nontender nondistended    Assessment / Plan    Status post simple diverticulitis episode, resolved  Colonoscopy in 2 months  Patient stopped baby aspirin for a day concern this was the cause of her pain, advised her to continue it  We will be sure she has no signs of colitis on colonoscopy; if she does she will need to follow-up with her cardiologist regarding aspirin  Office f/u PRN    A total of 15 minutes was spent with the patient, with >50% of time spent on counseling and coordination of care. The diagnoses and plan were discussed with patient. All questions answered. Plan of care agreed to by all concerned.

## 2019-06-24 ENCOUNTER — TELEPHONE (OUTPATIENT)
Dept: FAMILY MEDICINE CLINIC | Age: 39
End: 2019-06-24

## 2019-06-24 DIAGNOSIS — K57.32 DIVERTICULITIS OF COLON: Primary | ICD-10-CM

## 2019-06-24 RX ORDER — METRONIDAZOLE 500 MG/1
500 TABLET ORAL 3 TIMES DAILY
Qty: 30 TAB | Refills: 0 | Status: SHIPPED | OUTPATIENT
Start: 2019-06-24 | End: 2019-07-04

## 2019-06-24 RX ORDER — CIPROFLOXACIN 500 MG/1
500 TABLET ORAL 2 TIMES DAILY
Qty: 20 TAB | Refills: 0 | Status: SHIPPED | OUTPATIENT
Start: 2019-06-24 | End: 2019-07-04

## 2019-06-24 RX ORDER — METRONIDAZOLE 500 MG/1
500 TABLET ORAL 3 TIMES DAILY
Qty: 30 TAB | Refills: 0 | OUTPATIENT
Start: 2019-06-24 | End: 2019-07-04

## 2019-06-24 NOTE — TELEPHONE ENCOUNTER
Patient identified with 2 identifiers (name and ).   Spoke with patient and she states Dr. Srinivas Saxena office has called in Cipro and Flagyl

## 2019-06-24 NOTE — TELEPHONE ENCOUNTER
Pt called stating she does not need the script called in. Pt states she spoke with Dr. Merlin Boys & he is going to take care of it. Pt can be reached at [phone] if you have any questions.

## 2019-06-24 NOTE — TELEPHONE ENCOUNTER
Pt called in wanting to speak with Annetta Brooks in reference to a Flagyl script. She states there was a mix up & Dr. Romayne Punches will not be calling in the script. The pt can be reached at [phone].

## 2019-06-24 NOTE — TELEPHONE ENCOUNTER
This patient contacted office for the following prescriptions to be filled:    Medication requested :  Requested Prescriptions     Pending Prescriptions Disp Refills    metroNIDAZOLE (FLAGYL) 500 mg tablet 30 Tab 0     Sig: Take 1 Tab by mouth three (3) times daily for 10 days.          PCP: [address] [place] [address] or Print: [facility]   Mail order or Local pharmacy [address] [facility]     Scheduled appointment if not seen by current providers in office:  LOV 5/13/2019 f/u 8/15/2019

## 2019-06-24 NOTE — TELEPHONE ENCOUNTER
Patient identified with 2 identifiers (name and ). Spoke with patient and she states she is having lower abdomen pain and is requesting Flagyl. Patient states the pain started las t night in the lower abdomen similar to other times . Patient states she left a message on Dr. Fair Parisian nurse voice mail and has not heard any response as of yet.  Please advise

## 2019-06-27 ENCOUNTER — OFFICE VISIT (OUTPATIENT)
Dept: SURGERY | Age: 39
End: 2019-06-27

## 2019-06-27 VITALS
HEART RATE: 88 BPM | BODY MASS INDEX: 44.37 KG/M2 | TEMPERATURE: 98 F | WEIGHT: 226 LBS | OXYGEN SATURATION: 99 % | HEIGHT: 60 IN | RESPIRATION RATE: 18 BRPM | DIASTOLIC BLOOD PRESSURE: 84 MMHG | SYSTOLIC BLOOD PRESSURE: 136 MMHG

## 2019-06-27 DIAGNOSIS — K57.32 DIVERTICULITIS OF COLON: Primary | ICD-10-CM

## 2019-06-27 NOTE — PROGRESS NOTES
Subjective: She developed recurrent abdominal pain. We started her on antibiotics. She began this 2 to 3 days ago. She states the pain is somewhat better. She tells me she had CT imaging done in March at [facility]. this diverticular episode has been going on for the last 6 months. She has not had a colonoscopy. Past medical history and ROS were reviewed and unchanged. Abdomen: Soft, mildly tender left lower quadrant, nondistended    Assessment / Plan    Simple diverticulitis, medically refractory  Continue antibiotics low fiber diet  Follow-up 2 weeks  Asked to retrieve CT imaging from March  May require surgical resection    A total of 15 minutes was spent with the patient, with >50% of time spent on counseling and coordination of care. The diagnoses and plan were discussed with patient. All questions answered. Plan of care agreed to by all concerned.

## 2019-07-18 ENCOUNTER — OFFICE VISIT (OUTPATIENT)
Dept: FAMILY MEDICINE CLINIC | Age: 39
End: 2019-07-18

## 2019-07-18 VITALS
HEIGHT: 60 IN | RESPIRATION RATE: 16 BRPM | DIASTOLIC BLOOD PRESSURE: 86 MMHG | WEIGHT: 227 LBS | OXYGEN SATURATION: 99 % | SYSTOLIC BLOOD PRESSURE: 114 MMHG | TEMPERATURE: 98.4 F | HEART RATE: 82 BPM | BODY MASS INDEX: 44.57 KG/M2

## 2019-07-18 DIAGNOSIS — N89.8 VAGINAL DISCHARGE: Primary | ICD-10-CM

## 2019-07-18 RX ORDER — ACETAMINOPHEN 500 MG/1
500 TABLET, FILM COATED ORAL
COMMUNITY
Start: 2018-08-18 | End: 2021-09-21

## 2019-07-18 RX ORDER — FLUCONAZOLE 150 MG/1
150 TABLET ORAL DAILY
Qty: 1 TAB | Refills: 0 | Status: SHIPPED | OUTPATIENT
Start: 2019-07-18 | End: 2019-07-19

## 2019-07-18 RX ORDER — DICLOFENAC SODIUM 10 MG/G
GEL TOPICAL
Refills: 1 | COMMUNITY
Start: 2019-07-02 | End: 2020-02-21

## 2019-07-18 NOTE — PROGRESS NOTES
HISTORY OF PRESENT ILLNESS  Tabatha Recinos is a 34 y.o. female. HPI: Dr. Olea Scales patient. Here with c/o vaginal discharge and fishy order since yesterday. Has h/o BV so got concern. No fever. No abdominal pain. Had unprotected sexual activity last night. Same partner since 15 yeas. No h/o STD during that time. Had recent antibiotic couple of weeks ago. No vaginal itching or discomfort. No pelvic pain. No blood in urine or vaginal discharge. No fever. No nausea or vomiting. Visit Vitals  /86 (BP 1 Location: Left arm, BP Patient Position: Sitting)   Pulse 82   Temp 98.4 °F (36.9 °C) (Oral)   Resp 16   Ht 5' (1.524 m)   Wt 227 lb (103 kg)   SpO2 99%   BMI 44.33 kg/m²     ROS: see HPI     Physical Exam   Constitutional: She is oriented to person, place, and time. No distress. Abdominal: Soft. There is no tenderness. Genitourinary:   Genitourinary Comments: Pelvic exam: whitish thin vaginal discharge noted. No cervical motion tenderness or adnexal tenderness noted    Neurological: She is oriented to person, place, and time. ASSESSMENT and PLAN    ICD-10-CM ICD-9-CM    1. Vaginal discharge: for now taken nuswab. F/u after result. Mean time given diflucan if symptoms gets worse otherwise instructed to wait to hear for results. N89.8 623.5 fluconazole (DIFLUCAN) 150 mg tablet      NUSWAB VAGINITIS   Pt understood and agree with the plan   She is not taking plavix and statin. She will discuss that further with PCP. On aspirin. Follow-up and Dispositions    · Return in about 2 weeks (around 8/1/2019), or if symptoms worsen or fail to improve, for with PCP .

## 2019-07-18 NOTE — PATIENT INSTRUCTIONS

## 2019-07-19 ENCOUNTER — TELEPHONE (OUTPATIENT)
Dept: FAMILY MEDICINE CLINIC | Age: 39
End: 2019-07-19

## 2019-07-19 NOTE — TELEPHONE ENCOUNTER
Contacted patient and verified identity using name and date of birth (2- identifiers)  Spoke with patient and advised that results are not available yet.  She verbalized understanding

## 2019-07-19 NOTE — TELEPHONE ENCOUNTER
Pt requesting results for the labs of 7/18/2019 to determine if she needs medication. Please advise.

## 2019-07-22 ENCOUNTER — TELEPHONE (OUTPATIENT)
Dept: FAMILY MEDICINE CLINIC | Age: 39
End: 2019-07-22

## 2019-07-22 DIAGNOSIS — B96.89 BV (BACTERIAL VAGINOSIS): Primary | ICD-10-CM

## 2019-07-22 DIAGNOSIS — N76.0 BV (BACTERIAL VAGINOSIS): Primary | ICD-10-CM

## 2019-07-22 LAB
A VAGINAE DNA VAG QL NAA+PROBE: ABNORMAL SCORE
BVAB2 DNA VAG QL NAA+PROBE: ABNORMAL SCORE
C ALBICANS DNA VAG QL NAA+PROBE: NEGATIVE
C GLABRATA DNA VAG QL NAA+PROBE: NEGATIVE
MEGA1 DNA VAG QL NAA+PROBE: ABNORMAL SCORE
T VAGINALIS RRNA SPEC QL NAA+PROBE: NEGATIVE

## 2019-07-22 RX ORDER — METRONIDAZOLE 500 MG/1
500 TABLET ORAL 2 TIMES DAILY
Qty: 14 TAB | Refills: 0 | Status: SHIPPED | OUTPATIENT
Start: 2019-07-22 | End: 2019-07-29

## 2019-07-22 NOTE — PROGRESS NOTES
Intermediate score for BV. For now as she is symptomatic will consider treating her. Please call her with instructions. Medication sent to the pharmacy.

## 2019-07-23 ENCOUNTER — APPOINTMENT (OUTPATIENT)
Dept: GENERAL RADIOLOGY | Age: 39
End: 2019-07-23
Attending: PHYSICIAN ASSISTANT
Payer: MEDICAID

## 2019-07-23 ENCOUNTER — HOSPITAL ENCOUNTER (EMERGENCY)
Age: 39
Discharge: HOME | End: 2019-07-23
Attending: EMERGENCY MEDICINE | Admitting: EMERGENCY MEDICINE
Payer: MEDICAID

## 2019-07-23 ENCOUNTER — APPOINTMENT (OUTPATIENT)
Dept: CT IMAGING | Age: 39
End: 2019-07-23
Attending: PHYSICIAN ASSISTANT
Payer: MEDICAID

## 2019-07-23 VITALS
TEMPERATURE: 98.6 F | HEIGHT: 60 IN | OXYGEN SATURATION: 99 % | WEIGHT: 226 LBS | DIASTOLIC BLOOD PRESSURE: 81 MMHG | HEART RATE: 75 BPM | RESPIRATION RATE: 14 BRPM | BODY MASS INDEX: 44.37 KG/M2 | SYSTOLIC BLOOD PRESSURE: 137 MMHG

## 2019-07-23 DIAGNOSIS — R07.89 ATYPICAL CHEST PAIN: Primary | ICD-10-CM

## 2019-07-23 DIAGNOSIS — E87.6 HYPOKALEMIA: ICD-10-CM

## 2019-07-23 LAB
ANION GAP SERPL CALC-SCNC: 6 MMOL/L (ref 3–18)
BASOPHILS # BLD: 0 K/UL (ref 0–0.1)
BASOPHILS NFR BLD: 0 % (ref 0–2)
BUN SERPL-MCNC: 7 MG/DL (ref 7–18)
BUN/CREAT SERPL: 9 (ref 12–20)
CALCIUM SERPL-MCNC: 8.9 MG/DL (ref 8.5–10.1)
CHLORIDE SERPL-SCNC: 105 MMOL/L (ref 100–111)
CK MB CFR SERPL CALC: NORMAL % (ref 0–4)
CK MB CFR SERPL CALC: NORMAL % (ref 0–4)
CK MB SERPL-MCNC: <1 NG/ML (ref 5–25)
CK MB SERPL-MCNC: <1 NG/ML (ref 5–25)
CK SERPL-CCNC: 106 U/L (ref 26–192)
CK SERPL-CCNC: 106 U/L (ref 26–192)
CO2 SERPL-SCNC: 28 MMOL/L (ref 21–32)
CREAT SERPL-MCNC: 0.75 MG/DL (ref 0.6–1.3)
D DIMER PPP FEU-MCNC: 0.65 UG/ML(FEU)
DIFFERENTIAL METHOD BLD: ABNORMAL
EOSINOPHIL # BLD: 0.2 K/UL (ref 0–0.4)
EOSINOPHIL NFR BLD: 2 % (ref 0–5)
ERYTHROCYTE [DISTWIDTH] IN BLOOD BY AUTOMATED COUNT: 15.8 % (ref 11.6–14.5)
GLUCOSE SERPL-MCNC: 141 MG/DL (ref 74–99)
HCG SERPL QL: NEGATIVE
HCT VFR BLD AUTO: 36.7 % (ref 35–45)
HGB BLD-MCNC: 11.5 G/DL (ref 12–16)
INR PPP: 1 (ref 0.8–1.2)
LYMPHOCYTES # BLD: 2.9 K/UL (ref 0.9–3.6)
LYMPHOCYTES NFR BLD: 36 % (ref 21–52)
MCH RBC QN AUTO: 25.1 PG (ref 24–34)
MCHC RBC AUTO-ENTMCNC: 31.3 G/DL (ref 31–37)
MCV RBC AUTO: 80 FL (ref 74–97)
MONOCYTES # BLD: 0.2 K/UL (ref 0.05–1.2)
MONOCYTES NFR BLD: 3 % (ref 3–10)
NEUTS SEG # BLD: 4.7 K/UL (ref 1.8–8)
NEUTS SEG NFR BLD: 59 % (ref 40–73)
PLATELET # BLD AUTO: 420 K/UL (ref 135–420)
PMV BLD AUTO: 9.9 FL (ref 9.2–11.8)
POTASSIUM SERPL-SCNC: 3.3 MMOL/L (ref 3.5–5.5)
PROTHROMBIN TIME: 13.1 SEC (ref 11.5–15.2)
RBC # BLD AUTO: 4.59 M/UL (ref 4.2–5.3)
SODIUM SERPL-SCNC: 139 MMOL/L (ref 136–145)
TROPONIN I SERPL-MCNC: <0.02 NG/ML (ref 0–0.04)
TROPONIN I SERPL-MCNC: <0.02 NG/ML (ref 0–0.04)
WBC # BLD AUTO: 8 K/UL (ref 4.6–13.2)

## 2019-07-23 PROCEDURE — 74011250637 HC RX REV CODE- 250/637: Performed by: PHYSICIAN ASSISTANT

## 2019-07-23 PROCEDURE — 82550 ASSAY OF CK (CPK): CPT

## 2019-07-23 PROCEDURE — 74011636320 HC RX REV CODE- 636/320: Performed by: EMERGENCY MEDICINE

## 2019-07-23 PROCEDURE — 71045 X-RAY EXAM CHEST 1 VIEW: CPT

## 2019-07-23 PROCEDURE — 85610 PROTHROMBIN TIME: CPT

## 2019-07-23 PROCEDURE — 85379 FIBRIN DEGRADATION QUANT: CPT

## 2019-07-23 PROCEDURE — 84703 CHORIONIC GONADOTROPIN ASSAY: CPT

## 2019-07-23 PROCEDURE — 71275 CT ANGIOGRAPHY CHEST: CPT

## 2019-07-23 PROCEDURE — 99285 EMERGENCY DEPT VISIT HI MDM: CPT

## 2019-07-23 PROCEDURE — 93005 ELECTROCARDIOGRAM TRACING: CPT

## 2019-07-23 PROCEDURE — 80048 BASIC METABOLIC PNL TOTAL CA: CPT

## 2019-07-23 PROCEDURE — 85025 COMPLETE CBC W/AUTO DIFF WBC: CPT

## 2019-07-23 RX ORDER — GUAIFENESIN 100 MG/5ML
162 LIQUID (ML) ORAL
Status: COMPLETED | OUTPATIENT
Start: 2019-07-23 | End: 2019-07-23

## 2019-07-23 RX ORDER — POTASSIUM CHLORIDE 20 MEQ/1
20 TABLET, EXTENDED RELEASE ORAL 3 TIMES DAILY
Qty: 9 TAB | Refills: 0 | Status: SHIPPED | OUTPATIENT
Start: 2019-07-23 | End: 2019-07-26

## 2019-07-23 RX ORDER — POTASSIUM CHLORIDE 20 MEQ/1
40 TABLET, EXTENDED RELEASE ORAL
Status: COMPLETED | OUTPATIENT
Start: 2019-07-23 | End: 2019-07-23

## 2019-07-23 RX ORDER — CLOPIDOGREL BISULFATE 75 MG/1
75 TABLET ORAL
Status: COMPLETED | OUTPATIENT
Start: 2019-07-23 | End: 2019-07-23

## 2019-07-23 RX ADMIN — POTASSIUM CHLORIDE 40 MEQ: 20 TABLET, EXTENDED RELEASE ORAL at 20:39

## 2019-07-23 RX ADMIN — ASPIRIN 81 MG 162 MG: 81 TABLET ORAL at 19:49

## 2019-07-23 RX ADMIN — IOPAMIDOL 85 ML: 755 INJECTION, SOLUTION INTRAVENOUS at 20:54

## 2019-07-23 RX ADMIN — CLOPIDOGREL BISULFATE 75 MG: 75 TABLET ORAL at 19:49

## 2019-07-23 NOTE — ED PROVIDER NOTES
EMERGENCY DEPARTMENT HISTORY AND PHYSICAL EXAM    7:35 PM      Date: 7/23/2019  Patient Name: Natalia Carrasquillo    History of Presenting Illness     Chief Complaint   Patient presents with    Chest Pain         History Provided By: Patient    Additional History (Context): Natalia Carrasquillo is a 34 y.o. female with hx of STEMI, CAD who presents with c/o intermittent sharp left-sided chest pain since 1 PM.  Patient notes the pain lasts for a few seconds to a minute and resolves without intervention. Pt notes symptoms do not feel typical of previous anginal pain. Denies diaphoresis, nausea, vomiting, dyspnea, radiation of pain into neck or arm, pleuritic or exertional symptoms. Notes she had a cardiac stent placed last year as well as a cardiac stress test.  Notes she takes baby aspirin and Plavix daily. Notes her cardiologist is Dr. Anabel Kessler. She denies smoking, history of DVT or PE, hemoptysis, recent surgery or travel. PCP: Toni Gaitan MD    Current Facility-Administered Medications   Medication Dose Route Frequency Provider Last Rate Last Dose    potassium chloride (K-DUR, KLOR-CON) SR tablet 40 mEq  40 mEq Oral NOW Dejah Harris PA         Current Outpatient Medications   Medication Sig Dispense Refill    potassium chloride (K-DUR, KLOR-CON) 20 mEq tablet Take 1 Tab by mouth three (3) times daily for 3 days. 9 Tab 0    metroNIDAZOLE (FLAGYL) 500 mg tablet Take 1 Tab by mouth two (2) times a day for 7 days. 14 Tab 0    acetaminophen (TYLENOL EXTRA STRENGTH) 500 mg tablet Take 500 mg by mouth every four (4) hours as needed.  diclofenac (VOLTAREN) 1 % gel APPLY 2 GRAMS AA QID  1    hyoscyamine (LEVSIN) 0.125 mg tablet Take 1 Tab by mouth every four (4) hours as needed for Cramping. 40 Tab 0    ondansetron (ZOFRAN ODT) 4 mg disintegrating tablet Take 1 Tab by mouth every eight (8) hours as needed for Nausea.  6 Tab 1    albuterol (PROVENTIL HFA, VENTOLIN HFA, PROAIR HFA) 90 mcg/actuation inhaler Take 2 Puffs by inhalation every four (4) hours as needed for Wheezing. 1 Inhaler 0    amLODIPine (NORVASC) 10 mg tablet Take 1 Tab by mouth daily. (Patient taking differently: Take 5 mg by mouth daily.) 90 Tab 0    fluticasone (FLONASE) 50 mcg/actuation nasal spray 2 Sprays by Both Nostrils route daily. 1 Bottle 0    cetirizine (ZYRTEC) 10 mg tablet Take 1 Tab by mouth daily as needed for Allergies. 90 Tab 0    clopidogrel (PLAVIX) 75 mg tab 75 mg.  aspirin 81 mg chewable tablet Take 81 mg by mouth daily.  nitroglycerin (NITROSTAT) 0.4 mg SL tablet 0.4 mg. Past History     Past Medical History:  Past Medical History:   Diagnosis Date    Cardiac echocardiogram 2016    Sm LV cavity. EF 65-70%. No RWMA. Mod-marked LVH w/dynamic obstruction, mid-cavity obliteration & peak grad of 25 mmHg. Indeterminate diastolic fx. No significant valvular pathology.  Cardiac nuclear imaging test 2016    Low risk. Very sm distal anterior & apical partially reversible defect, likely artifact, but sm area of ischemia not excluded. No RWMA. EF 66%.   Neg EKG on pharm stress test.    Diverticulitis     Gall stones     GERD (gastroesophageal reflux disease)     Heart attack ([address]     Hypertension     Infectious disease     Bacteria vaginosis    Non-ST elevated myocardial infarction ([address]     STEMI (ST elevation myocardial infarction) ([address] 2018       Past Surgical History:  Past Surgical History:   Procedure Laterality Date    CARDIAC SURG PROCEDURE UNLIST      stent    HX  SECTION  2008    HX CORONARY STENT PLACEMENT      HX TUBAL LIGATION  2008       Family History:  Family History   Problem Relation Age of Onset    Depression Mother     Asthma Mother     Migraines Mother     Hypertension Mother     Stroke Mother     Heart Attack Mother     Arthritis-osteo Mother     Depression Brother     Alcohol abuse Father     Substance Abuse Father tobacco    Drug Abuse Father     Hypertension Father     High Cholesterol Father     Stroke Father     Rashes/Skin Problems Father     Substance Abuse Brother         tobacco    Drug Abuse Brother     Asthma Brother     Migraines Brother     Hypertension Brother     High Cholesterol Brother     Hypertension Paternal Grandmother     Diabetes Paternal Grandmother     Hypertension Maternal Grandmother     Colon Cancer Maternal Grandmother        Social History:  Social History     Tobacco Use    Smoking status: Former Smoker     Packs/day: 0.25     Years: 20.00     Pack years: 5.00     Last attempt to quit: 2018     Years since quittin.0    Smokeless tobacco: Never Used   Substance Use Topics    Alcohol use: No     Frequency: Never    Drug use: No       Allergies: Allergies   Allergen Reactions    Lisinopril Cough    Metoprolol Palpitations    Statins-Hmg-Coa Reductase Inhibitors Other (comments)     Muscle/joint pain    Vaccine Adjuvant Emulsion Combination No. 1 Seizures     MMR         Review of Systems       Review of Systems   Constitutional: Negative for chills and fever. Respiratory: Negative for shortness of breath. Cardiovascular: Positive for chest pain. Gastrointestinal: Negative for abdominal pain, nausea and vomiting. Skin: Negative for rash. Neurological: Negative for weakness. All other systems reviewed and are negative. Physical Exam     Visit Vitals  /87 (BP 1 Location: Left arm, BP Patient Position: At rest)   Pulse 93   Temp 98.6 °F (37 °C)   Resp 18   Ht 5' (1.524 m)   Wt 102.5 kg (226 lb)   LMP 2019   SpO2 99%   BMI 44.14 kg/m²         Physical Exam   Constitutional: She appears well-developed and well-nourished. No distress. HENT:   Head: Normocephalic and atraumatic. Neck: Normal range of motion. Neck supple. Cardiovascular: Normal rate, regular rhythm, normal heart sounds and intact distal pulses.  Exam reveals no gallop and no friction rub. No murmur heard. Pulmonary/Chest: Effort normal and breath sounds normal. No respiratory distress. She has no wheezes. She has no rales. She exhibits no tenderness. Abdominal: Soft. She exhibits no distension. There is no tenderness. There is no rebound and no guarding. Musculoskeletal: Normal range of motion. She exhibits no edema. Neurological: She is alert. Skin: Skin is warm. No rash noted. She is not diaphoretic. Nursing note and vitals reviewed. Diagnostic Study Results     Labs -  Recent Results (from the past 12 hour(s))   EKG, 12 LEAD, INITIAL    Collection Time: 07/23/19  6:37 PM   Result Value Ref Range    Ventricular Rate 86 BPM    Atrial Rate 86 BPM    P-R Interval 146 ms    QRS Duration 86 ms    Q-T Interval 382 ms    QTC Calculation (Bezet) 457 ms    Calculated P Axis 14 degrees    Calculated R Axis 30 degrees    Calculated T Axis 54 degrees    Diagnosis       Normal sinus rhythm  Inferior infarct , age undetermined  Abnormal ECG  When compared with ECG of 08-APR-2019 10:01,  No significant change was found     CBC WITH AUTOMATED DIFF    Collection Time: 07/23/19  6:58 PM   Result Value Ref Range    WBC 8.0 4.6 - 13.2 K/uL    RBC 4.59 4.20 - 5.30 M/uL    HGB 11.5 (L) 12.0 - 16.0 g/dL    HCT 36.7 35.0 - 45.0 %    MCV 80.0 74.0 - 97.0 FL    MCH 25.1 24.0 - 34.0 PG    MCHC 31.3 31.0 - 37.0 g/dL    RDW 15.8 (H) 11.6 - 14.5 %    PLATELET 214 101 - 976 K/uL    MPV 9.9 9.2 - 11.8 FL    NEUTROPHILS 59 40 - 73 %    LYMPHOCYTES 36 21 - 52 %    MONOCYTES 3 3 - 10 %    EOSINOPHILS 2 0 - 5 %    BASOPHILS 0 0 - 2 %    ABS. NEUTROPHILS 4.7 1.8 - 8.0 K/UL    ABS. LYMPHOCYTES 2.9 0.9 - 3.6 K/UL    ABS. MONOCYTES 0.2 0.05 - 1.2 K/UL    ABS. EOSINOPHILS 0.2 0.0 - 0.4 K/UL    ABS.  BASOPHILS 0.0 0.0 - 0.1 K/UL    DF AUTOMATED     METABOLIC PANEL, BASIC    Collection Time: 07/23/19  6:58 PM   Result Value Ref Range    Sodium 139 136 - 145 mmol/L    Potassium 3.3 (L) 3.5 - 5.5 mmol/L Chloride 105 100 - 111 mmol/L    CO2 28 21 - 32 mmol/L    Anion gap 6 3.0 - 18 mmol/L    Glucose 141 (H) 74 - 99 mg/dL    BUN 7 7.0 - 18 MG/DL    Creatinine 0.75 0.6 - 1.3 MG/DL    BUN/Creatinine ratio 9 (L) 12 - 20      GFR est AA >60 >60 ml/min/1.73m2    GFR est non-AA >60 >60 ml/min/1.73m2    Calcium 8.9 8.5 - 10.1 MG/DL   CARDIAC PANEL,(CK, CKMB & TROPONIN)    Collection Time: 07/23/19  6:58 PM   Result Value Ref Range     26 - 192 U/L    CK - MB <1.0 <3.6 ng/ml    CK-MB Index  0.0 - 4.0 %     CALCULATION NOT PERFORMED WHEN RESULT IS BELOW LINEAR LIMIT    Troponin-I, QT <0.02 0.0 - 0.045 NG/ML   HCG QL SERUM    Collection Time: 07/23/19  6:58 PM   Result Value Ref Range    HCG, Ql. NEGATIVE  NEG     PROTHROMBIN TIME + INR    Collection Time: 07/23/19  6:58 PM   Result Value Ref Range    Prothrombin time 13.1 11.5 - 15.2 sec    INR 1.0 0.8 - 1.2     D DIMER    Collection Time: 07/23/19  6:58 PM   Result Value Ref Range    D DIMER 0.65 (H) <0.46 ug/ml(FEU)       Radiologic Studies -   XR CHEST PORT    (Results Pending)   CTA CHEST W OR W WO CONT    (Results Pending)       Medical Decision Making   I am the first provider for this patient. I reviewed the vital signs, available nursing notes, past medical history, past surgical history, family history and social history. Vital Signs-Reviewed the patient's vital signs. Pulse Oximetry Analysis -  99 on room air    Records Reviewed: Nursing Notes and Old Medical Records (Time of Review: 7:35 PM)  [facility] Visits:  Coronary artery disease involving native coronary artery of native heart without angina pectoris  STEMI s/p emergent cath 1/27/18 ([place]) - RCA patent, LM patent, LCX small to moderate size first OM with 80% ostial stenosis, LAD mid 50% with distal 100% occluded.  S/p PCI of LAD using a 2.75 x 16mm Synergy LINH  NST 7/3/18 - small to moderate intensity ischemia in the apical cap and inferlat wall -- due to lack of anginal symptoms at f/u and good exercise tolerance, LHC was deferred  Atypical sharp chest pain    ED Course: Progress Notes, Reevaluation, and Consults:  PROGRESS NOTE:  8:00PM  Patient care will be transferred to Dr. Cathy Tristan. Discussed available diagnostic results and care plan at length. Pending repeat cardiac panel and CTA chest for disposition. Written by Shree Zimmerman PA-C    8:03 PM:  Discussed case with Dr. Sandrine Vieira, on call for Dr. Latasha Anderson, cardiologist. If EKG without ischemic changes, troponin negative x 2 and patient's symptoms are atypical, can be d/c'd and follow-up in office. Diagnosis     Clinical Impression:   1. Atypical chest pain    2. Hypokalemia        Disposition: TBS     Follow-up Information     Follow up With Specialties Details [address] [place] [id number] EMERGENCY DEPT Emergency Medicine  If symptoms worsen [address] [facility]  [phone]    Yuly Montes De Oca MD Family Practice In 2 days  [address] [place] [address] Drive  [address] [place] [address]  [address] [facility]  [phone]             Patient's Medications   Start Taking    POTASSIUM CHLORIDE (K-DUR, KLOR-CON) 20 MEQ TABLET    Take 1 Tab by mouth three (3) times daily for 3 days. Continue Taking    ACETAMINOPHEN (TYLENOL EXTRA STRENGTH) 500 MG TABLET    Take 500 mg by mouth every four (4) hours as needed. ALBUTEROL (PROVENTIL HFA, VENTOLIN HFA, PROAIR HFA) 90 MCG/ACTUATION INHALER    Take 2 Puffs by inhalation every four (4) hours as needed for Wheezing. AMLODIPINE (NORVASC) 10 MG TABLET    Take 1 Tab by mouth daily. ASPIRIN 81 MG CHEWABLE TABLET    Take 81 mg by mouth daily. CETIRIZINE (ZYRTEC) 10 MG TABLET    Take 1 Tab by mouth daily as needed for Allergies. CLOPIDOGREL (PLAVIX) 75 MG TAB    75 mg. DICLOFENAC (VOLTAREN) 1 % GEL    APPLY 2 GRAMS AA QID    FLUTICASONE (FLONASE) 50 MCG/ACTUATION NASAL SPRAY    2 Sprays by Both Nostrils route daily.     HYOSCYAMINE (LEVSIN) 0.125 MG TABLET    Take 1 Tab by mouth every four (4) hours as needed for Cramping. METRONIDAZOLE (FLAGYL) 500 MG TABLET    Take 1 Tab by mouth two (2) times a day for 7 days. NITROGLYCERIN (NITROSTAT) 0.4 MG SL TABLET    0.4 mg.    ONDANSETRON (ZOFRAN ODT) 4 MG DISINTEGRATING TABLET    Take 1 Tab by mouth every eight (8) hours as needed for Nausea. These Medications have changed    No medications on file   Stop Taking    PRAVASTATIN (PRAVACHOL) 20 MG TABLET    Take 20 mg by mouth nightly. Dictation disclaimer:  Please note that this dictation was completed with [employer], the [employer] voice recognition software. Quite often unanticipated grammatical, syntax, homophones, and other interpretive errors are inadvertently transcribed by the computer software. Please disregard these errors. Please excuse any errors that have escaped final proofreading.

## 2019-07-23 NOTE — PROGRESS NOTES
Contacted patient and verified identity using name and date of birth (2- identifiers)  Spoke with patient and she verbalized understanding of BV and need for Flagyl BID for 7 days. Rx to patient's pharmacy. Patient advised against alcohol consumption during course of treatment.

## 2019-07-24 LAB
ATRIAL RATE: 86 BPM
CALCULATED P AXIS, ECG09: 14 DEGREES
CALCULATED R AXIS, ECG10: 30 DEGREES
CALCULATED T AXIS, ECG11: 54 DEGREES
DIAGNOSIS, 93000: NORMAL
P-R INTERVAL, ECG05: 146 MS
Q-T INTERVAL, ECG07: 382 MS
QRS DURATION, ECG06: 86 MS
QTC CALCULATION (BEZET), ECG08: 457 MS
VENTRICULAR RATE, ECG03: 86 BPM

## 2019-07-24 NOTE — ED NOTES
10:59 PM neg trop x 2. Neg cta. Pt remains pain free. She agrees w dc plan. No questions . To call dr Demario Warren in am.  Bianca Quintanilla understanding of detailed return inst given.  No EMC

## 2019-07-24 NOTE — DISCHARGE INSTRUCTIONS
Patient Education     Follow-up with your cardiologist  within 2 days for reassessment. Bring the results from this visit with you for their review. Return to the ED immediately for any new, worsening, or persistent symptoms, including chest pain, shortness of breath, or any other medical concerns. Chest Pain:  Patient Education        Hypokalemia: Care Instructions  Your Care Instructions    Hypokalemia (say \"gy-qb-[id number]-PIEDAD-shakila-uh\") is a low level of potassium. The heart, muscles, kidneys, and nervous system all need potassium to work well. This problem has many different causes. Kidney problems, diet, and medicines like diuretics and laxatives can cause it. So can vomiting or diarrhea. In some cases, cancer is the cause. Your doctor may do tests to find the cause of your low potassium levels. You may need medicines to bring your potassium levels back to normal. You may also need regular blood tests to check your potassium. If you have very low potassium, you may need intravenous (IV) medicines. You also may need tests to check the electrical activity of your heart. Heart problems caused by low potassium levels can be very serious. Follow-up care is a key part of your treatment and safety. Be sure to make and go to all appointments, and call your doctor if you are having problems. It's also a good idea to know your test results and keep a list of the medicines you take. How can you care for yourself at home? · If your doctor recommends it, eat foods that have a lot of potassium. These include fresh fruits, juices, and vegetables. They also include nuts, beans, and milk. · Be safe with medicines. If your doctor prescribes medicines or potassium supplements, take them exactly as directed. Call your doctor if you have any problems with your medicines. · Get your potassium levels tested as often as your doctor tells you. When should you call for help? Call 911 anytime you think you may need emergency care. For example, call if:    · You feel like your heart is missing beats. Heart problems caused by low potassium can cause death.     · You passed out (lost consciousness).     · You have a seizure.    Call your doctor now or seek immediate medical care if:    · You feel weak or unusually tired.     · You have severe arm or leg cramps.     · You have tingling or numbness.     · You feel sick to your stomach, or you vomit.     · You have belly cramps.     · You feel bloated or constipated.     · You have to urinate a lot.     · You feel very thirsty most of the time.     · You are dizzy or lightheaded, or you feel like you may faint.     · You feel depressed, or you lose touch with reality.    Watch closely for changes in your health, and be sure to contact your doctor if:    · You do not get better as expected. Where can you learn more? Go to http://kim-tristin.info/. Enter G358 in the search box to learn more about \"Hypokalemia: Care Instructions. \"  Current as of: November 6, 2018  Content Version: 12.1  © 9946-7395 [employer]. Care instructions adapted under license by [employer] (which disclaims liability or warranty for this information). If you have questions about a medical condition or this instruction, always ask your healthcare professional. [address] any warranty or liability for your use of this information. Care Instructions  Your Care Instructions    There are many things that can cause chest pain. Some are not serious and will get better on their own in a few days. But some kinds of chest pain need more testing and treatment. Your doctor may have recommended a follow-up visit in the next 8 to 12 hours. If you are not getting better, you may need more tests or treatment. Even though your doctor has released you, you still need to watch for any problems.  The doctor carefully checked you, but sometimes problems can develop later. If you have new symptoms or if your symptoms do not get better, get medical care right away. If you have worse or different chest pain or pressure that lasts more than 5 minutes or you passed out (lost consciousness), call 911 or seek other emergency help right away. A medical visit is only one step in your treatment. Even if you feel better, you still need to do what your doctor recommends, such as going to all suggested follow-up appointments and taking medicines exactly as directed. This will help you recover and help prevent future problems. How can you care for yourself at home? · Rest until you feel better. · Take your medicine exactly as prescribed. Call your doctor if you think you are having a problem with your medicine. · Do not drive after taking a prescription pain medicine. When should you call for help? Call 911 if:    · You passed out (lost consciousness).     · You have severe difficulty breathing.     · You have symptoms of a heart attack. These may include:  ? Chest pain or pressure, or a strange feeling in your chest.  ? Sweating. ? Shortness of breath. ? Nausea or vomiting. ? Pain, pressure, or a strange feeling in your back, neck, jaw, or upper belly or in one or both shoulders or arms. ? Lightheadedness or sudden weakness. ? A fast or irregular heartbeat. After you call 911, the  may tell you to chew 1 adult-strength or 2 to 4 low-dose aspirin. Wait for an ambulance. Do not try to drive yourself.    Call your doctor today if:    · You have any trouble breathing.     · Your chest pain gets worse.     · You are dizzy or lightheaded, or you feel like you may faint.     · You are not getting better as expected.     · You are having new or different chest pain. Where can you learn more? Go to http://kim-tristin.info/. Enter A120 in the search box to learn more about \"Chest Pain: Care Instructions. \"  Current as of: September 23, 2018  Content Version: 12.1  © 1893-8180 [employer]. Care instructions adapted under license by [employer] (which disclaims liability or warranty for this information). If you have questions about a medical condition or this instruction, always ask your healthcare professional. [address] any warranty or liability for your use of this information.

## 2019-07-24 NOTE — ED NOTES
11:12 PM  07/23/19     Discharge instructions given to Marlys Poole (name) with verbalization of understanding. Patient accompanied by self. Patient discharged with the following prescriptions ptoassium. Patient discharged to home (destination).       [place]

## 2019-08-01 ENCOUNTER — OFFICE VISIT (OUTPATIENT)
Dept: SURGERY | Age: 39
End: 2019-08-01

## 2019-08-01 VITALS
SYSTOLIC BLOOD PRESSURE: 132 MMHG | WEIGHT: 221 LBS | TEMPERATURE: 99.1 F | HEART RATE: 82 BPM | DIASTOLIC BLOOD PRESSURE: 88 MMHG | OXYGEN SATURATION: 100 % | BODY MASS INDEX: 43.39 KG/M2 | RESPIRATION RATE: 18 BRPM | HEIGHT: 60 IN

## 2019-08-01 DIAGNOSIS — K57.32 DIVERTICULITIS OF COLON: Primary | ICD-10-CM

## 2019-08-01 NOTE — PROGRESS NOTES
Chief Complaint   Patient presents with    Diverticulitis   states no longer having left lower quad pain eating and drinking and bowels are moving   1. Have you been to the ER, urgent care clinic since your last visit? Hospitalized since your last visit? Yes for chest pain    2. Have you seen or consulted any other health care providers outside of the [address] [place] [address] since your last visit? Include any pap smears or colon screening.  No

## 2019-08-01 NOTE — PROGRESS NOTES
Subjective: Pain has resolved. She is on Flagyl for bacterial vaginosis. Moving her bowels. Past medical history and ROS were reviewed and unchanged. Abdomen: Soft, nontender nondistended    Assessment / Plan    Simple mild diverticulitis, resolved  If she develops recurrent disease may wish to consider resection  I have asked her to bring a copy of her CT imaging from [facility] from March, she tells me she will bring this  Colonoscopy at the end of the year    A total of 15 minutes was spent with the patient, with >50% of time spent on counseling and coordination of care. The diagnoses and plan were discussed with patient. All questions answered. Plan of care agreed to by all concerned.

## 2019-08-15 ENCOUNTER — OFFICE VISIT (OUTPATIENT)
Dept: FAMILY MEDICINE CLINIC | Age: 39
End: 2019-08-15

## 2019-08-15 VITALS
SYSTOLIC BLOOD PRESSURE: 126 MMHG | HEART RATE: 93 BPM | TEMPERATURE: 98.3 F | BODY MASS INDEX: 43.98 KG/M2 | RESPIRATION RATE: 16 BRPM | DIASTOLIC BLOOD PRESSURE: 84 MMHG | OXYGEN SATURATION: 99 % | HEIGHT: 60 IN | WEIGHT: 224 LBS

## 2019-08-15 DIAGNOSIS — N89.8 VAGINAL DISCHARGE: ICD-10-CM

## 2019-08-15 DIAGNOSIS — I25.119 CORONARY ARTERY DISEASE INVOLVING NATIVE CORONARY ARTERY OF NATIVE HEART WITH ANGINA PECTORIS (HCC): ICD-10-CM

## 2019-08-15 DIAGNOSIS — R73.03 PREDIABETES: Primary | ICD-10-CM

## 2019-08-15 DIAGNOSIS — I10 ESSENTIAL HYPERTENSION: ICD-10-CM

## 2019-08-15 DIAGNOSIS — D50.0 IRON DEFICIENCY ANEMIA DUE TO CHRONIC BLOOD LOSS: ICD-10-CM

## 2019-08-15 DIAGNOSIS — E78.6 LOW HDL (UNDER 40): ICD-10-CM

## 2019-08-15 RX ORDER — METRONIDAZOLE 500 MG/1
500 TABLET ORAL 2 TIMES DAILY
Qty: 6 TAB | Refills: 0 | Status: SHIPPED | OUTPATIENT
Start: 2019-08-15 | End: 2019-08-18

## 2019-08-15 NOTE — PROGRESS NOTES
Tabatha Recinos, 34 y.o.,  female    SUBJECTIVE  Ff-up    HTN- taking medications,  She has h/o CAD,  STEMI s/p PCI 1/18 - denies chest pain, sob currently. However upon chart review she was evaluated in the ED 2 x for chest pain and shortness of breath. neg CTAngio for PE, neg trop/EKG on ED visits. She says she those were related to MSK pain with lifting. She denies anxiety, heartburn. she is Following dr. Tia Olivera. She continues to be off smoking May 2018. She is Off simvastatin and metoprolol due to med intolerance palpitations. Reviewed cards notes, she is currently on pravachol and tolerating. History of depression- reports to continue to do well, off zoloft    Anemia-thought to be due to HMB. Following gyne  dr Tiffanie Zafar    Prediabetes- forgot to have labs drawn, reminded/reprinted    Recently treated for BV 7/18 and says she has responded well, however she says usually gets 10 days worth of flagyl instead of only 7 days, and requesting 3 more days. She denies any symptoms currently, she does have very mild residual fishy odor. She declines exam.    Acute diverticulitis- this has resolved, simple and no concern per dr. Edgar Key. ROS:  See HPI, all others negative        Patient Active Problem List   Diagnosis Code    Tobacco use Z72.0    Obesity E66.9    Hypertension I10    Prediabetes R73.03    Low HDL (under 40) E78.6    Obesity, morbid (HCC) E66.01    ST elevation myocardial infarction (STEMI) ([facility]) I21.3    Iron deficiency anemia due to chronic blood loss D50.0    Coronary artery disease involving native coronary artery of native heart with angina pectoris ([facility]) I25.119    Adjustment disorder with depressed mood F43.21    Epigastric pain R10.13    Calculus of gallbladder without cholecystitis without obstruction K80.20       Current Outpatient Medications   Medication Sig Dispense Refill    metroNIDAZOLE (FLAGYL) 500 mg tablet Take 1 Tab by mouth two (2) times a day for 3 days.  6 Tab 0    acetaminophen (TYLENOL EXTRA STRENGTH) 500 mg tablet Take 500 mg by mouth every four (4) hours as needed.  diclofenac (VOLTAREN) 1 % gel APPLY 2 GRAMS AA QID  1    hyoscyamine (LEVSIN) 0.125 mg tablet Take 1 Tab by mouth every four (4) hours as needed for Cramping. 40 Tab 0    ondansetron (ZOFRAN ODT) 4 mg disintegrating tablet Take 1 Tab by mouth every eight (8) hours as needed for Nausea. 6 Tab 1    albuterol (PROVENTIL HFA, VENTOLIN HFA, PROAIR HFA) 90 mcg/actuation inhaler Take 2 Puffs by inhalation every four (4) hours as needed for Wheezing. 1 Inhaler 0    amLODIPine (NORVASC) 10 mg tablet Take 1 Tab by mouth daily. (Patient taking differently: Take 5 mg by mouth daily.) 90 Tab 0    fluticasone (FLONASE) 50 mcg/actuation nasal spray 2 Sprays by Both Nostrils route daily. 1 Bottle 0    cetirizine (ZYRTEC) 10 mg tablet Take 1 Tab by mouth daily as needed for Allergies. 90 Tab 0    clopidogrel (PLAVIX) 75 mg tab 75 mg.  aspirin 81 mg chewable tablet Take 81 mg by mouth daily.  nitroglycerin (NITROSTAT) 0.4 mg SL tablet 0.4 mg. Allergies   Allergen Reactions    Lisinopril Cough    Metoprolol Palpitations    Statins-Hmg-Coa Reductase Inhibitors Other (comments)     Muscle/joint pain    Vaccine Adjuvant Emulsion Combination No. 1 Seizures     MMR       Past Medical History:   Diagnosis Date    Cardiac echocardiogram 07/21/2016    Sm LV cavity. EF 65-70%. No RWMA. Mod-marked LVH w/dynamic obstruction, mid-cavity obliteration & peak grad of 25 mmHg. Indeterminate diastolic fx. No significant valvular pathology.  Cardiac nuclear imaging test 07/22/2016    Low risk. Very sm distal anterior & apical partially reversible defect, likely artifact, but sm area of ischemia not excluded. No RWMA. EF 66%.   Neg EKG on pharm stress test.    Diverticulitis     Gall stones     GERD (gastroesophageal reflux disease)     Heart attack ([address]     Hypertension     Infectious disease     Bacteria vaginosis    Non-ST elevated myocardial infarction ([address]     STEMI (ST elevation myocardial infarction) ([address] 2018       Social History     Socioeconomic History    Marital status: SINGLE     Spouse name: Not on file    Number of children: Not on file    Years of education: Not on file    Highest education level: Not on file   Occupational History    Not on file   Social Needs    Financial resource strain: Not on file    Food insecurity:     Worry: Not on file     Inability: Not on file    Transportation needs:     Medical: Not on file     Non-medical: Not on file   Tobacco Use    Smoking status: Former Smoker     Packs/day: 0.25     Years: 20.00     Pack years: 5.00     Last attempt to quit: 2018     Years since quittin.1    Smokeless tobacco: Never Used   Substance and Sexual Activity    Alcohol use: No     Frequency: Never    Drug use: No    Sexual activity: Yes     Partners: Male     Birth control/protection: Surgical     Comment: tubal ligation   Lifestyle    Physical activity:     Days per week: Not on file     Minutes per session: Not on file    Stress: Not on file   Relationships    Social connections:     Talks on phone: Not on file     Gets together: Not on file     Attends Caodaism service: Not on file     Active member of club or organization: Not on file     Attends meetings of clubs or organizations: Not on file     Relationship status: Not on file    Intimate partner violence:     Fear of current or ex partner: Not on file     Emotionally abused: Not on file     Physically abused: Not on file     Forced sexual activity: Not on file   Other Topics Concern    Not on file   Social History Narrative    Not on file       Family History   Problem Relation Age of Onset    Depression Mother     Asthma Mother     Migraines Mother     Hypertension Mother     Stroke Mother     Heart Attack Mother     Arthritis-osteo Mother     Depression Brother    [facility] Alcohol abuse Father     Substance Abuse Father         tobacco    Drug Abuse Father     Hypertension Father     High Cholesterol Father     Stroke Father     Rashes/Skin Problems Father     Substance Abuse Brother         tobacco    Drug Abuse Brother     Asthma Brother     Migraines Brother     Hypertension Brother     High Cholesterol Brother     Hypertension Paternal Grandmother     Diabetes Paternal Grandmother     Hypertension Maternal Grandmother     Colon Cancer Maternal Grandmother          OBJECTIVE    Physical Exam:     Visit Vitals  /84 (BP 1 Location: Left arm, BP Patient Position: Sitting)   Pulse 93   Temp 98.3 °F (36.8 °C) (Oral)   Resp 16   Ht 5' (1.524 m)   Wt 224 lb (101.6 kg)   SpO2 99%   BMI 43.75 kg/m²       General: alert, well-appearing, obese,AA, in no apparent distress or pain  CVS: normal rate, regular rhythm, distinct S1 and S2  Lungs:clear to ausculation bilaterally, no crackles, wheezing or rhonchi noted  Abdomen: soft, non tender, no masses  Skin: warm, no lesions, rashes noted  Psych:  mood and affect normal        ASSESSMENT/PLAN  Diagnoses and all orders for this visit:    Prediabetes  Persistent, encouraged wt loss  monitoring    Essential hypertension  controlled   norvasc 10,  losartan  25 mg   Intolerant to BB and ACE    Coronary artery disease of native artery of native heart with stable angina pectoris (HCC)  S/p stent 1/18  Asymptomatic  On  nitrates, ASA, plavix, pravachol   Following dr. Diana Ramsay on smoking cessation  Intolerant to BB and simvastatin    Low HDL  Has started pravachol tolerating  Reminded/reprinted order for labs: lipid panel/cmp/ a1c/ cbc    Iron deficiency anemia due to chronic blood loss  Likely due to HMB  Monitoring    Vaginal discharge  Given extended 3 more days of flagyl  However if persistent, will need to further evaluate    BMI 40-45  Check TSH  Encourage weight loss    Ff-up in   3 months or sooner prn    Patient understands plan of care. Patient has provided input and agrees with goals.

## 2019-08-15 NOTE — PATIENT INSTRUCTIONS

## 2019-08-19 ENCOUNTER — HOSPITAL ENCOUNTER (EMERGENCY)
Age: 39
Discharge: HOME | End: 2019-08-19
Attending: EMERGENCY MEDICINE
Payer: MEDICAID

## 2019-08-19 VITALS
DIASTOLIC BLOOD PRESSURE: 80 MMHG | HEIGHT: 60 IN | OXYGEN SATURATION: 100 % | SYSTOLIC BLOOD PRESSURE: 140 MMHG | HEART RATE: 76 BPM | WEIGHT: 225 LBS | BODY MASS INDEX: 44.17 KG/M2 | RESPIRATION RATE: 18 BRPM | TEMPERATURE: 99.5 F

## 2019-08-19 DIAGNOSIS — R10.9 ABDOMINAL CRAMPS: Primary | ICD-10-CM

## 2019-08-19 LAB
ANION GAP SERPL CALC-SCNC: 7 MMOL/L (ref 3–18)
BASOPHILS # BLD: 0 K/UL (ref 0–0.1)
BASOPHILS NFR BLD: 0 % (ref 0–2)
BUN SERPL-MCNC: 8 MG/DL (ref 7–18)
BUN/CREAT SERPL: 13 (ref 12–20)
CALCIUM SERPL-MCNC: 9 MG/DL (ref 8.5–10.1)
CHLORIDE SERPL-SCNC: 107 MMOL/L (ref 100–111)
CO2 SERPL-SCNC: 27 MMOL/L (ref 21–32)
CREAT SERPL-MCNC: 0.64 MG/DL (ref 0.6–1.3)
DIFFERENTIAL METHOD BLD: ABNORMAL
EOSINOPHIL # BLD: 0.2 K/UL (ref 0–0.4)
EOSINOPHIL NFR BLD: 4 % (ref 0–5)
ERYTHROCYTE [DISTWIDTH] IN BLOOD BY AUTOMATED COUNT: 16.6 % (ref 11.6–14.5)
GLUCOSE SERPL-MCNC: 105 MG/DL (ref 74–99)
HCT VFR BLD AUTO: 37.8 % (ref 35–45)
HGB BLD-MCNC: 11.7 G/DL (ref 12–16)
LYMPHOCYTES # BLD: 2.9 K/UL (ref 0.9–3.6)
LYMPHOCYTES NFR BLD: 43 % (ref 21–52)
MCH RBC QN AUTO: 24.7 PG (ref 24–34)
MCHC RBC AUTO-ENTMCNC: 31 G/DL (ref 31–37)
MCV RBC AUTO: 79.9 FL (ref 74–97)
MONOCYTES # BLD: 0.4 K/UL (ref 0.05–1.2)
MONOCYTES NFR BLD: 6 % (ref 3–10)
NEUTS SEG # BLD: 3.1 K/UL (ref 1.8–8)
NEUTS SEG NFR BLD: 47 % (ref 40–73)
PLATELET # BLD AUTO: 399 K/UL (ref 135–420)
PMV BLD AUTO: 9.8 FL (ref 9.2–11.8)
POTASSIUM SERPL-SCNC: 3.8 MMOL/L (ref 3.5–5.5)
RBC # BLD AUTO: 4.73 M/UL (ref 4.2–5.3)
SODIUM SERPL-SCNC: 141 MMOL/L (ref 136–145)
WBC # BLD AUTO: 6.7 K/UL (ref 4.6–13.2)

## 2019-08-19 PROCEDURE — 85025 COMPLETE CBC W/AUTO DIFF WBC: CPT

## 2019-08-19 PROCEDURE — 80048 BASIC METABOLIC PNL TOTAL CA: CPT

## 2019-08-19 PROCEDURE — 74011250637 HC RX REV CODE- 250/637: Performed by: EMERGENCY MEDICINE

## 2019-08-19 PROCEDURE — 99282 EMERGENCY DEPT VISIT SF MDM: CPT

## 2019-08-19 RX ORDER — LIDOCAINE HYDROCHLORIDE 20 MG/ML
15 SOLUTION OROPHARYNGEAL AS NEEDED
Status: DISCONTINUED | OUTPATIENT
Start: 2019-08-19 | End: 2019-08-19

## 2019-08-19 RX ADMIN — ALUMINUM HYDROXIDE AND MAGNESIUM HYDROXIDE 15 ML: 200; 200 SUSPENSION ORAL at 07:58

## 2019-08-19 NOTE — ED PROVIDER NOTES
HPI   Patient says she was recently started on iron tablets supplements and she thinks this is caused her current symptoms of lower abdominal cramping. She says she is been having these symptoms since starting the tablets 4 days ago. Pain is described as a crampy type sensation in her lower mid abdomen, this is also been associated with some constipation. She denies any nausea vomiting, and denies any change in her bladder habits. She says she has been placed on iron tablets in the past and had similar type symptoms. She was placed on iron tablets to treat her chronic anemia. Denies any chest pain or shortness of breath. No other complaints given at this time. Past Medical History:   Diagnosis Date    Cardiac echocardiogram 2016    Sm LV cavity. EF 65-70%. No RWMA. Mod-marked LVH w/dynamic obstruction, mid-cavity obliteration & peak grad of 25 mmHg. Indeterminate diastolic fx. No significant valvular pathology.  Cardiac nuclear imaging test 2016    Low risk. Very sm distal anterior & apical partially reversible defect, likely artifact, but sm area of ischemia not excluded. No RWMA. EF 66%.   Neg EKG on pharm stress test.    Diverticulitis     Gall stones     GERD (gastroesophageal reflux disease)     Heart attack ([address]     Hypertension     Infectious disease     Bacteria vaginosis    Non-ST elevated myocardial infarction ([address]     STEMI (ST elevation myocardial infarction) ([address] 2018       Past Surgical History:   Procedure Laterality Date    CARDIAC SURG PROCEDURE UNLIST      stent    HX  SECTION  2008    HX CORONARY STENT PLACEMENT      HX TUBAL LIGATION  2008         Family History:   Problem Relation Age of Onset    Depression Mother     Asthma Mother     Migraines Mother     Hypertension Mother     Stroke Mother     Heart Attack Mother     Arthritis-osteo Mother     Depression Brother     Alcohol abuse Father     Substance Abuse Father tobacco    Drug Abuse Father     Hypertension Father     High Cholesterol Father     Stroke Father     Rashes/Skin Problems Father     Substance Abuse Brother         tobacco    Drug Abuse Brother     Asthma Brother     Migraines Brother     Hypertension Brother     High Cholesterol Brother     Hypertension Paternal Grandmother     Diabetes Paternal Grandmother     Hypertension Maternal Grandmother     Colon Cancer Maternal Grandmother        Social History     Socioeconomic History    Marital status: SINGLE     Spouse name: Not on file    Number of children: Not on file    Years of education: Not on file    Highest education level: Not on file   Occupational History    Not on file   Social Needs    Financial resource strain: Not on file    Food insecurity:     Worry: Not on file     Inability: Not on file    Transportation needs:     Medical: Not on file     Non-medical: Not on file   Tobacco Use    Smoking status: Former Smoker     Packs/day: 0.25     Years: 20.00     Pack years: 5.00     Last attempt to quit: 2018     Years since quittin.1    Smokeless tobacco: Never Used   Substance and Sexual Activity    Alcohol use: No     Frequency: Never    Drug use: No    Sexual activity: Yes     Partners: Male     Birth control/protection: Surgical     Comment: tubal ligation   Lifestyle    Physical activity:     Days per week: Not on file     Minutes per session: Not on file    Stress: Not on file   Relationships    Social connections:     Talks on phone: Not on file     Gets together: Not on file     Attends Restoration service: Not on file     Active member of club or organization: Not on file     Attends meetings of clubs or organizations: Not on file     Relationship status: Not on file    Intimate partner violence:     Fear of current or ex partner: Not on file     Emotionally abused: Not on file     Physically abused: Not on file     Forced sexual activity: Not on file Other Topics Concern    Not on file   Social History Narrative    Not on file         ALLERGIES: Lisinopril; Metoprolol; Statins-hmg-coa reductase inhibitors; and Vaccine adjuvant emulsion combination no. 1    Review of Systems   Constitutional: Negative. HENT: Negative. Eyes: Negative. Respiratory: Negative. Cardiovascular: Negative. Gastrointestinal: Negative. Endocrine: Negative. Genitourinary: Negative. Musculoskeletal: Negative. Neurological: Negative. Psychiatric/Behavioral: Negative. Vitals:    08/19/19 0657   BP: 140/80   Pulse: 76   Resp: 18   Temp: 99.5 °F (37.5 °C)   SpO2: 100%   Weight: 102.1 kg (225 lb)   Height: 5' (1.524 m)            Physical Exam   Constitutional: She is oriented to person, place, and time. She appears well-developed. HENT:   Head: Normocephalic and atraumatic. Mouth/Throat: Oropharynx is clear and moist.   Eyes: Pupils are equal, round, and reactive to light. Conjunctivae and EOM are normal.   Neck: Normal range of motion. Neck supple. Cardiovascular: Normal rate and regular rhythm. Pulmonary/Chest: Effort normal and breath sounds normal.   Abdominal: Soft. Musculoskeletal: Normal range of motion. Neurological: She is alert and oriented to person, place, and time. Skin: Skin is warm and dry. Psychiatric: She has a normal mood and affect. Nursing note and vitals reviewed. MDM     I reviewed the lab results with the patient. She is feeling much better after p.o. meds. Patient understands and agrees with the disposition and follow-up plan.   Mary Orona MD 9:22 AM    Procedures

## 2019-08-19 NOTE — ED TRIAGE NOTES
Pt complains of having abdominal pains for the past 3 days with nausea, she reports taking iron supplements with vitamin C recently within those 3 days.

## 2019-08-19 NOTE — DISCHARGE INSTRUCTIONS

## 2019-08-26 ENCOUNTER — OFFICE VISIT (OUTPATIENT)
Dept: FAMILY MEDICINE CLINIC | Age: 39
End: 2019-08-26

## 2019-08-26 VITALS
SYSTOLIC BLOOD PRESSURE: 136 MMHG | DIASTOLIC BLOOD PRESSURE: 84 MMHG | WEIGHT: 225 LBS | HEART RATE: 63 BPM | RESPIRATION RATE: 16 BRPM | HEIGHT: 60 IN | BODY MASS INDEX: 44.17 KG/M2 | TEMPERATURE: 98.2 F

## 2019-08-26 DIAGNOSIS — I10 ESSENTIAL HYPERTENSION: ICD-10-CM

## 2019-08-26 DIAGNOSIS — R94.39 ABNORMAL CARDIOVASCULAR STRESS TEST: ICD-10-CM

## 2019-08-26 DIAGNOSIS — R07.9 CHEST PAIN, UNSPECIFIED TYPE: Primary | ICD-10-CM

## 2019-08-26 DIAGNOSIS — Z87.891 FORMER SMOKER: ICD-10-CM

## 2019-08-26 DIAGNOSIS — R73.03 PREDIABETES: ICD-10-CM

## 2019-08-26 DIAGNOSIS — I25.119 CORONARY ARTERY DISEASE INVOLVING NATIVE CORONARY ARTERY OF NATIVE HEART WITH ANGINA PECTORIS (HCC): ICD-10-CM

## 2019-08-26 DIAGNOSIS — E78.5 HYPERLIPIDEMIA LDL GOAL <70: ICD-10-CM

## 2019-08-26 RX ORDER — EZETIMIBE 10 MG/1
10 TABLET ORAL
COMMUNITY
Start: 2019-08-20 | End: 2020-05-17

## 2019-08-26 RX ORDER — CARVEDILOL 6.25 MG/1
6.25 TABLET ORAL 2 TIMES DAILY WITH MEALS
Qty: 60 TAB | Refills: 0 | Status: SHIPPED | OUTPATIENT
Start: 2019-08-26 | End: 2019-10-28 | Stop reason: SDUPTHER

## 2019-08-26 RX ORDER — ISOSORBIDE MONONITRATE 30 MG/1
TABLET, EXTENDED RELEASE ORAL
Refills: 0 | COMMUNITY
Start: 2019-08-24 | End: 2019-12-30

## 2019-08-26 NOTE — PROGRESS NOTES
Vinay Prather, 34 y.o.,  female    SUBJECTIVE  Ff-up hospitalization chest pain    Admitted 8/22-8/23    Pt w/ h/o CAD s/p angioplasty stent 1/2018, presented to ED after experiencing sharp L sided chest pain at rest, while watching TV along with palpitations, relieved by nitroglycerine initially then persistent throughout the night. Discharge summary not yet available. Reviewed findings on care everywhere:    EKG showing no acute changes, Trop x 2 neg, echo EF 65%, mild septal hypertrophy. Myocardial stress test showing:  THIS MYOCARDIAL PERFUSION STUDY IS ABNORMAL   THIS IS A MEDIUM RISK STUDY   ABNORMAL NUCLEAR STUDY CONSISTENT WITH EITHER SINGLE OR 2 VESSEL ISCHEMIA     ON THE INITIAL STRESS IMAGES THERE IS A MODERATE AREA OF DIMINISHED UPTAKE OF THE ISOTOPE   THROUGHOUT THE INFERIOR INFERIOR APICAL AND INFERIOR LATERAL WALL     ON THE REST IMAGES THERE IS ENHANCED UPTAKE THROUGHOUT THE INFERIOR INFERIOR APICAL AND   INFERIOR LATERAL WALL     THE GATED ACQUISITION SHOWS NORMAL WALL MOTION AND AN EJECTION FRACTION ESTIMATED AT 70%     THE ABOVE FINDINGS ARE CONSISTENT WITH AT LEAST SINGLE AND POSSIBLE TWO-VESSEL DISEASE WITH THE   ISCHEMIA WITHIN THE DISTRIBUTION OF THE RCA AND POSSIBLY THE CIRCUMFLEX ARE      She says there was a discussion of getting a cardiac cath during admission, and she preferred to see Dr. Sandi Mccabe her cardiologist regarding this. Of note she saw her cardiologist 2 days prior to admission, she was having positional chest pains then usually when she had her arm up, and reviewed note 7/3/2018 showed NST showed small to moderate intensity ishcemia in the apical cap and inferior wall due to lack of anginal symptoms and good exercise tolerance LHC was deferred. Impression was Atypical sharp CP and since she was intolerant to statins to consider psck9's. She was to cont asa. plavix. holter monitor 8/30/2018  Occasional PVCs.   She says has spoken dr. [employer] office today and was reassured with neg troponins and resolution of symptoms. She was on metoprolol previously, she mentions paradoxical palpitations on this medication. However she says even after discontinuation of BB she continued to have intermittent palpitations. She denies anxiety or depression symptoms currently . LDL during admission 63      See HPI, all others negative        Patient Active Problem List   Diagnosis Code    Tobacco use Z72.0    Obesity E66.9    Hypertension I10    Prediabetes R73.03    Low HDL (under 40) E78.6    Obesity, morbid (HCC) E66.01    ST elevation myocardial infarction ([facility]I) ([facility] I21.3    Iron deficiency anemia due to chronic blood loss D50.0    Coronary artery disease involving native coronary artery of native heart with angina pectoris (HCC) I25.119    Adjustment disorder with depressed mood F43.21    Epigastric pain R10.13    Calculus of gallbladder without cholecystitis without obstruction K80.20       Current Outpatient Medications   Medication Sig Dispense Refill    ezetimibe (ZETIA) 10 mg tablet Take 10 mg by mouth.  carvedilol (COREG) 6.25 mg tablet Take 1 Tab by mouth two (2) times daily (with meals). 60 Tab 0    acetaminophen (TYLENOL EXTRA STRENGTH) 500 mg tablet Take 500 mg by mouth every four (4) hours as needed.  hyoscyamine (LEVSIN) 0.125 mg tablet Take 1 Tab by mouth every four (4) hours as needed for Cramping. 40 Tab 0    ondansetron (ZOFRAN ODT) 4 mg disintegrating tablet Take 1 Tab by mouth every eight (8) hours as needed for Nausea. 6 Tab 1    albuterol (PROVENTIL HFA, VENTOLIN HFA, PROAIR HFA) 90 mcg/actuation inhaler Take 2 Puffs by inhalation every four (4) hours as needed for Wheezing. 1 Inhaler 0    amLODIPine (NORVASC) 10 mg tablet Take 1 Tab by mouth daily. (Patient taking differently: Take 5 mg by mouth daily.) 90 Tab 0    fluticasone (FLONASE) 50 mcg/actuation nasal spray 2 Sprays by Both Nostrils route daily.  1 Bottle 0    cetirizine (ZYRTEC) 10 mg tablet Take 1 Tab by mouth daily as needed for Allergies. 90 Tab 0    clopidogrel (PLAVIX) 75 mg tab 75 mg.  aspirin 81 mg chewable tablet Take 81 mg by mouth daily.  isosorbide mononitrate ER (IMDUR) 30 mg tablet TK 1 T PO ONCE A DAY  0    diclofenac (VOLTAREN) 1 % gel APPLY 2 GRAMS AA QID  1    nitroglycerin (NITROSTAT) 0.4 mg SL tablet 0.4 mg. Allergies   Allergen Reactions    Lisinopril Cough    Metoprolol Palpitations    Statins-Hmg-Coa Reductase Inhibitors Other (comments)     Muscle/joint pain    Vaccine Adjuvant Emulsion Combination No. 1 Seizures     MMR       Past Medical History:   Diagnosis Date    Cardiac echocardiogram 07/21/2016    Sm LV cavity. EF 65-70%. No RWMA. Mod-marked LVH w/dynamic obstruction, mid-cavity obliteration & peak grad of 25 mmHg. Indeterminate diastolic fx. No significant valvular pathology.  Cardiac nuclear imaging test 07/22/2016    Low risk. Very sm distal anterior & apical partially reversible defect, likely artifact, but sm area of ischemia not excluded. No RWMA. EF 66%.   Neg EKG on pharm stress test.    Diverticulitis     Gall stones     GERD (gastroesophageal reflux disease)     [facility]     Hypertension     Infectious disease     Bacteria vaginosis    Non-ST elevated myocardial infarction ([facility]     STEMI (ST elevation myocardial infarction) ([facility] 01/2018       Social History     Socioeconomic History    Marital status: SINGLE     Spouse name: Not on file    Number of children: Not on file    Years of education: Not on file    Highest education level: Not on file   Occupational History    Not on file   Social Needs    Financial resource strain: Not on file    Food insecurity:     Worry: Not on file     Inability: Not on file    Transportation needs:     Medical: Not on file     Non-medical: Not on file   Tobacco Use    Smoking status: Former Smoker     Packs/day: 0.25     Years: 20.00     Pack years: 5.00 Last attempt to quit: 2018     Years since quittin.1    Smokeless tobacco: Never Used   Substance and Sexual Activity    Alcohol use: No     Frequency: Never    Drug use: No    Sexual activity: Yes     Partners: Male     Birth control/protection: Surgical     Comment: tubal ligation   Lifestyle    Physical activity:     Days per week: Not on file     Minutes per session: Not on file    Stress: Not on file   Relationships    Social connections:     Talks on phone: Not on file     Gets together: Not on file     Attends Hoahaoism service: Not on file     Active member of club or organization: Not on file     Attends meetings of clubs or organizations: Not on file     Relationship status: Not on file    Intimate partner violence:     Fear of current or ex partner: Not on file     Emotionally abused: Not on file     Physically abused: Not on file     Forced sexual activity: Not on file   Other Topics Concern    Not on file   Social History Narrative    Not on file       Family History   Problem Relation Age of Onset    Depression Mother     Asthma Mother     Migraines Mother     Hypertension Mother     Stroke Mother     Heart Attack Mother    Sami Hastings Mother     Depression Brother     Alcohol abuse Father     Substance Abuse Father         tobacco    Drug Abuse Father     Hypertension Father     High Cholesterol Father     Stroke Father     Rashes/Skin Problems Father     Substance Abuse Brother         tobacco    Drug Abuse Brother     Asthma Brother     Migraines Brother     Hypertension Brother     High Cholesterol Brother     Hypertension Paternal Grandmother     Diabetes Paternal Grandmother     Hypertension Maternal Grandmother     Colon Cancer Maternal Grandmother          OBJECTIVE    Physical Exam:     Visit Vitals  /84 (BP 1 Location: Left arm, BP Patient Position: Sitting)   Pulse 63   Temp 98.2 °F (36.8 °C) (Oral)   Resp 16   Ht 5' (1.524 m)   Wt 225 lb (102.1 kg)   LMP 08/06/2019 (Exact Date)   BMI 43.94 kg/m²       General: alert, well-appearing, obese,AA, in no apparent distress or pain  CVS: normal rate, regular rhythm, distinct S1 and S2, non-reproducible pain  Lungs:clear to ausculation bilaterally, no crackles, wheezing or rhonchi noted  Abdomen: soft, non tender, no masses  Skin: warm, no lesions, rashes noted  Psych:  mood and affect normal        ASSESSMENT/PLAN  Diagnoses and all orders for this visit:    Chest pain  W/ h/o CAD, responded to nitrates initially, could very well be angina  Trop neg x 2  Abnormal stress test, though maybe similar to 7/18. She is closely following her Dr. Khadar Michel her cardiologist. She is  completely asymptomatic currently. Cont plavix, asa, intolerant to statin on zetia with LDL 60's  Will try to add BB coreg 6.25 mg  She is to call dr. Khadar Michel office tomorrow, discussed ED precautions  discussed    Abnormal cardiovascular stress test    Prediabetes  Persistent, encouraged wt loss  monitoring    Essential hypertension  controlled   norvasc 10,  losartan  25 mg   Intolerant to BB and ACE with cough  Will try coreg, hopefully no palpitations as compared to metoprolol    Coronary artery disease of native artery of native heart with stable angina pectoris ([address]  S/p stent 1/18  On  nitrates, ASA, plavix, zetia  Following dr. Matt Vivar on smoking cessation  Intolerant to metoprolol and statin, considering repatha though LDL < 70 already on zetia alone. Former smoker  Commended on maintaining off smoking    I spent greater than 40 minutes with pt with greater than 50% of the face to face time counseling and coordinating care       Ff-up in  1 months or sooner prn    Patient understands plan of care. Patient has provided input and agrees with goals.

## 2019-09-26 ENCOUNTER — OFFICE VISIT (OUTPATIENT)
Dept: FAMILY MEDICINE CLINIC | Age: 39
End: 2019-09-26

## 2019-09-26 VITALS
DIASTOLIC BLOOD PRESSURE: 80 MMHG | HEART RATE: 65 BPM | HEIGHT: 61 IN | SYSTOLIC BLOOD PRESSURE: 128 MMHG | BODY MASS INDEX: 41.72 KG/M2 | TEMPERATURE: 98.3 F | WEIGHT: 221 LBS | OXYGEN SATURATION: 99 % | RESPIRATION RATE: 16 BRPM

## 2019-09-26 DIAGNOSIS — R73.03 PREDIABETES: ICD-10-CM

## 2019-09-26 DIAGNOSIS — I25.119 CORONARY ARTERY DISEASE INVOLVING NATIVE CORONARY ARTERY OF NATIVE HEART WITH ANGINA PECTORIS (HCC): ICD-10-CM

## 2019-09-26 DIAGNOSIS — R07.89 OTHER CHEST PAIN: Primary | ICD-10-CM

## 2019-09-26 DIAGNOSIS — I10 ESSENTIAL HYPERTENSION: ICD-10-CM

## 2019-09-26 RX ORDER — ROSUVASTATIN CALCIUM 5 MG/1
5 TABLET, COATED ORAL
Qty: 90 TAB | Refills: 0 | Status: SHIPPED | OUTPATIENT
Start: 2019-09-26 | End: 2019-12-18 | Stop reason: SDUPTHER

## 2019-09-26 NOTE — PROGRESS NOTES
Jennifer Iqbal, 34 y.o.,  female    SUBJECTIVE  Ff-up    CAD s/p LAD stent- she has had no recurrence of chest pain. Cardiac cath 9/2019 showing widely patent LAD stent, with small to moderate 1st obtuse marginal vessel 80% stenosis. It was deemed her recent chest pain episode was non cardiac. She denies stress/anxiety currently. She continues to be off smoking. She did not start coreg from last visit, says did not get a chance to pick it up yet. She says repatha was denied due to LDL numbers being low. She is intolerant to pravastatin and simvastatin. She is currently on zetia however having muscle aches even with this medication     she has h/o HTN/Prediabetes      See HPI, all others negative        Patient Active Problem List   Diagnosis Code    Tobacco use Z72.0    Obesity E66.9    Hypertension I10    Prediabetes R73.03    Low HDL (under 40) E78.6    Obesity, morbid (HCC) E66.01    ST elevation myocardial infarction (STEMI) ([facility] I21.3    Iron deficiency anemia due to chronic blood loss D50.0    Coronary artery disease involving native coronary artery of native heart with angina pectoris (HCC) I25.119    Adjustment disorder with depressed mood F43.21    Epigastric pain R10.13    Calculus of gallbladder without cholecystitis without obstruction K80.20    Hyperlipidemia LDL goal <70 E78.5    Former smoker Z87.891       Current Outpatient Medications   Medication Sig Dispense Refill    rosuvastatin (CRESTOR) 5 mg tablet Take 1 Tab by mouth nightly. 90 Tab 0    isosorbide mononitrate ER (IMDUR) 30 mg tablet TK 1 T PO ONCE A DAY  0    ezetimibe (ZETIA) 10 mg tablet Take 10 mg by mouth.  carvedilol (COREG) 6.25 mg tablet Take 1 Tab by mouth two (2) times daily (with meals). 60 Tab 0    amLODIPine (NORVASC) 10 mg tablet Take 1 Tab by mouth daily. (Patient taking differently: Take 5 mg by mouth daily.) 90 Tab 0    clopidogrel (PLAVIX) 75 mg tab 75 mg.       aspirin 81 mg chewable tablet Take 81 mg by mouth daily.  acetaminophen (TYLENOL EXTRA STRENGTH) 500 mg tablet Take 500 mg by mouth every four (4) hours as needed.  diclofenac (VOLTAREN) 1 % gel APPLY 2 GRAMS AA QID  1    hyoscyamine (LEVSIN) 0.125 mg tablet Take 1 Tab by mouth every four (4) hours as needed for Cramping. 40 Tab 0    ondansetron (ZOFRAN ODT) 4 mg disintegrating tablet Take 1 Tab by mouth every eight (8) hours as needed for Nausea. 6 Tab 1    albuterol (PROVENTIL HFA, VENTOLIN HFA, PROAIR HFA) 90 mcg/actuation inhaler Take 2 Puffs by inhalation every four (4) hours as needed for Wheezing. 1 Inhaler 0    fluticasone (FLONASE) 50 mcg/actuation nasal spray 2 Sprays by Both Nostrils route daily. 1 Bottle 0    cetirizine (ZYRTEC) 10 mg tablet Take 1 Tab by mouth daily as needed for Allergies. 90 Tab 0    nitroglycerin (NITROSTAT) 0.4 mg SL tablet 0.4 mg. Allergies   Allergen Reactions    Lisinopril Cough    Metoprolol Palpitations    Statins-Hmg-Coa Reductase Inhibitors Other (comments)     Muscle/joint pain    Vaccine Adjuvant Emulsion Combination No. 1 Seizures     MMR       Past Medical History:   Diagnosis Date    Cardiac echocardiogram 07/21/2016    Sm LV cavity. EF 65-70%. No RWMA. Mod-marked LVH w/dynamic obstruction, mid-cavity obliteration & peak grad of 25 mmHg. Indeterminate diastolic fx. No significant valvular pathology.  Cardiac nuclear imaging test 07/22/2016    Low risk. Very sm distal anterior & apical partially reversible defect, likely artifact, but sm area of ischemia not excluded. No RWMA. EF 66%.   Neg EKG on pharm stress test.    Diverticulitis     Gall stones     GERD (gastroesophageal reflux disease)     Heart attack ([facility]     Hypertension     Infectious disease     Bacteria vaginosis    Non-ST elevated myocardial infarction [facility])     STEMI (ST elevation myocardial infarction) ([facility] [facility] 01/2018       Social History     Socioeconomic History    Marital status: SINGLE Spouse name: Not on file    Number of children: Not on file    Years of education: Not on file    Highest education level: Not on file   Occupational History    Not on file   Social Needs    Financial resource strain: Not on file    Food insecurity:     Worry: Not on file     Inability: Not on file    Transportation needs:     Medical: Not on file     Non-medical: Not on file   Tobacco Use    Smoking status: Former Smoker     Packs/day: 0.25     Years: 20.00     Pack years: 5.00     Last attempt to quit: 2018     Years since quittin.2    Smokeless tobacco: Never Used   Substance and Sexual Activity    Alcohol use: No     Frequency: Never    Drug use: No    Sexual activity: Yes     Partners: Male     Birth control/protection: Surgical     Comment: tubal ligation   Lifestyle    Physical activity:     Days per week: Not on file     Minutes per session: Not on file    Stress: Not on file   Relationships    Social connections:     Talks on phone: Not on file     Gets together: Not on file     Attends [employer] service: Not on file     Active member of club or organization: Not on file     Attends meetings of clubs or organizations: Not on file     Relationship status: Not on file    Intimate partner violence:     Fear of current or ex partner: Not on file     Emotionally abused: Not on file     Physically abused: Not on file     Forced sexual activity: Not on file   Other Topics Concern    Not on file   Social History Narrative    Not on file       Family History   Problem Relation Age of Onset    Depression Mother     Asthma Mother     Migraines Mother     Hypertension Mother     Stroke Mother     Heart Attack Mother    Zora Pounds Mother     Depression Brother     Alcohol abuse Father     Substance Abuse Father         tobacco    Drug Abuse Father     Hypertension Father     High Cholesterol Father     Stroke Father     Rashes/Skin Problems Father     Substance Abuse Brother         tobacco    Drug Abuse Brother     Asthma Brother     Migraines Brother     Hypertension Brother     High Cholesterol Brother     Hypertension Paternal Grandmother     Diabetes Paternal Grandmother     Hypertension Maternal Grandmother     Colon Cancer Maternal Grandmother          OBJECTIVE    Physical Exam:     Visit Vitals  /80 (BP 1 Location: Left arm, BP Patient Position: Sitting)   Pulse 65   Temp 98.3 °F (36.8 °C) (Oral)   Resp 16   Ht 5' 1\" (1.549 m)   Wt 221 lb (100.2 kg)   LMP 08/01/2019   SpO2 99%   BMI 41.76 kg/m²       General: alert, well-appearing, obese,AA, in no apparent distress or pain  CVS: normal rate, regular rhythm, distinct S1 and S2, non-reproducible pain  Lungs:clear to ausculation bilaterally, no crackles, wheezing or rhonchi noted  Abdomen: soft, non tender, no masses  Skin: warm, no lesions, rashes noted  Psych:  mood and affect normal        ASSESSMENT/PLAN  Diagnoses and all orders for this visit:    Other Chest pain  Concluded to be noncardiac, no symptoms currently   S/p LAD stent 2018  Cardiac cath 9/2019 patent stent, 80% stenosis obtuse marginal vessel  Still advised trial BB coreg 6.25 mg  Intolerant to statin, even to zetia  Trial crestor 5 mg qhs, may even cut in half or take every other day if symptomatic  Maintain off smoking  Check cmp/lipid panel/a1c prior to next visit    Prediabetes  Persistent, encouraged wt loss  monitoring    Essential hypertension  controlled   norvasc 10,  losartan  25 mg, add low dose coreg  Intolerant ACE with cough  Will try coreg, hopefully no palpitations as compared to metoprolol    Coronary artery disease of native artery of native heart with stable angina pectoris ([facility] [address]  S/p stent 1/18  On  nitrates, ASA, plavix  Following dr. Khadar caiatha was denied per pt with low lipid levels  Intolerant to zocor, pravachol and now zetia  Trial crestor    BMI 40-45  Encourage wt loss    Ff-up in  2  months or sooner prn    Patient understands plan of care. Patient has provided input and agrees with goals.

## 2019-09-26 NOTE — PATIENT INSTRUCTIONS
DASH Diet: Care Instructions Your Care Instructions The DASH diet is an eating plan that can help lower your blood pressure. DASH stands for Dietary Approaches to Stop Hypertension. Hypertension is high blood pressure. The DASH diet focuses on eating foods that are high in calcium, potassium, and magnesium. These nutrients can lower blood pressure. The foods that are highest in these nutrients are fruits, vegetables, low-fat dairy products, nuts, seeds, and legumes. But taking calcium, potassium, and magnesium supplements instead of eating foods that are high in those nutrients does not have the same effect. The DASH diet also includes whole grains, fish, and poultry. The DASH diet is one of several lifestyle changes your doctor may recommend to lower your high blood pressure. Your doctor may also want you to decrease the amount of sodium in your diet. Lowering sodium while following the DASH diet can lower blood pressure even further than just the DASH diet alone. Follow-up care is a key part of your treatment and safety. Be sure to make and go to all appointments, and call your doctor if you are having problems. It's also a good idea to know your test results and keep a list of the medicines you take. How can you care for yourself at home? Following the DASH diet · Eat 4 to 5 servings of fruit each day. A serving is 1 medium-sized piece of fruit, ½ cup chopped or canned fruit, 1/4 cup dried fruit, or 4 ounces (½ cup) of fruit juice. Choose fruit more often than fruit juice. · Eat 4 to 5 servings of vegetables each day. A serving is 1 cup of lettuce or raw leafy vegetables, ½ cup of chopped or cooked vegetables, or 4 ounces (½ cup) of vegetable juice. Choose vegetables more often than vegetable juice. · Get 2 to 3 servings of low-fat and fat-free dairy each day. A serving is 8 ounces of milk, 1 cup of yogurt, or 1 ½ ounces of cheese. · Eat 6 to 8 servings of grains each day. A serving is 1 slice of bread, 1 ounce of dry cereal, or ½ cup of cooked rice, pasta, or cooked cereal. Try to choose whole-grain products as much as possible. · Limit lean meat, poultry, and fish to 2 servings each day. A serving is 3 ounces, about the size of a deck of cards. · Eat 4 to 5 servings of nuts, seeds, and legumes (cooked dried beans, lentils, and split peas) each week. A serving is 1/3 cup of nuts, 2 tablespoons of seeds, or ½ cup of cooked beans or peas. · Limit fats and oils to 2 to 3 servings each day. A serving is 1 teaspoon of vegetable oil or 2 tablespoons of salad dressing. · Limit sweets and added sugars to 5 servings or less a week. A serving is 1 tablespoon jelly or jam, ½ cup sorbet, or 1 cup of lemonade. · Eat less than 2,300 milligrams (mg) of sodium a day. If you limit your sodium to 1,500 mg a day, you can lower your blood pressure even more. Tips for success · Start small. Do not try to make dramatic changes to your diet all at once. You might feel that you are missing out on your favorite foods and then be more likely to not follow the plan. Make small changes, and stick with them. Once those changes become habit, add a few more changes. · Try some of the following: ? Make it a goal to eat a fruit or vegetable at every meal and at snacks. This will make it easy to get the recommended amount of fruits and vegetables each day. ? Try yogurt topped with fruit and nuts for a snack or healthy dessert. ? Add lettuce, tomato, cucumber, and onion to sandwiches. ? Combine a ready-made pizza crust with low-fat mozzarella cheese and lots of vegetable toppings. Try using tomatoes, squash, spinach, broccoli, carrots, cauliflower, and onions. ? Have a variety of cut-up vegetables with a low-fat dip as an appetizer instead of chips and dip. ? Sprinkle sunflower seeds or chopped almonds over salads.  Or try adding chopped walnuts or almonds to cooked vegetables. ? Try some vegetarian meals using beans and peas. Add garbanzo or kidney beans to salads. Make burritos and tacos with mashed beal beans or black beans. Where can you learn more? Go to http://kim-tristin.info/. Enter I146 in the search box to learn more about \"DASH Diet: Care Instructions. \" Current as of: April 9, 2019 Content Version: 12.2 © 5786-8146 [employer]. Care instructions adapted under license by [employer] (which disclaims liability or warranty for this information). If you have questions about a medical condition or this instruction, always ask your healthcare professional. [address] any warranty or liability for your use of this information.

## 2019-09-26 NOTE — PROGRESS NOTES
1. Have you been to the ER, urgent care clinic since your last visit? Hospitalized since your last visit? No    2. Have you seen or consulted any other health care providers outside of the [address] [place] [address] since your last visit? Include any pap smears or colon screening.  No

## 2019-10-10 ENCOUNTER — TELEPHONE (OUTPATIENT)
Dept: FAMILY MEDICINE CLINIC | Age: 39
End: 2019-10-10

## 2019-10-10 NOTE — TELEPHONE ENCOUNTER
Patient called in requesting the following medications metroNIDAZOLE (FLAGYL) 500 mg tablet & ciprofloxacin HCl (CIPRO) 500 mg tablet. She states she normally gets the following medication from her gastro doctor but he is out of town. She would like the following medications called in to [facility].  The patient can be reached at [phone]

## 2019-10-11 NOTE — TELEPHONE ENCOUNTER
Contacted patient and verified identity using name and date of birth (2- identifiers)\  Spoke with patient and she verbalized understanding of need for evaluation or contact Dr. Kamille Hassan office

## 2019-10-21 DIAGNOSIS — K57.32 DIVERTICULITIS OF COLON: Primary | ICD-10-CM

## 2019-10-21 RX ORDER — METRONIDAZOLE 500 MG/1
500 TABLET ORAL 3 TIMES DAILY
Qty: 42 TAB | Refills: 0 | Status: SHIPPED | OUTPATIENT
Start: 2019-10-21 | End: 2019-11-04

## 2019-10-21 RX ORDER — CIPROFLOXACIN 500 MG/1
500 TABLET ORAL 2 TIMES DAILY
Qty: 28 TAB | Refills: 0 | Status: SHIPPED | OUTPATIENT
Start: 2019-10-21 | End: 2019-11-04

## 2019-10-28 RX ORDER — CARVEDILOL 6.25 MG/1
TABLET ORAL
Qty: 60 TAB | Refills: 0 | Status: SHIPPED | OUTPATIENT
Start: 2019-10-28 | End: 2019-12-09 | Stop reason: DRUGHIGH

## 2019-10-31 ENCOUNTER — OFFICE VISIT (OUTPATIENT)
Dept: SURGERY | Age: 39
End: 2019-10-31

## 2019-10-31 VITALS
HEIGHT: 61 IN | DIASTOLIC BLOOD PRESSURE: 94 MMHG | TEMPERATURE: 98.9 F | RESPIRATION RATE: 18 BRPM | BODY MASS INDEX: 43.23 KG/M2 | WEIGHT: 229 LBS | SYSTOLIC BLOOD PRESSURE: 169 MMHG | HEART RATE: 75 BPM

## 2019-10-31 DIAGNOSIS — R10.30 LOWER ABDOMINAL PAIN: Primary | ICD-10-CM

## 2019-11-18 ENCOUNTER — HOSPITAL ENCOUNTER (OUTPATIENT)
Dept: LAB | Age: 39
Discharge: HOME | End: 2019-11-18

## 2019-11-18 LAB — XX-LABCORP SPECIMEN COL,LCBCF: NORMAL

## 2019-11-18 PROCEDURE — 99001 SPECIMEN HANDLING PT-LAB: CPT

## 2019-11-19 LAB
ALBUMIN SERPL-MCNC: 4.2 G/DL (ref 3.5–5.5)
ALBUMIN/GLOB SERPL: 1.4 {RATIO} (ref 1.2–2.2)
ALP SERPL-CCNC: 76 IU/L (ref 39–117)
ALT SERPL-CCNC: 17 IU/L (ref 0–32)
AST SERPL-CCNC: 15 IU/L (ref 0–40)
BILIRUB SERPL-MCNC: <0.2 MG/DL (ref 0–1.2)
BUN SERPL-MCNC: 7 MG/DL (ref 6–24)
BUN/CREAT SERPL: 12 (ref 9–23)
CALCIUM SERPL-MCNC: 9.2 MG/DL (ref 8.7–10.2)
CHLORIDE SERPL-SCNC: 102 MMOL/L (ref 96–106)
CHOLEST SERPL-MCNC: 90 MG/DL (ref 100–199)
CO2 SERPL-SCNC: 22 MMOL/L (ref 20–29)
CREAT SERPL-MCNC: 0.59 MG/DL (ref 0.57–1)
GLOBULIN SER CALC-MCNC: 2.9 G/DL (ref 1.5–4.5)
GLUCOSE SERPL-MCNC: 87 MG/DL (ref 65–99)
HBA1C MFR BLD: 5.7 % (ref 4.8–5.6)
HDLC SERPL-MCNC: 36 MG/DL
INTERPRETATION, 910389: NORMAL
LDLC SERPL CALC-MCNC: 39 MG/DL (ref 0–99)
POTASSIUM SERPL-SCNC: 4.2 MMOL/L (ref 3.5–5.2)
PROT SERPL-MCNC: 7.1 G/DL (ref 6–8.5)
SODIUM SERPL-SCNC: 139 MMOL/L (ref 134–144)
SPECIMEN STATUS REPORT, ROLRST: NORMAL
TRIGL SERPL-MCNC: 73 MG/DL (ref 0–149)
VLDLC SERPL CALC-MCNC: 15 MG/DL (ref 5–40)

## 2019-11-21 ENCOUNTER — OFFICE VISIT (OUTPATIENT)
Dept: FAMILY MEDICINE CLINIC | Age: 39
End: 2019-11-21

## 2019-11-21 VITALS
DIASTOLIC BLOOD PRESSURE: 68 MMHG | RESPIRATION RATE: 16 BRPM | TEMPERATURE: 98.6 F | SYSTOLIC BLOOD PRESSURE: 122 MMHG | HEART RATE: 70 BPM | WEIGHT: 229 LBS | OXYGEN SATURATION: 98 % | HEIGHT: 61 IN | BODY MASS INDEX: 43.23 KG/M2

## 2019-11-21 DIAGNOSIS — R73.03 PREDIABETES: ICD-10-CM

## 2019-11-21 DIAGNOSIS — I25.119 CORONARY ARTERY DISEASE INVOLVING NATIVE CORONARY ARTERY OF NATIVE HEART WITH ANGINA PECTORIS (HCC): Primary | ICD-10-CM

## 2019-11-21 DIAGNOSIS — I10 ESSENTIAL HYPERTENSION: ICD-10-CM

## 2019-11-21 NOTE — PROGRESS NOTES
1. Have you been to the ER, urgent care clinic since your last visit? Hospitalized since your last visit? No    2. Have you seen or consulted any other health care providers outside of the [address] [place] [address] since your last visit? Include any pap smears or colon screening.  No

## 2019-11-21 NOTE — PROGRESS NOTES
Ramón Padron, 34 y.o.,  female    SUBJECTIVE  Ff-up    CAD s/p LAD stent 2018/Cardiac cath 9/2019 showing widely patent LAD stent, with small to moderate 1st obtuse marginal vessel 80% stenosis. She denies any chest pain, sob. Reviewed cards notes, she was not tolerating plavix, and agreed to hold and continued all other medications    HL- on crestor and tolerating. HTN/Prediabetes- tolerating coreg. Says only takes this once daily, as she finds her BP to be low in the ams 100/ 60's. She continues to be off smoking, reviewed labs    Upcoming colonoscopy with h/o diverticulitis/diverticulosis dr lilly, advised direction with antiplatelets from cards. See HPI, all others negative        Patient Active Problem List   Diagnosis Code    Tobacco use Z72.0    Obesity E66.9    Hypertension I10    Prediabetes R73.03    Low HDL (under 40) E78.6    Obesity, morbid (HCC) E66.01    ST elevation myocardial infarction (STEMI) ([facility] I21.3    Iron deficiency anemia due to chronic blood loss D50.0    Coronary artery disease involving native coronary artery of native heart with angina pectoris ([facility]) I25.119    Adjustment disorder with depressed mood F43.21    Epigastric pain R10.13    Calculus of gallbladder without cholecystitis without obstruction K80.20    Hyperlipidemia LDL goal <70 E78.5    Former smoker Z87.891       Current Outpatient Medications   Medication Sig Dispense Refill    carvedilol (COREG) 6.25 mg tablet TAKE 1 TABLET BY MOUTH TWICE DAILY WITH MEALS (Patient taking differently: Take 6.25 mg by mouth daily.) 60 Tab 0    rosuvastatin (CRESTOR) 5 mg tablet Take 1 Tab by mouth nightly. 90 Tab 0    acetaminophen (TYLENOL EXTRA STRENGTH) 500 mg tablet Take 500 mg by mouth every four (4) hours as needed.  hyoscyamine (LEVSIN) 0.125 mg tablet Take 1 Tab by mouth every four (4) hours as needed for Cramping.  40 Tab 0    ondansetron (ZOFRAN ODT) 4 mg disintegrating tablet Take 1 Tab by mouth every eight (8) hours as needed for Nausea. 6 Tab 1    albuterol (PROVENTIL HFA, VENTOLIN HFA, PROAIR HFA) 90 mcg/actuation inhaler Take 2 Puffs by inhalation every four (4) hours as needed for Wheezing. 1 Inhaler 0    amLODIPine (NORVASC) 10 mg tablet Take 1 Tab by mouth daily. (Patient taking differently: Take 5 mg by mouth daily.) 90 Tab 0    fluticasone (FLONASE) 50 mcg/actuation nasal spray 2 Sprays by Both Nostrils route daily. 1 Bottle 0    aspirin 81 mg chewable tablet Take 81 mg by mouth daily.  isosorbide mononitrate ER (IMDUR) 30 mg tablet TK 1 T PO ONCE A DAY  0    ezetimibe (ZETIA) 10 mg tablet Take 10 mg by mouth.  diclofenac (VOLTAREN) 1 % gel APPLY 2 GRAMS AA QID  1    cetirizine (ZYRTEC) 10 mg tablet Take 1 Tab by mouth daily as needed for Allergies. 90 Tab 0    clopidogrel (PLAVIX) 75 mg tab 75 mg.      nitroglycerin (NITROSTAT) 0.4 mg SL tablet 0.4 mg. Allergies   Allergen Reactions    Lisinopril Cough    Metoprolol Palpitations    Statins-Hmg-Coa Reductase Inhibitors Other (comments)     Muscle/joint pain    Vaccine Adjuvant Emulsion Combination No. 1 Seizures     MMR       Past Medical History:   Diagnosis Date    Cardiac echocardiogram 07/21/2016    Sm LV cavity. EF 65-70%. No RWMA. Mod-marked LVH w/dynamic obstruction, mid-cavity obliteration & peak grad of 25 mmHg. Indeterminate diastolic fx. No significant valvular pathology.  Cardiac nuclear imaging test 07/22/2016    Low risk. Very sm distal anterior & apical partially reversible defect, likely artifact, but sm area of ischemia not excluded. No RWMA. EF 66%.   Neg EKG on pharm stress test.    Diverticulitis     Gall stones     GERD (gastroesophageal reflux disease)     Heart [facility] ([facility]     Hypertension     Infectious disease     Bacteria vaginosis    Non-ST elevated myocardial infarction [facility])     [facility]       Social History Socioeconomic History    Marital status: SINGLE     Spouse name: Not on file    Number of children: Not on file    Years of education: Not on file    Highest education level: Not on file   Occupational History    Not on file   Social Needs    Financial resource strain: Not on file    Food insecurity:     Worry: Not on file     Inability: Not on file    Transportation needs:     Medical: Not on file     Non-medical: Not on file   Tobacco Use    Smoking status: Former Smoker     Packs/day: 0.25     Years: 20.00     Pack years: 5.00     Last attempt to quit: 2018     Years since quittin.4    Smokeless tobacco: Never Used   Substance and Sexual Activity    Alcohol use: No     Frequency: Never    Drug use: No    Sexual activity: Yes     Partners: Male     Birth control/protection: Surgical     Comment: tubal ligation   Lifestyle    Physical activity:     Days per week: Not on file     Minutes per session: Not on file    Stress: Not on file   Relationships    Social connections:     Talks on phone: Not on file     Gets together: Not on file     Attends [employer] service: Not on file     Active member of club or organization: Not on file     Attends meetings of clubs or organizations: Not on file     Relationship status: Not on file    Intimate partner violence:     Fear of current or ex partner: Not on file     Emotionally abused: Not on file     Physically abused: Not on file     Forced sexual activity: Not on file   Other Topics Concern    Not on file   Social History Narrative    Not on file       Family History   Problem Relation Age of Onset    Depression Mother     Asthma Mother     Migraines Mother     Hypertension Mother     Stroke Mother     Heart Attack Mother     Arthritis-osteo Mother     Depression Brother     Alcohol abuse Father     Substance Abuse Father         tobacco    Drug Abuse Father     Hypertension Father     High Cholesterol Father     Stroke Father  Rashes/Skin Problems Father     Substance Abuse Brother         tobacco    Drug Abuse Brother     Asthma Brother     Migraines Brother     Hypertension Brother     High Cholesterol Brother     Hypertension Paternal Grandmother     Diabetes Paternal Grandmother     Hypertension Maternal Grandmother     Colon Cancer Maternal Grandmother          OBJECTIVE    Physical Exam:     Visit Vitals  /68 (BP 1 Location: Left arm, BP Patient Position: Sitting)   Pulse 70   Temp 98.6 °F (37 °C) (Oral)   Resp 16   Ht 5' 1\" (1.549 m)   Wt 229 lb (103.9 kg)   SpO2 98%   BMI 43.27 kg/m²       General: alert, well-appearing, obese,AA, in no apparent distress or pain  CVS: normal rate, regular rhythm, distinct S1 and S2, non-reproducible pain  Lungs:clear to ausculation bilaterally, no crackles, wheezing or rhonchi noted  Abdomen: soft, non tender, no masses  Skin: warm, no lesions, rashes noted  Psych:  mood and affect normal    9/19 cardiac cath  Imp:  1.  Major epicardial vessels demonstrate nonobstructive disease. The prior left anterior descending artery stent widely patent.    The first obtuse marginal branch is a small to moderate vessel   which contains an 80% stenosis (based on the small size of the   OMB and the large native circumflex artery it is a poor target   for PCI). 2.  Preserved LV systolic function ejection fraction 60%  3.  No aortic stenosis  4.  No mitral regurgitation    ASSESSMENT/PLAN  Diagnoses and all orders for this visit:    Coronary artery disease involving native coronary artery of native heart with angina pectoris ([address]  asymptomatic  S/p LAD stent 2018  Cardiac cath 9/2019 patent stent, 80% stenosis obtuse marginal vessel  Tolerating  BB coreg 6.25 mg, with ?  Hypotensive readings, advised to cut down in half 3.125 bid instead of 6.25 mg OD she is currently taking  Tolerating crestor 5 mg qhs, LDL <70  Intolerant to statin, even to zetia  Maintain off smoking  On  nitrates, ASA, per pt intolerant to plavix, cards is aware  Following dr. Mary lockharta was denied per pt with low lipid levels  Intolerant to zocor, pravachol and now zetia  Prediabetes  Persistent, encouraged wt loss  monitoring    Essential hypertension  Controlled, per pt with low bp readings   norvasc 10,  losartan  25 mg, reduce coreg to 3.125 mg bid  Intolerant ACE with cough    BMI 40-45  Encourage wt loss    Ff-up in  3  months or sooner prn    Patient understands plan of care. Patient has provided input and agrees with goals.

## 2019-11-21 NOTE — PATIENT INSTRUCTIONS
DASH Diet: Care Instructions Your Care Instructions The DASH diet is an eating plan that can help lower your blood pressure. DASH stands for Dietary Approaches to Stop Hypertension. Hypertension is high blood pressure. The DASH diet focuses on eating foods that are high in calcium, potassium, and magnesium. These nutrients can lower blood pressure. The foods that are highest in these nutrients are fruits, vegetables, low-fat dairy products, nuts, seeds, and legumes. But taking calcium, potassium, and magnesium supplements instead of eating foods that are high in those nutrients does not have the same effect. The DASH diet also includes whole grains, fish, and poultry. The DASH diet is one of several lifestyle changes your doctor may recommend to lower your high blood pressure. Your doctor may also want you to decrease the amount of sodium in your diet. Lowering sodium while following the DASH diet can lower blood pressure even further than just the DASH diet alone. Follow-up care is a key part of your treatment and safety. Be sure to make and go to all appointments, and call your doctor if you are having problems. It's also a good idea to know your test results and keep a list of the medicines you take. How can you care for yourself at home? Following the DASH diet · Eat 4 to 5 servings of fruit each day. A serving is 1 medium-sized piece of fruit, ½ cup chopped or canned fruit, 1/4 cup dried fruit, or 4 ounces (½ cup) of fruit juice. Choose fruit more often than fruit juice. · Eat 4 to 5 servings of vegetables each day. A serving is 1 cup of lettuce or raw leafy vegetables, ½ cup of chopped or cooked vegetables, or 4 ounces (½ cup) of vegetable juice. Choose vegetables more often than vegetable juice. · Get 2 to 3 servings of low-fat and fat-free dairy each day. A serving is 8 ounces of milk, 1 cup of yogurt, or 1 ½ ounces of cheese. · Eat 6 to 8 servings of grains each day. A serving is 1 slice of bread, 1 ounce of dry cereal, or ½ cup of cooked rice, pasta, or cooked cereal. Try to choose whole-grain products as much as possible. · Limit lean meat, poultry, and fish to 2 servings each day. A serving is 3 ounces, about the size of a deck of cards. · Eat 4 to 5 servings of nuts, seeds, and legumes (cooked dried beans, lentils, and split peas) each week. A serving is 1/3 cup of nuts, 2 tablespoons of seeds, or ½ cup of cooked beans or peas. · Limit fats and oils to 2 to 3 servings each day. A serving is 1 teaspoon of vegetable oil or 2 tablespoons of salad dressing. · Limit sweets and added sugars to 5 servings or less a week. A serving is 1 tablespoon jelly or jam, ½ cup sorbet, or 1 cup of lemonade. · Eat less than 2,300 milligrams (mg) of sodium a day. If you limit your sodium to 1,500 mg a day, you can lower your blood pressure even more. Tips for success · Start small. Do not try to make dramatic changes to your diet all at once. You might feel that you are missing out on your favorite foods and then be more likely to not follow the plan. Make small changes, and stick with them. Once those changes become habit, add a few more changes. · Try some of the following: ? Make it a goal to eat a fruit or vegetable at every meal and at snacks. This will make it easy to get the recommended amount of fruits and vegetables each day. ? Try yogurt topped with fruit and nuts for a snack or healthy dessert. ? Add lettuce, tomato, cucumber, and onion to sandwiches. ? Combine a ready-made pizza crust with low-fat mozzarella cheese and lots of vegetable toppings. Try using tomatoes, squash, spinach, broccoli, carrots, cauliflower, and onions. ? Have a variety of cut-up vegetables with a low-fat dip as an appetizer instead of chips and dip. ? Sprinkle sunflower seeds or chopped almonds over salads.  Or try adding chopped walnuts or almonds to cooked vegetables. ? Try some vegetarian meals using beans and peas. Add garbanzo or kidney beans to salads. Make burritos and tacos with mashed beal beans or black beans. Where can you learn more? Go to http://kim-tristin.info/. Enter N524 in the search box to learn more about \"DASH Diet: Care Instructions. \" Current as of: April 9, 2019 Content Version: 12.2 © 9092-7141 [employer]. Care instructions adapted under license by [employer] (which disclaims liability or warranty for this information). If you have questions about a medical condition or this instruction, always ask your healthcare professional. [address] any warranty or liability for your use of this information.

## 2019-12-09 RX ORDER — CARVEDILOL 3.12 MG/1
3.12 TABLET ORAL 2 TIMES DAILY WITH MEALS
Qty: 180 TAB | Refills: 3 | Status: SHIPPED | OUTPATIENT
Start: 2019-12-09 | End: 2019-12-30

## 2019-12-09 NOTE — TELEPHONE ENCOUNTER
Patient states she needs a new prescription for coreg to 3.125 mg bid. She has been breaking her 6.25 mg in half.

## 2019-12-18 RX ORDER — ROSUVASTATIN CALCIUM 5 MG/1
5 TABLET, COATED ORAL
Qty: 90 TAB | Refills: 3 | Status: SHIPPED | OUTPATIENT
Start: 2019-12-18 | End: 2020-05-17

## 2019-12-18 NOTE — TELEPHONE ENCOUNTER
This pharmacy faxed over request for the following prescriptions to be filled:    Medication requested :   Requested Prescriptions     Pending Prescriptions Disp Refills    rosuvastatin (CRESTOR) 5 mg tablet 90 Tab 0     Sig: Take 1 Tab by mouth nightly.      PCP: [address] [place] [address] Street or Print:  [facility]   Mail order or Local pharmacy  [address]     Scheduled appointment if not seen by current providers in office: LOV 11/21/2019 f/u 2/21/2020

## 2019-12-30 RX ORDER — NADOLOL 20 MG/1
10 TABLET ORAL DAILY
COMMUNITY
End: 2020-08-26 | Stop reason: ALTCHOICE

## 2019-12-30 RX ORDER — AMLODIPINE BESYLATE 5 MG/1
5 TABLET ORAL
COMMUNITY

## 2020-02-16 ENCOUNTER — HOSPITAL ENCOUNTER (EMERGENCY)
Age: 40
Discharge: HOME | End: 2020-02-16
Attending: EMERGENCY MEDICINE
Payer: MEDICAID

## 2020-02-16 ENCOUNTER — APPOINTMENT (OUTPATIENT)
Dept: GENERAL RADIOLOGY | Age: 40
End: 2020-02-16
Attending: EMERGENCY MEDICINE
Payer: MEDICAID

## 2020-02-16 VITALS
OXYGEN SATURATION: 98 % | HEART RATE: 69 BPM | SYSTOLIC BLOOD PRESSURE: 138 MMHG | DIASTOLIC BLOOD PRESSURE: 70 MMHG | WEIGHT: 230 LBS | HEIGHT: 60 IN | TEMPERATURE: 98.1 F | RESPIRATION RATE: 20 BRPM | BODY MASS INDEX: 45.16 KG/M2

## 2020-02-16 DIAGNOSIS — R07.89 ATYPICAL CHEST PAIN: ICD-10-CM

## 2020-02-16 DIAGNOSIS — J45.901 ASTHMA WITH ACUTE EXACERBATION, UNSPECIFIED ASTHMA SEVERITY, UNSPECIFIED WHETHER PERSISTENT: Primary | ICD-10-CM

## 2020-02-16 LAB
ANION GAP SERPL CALC-SCNC: 5 MMOL/L (ref 3–18)
ATRIAL RATE: 70 BPM
BASOPHILS # BLD: 0 K/UL (ref 0–0.1)
BASOPHILS NFR BLD: 0 % (ref 0–2)
BUN SERPL-MCNC: 9 MG/DL (ref 7–18)
BUN/CREAT SERPL: 9 (ref 12–20)
CALCIUM SERPL-MCNC: 8.8 MG/DL (ref 8.5–10.1)
CALCULATED P AXIS, ECG09: 28 DEGREES
CALCULATED R AXIS, ECG10: 44 DEGREES
CALCULATED T AXIS, ECG11: 54 DEGREES
CHLORIDE SERPL-SCNC: 107 MMOL/L (ref 100–111)
CK MB CFR SERPL CALC: NORMAL % (ref 0–4)
CK MB SERPL-MCNC: <1 NG/ML (ref 5–25)
CK SERPL-CCNC: 66 U/L (ref 26–192)
CO2 SERPL-SCNC: 29 MMOL/L (ref 21–32)
CREAT SERPL-MCNC: 0.95 MG/DL (ref 0.6–1.3)
DIAGNOSIS, 93000: NORMAL
DIFFERENTIAL METHOD BLD: ABNORMAL
EOSINOPHIL # BLD: 0.3 K/UL (ref 0–0.4)
EOSINOPHIL NFR BLD: 4 % (ref 0–5)
ERYTHROCYTE [DISTWIDTH] IN BLOOD BY AUTOMATED COUNT: 17.8 % (ref 11.6–14.5)
GLUCOSE SERPL-MCNC: 106 MG/DL (ref 74–99)
HCG SERPL QL: NEGATIVE
HCT VFR BLD AUTO: 35.7 % (ref 35–45)
HGB BLD-MCNC: 10.8 G/DL (ref 12–16)
LYMPHOCYTES # BLD: 2.9 K/UL (ref 0.9–3.6)
LYMPHOCYTES NFR BLD: 46 % (ref 21–52)
MCH RBC QN AUTO: 23.1 PG (ref 24–34)
MCHC RBC AUTO-ENTMCNC: 30.3 G/DL (ref 31–37)
MCV RBC AUTO: 76.3 FL (ref 74–97)
MONOCYTES # BLD: 0.5 K/UL (ref 0.05–1.2)
MONOCYTES NFR BLD: 7 % (ref 3–10)
NEUTS SEG # BLD: 2.8 K/UL (ref 1.8–8)
NEUTS SEG NFR BLD: 43 % (ref 40–73)
P-R INTERVAL, ECG05: 156 MS
PLATELET # BLD AUTO: 424 K/UL (ref 135–420)
PMV BLD AUTO: 10.3 FL (ref 9.2–11.8)
POTASSIUM SERPL-SCNC: 3.5 MMOL/L (ref 3.5–5.5)
Q-T INTERVAL, ECG07: 390 MS
QRS DURATION, ECG06: 96 MS
QTC CALCULATION (BEZET), ECG08: 421 MS
RBC # BLD AUTO: 4.68 M/UL (ref 4.2–5.3)
SODIUM SERPL-SCNC: 141 MMOL/L (ref 136–145)
TROPONIN I SERPL-MCNC: <0.02 NG/ML (ref 0–0.04)
VENTRICULAR RATE, ECG03: 70 BPM
WBC # BLD AUTO: 6.4 K/UL (ref 4.6–13.2)

## 2020-02-16 PROCEDURE — 84703 CHORIONIC GONADOTROPIN ASSAY: CPT

## 2020-02-16 PROCEDURE — 82550 ASSAY OF CK (CPK): CPT

## 2020-02-16 PROCEDURE — 99283 EMERGENCY DEPT VISIT LOW MDM: CPT

## 2020-02-16 PROCEDURE — 80048 BASIC METABOLIC PNL TOTAL CA: CPT

## 2020-02-16 PROCEDURE — 85025 COMPLETE CBC W/AUTO DIFF WBC: CPT

## 2020-02-16 PROCEDURE — 71045 X-RAY EXAM CHEST 1 VIEW: CPT

## 2020-02-16 PROCEDURE — 93005 ELECTROCARDIOGRAM TRACING: CPT

## 2020-02-16 RX ORDER — ALBUTEROL SULFATE 0.83 MG/ML
2.5 SOLUTION RESPIRATORY (INHALATION)
Qty: 30 EACH | Refills: 0 | Status: SHIPPED | OUTPATIENT
Start: 2020-02-16 | End: 2020-05-18

## 2020-02-16 RX ORDER — PREDNISONE 50 MG/1
50 TABLET ORAL DAILY
Qty: 5 TAB | Refills: 0 | Status: SHIPPED | OUTPATIENT
Start: 2020-02-16 | End: 2020-02-21

## 2020-02-16 NOTE — ED TRIAGE NOTES
Pt complains of SOB with bronchitis since Monday, states the SOB is getting worse with now chest pains.

## 2020-02-16 NOTE — DISCHARGE INSTRUCTIONS
Patient Education        Asthma in Adults: Care Instructions  Your Care Instructions    During an asthma attack, your airways swell and narrow as a reaction to certain things (triggers). This makes it hard to breathe. You may be able to prevent asthma attacks if you avoid the things that set off your asthma symptoms. Keeping your asthma under control and treating symptoms before they get bad can help you avoid severe attacks. If you can control your asthma, you may be able to do all of your normal daily activities. You may also avoid asthma attacks and trips to the hospital.  Follow-up care is a key part of your treatment and safety. Be sure to make and go to all appointments, and call your doctor if you are having problems. It's also a good idea to know your test results and keep a list of the medicines you take. How can you care for yourself at home? · Follow your asthma action plan so you can manage your symptoms at home. An asthma action plan will help you prevent and control airway reactions and will tell you what to do during an asthma attack. If you do not have an asthma action plan, work with your doctor to build one. · Take your asthma medicine exactly as prescribed. Medicine plays an important role in controlling asthma. Talk to your doctor right away if you have any questions about what to take and how to take it. ? Use your quick-relief medicine when you have symptoms of an attack. Quick-relief medicine often is an albuterol inhaler. Some people need to use quick-relief medicine before they exercise. ? Take your controller medicine every day, not just when you have symptoms. Controller medicine is usually an inhaled corticosteroid. The goal is to prevent problems before they occur. Do not use your controller medicine to try to treat an attack that has already started. It does not work fast enough to help.   ? If your doctor prescribed corticosteroid pills to use during an attack, take them as directed. They may take hours to work, but they may shorten the attack and help you breathe better. ? Keep your quick-relief medicine with you at all times. · Talk to your doctor before using other medicines. Some medicines, such as aspirin, can cause asthma attacks in some people. · Check yourself for asthma symptoms to know which step to follow in your action plan. Watch for things like being short of breath, having chest tightness, coughing, and wheezing. Also notice if symptoms wake you up at night or if you get tired quickly when you exercise. · If you have a peak flow meter, use it to check how well you are breathing. This can help you predict when an asthma attack is going to occur. Then you can take medicine to prevent the asthma attack or make it less severe. · See your doctor regularly. These visits will help you learn more about asthma and what you can do to control it. Your doctor will monitor your treatment to make sure the medicine is helping you. · Keep track of your asthma attacks and your treatment. After you have had an attack, write down what triggered it, what helped end it, and any concerns you have about your asthma action plan. Take your diary when you see your doctor. You can then review your asthma action plan and decide if it is working. · Do not smoke or allow others to smoke around you. Avoid smoky places. Smoking makes asthma worse. If you need help quitting, talk to your doctor about stop-smoking programs and medicines. These can increase your chances of quitting for good. · Learn what triggers an asthma attack for you, and avoid the triggers when you can. Common triggers include colds, smoke, air pollution, dust, pollen, mold, pets, cockroaches, stress, and cold air. · Avoid colds and the flu. Get a pneumococcal vaccine shot. If you have had one before, ask your doctor whether you need a second dose. Get a flu vaccine every fall.  If you must be around people with colds or the flu, wash your hands often. When should you call for help? Call 911 anytime you think you may need emergency care. For example, call if:    · You have severe trouble breathing.    Call your doctor now or seek immediate medical care if:    · Your symptoms do not get better after you have followed your asthma action plan.     · You cough up yellow, dark brown, or bloody mucus (sputum).    Watch closely for changes in your health, and be sure to contact your doctor if:    · Your coughing and wheezing get worse.     · You need to use quick-relief medicine on more than 2 days a week (unless it is just for exercise).     · You need help figuring out what is triggering your asthma attacks. Where can you learn more? Go to http://kim-tristin.info/. Enter P597 in the search box to learn more about \"Asthma in Adults: Care Instructions. \"  Current as of: June 9, 2019  Content Version: 12.2  © 3903-6529 [employer]. Care instructions adapted under license by [employer] (which disclaims liability or warranty for this information). If you have questions about a medical condition or this instruction, always ask your healthcare professional. [place] [address] any warranty or liability for your use of this information. Patient Education        Chest Pain: Care Instructions  Your Care Instructions    There are many things that can cause chest pain. Some are not serious and will get better on their own in a few days. But some kinds of chest pain need more testing and treatment. Your doctor may have recommended a follow-up visit in the next 8 to 12 hours. If you are not getting better, you may need more tests or treatment. Even though your doctor has released you, you still need to watch for any problems. The doctor carefully checked you, but sometimes problems can develop later.  If you have new symptoms or if your symptoms do not get better, get medical care right away. If you have worse or different chest pain or pressure that lasts more than 5 minutes or you passed out (lost consciousness), call 911 or seek other emergency help right away. A medical visit is only one step in your treatment. Even if you feel better, you still need to do what your doctor recommends, such as going to all suggested follow-up appointments and taking medicines exactly as directed. This will help you recover and help prevent future problems. How can you care for yourself at home? · Rest until you feel better. · Take your medicine exactly as prescribed. Call your doctor if you think you are having a problem with your medicine. · Do not drive after taking a prescription pain medicine. When should you call for help? Call 911 if:    · You passed out (lost consciousness).     · You have severe difficulty breathing.     · You have symptoms of a heart attack. These may include:  ? Chest pain or pressure, or a strange feeling in your chest.  ? Sweating. ? Shortness of breath. ? Nausea or vomiting. ? Pain, pressure, or a strange feeling in your back, neck, jaw, or upper belly or in one or both shoulders or arms. ? Lightheadedness or sudden weakness. ? A fast or irregular heartbeat. After you call 911, the  may tell you to chew 1 adult-strength or 2 to 4 low-dose aspirin. Wait for an ambulance. Do not try to drive yourself.    Call your doctor today if:    · You have any trouble breathing.     · Your chest pain gets worse.     · You are dizzy or lightheaded, or you feel like you may faint.     · You are not getting better as expected.     · You are having new or different chest pain. Where can you learn more? Go to http://kim-tristin.info/. Enter A120 in the search box to learn more about \"Chest Pain: Care Instructions. \"  Current as of: June 26, 2019  Content Version: 12.2  © 4174-4311 [employer].  Care instructions adapted under license by [employer] (which disclaims liability or warranty for this information). If you have questions about a medical condition or this instruction, always ask your healthcare professional. [address] any warranty or liability for your use of this information.

## 2020-02-16 NOTE — ED PROVIDER NOTES
Aj Martinez is a 37 y.o. female with past medical history of hypertension, coronary disease status post stenting, GERD, and prediabetes coming in complaining of shortness of breath. Patient states that she has had shortness of breath intermittently for the last 5 days. She states that she feels like she will be wheezing and occasionally gets a tight cough consistent with her asthma. She states that she has been using her albuterol MDI throughout the week which seems to help. She states that tonight she was in bed and woke up short of breath and wheezing again. She states that she used her nebulizer at home and then came in for evaluation. She states that she Fox feels much better than she did when she woke up. She states she is also had intermittent chest pain over the last week. She states that the pain is not necessarily associated with the wheezing or shortness of breath. She states when the pain comes on is on the left side of her chest and is sharp and lasts only for a second at a time. She denies any chest pain currently. She denies any persistent chest pressure or tightness. She denies any leg swelling. She denies any cough, URI symptoms, or fevers. She denies any abdominal pain or vomiting. Patient states that the symptoms are different from when she had her MI. Patient has no other complaints at this time. Past Medical History:   Diagnosis Date    Cardiac echocardiogram 07/21/2016    Sm LV cavity. EF 65-70%. No RWMA. Mod-marked LVH w/dynamic obstruction, mid-cavity obliteration & peak grad of 25 mmHg. Indeterminate diastolic fx. No significant valvular pathology.  Cardiac nuclear imaging test 07/22/2016    Low risk. Very sm distal anterior & apical partially reversible defect, likely artifact, but sm area of ischemia not excluded. No RWMA. EF 66%.   Neg EKG on pharm stress test.    Diverticulitis     Gall stones     GERD (gastroesophageal reflux disease)     [facility] ([facility]     2018    Hypertension     Infectious disease     Bacteria vaginosis    Nausea & vomiting     Non-ST elevated [facility] infarction ([facility]     STEMI (ST elevation myocardial infarction) ([facility] 2018       Past Surgical History:   Procedure Laterality Date    CARDIAC SURG PROCEDURE UNLIST      stent    HX  SECTION  2008    HX CORONARY STENT PLACEMENT      HX TUBAL LIGATION  2008         Family History:   Problem Relation Age of Onset    Depression Mother     Asthma Mother     Migraines Mother     Hypertension Mother     Stroke Mother     Heart Attack Mother     Arthritis-osteo Mother     Depression Brother     Alcohol abuse Father     Substance Abuse Father         tobacco    Drug Abuse Father     Hypertension Father     High Cholesterol Father     Stroke Father     Rashes/Skin Problems Father     Substance Abuse Brother         tobacco    Drug Abuse Brother     Asthma Brother     Migraines Brother     Hypertension Brother     High Cholesterol Brother     Hypertension Paternal Grandmother     Diabetes Paternal Grandmother     Hypertension Maternal Grandmother     Colon Cancer Maternal Grandmother        Social History     Socioeconomic History    Marital status: SINGLE     Spouse name: Not on file    Number of children: Not on file    Years of education: Not on file    Highest education level: Not on file   Occupational History    Not on file   Social Needs    Financial resource strain: Not on file    Food insecurity:     Worry: Not on file     Inability: Not on file    Transportation needs:     Medical: Not on file     Non-medical: Not on file   Tobacco Use    Smoking status: Former Smoker     Packs/day: 0.25     Years: 20.00     Pack years: 5.00     Last attempt to quit: 2018     Years since quittin.6    Smokeless tobacco: Never Used   Substance and Sexual Activity    Alcohol use: No     Frequency: Never    Drug use: No    Sexual activity: Yes     Partners: Male     Birth control/protection: Surgical     Comment: tubal ligation   Lifestyle    Physical activity:     Days per week: Not on file     Minutes per session: Not on file    Stress: Not on file   Relationships    Social connections:     Talks on phone: Not on file     Gets together: Not on file     Attends Anabaptism service: Not on file     Active member of club or organization: Not on file     Attends meetings of clubs or organizations: Not on file     Relationship status: Not on file    Intimate partner violence:     Fear of current or ex partner: Not on file     Emotionally abused: Not on file     Physically abused: Not on file     Forced sexual activity: Not on file   Other Topics Concern    Not on file   Social History Narrative    Not on file         ALLERGIES: Lisinopril; Metoprolol; Statins-hmg-coa reductase inhibitors; and Vaccine adjuvant emulsion combination no. 1    Review of Systems   Constitutional: Negative. Negative for chills and fever. HENT: Negative for congestion and rhinorrhea. Respiratory: Positive for shortness of breath and wheezing. Cardiovascular: Positive for chest pain. Negative for palpitations and leg swelling. Gastrointestinal: Negative. Negative for abdominal pain, nausea and vomiting. Genitourinary: Negative. Negative for dysuria. Musculoskeletal: Negative. Negative for myalgias. Skin: Negative. Negative for rash. Neurological: Negative. Negative for dizziness, weakness and light-headedness. All other systems reviewed and are negative. Vitals:    02/16/20 0124   BP: 138/70   Pulse: 69   Resp: 20   Temp: 98.1 °F (36.7 °C)   SpO2: 98%   Weight: 104.3 kg (230 lb)   Height: 5' (1.524 m)            Physical Exam  Vitals signs reviewed. Constitutional:       General: She is not in acute distress. Appearance: Normal appearance. She is well-developed. She is obese.    HENT:      Head: Normocephalic and atraumatic. Mouth/Throat:      Mouth: Mucous membranes are moist.   Eyes:      Extraocular Movements: Extraocular movements intact. Conjunctiva/sclera: Conjunctivae normal.      Pupils: Pupils are equal, round, and reactive to light. Neck:      Musculoskeletal: Normal range of motion and neck supple. Cardiovascular:      Rate and Rhythm: Normal rate and regular rhythm. Heart sounds: S1 normal and S2 normal. No murmur. No friction rub. No gallop. Pulmonary:      Effort: Pulmonary effort is normal. No accessory muscle usage or respiratory distress. Breath sounds: Normal breath sounds. Comments: Patient speaking full sentences. Ambulates without any difficulty or increased work of breathing. No accessory muscle use. No wheezing auscultated. Abdominal:      General: There is no distension. Tenderness: There is no abdominal tenderness. Musculoskeletal: Normal range of motion. General: No tenderness. Skin:     General: Skin is warm. Findings: No rash. Neurological:      General: No focal deficit present. Mental Status: She is alert and oriented to person, place, and time. Sensory: No sensory deficit. Motor: No weakness. Gait: Gait normal.   Psychiatric:         Speech: Speech normal.          MDM  Number of Diagnoses or Management Options  Asthma with acute exacerbation, unspecified asthma severity, unspecified whether persistent:   Atypical chest pain:   Diagnosis management comments: Brittany Lozoya is a 37 y.o. female with a past medical history of both asthma and coronary disease coming in complaining of very atypical chest pain lasting for seconds at a time as well as intermittent shortness of breath and wheezing throughout the week which has been responding to her nebulizer and albuterol MDI. Patient used her albuterol nebulizer just prior to arrival and symptoms have resolved. She is now currently asymptomatic.   Patient is [facility] negative. EKG is reassuring. Patient's chest pain is very atypical and I have extremely low concern for ACS. Low risk heart score. Will put patient on short course of prednisone and patient does need a refill for her albuterol for her nebulizer. We will get single troponin rule out ACS, do not feel that she needs any other further work-up at this time. Second troponin negative. Patient has remained asymptomatic here in the emergency department. Chest x-ray does not show any large infiltrative process. Patient was advised to follow-up with her regular doctor and was given return precautions for worsening symptoms and she verbalizes understanding and agrees with this plan. Procedures    Vitals:  Patient Vitals for the past 12 hrs:   Temp Pulse Resp BP SpO2   02/16/20 0124 98.1 °F (36.7 °C) 69 20 138/70 98 %       Lab findings:  Recent Results (from the past 12 hour(s))   EKG, 12 LEAD, INITIAL    Collection Time: 02/16/20  1:16 AM   Result Value Ref Range    Ventricular Rate 70 BPM    Atrial Rate 70 BPM    P-R Interval 156 ms    QRS Duration 96 ms    Q-T Interval 390 ms    QTC Calculation (Bezet) 421 ms    Calculated P Axis 28 degrees    Calculated R Axis 44 degrees    Calculated T Axis 54 degrees    Diagnosis       Normal sinus rhythm  Normal ECG  When compared with ECG of 23-JUL-2019 18:37,  No significant change was found     CBC WITH AUTOMATED DIFF    Collection Time: 02/16/20  1:35 AM   Result Value Ref Range    WBC 6.4 4.6 - 13.2 K/uL    RBC 4.68 4.20 - 5.30 M/uL    HGB 10.8 (L) 12.0 - 16.0 g/dL    HCT 35.7 35.0 - 45.0 %    MCV 76.3 74.0 - 97.0 FL    MCH 23.1 (L) 24.0 - 34.0 PG    MCHC 30.3 (L) 31.0 - 37.0 g/dL    RDW 17.8 (H) 11.6 - 14.5 %    PLATELET 692 (H) 940 - 420 K/uL    MPV 10.3 9.2 - 11.8 FL    NEUTROPHILS 43 40 - 73 %    LYMPHOCYTES 46 21 - 52 %    MONOCYTES 7 3 - 10 %    EOSINOPHILS 4 0 - 5 %    BASOPHILS 0 0 - 2 %    ABS. NEUTROPHILS 2.8 1.8 - 8.0 K/UL    ABS.  LYMPHOCYTES 2.9 0.9 - 3.6 K/UL    ABS. MONOCYTES 0.5 0.05 - 1.2 K/UL    ABS. EOSINOPHILS 0.3 0.0 - 0.4 K/UL    ABS. BASOPHILS 0.0 0.0 - 0.1 K/UL    DF AUTOMATED     METABOLIC PANEL, BASIC    Collection Time: 02/16/20  1:35 AM   Result Value Ref Range    Sodium 141 136 - 145 mmol/L    Potassium 3.5 3.5 - 5.5 mmol/L    Chloride 107 100 - 111 mmol/L    CO2 29 21 - 32 mmol/L    Anion gap 5 3.0 - 18 mmol/L    Glucose 106 (H) 74 - 99 mg/dL    BUN 9 7.0 - 18 MG/DL    Creatinine 0.95 0.6 - 1.3 MG/DL    BUN/Creatinine ratio 9 (L) 12 - 20      GFR est AA >60 >60 ml/min/1.73m2    GFR est non-AA >60 >60 ml/min/1.73m2    Calcium 8.8 8.5 - 10.1 MG/DL   HCG QL SERUM    Collection Time: 02/16/20  1:35 AM   Result Value Ref Range    HCG, Ql. NEGATIVE  NEG     CARDIAC PANEL,(CK, CKMB & TROPONIN)    Collection Time: 02/16/20  1:35 AM   Result Value Ref Range    CK 66 26 - 192 U/L    CK - MB <1.0 <3.6 ng/ml    CK-MB Index  0.0 - 4.0 %     CALCULATION NOT PERFORMED WHEN RESULT IS BELOW LINEAR LIMIT    Troponin-I, QT <0.02 0.0 - 0.045 NG/ML       EKG interpretation by ED Physician: This rhythm rate of 70 bpm.  No acute ischemic changes or evidence of acute right heart strain. Disposition:  Diagnosis:   1. Asthma with acute exacerbation, unspecified asthma severity, unspecified whether persistent    2. Atypical chest pain        Disposition: Discharge    Follow-up Information     Follow up With Specialties Details Why Contact Info    Michael Montes MD [facility] Call in 3 days for office follow up [address]  [address] [address]      [address] [facility] EMERGENCY DEPT Emergency Medicine  As needed, If symptoms worsen [address] [facility]  [phone]           Patient's Medications   Start Taking    ALBUTEROL (PROVENTIL VENTOLIN) 2.5 MG /3 ML (0.083 %) NEBU    3 mL by Nebulization route every four (4) hours as needed for Wheezing.     PREDNISONE (DELTASONE) 50 MG TABLET    Take 1 Tab by mouth daily for 5 days. Continue Taking    ACETAMINOPHEN (TYLENOL EXTRA STRENGTH) 500 MG TABLET    Take 500 mg by mouth every four (4) hours as needed. ALBUTEROL (PROVENTIL HFA, VENTOLIN HFA, PROAIR HFA) 90 MCG/ACTUATION INHALER    Take 2 Puffs by inhalation every four (4) hours as needed for Wheezing. AMLODIPINE (NORVASC) 5 MG TABLET    Take 5 mg by mouth nightly. ASPIRIN 81 MG CHEWABLE TABLET    Take 81 mg by mouth daily. CETIRIZINE (ZYRTEC) 10 MG TABLET    Take 1 Tab by mouth daily as needed for Allergies. DICLOFENAC (VOLTAREN) 1 % GEL    APPLY 2 GRAMS AA QID    EZETIMIBE (ZETIA) 10 MG TABLET    Take 10 mg by mouth. FLUTICASONE (FLONASE) 50 MCG/ACTUATION NASAL SPRAY    2 Sprays by Both Nostrils route daily. NADOLOL (CORGARD) 20 MG TABLET    Take 10 mg by mouth daily. NITROGLYCERIN (NITROSTAT) 0.4 MG SL TABLET    0.4 mg.    ONDANSETRON (ZOFRAN ODT) 4 MG DISINTEGRATING TABLET    Take 1 Tab by mouth every eight (8) hours as needed for Nausea. ROSUVASTATIN (CRESTOR) 5 MG TABLET    Take 1 Tab by mouth nightly.    These Medications have changed    No medications on file   Stop Taking    No medications on file

## 2020-02-21 ENCOUNTER — OFFICE VISIT (OUTPATIENT)
Dept: FAMILY MEDICINE CLINIC | Age: 40
End: 2020-02-21

## 2020-02-21 ENCOUNTER — HOSPITAL ENCOUNTER (OUTPATIENT)
Dept: LAB | Age: 40
Discharge: HOME | End: 2020-02-21
Payer: MEDICAID

## 2020-02-21 VITALS
HEIGHT: 60 IN | DIASTOLIC BLOOD PRESSURE: 80 MMHG | RESPIRATION RATE: 16 BRPM | BODY MASS INDEX: 44.57 KG/M2 | SYSTOLIC BLOOD PRESSURE: 126 MMHG | TEMPERATURE: 98.2 F | HEART RATE: 62 BPM | WEIGHT: 227 LBS | OXYGEN SATURATION: 99 %

## 2020-02-21 DIAGNOSIS — R21 RASH: ICD-10-CM

## 2020-02-21 DIAGNOSIS — I10 ESSENTIAL HYPERTENSION: ICD-10-CM

## 2020-02-21 DIAGNOSIS — I25.119 CORONARY ARTERY DISEASE INVOLVING NATIVE CORONARY ARTERY OF NATIVE HEART WITH ANGINA PECTORIS (HCC): ICD-10-CM

## 2020-02-21 DIAGNOSIS — D64.9 ANEMIA, UNSPECIFIED TYPE: ICD-10-CM

## 2020-02-21 DIAGNOSIS — R06.02 SHORTNESS OF BREATH: Primary | ICD-10-CM

## 2020-02-21 DIAGNOSIS — Z87.891 FORMER SMOKER: ICD-10-CM

## 2020-02-21 DIAGNOSIS — R73.03 PREDIABETES: ICD-10-CM

## 2020-02-21 DIAGNOSIS — R06.2 WHEEZING: ICD-10-CM

## 2020-02-21 LAB
BASOPHILS # BLD: 0 K/UL (ref 0–0.1)
BASOPHILS NFR BLD: 0 % (ref 0–2)
DIFFERENTIAL METHOD BLD: ABNORMAL
EOSINOPHIL # BLD: 0.2 K/UL (ref 0–0.4)
EOSINOPHIL NFR BLD: 3 % (ref 0–5)
ERYTHROCYTE [DISTWIDTH] IN BLOOD BY AUTOMATED COUNT: 17.4 % (ref 11.6–14.5)
FERRITIN SERPL-MCNC: 14 NG/ML (ref 8–388)
HCT VFR BLD AUTO: 35.5 % (ref 35–45)
HGB BLD-MCNC: 11 G/DL (ref 12–16)
IRON SATN MFR SERPL: 9 % (ref 20–50)
IRON SERPL-MCNC: 29 UG/DL (ref 50–175)
LYMPHOCYTES # BLD: 3.2 K/UL (ref 0.9–3.6)
LYMPHOCYTES NFR BLD: 49 % (ref 21–52)
MCH RBC QN AUTO: 23.2 PG (ref 24–34)
MCHC RBC AUTO-ENTMCNC: 31 G/DL (ref 31–37)
MCV RBC AUTO: 74.9 FL (ref 74–97)
MONOCYTES # BLD: 0.4 K/UL (ref 0.05–1.2)
MONOCYTES NFR BLD: 6 % (ref 3–10)
NEUTS SEG # BLD: 2.7 K/UL (ref 1.8–8)
NEUTS SEG NFR BLD: 42 % (ref 40–73)
PLATELET # BLD AUTO: 420 K/UL (ref 135–420)
PMV BLD AUTO: 10.2 FL (ref 9.2–11.8)
RBC # BLD AUTO: 4.74 M/UL (ref 4.2–5.3)
TIBC SERPL-MCNC: 329 UG/DL (ref 250–450)
WBC # BLD AUTO: 6.5 K/UL (ref 4.6–13.2)

## 2020-02-21 PROCEDURE — 82728 ASSAY OF FERRITIN: CPT

## 2020-02-21 PROCEDURE — 85025 COMPLETE CBC W/AUTO DIFF WBC: CPT

## 2020-02-21 PROCEDURE — 83540 ASSAY OF IRON: CPT

## 2020-02-21 PROCEDURE — 36415 COLL VENOUS BLD VENIPUNCTURE: CPT

## 2020-02-21 RX ORDER — FLUTICASONE PROPIONATE 110 UG/1
2 AEROSOL, METERED RESPIRATORY (INHALATION) EVERY 12 HOURS
Qty: 1 INHALER | Refills: 0 | Status: SHIPPED | OUTPATIENT
Start: 2020-02-21 | End: 2020-03-20

## 2020-02-21 RX ORDER — CLOBETASOL PROPIONATE 0.5 MG/G
OINTMENT TOPICAL 2 TIMES DAILY
Qty: 15 G | Refills: 0 | Status: SHIPPED | OUTPATIENT
Start: 2020-02-21 | End: 2020-03-20

## 2020-02-21 RX ORDER — CETIRIZINE HCL 10 MG
10 TABLET ORAL
Qty: 90 TAB | Refills: 3 | Status: SHIPPED | OUTPATIENT
Start: 2020-02-21 | End: 2020-09-14

## 2020-02-21 RX ORDER — FLUTICASONE PROPIONATE 50 MCG
2 SPRAY, SUSPENSION (ML) NASAL DAILY
Qty: 1 BOTTLE | Refills: 5 | Status: SHIPPED | OUTPATIENT
Start: 2020-02-21 | End: 2020-09-14

## 2020-02-21 NOTE — PROGRESS NOTES
1. Have you been to the ER, urgent care clinic since your last visit? Hospitalized since your last visit? Yes ER 02/16/2020 Asthma SOB    2. Have you seen or consulted any other health care providers outside of the [address] [place] [address] since your last visit? Include any pap smears or colon screening.  No

## 2020-02-21 NOTE — PROGRESS NOTES
Elise Keys, 37 y.o.,  female    SUBJECTIVE  Ff-up ED sob/wheezing    Pt reports developing cough, wheezing and sob past week. She was evaluated in ED, cxr/trop ekg negative. She says responded to albuterol. She was a previous smoker. She reports few 'bronchitis' episodes last year. She denies previous h/o asthma. Noted hgb 10.8- she reports previous h/o SAPPHIRE due to heavy menstrual bleeding. She was evaluated by gyne and says after stopping plavix, her menses have lightened up. CAD s/p LAD stent 2018/Cardiac cath 9/2019 showing widely patent LAD stent, with small to moderate 1st obtuse marginal vessel 80% stenosis. HL- says not tolerating crestor. HTN/Prediabetes- tolerating coreg. Says only takes this once daily, as she finds her BP to be low in the ams 100/ 60's. She continues to be off smoking, reviewed labs    Rash- c/o itchy rash on chest and back. No sick contacts, new med or topicals. See HPI, all others negative        Patient Active Problem List   Diagnosis Code    Tobacco use Z72.0    Obesity E66.9    Hypertension I10    Prediabetes R73.03    Low HDL (under 40) E78.6    Obesity, morbid ([facility]) E66.01    ST elevation myocardial infarction (STEMI) ([facility]) I21.3    Iron deficiency anemia due to chronic blood loss D50.0    Coronary artery disease involving native coronary artery of native heart with angina pectoris ([facility]) I25.119    Adjustment disorder with depressed mood F43.21    Epigastric pain R10.13    Calculus of gallbladder without cholecystitis without obstruction K80.20    Hyperlipidemia LDL goal <70 E78.5    Former smoker Z87.891       Current Outpatient Medications   Medication Sig Dispense Refill    fluticasone propionate (FLONASE) 50 mcg/actuation nasal spray 2 Sprays by Both Nostrils route daily. 1 Bottle 5    cetirizine (ZYRTEC) 10 mg tablet Take 1 Tab by mouth daily as needed for Allergies.  90 Tab 3    fluticasone propionate (FLOVENT HFA) 110 mcg/actuation inhaler Take 2 Puffs by inhalation every twelve (12) hours. 1 Inhaler 0    clobetasoL (TEMOVATE) 0.05 % ointment Apply  to affected area two (2) times a day. 15 g 0    albuterol (PROVENTIL VENTOLIN) 2.5 mg /3 mL (0.083 %) nebu 3 mL by Nebulization route every four (4) hours as needed for Wheezing. 30 Each 0    amLODIPine (NORVASC) 5 mg tablet Take 5 mg by mouth nightly.  nadolol (CORGARD) 20 mg tablet Take 10 mg by mouth daily.  acetaminophen (TYLENOL EXTRA STRENGTH) 500 mg tablet Take 500 mg by mouth every four (4) hours as needed.  albuterol (PROVENTIL HFA, VENTOLIN HFA, PROAIR HFA) 90 mcg/actuation inhaler Take 2 Puffs by inhalation every four (4) hours as needed for Wheezing. 1 Inhaler 0    aspirin 81 mg chewable tablet Take 81 mg by mouth daily.  rosuvastatin (CRESTOR) 5 mg tablet Take 1 Tab by mouth nightly. 90 Tab 3    ezetimibe (ZETIA) 10 mg tablet Take 10 mg by mouth.  nitroglycerin (NITROSTAT) 0.4 mg SL tablet 0.4 mg. Allergies   Allergen Reactions    Lisinopril Cough    Metoprolol Palpitations    Statins-Hmg-Coa Reductase Inhibitors Other (comments)     Muscle/joint pain    Vaccine Adjuvant Emulsion Combination No. 1 Seizures     MMR       Past Medical History:   Diagnosis Date    Cardiac echocardiogram 07/21/2016    Sm LV cavity. EF 65-70%. No RWMA. Mod-marked LVH w/dynamic obstruction, mid-cavity obliteration & peak grad of 25 mmHg. Indeterminate diastolic fx. No significant valvular pathology.  Cardiac nuclear imaging test 07/22/2016    Low risk. Very sm distal anterior & apical partially reversible defect, likely artifact, but sm area of ischemia not excluded. No RWMA. EF 66%.   Neg EKG on pharm stress test.    Diverticulitis     Gall stones     GERD (gastroesophageal reflux disease)     [facility]     01/27/2018    Hypertension     Infectious disease     Bacteria vaginosis    Nausea & vomiting     Non-ST elevated myocardial infarction [facility])     STEMI (ST elevation myocardial infarction) ([facility] 2018       Social History     Socioeconomic History    Marital status: SINGLE     Spouse name: Not on file    Number of children: Not on file    Years of education: Not on file    Highest education level: Not on file   Occupational History    Not on file   Social Needs    Financial resource strain: Not on file    Food insecurity:     Worry: Not on file     Inability: Not on file    Transportation needs:     Medical: Not on file     Non-medical: Not on file   Tobacco Use    Smoking status: Former Smoker     Packs/day: 0.25     Years: 20.00     Pack years: 5.00     Last attempt to quit: 2018     Years since quittin.6    Smokeless tobacco: Never Used   Substance and Sexual Activity    Alcohol use: No     Frequency: Never    Drug use: No    Sexual activity: Yes     Partners: Male     Birth control/protection: Surgical     Comment: tubal ligation   Lifestyle    Physical activity:     Days per week: Not on file     Minutes per session: Not on file    Stress: Not on file   Relationships    Social connections:     Talks on phone: Not on file     Gets together: Not on file     Attends Episcopalian service: Not on file     Active member of club or organization: Not on file     Attends meetings of clubs or organizations: Not on file     Relationship status: Not on file    Intimate partner violence:     Fear of current or ex partner: Not on file     Emotionally abused: Not on file     Physically abused: Not on file     Forced sexual activity: Not on file   Other Topics Concern    Not on file   Social History Narrative    Not on file       Family History   Problem Relation Age of Onset    Depression Mother     Asthma Mother     Migraines Mother     Hypertension Mother     Stroke Mother     Heart Attack Mother     Arthritis-osteo Mother     Depression Brother     Alcohol abuse Father     Substance Abuse Father tobacco    Drug Abuse Father     Hypertension Father     High Cholesterol Father     Stroke Father     Rashes/Skin Problems Father     Substance Abuse Brother         tobacco    Drug Abuse Brother     Asthma Brother     Migraines Brother     Hypertension Brother     High Cholesterol Brother     Hypertension Paternal Grandmother     Diabetes Paternal Grandmother     Hypertension Maternal Grandmother     Colon Cancer Maternal Grandmother          OBJECTIVE    Physical Exam:     Visit Vitals  /80 (BP 1 Location: Left arm, BP Patient Position: Sitting)   Pulse 62   Temp 98.2 °F (36.8 °C) (Oral)   Resp 16   Ht 5' (1.524 m)   Wt 227 lb (103 kg)   LMP 02/15/2020 (Exact Date)   SpO2 99%   BMI 44.33 kg/m²       General: alert, well-appearing, obese,AA, in no apparent distress or pain  CVS: normal rate, regular rhythm, distinct S1 and S2, non-reproducible pain  Lungs:clear to ausculation bilaterally, no crackles, wheezing or rhonchi noted  Abdomen: soft, non tender, no masses  Skin: + hyperpigmented scaly rash on L breast and back  Psych:  mood and affect normal    9/19 cardiac cath  Imp:  1.  Major epicardial vessels demonstrate nonobstructive disease. The prior left anterior descending artery stent widely patent.    The first obtuse marginal branch is a small to moderate vessel   which contains an 80% stenosis (based on the small size of the   OMB and the large native circumflex artery it is a poor target   for PCI). 2.  Preserved LV systolic function ejection fraction 60%  3.  No aortic stenosis  4.  No mitral regurgitation    ASSESSMENT/PLAN  Diagnoses and all orders for this visit:    Shortness of breath  ?  RAD, with allergic rhinitis and responding to albuterol  She was a former smoker  Check PFT  Start trial flovent  Cont prn albuterol    Wheezing    Allergic rhinitis, unspecified   Cont flonase and zyrtec    Anemia, unspecified  Check cbc, iron panel, ferritin    Rash   Start clobetasol ointment    Former smoker    Coronary artery disease involving native coronary artery of native heart with angina pectoris ([facility]  asymptomatic  S/p LAD stent 2018  Cardiac cath 9/2019 patent stent, 80% stenosis obtuse marginal vessel  Tolerating  BB coreg 6.25 mg, with ? Hypotensive readings, advised to cut down in half 3.125 bid instead of 6.25 mg OD she is currently taking  Says not tolerating crestor 5 mg qhs, LDL <70- to discuss with cards [id number]  Intolerant to statin, even to zetia  Maintain off smoking  On  nitrates, ASA, per pt intolerant to plavix, cards is aware  Following dr. Linda caiatha was denied per pt with low lipid levels  Intolerant to zocor, pravachol     Prediabetes  Persistent, encouraged wt loss  monitoring    Essential hypertension  Controlled, per pt with low bp readings   norvasc 10,  losartan  25 mg, reduce coreg to 3.125 mg bid  Intolerant ACE with cough    BMI 40-45  Encourage wt loss    Ff-up in  1  months or sooner prn    Patient understands plan of care. Patient has provided input and agrees with goals.

## 2020-02-21 NOTE — PATIENT INSTRUCTIONS
Wheezing or Bronchoconstriction: Care Instructions  Your Care Instructions  Wheezing is a whistling noise made during breathing. It occurs when the small airways, or bronchial tubes, that lead to your lungs swell or contract (spasm) and become narrow. This narrowing is called bronchoconstriction. When your airways constrict, it is hard for air to pass through and this makes it hard for you to breathe. Wheezing and bronchoconstriction can be caused by many problems, including:  · An infection such as the flu or a cold. · Allergies such as hay fever. · Diseases such as asthma or chronic obstructive pulmonary disease. · Smoking. Treatment for your wheezing depends on what is causing the problem. Your wheezing may get better without treatment. But you may need to pay attention to things that cause your wheezing and avoid them. Or you may need medicine to help treat the wheezing and to reduce the swelling or to relieve spasms in your lungs. Follow-up care is a key part of your treatment and safety. Be sure to make and go to all appointments, and call your doctor if you are having problems. It is also a good idea to know your test results and keep a list of the medicines you take. How can you care for yourself at home? · Take your medicine exactly as prescribed. Call your doctor if you think you are having a problem with your medicine. You will get more details on the specific medicine your doctor prescribes. · If your doctor prescribed antibiotics, take them as directed. Do not stop taking them just because you feel better. You need to take the full course of antibiotics. · Breathe moist air from a humidifier, hot shower, or sink filled with hot water. This may help ease your symptoms and make it easier for you to breathe. · If you have congestion in your nose and throat, drinking plenty of fluids, especially hot fluids, may help relieve your symptoms.  If you have kidney, heart, or liver disease and have to limit fluids, talk with your doctor before you increase the amount of fluids you drink. · If you have mucus in your airways, it may help to breathe deeply and cough. · Do not smoke or allow others to smoke around you. Smoking can make your wheezing worse. If you need help quitting, talk to your doctor about stop-smoking programs and medicines. These can increase your chances of quitting for good. · Avoid things that may cause your wheezing. These may include colds, smoke, air pollution, dust, pollen, pets, cockroaches, stress, and cold air. When should you call for help? Call 911 anytime you think you may need emergency care. For example, call if:    · You have severe trouble breathing.     · You passed out (lost consciousness).    Call your doctor now or seek immediate medical care if:    · You cough up yellow, dark brown, or bloody mucus (sputum).     · You have new or worse shortness of breath.     · Your wheezing is not getting better or it gets worse after you start taking your medicine.    Watch closely for changes in your health, and be sure to contact your doctor if:    · You do not get better as expected. Where can you learn more? Go to http://kim-tristin.info/. Enter 454 [id number] in the search box to learn more about \"Wheezing or Bronchoconstriction: Care Instructions. \"  Current as of: June 9, 2019  Content Version: 12.2  © 9964-7367 [employer]. Care instructions adapted under license by [employer] (which disclaims liability or warranty for this information). If you have questions about a medical condition or this instruction, always ask your healthcare professional. [place] [address] any warranty or liability for your use of this information.

## 2020-02-24 DIAGNOSIS — D50.0 IRON DEFICIENCY ANEMIA DUE TO CHRONIC BLOOD LOSS: Primary | ICD-10-CM

## 2020-02-24 RX ORDER — FERROUS SULFATE 325(65) MG
325 TABLET, DELAYED RELEASE (ENTERIC COATED) ORAL
Qty: 90 TAB | Refills: 1 | Status: SHIPPED | OUTPATIENT
Start: 2020-02-24 | End: 2020-05-17

## 2020-02-24 NOTE — PROGRESS NOTES
Anemia has somewhat improved, however still evidence of iron deficiency anemia  Recommend to see her gynecologist again to discuss this   Advise to start daily po fe 325 mg OD, erx done  Keep appt next month.  Plan to monitor then

## 2020-02-25 ENCOUNTER — TELEPHONE (OUTPATIENT)
Dept: FAMILY MEDICINE CLINIC | Age: 40
End: 2020-02-25

## 2020-02-25 NOTE — PROGRESS NOTES
Patient identified with 2 identifiers (name and ). Patient aware of Anemia has somewhat improved, however still evidence of iron deficiency anemia  Recommend to see her gynecologist again to discuss this   Advise to start daily po fe 325 mg OD, erx done  Keep appt next month. Plan to monitor then  Patient is request a prescription for Vit C to be sent to pharmacy to take with the Fe.  Please advise

## 2020-02-26 RX ORDER — VITAMIN E 1000 UNIT
1000 CAPSULE ORAL DAILY
Qty: 90 TAB | Refills: 0 | Status: SHIPPED | OUTPATIENT
Start: 2020-02-26 | End: 2020-09-14

## 2020-02-28 ENCOUNTER — TELEPHONE (OUTPATIENT)
Dept: SURGERY | Age: 40
End: 2020-02-28

## 2020-02-28 NOTE — TELEPHONE ENCOUNTER
Called with complaints of intermittent left lower crampy abd pain nausea no fever no vomiting discussed with dr lilly she will start levsin and if no improvement will see pcp asap if develops fever vomiting severe pain will go to er

## 2020-03-02 ENCOUNTER — TELEPHONE (OUTPATIENT)
Dept: FAMILY MEDICINE CLINIC | Age: 40
End: 2020-03-02

## 2020-03-02 NOTE — TELEPHONE ENCOUNTER
Needs appt to evaluate prior to treating. She did call dr monzon office recommending levsin already. May double book shadia.

## 2020-03-03 NOTE — TELEPHONE ENCOUNTER
Patient identified with 2 identifiers (name and ). Spoke with patient and she states she has a vaginal odor and itching.  Patient has been scheduled with Dr. Ree Goode 2020

## 2020-03-09 ENCOUNTER — HOSPITAL ENCOUNTER (OUTPATIENT)
Dept: RESPIRATORY THERAPY | Age: 40
Discharge: HOME | End: 2020-03-09
Attending: FAMILY MEDICINE
Payer: MEDICAID

## 2020-03-09 DIAGNOSIS — R06.2 WHEEZING: ICD-10-CM

## 2020-03-09 PROCEDURE — 94010 BREATHING CAPACITY TEST: CPT

## 2020-03-09 PROCEDURE — 94729 DIFFUSING CAPACITY: CPT

## 2020-03-09 PROCEDURE — 94726 PLETHYSMOGRAPHY LUNG VOLUMES: CPT

## 2020-03-20 ENCOUNTER — OFFICE VISIT (OUTPATIENT)
Dept: FAMILY MEDICINE CLINIC | Age: 40
End: 2020-03-20

## 2020-03-20 VITALS
HEIGHT: 60 IN | HEART RATE: 68 BPM | BODY MASS INDEX: 43.98 KG/M2 | RESPIRATION RATE: 16 BRPM | DIASTOLIC BLOOD PRESSURE: 80 MMHG | WEIGHT: 224 LBS | TEMPERATURE: 98.3 F | SYSTOLIC BLOOD PRESSURE: 122 MMHG

## 2020-03-20 DIAGNOSIS — Z87.891 FORMER SMOKER: ICD-10-CM

## 2020-03-20 DIAGNOSIS — I10 ESSENTIAL HYPERTENSION: ICD-10-CM

## 2020-03-20 DIAGNOSIS — J30.9 ALLERGIC RHINITIS, UNSPECIFIED SEASONALITY, UNSPECIFIED TRIGGER: ICD-10-CM

## 2020-03-20 DIAGNOSIS — D50.0 IRON DEFICIENCY ANEMIA DUE TO CHRONIC BLOOD LOSS: ICD-10-CM

## 2020-03-20 DIAGNOSIS — R73.03 PREDIABETES: ICD-10-CM

## 2020-03-20 DIAGNOSIS — I25.119 CORONARY ARTERY DISEASE INVOLVING NATIVE CORONARY ARTERY OF NATIVE HEART WITH ANGINA PECTORIS (HCC): Primary | ICD-10-CM

## 2020-03-20 NOTE — PROGRESS NOTES
1. Have you been to the ER, urgent care clinic since your last visit? Hospitalized since your last visit? No    2. Have you seen or consulted any other health care providers outside of the [address] [place] [address] since your last visit? Include any pap smears or colon screening.    Saw GYN at [facility]

## 2020-03-20 NOTE — PROGRESS NOTES
Kenny Lora, 37 y.o.,  female    SUBJECTIVE  Ff-up    cough, wheezing and sob have improved, PFTs reviewed without evidence of asthma/copd    [facility]- reports improved energy on po fe, she is following with gyne dr. Yazmin Kulakrni and offered partial hysterectomy she is still considering.  hgb 10.8>11    CAD s/p LAD stent 2018/Cardiac cath 9/2019 showing widely patent LAD stent, with small to moderate 1st obtuse marginal vessel 80% stenosis. Denies cp, sob    [facility]- says not tolerating crestor qhs with leg cramps, she takes every other night currently. HTN/Prediabetes- tolerating nadolol. She continues to be off smoking (quit 2018)      See HPI, all others negative        Patient Active Problem List   Diagnosis Code    Tobacco use Z72.0    Obesity E66.9    Hypertension I10    Prediabetes R73.03    Low HDL (under 40) E78.6    Obesity, morbid (HCC) E66.01    ST elevation myocardial infarction (STEMI) ([facility] [address] I21.3    Iron deficiency anemia due to chronic blood loss D50.0    Coronary artery disease involving native coronary artery of native heart with angina pectoris (HCC) I25.119    Adjustment disorder with depressed mood F43.21    Epigastric pain R10.13    Calculus of gallbladder without cholecystitis without obstruction K80.20    Hyperlipidemia LDL goal <70 E78.5    Former smoker Z87.891       Current Outpatient Medications   Medication Sig Dispense Refill    ascorbic acid, vitamin C, (VITAMIN C) 1,000 mg tablet Take 1 Tab by mouth daily. 90 Tab 0    ferrous sulfate (IRON) 325 mg (65 mg iron) EC tablet Take 1 Tab by mouth three (3) times daily (with meals). 90 Tab 1    fluticasone propionate (FLONASE) 50 mcg/actuation nasal spray 2 Sprays by Both Nostrils route daily. 1 Bottle 5    cetirizine (ZYRTEC) 10 mg tablet Take 1 Tab by mouth daily as needed for Allergies. 90 Tab 3    albuterol (PROVENTIL VENTOLIN) 2.5 mg /3 mL (0.083 %) nebu 3 mL by Nebulization route every four (4) hours as needed for Wheezing. 30 Each 0    amLODIPine (NORVASC) 5 mg tablet Take 5 mg by mouth nightly.  nadolol (CORGARD) 20 mg tablet Take 10 mg by mouth daily.  rosuvastatin (CRESTOR) 5 mg tablet Take 1 Tab by mouth nightly. 90 Tab 3    acetaminophen (TYLENOL EXTRA STRENGTH) 500 mg tablet Take 500 mg by mouth every four (4) hours as needed.  albuterol (PROVENTIL HFA, VENTOLIN HFA, PROAIR HFA) 90 mcg/actuation inhaler Take 2 Puffs by inhalation every four (4) hours as needed for Wheezing. 1 Inhaler 0    aspirin 81 mg chewable tablet Take 81 mg by mouth daily.  nitroglycerin (NITROSTAT) 0.4 mg SL tablet 0.4 mg.      ezetimibe (ZETIA) 10 mg tablet Take 10 mg by mouth. Allergies   Allergen Reactions    Lisinopril Cough    Metoprolol Palpitations    Statins-Hmg-Coa Reductase Inhibitors Other (comments)     Muscle/joint pain    Vaccine Adjuvant Emulsion Combination No. 1 Seizures     MMR       Past Medical History:   Diagnosis Date    Cardiac echocardiogram 07/21/2016    Sm LV cavity. EF 65-70%. No RWMA. Mod-marked LVH w/dynamic obstruction, mid-cavity obliteration & peak grad of 25 mmHg. Indeterminate diastolic fx. No significant valvular pathology.  Cardiac nuclear imaging test 07/22/2016    Low risk. Very sm distal anterior & apical partially reversible defect, likely artifact, but sm area of ischemia not excluded. No RWMA. EF 66%.   Neg EKG on pharm stress test.    Diverticulitis     Gall stones     GERD (gastroesophageal reflux disease)     Heart attack ([facility] [address]     01/27/2018    Hypertension     Infectious disease     Bacteria vaginosis    Nausea & vomiting     Non-ST elevated myocardial infarction [facility])     STEMI (ST elevation myocardial infarction) ([facility] [address] 01/2018       Social History     Socioeconomic History    Marital status: SINGLE     Spouse name: Not on file    Number of children: Not on file    Years of education: Not on file    Highest education level: Not on file   Occupational History    Not on file   Social Needs    Financial resource strain: Not on file    Food insecurity     Worry: Not on file     Inability: Not on file    Transportation needs     Medical: Not on file     Non-medical: Not on file   Tobacco Use    Smoking status: Former Smoker     Packs/day: 0.25     Years: 20.00     Pack years: 5.00     Last attempt to quit: 2018     Years since quittin.7    Smokeless tobacco: Never Used   Substance and Sexual Activity    Alcohol use: No     Frequency: Never    Drug use: No    Sexual activity: Yes     Partners: Male     Birth control/protection: Surgical     Comment: tubal ligation   Lifestyle    Physical activity     Days per week: Not on file     Minutes per session: Not on file    Stress: Not on file   Relationships    Social connections     Talks on phone: Not on file     Gets together: Not on file     Attends Sikhism service: Not on file     Active member of club or organization: Not on file     Attends meetings of clubs or organizations: Not on file     Relationship status: Not on file    Intimate partner violence     Fear of current or ex partner: Not on file     Emotionally abused: Not on file     Physically abused: Not on file     Forced sexual activity: Not on file   Other Topics Concern    Not on file   Social History Narrative    Not on file       Family History   Problem Relation Age of Onset    Depression Mother     Asthma Mother     Migraines Mother     Hypertension Mother     Stroke Mother     Heart Attack Mother    Aron Fernandez Mother     Depression Brother     Alcohol abuse Father     Substance Abuse Father         tobacco    Drug Abuse Father     Hypertension Father     High Cholesterol Father     Stroke Father     Rashes/Skin Problems Father     Substance Abuse Brother         tobacco    Drug Abuse Brother     Asthma Brother     Migraines Brother     Hypertension Brother     High Cholesterol Brother     Hypertension Paternal Grandmother     Diabetes Paternal Grandmother     Hypertension Maternal Grandmother     Colon Cancer Maternal Grandmother          OBJECTIVE    Physical Exam:     Visit Vitals  /80 (BP 1 Location: Left arm, BP Patient Position: Sitting)   Pulse 68   Temp 98.3 °F (36.8 °C) (Oral)   Resp 16   Ht 5' (1.524 m)   Wt 224 lb (101.6 kg)   BMI 43.75 kg/m²       General: alert, well-appearing, obese,AA, in no apparent distress or pain  CVS: normal rate, regular rhythm, distinct S1 and S2, non-reproducible pain  Lungs:clear to ausculation bilaterally, no crackles, wheezing or rhonchi noted  Abdomen: soft, non tender, no masses  Skin: + hyperpigmented scaly rash on L breast and back  Psych:  mood and affect normal    9/19 cardiac cath  Imp:  1.  Major epicardial vessels demonstrate nonobstructive disease. The prior left anterior descending artery stent widely patent.    The first obtuse marginal branch is a small to moderate vessel   which contains an 80% stenosis (based on the small size of the   OMB and the large native circumflex artery it is a poor target   for PCI).   2.  Preserved LV systolic function ejection fraction 60%  3.  No aortic stenosis  4.  No mitral regurgitation    ASSESSMENT/PLAN  Diagnoses and all orders for this visit:    Coronary artery disease involving native coronary artery of native heart with angina pectoris ([address]  asymptomatic  S/p LAD stent 2018  Cardiac cath 9/2019 patent stent, 80% stenosis obtuse marginal vessel  Currently on BB, crestor 5 mg every other night, ASA  LDL goal <70-repatha was denied per pt with low lipid levels  Intolerant to statin, even to zetia, Intolerant to zocor, pravachol   Maintain off smoking  intolerant to plavix, cards is aware  Following dr. Yomaira Haas    Allergic rhinitis, unspecified   Cont flonase and zyrtec    Iron deficiency anemia due to chronic blood loss  Cont po fe  Likely due to menorrhagia,following with gyne dr. Ana Nichole, cmp, a1c in 3 months or sooner prn    Former smoker    Prediabetes  Persistent, encouraged wt loss  monitoring    Essential hypertension  Controlled, per pt with low bp readings   norvasc,   nadolol  Intolerant ACE with cough    BMI 40-45  Encourage wt loss    Ff-up in  3  months or sooner prn    Patient understands plan of care. Patient has provided input and agrees with goals.

## 2020-03-20 NOTE — PATIENT INSTRUCTIONS
A Healthy Heart: Care Instructions Your Care Instructions Heart disease occurs when a substance called plaque builds up in the vessels that supply oxygen-rich blood to your heart. This can narrow the blood vessels and reduce blood flow. A heart attack happens when blood flow is completely blocked. A high-fat diet, smoking, and other factors increase the risk of heart disease. Your doctor has found that you have a chance of having heart disease. You can do lots of things to keep your heart healthy. It may not be easy, but you can change your diet, exercise more, and quit smoking. These steps really work to lower your chance of heart disease. Follow-up care is a key part of your treatment and safety. Be sure to make and go to all appointments, and call your doctor if you are having problems. It's also a good idea to know your test results and keep a list of the medicines you take. How can you care for yourself at home? Diet 
  · Use less salt when you cook and eat. This helps lower your blood pressure. Taste food before salting. Add only a little salt when you think you need it. With time, your taste buds will adjust to less salt.  
  · Eat fewer snack items, fast foods, canned soups, and other high-salt, high-fat, processed foods.  
  · Read food labels and try to avoid saturated and trans fats. They increase your risk of heart disease by raising cholesterol levels.  
  · Limit the amount of solid fat-butter, margarine, and shortening-you eat. Use olive, peanut, or canola oil when you cook. Bake, broil, and steam foods instead of frying them.  
  · Eat a variety of fruit and vegetables every day. Dark green, deep orange, red, or yellow fruits and vegetables are especially good for you. Examples include spinach, carrots, peaches, and berries.  
  · Foods high in fiber can reduce your cholesterol and provide important vitamins and minerals.  High-fiber foods include whole-grain cereals and breads, oatmeal, beans, brown rice, citrus fruits, and apples.  
  · Eat lean proteins. Heart-healthy proteins include seafood, lean meats and poultry, eggs, beans, peas, nuts, seeds, and soy products.  
  · Limit drinks and foods with added sugar. These include candy, desserts, and soda pop.  
 Lifestyle changes 
  · If your doctor recommends it, get more exercise. Walking is a good choice. Bit by bit, increase the amount you walk every day. Try for at least 30 minutes on most days of the week. You also may want to swim, bike, or do other activities.  
  · Do not smoke. If you need help quitting, talk to your doctor about stop-smoking programs and medicines. These can increase your chances of quitting for good. Quitting smoking may be the most important step you can take to protect your heart. It is never too late to quit. You will get health benefits right away.  
  · Limit alcohol to 2 drinks a day for men and 1 drink a day for women. Too much alcohol can cause health problems. Medicines 
  · Take your medicines exactly as prescribed. Call your doctor if you think you are having a problem with your medicine.  
  · If your doctor recommends aspirin, take the amount directed each day. Make sure you take aspirin and not another kind of pain reliever, such as acetaminophen (Tylenol). If you take ibuprofen (such as Advil or Motrin) for other problems, take aspirin at least 2 hours before taking ibuprofen. When should you call for help? Call 911 if you have symptoms of a heart attack.  These may include: 
  · Chest pain or pressure, or a strange feeling in the chest.  
  · Sweating.  
  · Shortness of breath.  
  · Pain, pressure, or a strange feeling in the back, neck, jaw, or upper belly or in one or both shoulders or arms.  
  · Lightheadedness or sudden weakness.  
  · A fast or irregular heartbeat.  
 After you call  911, the  may tell you to chew 1 adult-strength or 2 to 4 low-dose aspirin. Wait for an ambulance. Do not try to drive yourself. 
 Watch closely for changes in your health, and be sure to contact your doctor if you have any problems. Where can you learn more? Go to http://kim-tristin.info/ Enter W588 in the search box to learn more about \"A Healthy Heart: Care Instructions. \" Current as of: December 15, 2019Content Version: 12.4 © 1911-8882 [employer]. Care instructions adapted under license by [employer] (which disclaims liability or warranty for this information). If you have questions about a medical condition or this instruction, always ask your healthcare professional. [address] any warranty or liability for your use of this information.

## 2020-05-12 ENCOUNTER — VIRTUAL VISIT (OUTPATIENT)
Dept: FAMILY MEDICINE CLINIC | Age: 40
End: 2020-05-12

## 2020-05-12 DIAGNOSIS — I10 ESSENTIAL HYPERTENSION: ICD-10-CM

## 2020-05-12 DIAGNOSIS — N89.8 VAGINAL DISCHARGE: Primary | ICD-10-CM

## 2020-05-12 DIAGNOSIS — R73.03 PREDIABETES: ICD-10-CM

## 2020-05-12 DIAGNOSIS — I25.119 CORONARY ARTERY DISEASE INVOLVING NATIVE CORONARY ARTERY OF NATIVE HEART WITH ANGINA PECTORIS (HCC): ICD-10-CM

## 2020-05-12 DIAGNOSIS — E78.5 HYPERLIPIDEMIA LDL GOAL <70: ICD-10-CM

## 2020-05-12 RX ORDER — METRONIDAZOLE 7.5 MG/G
1 GEL VAGINAL
Qty: 25 G | Refills: 0 | Status: SHIPPED | OUTPATIENT
Start: 2020-05-12 | End: 2020-05-18

## 2020-05-12 RX ORDER — FLUCONAZOLE 150 MG/1
150 TABLET ORAL DAILY
Qty: 2 TAB | Refills: 0 | Status: SHIPPED | OUTPATIENT
Start: 2020-05-12 | End: 2020-05-14

## 2020-05-12 NOTE — TELEPHONE ENCOUNTER
Left detailed message that  recommends a VV.  Advised patient to call the office and schedule a Virtual Visit

## 2020-05-12 NOTE — TELEPHONE ENCOUNTER
History of BV. Is having discharge since yesterday and thinks it's because she changed soaps. Wants to know if Dr. Kalyn Evans will give her a prescription for Flagyl. If she can please send to [facility] on file.

## 2020-05-17 ENCOUNTER — APPOINTMENT (OUTPATIENT)
Dept: GENERAL RADIOLOGY | Age: 40
End: 2020-05-17
Attending: EMERGENCY MEDICINE
Payer: MEDICAID

## 2020-05-17 ENCOUNTER — HOSPITAL ENCOUNTER (OUTPATIENT)
Age: 40
Setting detail: OBSERVATION
LOS: 1 days | Discharge: HOME | End: 2020-05-18
Attending: EMERGENCY MEDICINE | Admitting: HOSPITALIST
Payer: MEDICAID

## 2020-05-17 DIAGNOSIS — R07.9 CHEST PAIN, UNSPECIFIED TYPE: Primary | ICD-10-CM

## 2020-05-17 LAB
ALBUMIN SERPL-MCNC: 3.1 G/DL (ref 3.4–5)
ALBUMIN/GLOB SERPL: 0.7 {RATIO} (ref 0.8–1.7)
ALP SERPL-CCNC: 76 U/L (ref 45–117)
ALT SERPL-CCNC: 20 U/L (ref 13–56)
ANION GAP SERPL CALC-SCNC: 9 MMOL/L (ref 3–18)
AST SERPL-CCNC: 19 U/L (ref 10–38)
BASOPHILS # BLD: 0 K/UL (ref 0–0.1)
BASOPHILS NFR BLD: 0 % (ref 0–2)
BILIRUB SERPL-MCNC: 0.1 MG/DL (ref 0.2–1)
BUN SERPL-MCNC: 9 MG/DL (ref 7–18)
BUN/CREAT SERPL: 14 (ref 12–20)
CALCIUM SERPL-MCNC: 8.5 MG/DL (ref 8.5–10.1)
CHLORIDE SERPL-SCNC: 106 MMOL/L (ref 100–111)
CK MB CFR SERPL CALC: NORMAL % (ref 0–4)
CK MB SERPL-MCNC: <1 NG/ML (ref 5–25)
CK SERPL-CCNC: 94 U/L (ref 26–192)
CO2 SERPL-SCNC: 25 MMOL/L (ref 21–32)
CREAT SERPL-MCNC: 0.64 MG/DL (ref 0.6–1.3)
DIFFERENTIAL METHOD BLD: ABNORMAL
EOSINOPHIL # BLD: 0.3 K/UL (ref 0–0.4)
EOSINOPHIL NFR BLD: 4 % (ref 0–5)
ERYTHROCYTE [DISTWIDTH] IN BLOOD BY AUTOMATED COUNT: 18 % (ref 11.6–14.5)
GLOBULIN SER CALC-MCNC: 4.7 G/DL (ref 2–4)
GLUCOSE SERPL-MCNC: 94 MG/DL (ref 74–99)
HCT VFR BLD AUTO: 38.6 % (ref 35–45)
HGB BLD-MCNC: 12.1 G/DL (ref 12–16)
LIPASE SERPL-CCNC: 36 U/L (ref 73–393)
LYMPHOCYTES # BLD: 3.4 K/UL (ref 0.9–3.6)
LYMPHOCYTES NFR BLD: 46 % (ref 21–52)
MCH RBC QN AUTO: 24.8 PG (ref 24–34)
MCHC RBC AUTO-ENTMCNC: 31.3 G/DL (ref 31–37)
MCV RBC AUTO: 79.1 FL (ref 74–97)
MONOCYTES # BLD: 0.4 K/UL (ref 0.05–1.2)
MONOCYTES NFR BLD: 6 % (ref 3–10)
NEUTS SEG # BLD: 3.2 K/UL (ref 1.8–8)
NEUTS SEG NFR BLD: 44 % (ref 40–73)
PLATELET # BLD AUTO: 453 K/UL (ref 135–420)
PMV BLD AUTO: 10.3 FL (ref 9.2–11.8)
POTASSIUM SERPL-SCNC: 3.8 MMOL/L (ref 3.5–5.5)
PROT SERPL-MCNC: 7.8 G/DL (ref 6.4–8.2)
RBC # BLD AUTO: 4.88 M/UL (ref 4.2–5.3)
SODIUM SERPL-SCNC: 140 MMOL/L (ref 136–145)
TROPONIN I SERPL-MCNC: <0.02 NG/ML (ref 0–0.04)
WBC # BLD AUTO: 7.4 K/UL (ref 4.6–13.2)

## 2020-05-17 PROCEDURE — 96372 THER/PROPH/DIAG INJ SC/IM: CPT

## 2020-05-17 PROCEDURE — 83690 ASSAY OF LIPASE: CPT

## 2020-05-17 PROCEDURE — 93005 ELECTROCARDIOGRAM TRACING: CPT

## 2020-05-17 PROCEDURE — 82550 ASSAY OF CK (CPK): CPT

## 2020-05-17 PROCEDURE — 99285 EMERGENCY DEPT VISIT HI MDM: CPT

## 2020-05-17 PROCEDURE — 80053 COMPREHEN METABOLIC PANEL: CPT

## 2020-05-17 PROCEDURE — 85025 COMPLETE CBC W/AUTO DIFF WBC: CPT

## 2020-05-17 PROCEDURE — 71045 X-RAY EXAM CHEST 1 VIEW: CPT

## 2020-05-18 ENCOUNTER — APPOINTMENT (OUTPATIENT)
Dept: NON INVASIVE DIAGNOSTICS | Age: 40
End: 2020-05-18
Attending: PHYSICIAN ASSISTANT
Payer: MEDICAID

## 2020-05-18 VITALS
OXYGEN SATURATION: 100 % | TEMPERATURE: 97.9 F | HEART RATE: 76 BPM | RESPIRATION RATE: 18 BRPM | DIASTOLIC BLOOD PRESSURE: 72 MMHG | SYSTOLIC BLOOD PRESSURE: 118 MMHG | HEIGHT: 60 IN | BODY MASS INDEX: 45.16 KG/M2 | WEIGHT: 230 LBS

## 2020-05-18 PROBLEM — R07.9 CHEST PAIN: Status: ACTIVE | Noted: 2020-05-18

## 2020-05-18 LAB
ANION GAP SERPL CALC-SCNC: 5 MMOL/L (ref 3–18)
ATRIAL RATE: 76 BPM
BUN SERPL-MCNC: 7 MG/DL (ref 7–18)
BUN/CREAT SERPL: 11 (ref 12–20)
CALCIUM SERPL-MCNC: 8.6 MG/DL (ref 8.5–10.1)
CALCULATED P AXIS, ECG09: 9 DEGREES
CALCULATED R AXIS, ECG10: 34 DEGREES
CALCULATED T AXIS, ECG11: 37 DEGREES
CHLORIDE SERPL-SCNC: 108 MMOL/L (ref 100–111)
CHOLEST SERPL-MCNC: 129 MG/DL
CK MB CFR SERPL CALC: NORMAL % (ref 0–4)
CK MB SERPL-MCNC: <1 NG/ML (ref 5–25)
CK SERPL-CCNC: 65 U/L (ref 26–192)
CO2 SERPL-SCNC: 28 MMOL/L (ref 21–32)
CREAT SERPL-MCNC: 0.61 MG/DL (ref 0.6–1.3)
DIAGNOSIS, 93000: NORMAL
GLUCOSE SERPL-MCNC: 92 MG/DL (ref 74–99)
HBA1C MFR BLD: 6 % (ref 4.2–5.6)
HDLC SERPL-MCNC: 34 MG/DL (ref 40–60)
HDLC SERPL: 3.8 {RATIO} (ref 0–5)
LDLC SERPL CALC-MCNC: 75 MG/DL (ref 0–100)
LIPID PROFILE,FLP: ABNORMAL
P-R INTERVAL, ECG05: 152 MS
POTASSIUM SERPL-SCNC: 3.8 MMOL/L (ref 3.5–5.5)
Q-T INTERVAL, ECG07: 378 MS
QRS DURATION, ECG06: 82 MS
QTC CALCULATION (BEZET), ECG08: 425 MS
SODIUM SERPL-SCNC: 141 MMOL/L (ref 136–145)
STRESS BASELINE DIAS BP: 77 MMHG
STRESS BASELINE HR: 66 BPM
STRESS BASELINE SYS BP: 115 MMHG
STRESS ESTIMATED WORKLOAD: 1 METS
STRESS EXERCISE DUR MIN: NORMAL
STRESS PEAK DIAS BP: 98 MMHG
STRESS PEAK SYS BP: 152 MMHG
STRESS PERCENT HR ACHIEVED: 68 %
STRESS POST PEAK HR: 122 BPM
STRESS RATE PRESSURE PRODUCT: NORMAL BPM*MMHG
STRESS ST DEPRESSION: 0 MM
STRESS ST ELEVATION: 0 MM
STRESS TARGET HR: 179 BPM
TRIGL SERPL-MCNC: 100 MG/DL (ref ?–150)
TROPONIN I SERPL-MCNC: <0.02 NG/ML (ref 0–0.04)
TROPONIN I SERPL-MCNC: <0.02 NG/ML (ref 0–0.04)
VENTRICULAR RATE, ECG03: 76 BPM
VLDLC SERPL CALC-MCNC: 20 MG/DL

## 2020-05-18 PROCEDURE — 84484 ASSAY OF TROPONIN QUANT: CPT

## 2020-05-18 PROCEDURE — 74011250637 HC RX REV CODE- 250/637: Performed by: EMERGENCY MEDICINE

## 2020-05-18 PROCEDURE — A9500 TC99M SESTAMIBI: HCPCS

## 2020-05-18 PROCEDURE — 99218 HC RM OBSERVATION: CPT

## 2020-05-18 PROCEDURE — 74011250636 HC RX REV CODE- 250/636: Performed by: PHYSICIAN ASSISTANT

## 2020-05-18 PROCEDURE — 80048 BASIC METABOLIC PNL TOTAL CA: CPT

## 2020-05-18 PROCEDURE — 74011250637 HC RX REV CODE- 250/637: Performed by: HOSPITALIST

## 2020-05-18 PROCEDURE — 74011250636 HC RX REV CODE- 250/636: Performed by: HOSPITALIST

## 2020-05-18 PROCEDURE — 36415 COLL VENOUS BLD VENIPUNCTURE: CPT

## 2020-05-18 PROCEDURE — 82550 ASSAY OF CK (CPK): CPT

## 2020-05-18 PROCEDURE — 83036 HEMOGLOBIN GLYCOSYLATED A1C: CPT

## 2020-05-18 PROCEDURE — 80061 LIPID PANEL: CPT

## 2020-05-18 RX ORDER — NADOLOL 20 MG/1
10 TABLET ORAL EVERY EVENING
Status: DISCONTINUED | OUTPATIENT
Start: 2020-05-18 | End: 2020-05-18 | Stop reason: HOSPADM

## 2020-05-18 RX ORDER — ACETAMINOPHEN 500 MG
1000 TABLET ORAL
Status: COMPLETED | OUTPATIENT
Start: 2020-05-18 | End: 2020-05-18

## 2020-05-18 RX ORDER — NITROGLYCERIN 0.4 MG/1
0.4 TABLET SUBLINGUAL
Status: COMPLETED | OUTPATIENT
Start: 2020-05-18 | End: 2020-05-18

## 2020-05-18 RX ORDER — FLUTICASONE PROPIONATE 50 MCG
2 SPRAY, SUSPENSION (ML) NASAL DAILY
Status: DISCONTINUED | OUTPATIENT
Start: 2020-05-18 | End: 2020-05-18 | Stop reason: HOSPADM

## 2020-05-18 RX ORDER — ONDANSETRON 2 MG/ML
4 INJECTION INTRAMUSCULAR; INTRAVENOUS
Status: DISCONTINUED | OUTPATIENT
Start: 2020-05-18 | End: 2020-05-18 | Stop reason: HOSPADM

## 2020-05-18 RX ORDER — HYDROCODONE BITARTRATE AND ACETAMINOPHEN 5; 325 MG/1; MG/1
1 TABLET ORAL
Status: DISCONTINUED | OUTPATIENT
Start: 2020-05-18 | End: 2020-05-18 | Stop reason: HOSPADM

## 2020-05-18 RX ORDER — AMLODIPINE BESYLATE 5 MG/1
5 TABLET ORAL
Status: DISCONTINUED | OUTPATIENT
Start: 2020-05-18 | End: 2020-05-18

## 2020-05-18 RX ORDER — SODIUM CHLORIDE 9 MG/ML
250 INJECTION, SOLUTION INTRAVENOUS ONCE
Status: COMPLETED | OUTPATIENT
Start: 2020-05-18 | End: 2020-05-18

## 2020-05-18 RX ORDER — AMLODIPINE BESYLATE 5 MG/1
5 TABLET ORAL
Status: DISCONTINUED | OUTPATIENT
Start: 2020-05-18 | End: 2020-05-18 | Stop reason: HOSPADM

## 2020-05-18 RX ORDER — VITAMIN E 1000 UNIT
1000 CAPSULE ORAL DAILY
Status: DISCONTINUED | OUTPATIENT
Start: 2020-05-18 | End: 2020-05-18 | Stop reason: HOSPADM

## 2020-05-18 RX ORDER — ACETAMINOPHEN 325 MG/1
650 TABLET ORAL
Status: DISCONTINUED | OUTPATIENT
Start: 2020-05-18 | End: 2020-05-18 | Stop reason: HOSPADM

## 2020-05-18 RX ORDER — ENOXAPARIN SODIUM 100 MG/ML
40 INJECTION SUBCUTANEOUS EVERY 24 HOURS
Status: DISCONTINUED | OUTPATIENT
Start: 2020-05-18 | End: 2020-05-18 | Stop reason: HOSPADM

## 2020-05-18 RX ORDER — GUAIFENESIN 100 MG/5ML
81 LIQUID (ML) ORAL DAILY
Status: DISCONTINUED | OUTPATIENT
Start: 2020-05-18 | End: 2020-05-18 | Stop reason: HOSPADM

## 2020-05-18 RX ORDER — ADHESIVE BANDAGE
30 BANDAGE TOPICAL DAILY PRN
Status: DISCONTINUED | OUTPATIENT
Start: 2020-05-18 | End: 2020-05-18 | Stop reason: HOSPADM

## 2020-05-18 RX ORDER — LORATADINE 10 MG/1
10 TABLET ORAL
Status: DISCONTINUED | OUTPATIENT
Start: 2020-05-18 | End: 2020-05-18 | Stop reason: HOSPADM

## 2020-05-18 RX ORDER — ALBUTEROL SULFATE 0.83 MG/ML
2.5 SOLUTION RESPIRATORY (INHALATION)
Status: DISCONTINUED | OUTPATIENT
Start: 2020-05-18 | End: 2020-05-18 | Stop reason: HOSPADM

## 2020-05-18 RX ORDER — NADOLOL 20 MG/1
10 TABLET ORAL DAILY
Status: DISCONTINUED | OUTPATIENT
Start: 2020-05-18 | End: 2020-05-18

## 2020-05-18 RX ADMIN — Medication 1000 MG: at 08:27

## 2020-05-18 RX ADMIN — ASPIRIN 81 MG CHEWABLE TABLET 81 MG: 81 TABLET CHEWABLE at 08:27

## 2020-05-18 RX ADMIN — NITROGLYCERIN 0.4 MG: 0.4 TABLET SUBLINGUAL at 01:01

## 2020-05-18 RX ADMIN — NITROGLYCERIN 0.5 INCH: 20 OINTMENT TOPICAL at 01:29

## 2020-05-18 RX ADMIN — SODIUM CHLORIDE 250 ML: 900 INJECTION, SOLUTION INTRAVENOUS at 11:30

## 2020-05-18 RX ADMIN — ACETAMINOPHEN 1000 MG: 500 TABLET ORAL at 01:30

## 2020-05-18 RX ADMIN — ENOXAPARIN SODIUM 40 MG: 40 INJECTION SUBCUTANEOUS at 06:55

## 2020-05-18 RX ADMIN — REGADENOSON 0.4 MG: 0.08 INJECTION, SOLUTION INTRAVENOUS at 11:30

## 2020-05-18 NOTE — DISCHARGE SUMMARY
Physician Discharge Summary       Patient: Blue Luu MRN: [id number]  SSN: [id number]    YOB: 1980  Age: 37 y.o. Sex: female    PCP: Ct Pleitez MD    Allergies: Lisinopril; Metoprolol; Statins-hmg-coa reductase inhibitors; and Vaccine adjuvant emulsion combination no. 1    Admit date: 5/17/2020  Admitting Provider: Zenia Espinosa MD    Discharge date: 5/18/2020  Discharging Provider: GAURI Camejo    * Admission Diagnoses: Chest pain [R07.9]  Chest pain [R07.9]    * Discharge Diagnoses:    1. Chest pain- ruled out ACS  2. Hypertension  3. CAD status post PCI in 2008, Cath 9/4/19 80% ostial OM1, patent RCA, EF 60% on medical therapy (small size of OMB and large native LCx, felt OM to be poor target for PCI)  4. Dyslipidemia  5. Hx of tobacco abuse      HPI per admitting MD: Blue Luu is a 37 y.o.  female with past medical history of coronary artery disease status post PCI in 2018, cath in September 2019 showed distal ALEJANDRA 80% lesion on medical treatment comes to the emergency room complaining of chest pain. Patient was watching TV and started having retrosternal pain which was intermittent in nature, 8/10 in severity scale, nonradiating, not associated with any diaphoresis or shortness of breath. Pain improved with the second nitroglycerin in the ED. Patient has been chest pain-free since then. [facility] Course: Ms. Caleb Pittman is a 38 yo AA female with a past medical hx of CAD, HTN, HLD, prior tobacco abuse who presented on 5/17 with chest pain. She was admitted for further care. Cardiology was consulted and did a nuclear stress test which showed \"Fixed defect consistent with prior myocardial infarction. Myocardial perfusion imaging supports a low risk stress test.. As compared to the previous study, there are no significant changes. \" her troponins were negative x3 and her telemetry without events.  As cardiology did not plan to do further intervention, only medical mgmt, the patient was discharged to home and instructed to follow up with her Cardiologist Dr Tiffany Golden at [facility]. * Procedures: nuclear stress test on 5/18/2020 with Dr Yecenia Garcia then Gated Stress study. Adam Amsler was used as the stressing method and agent. (Adam Amsler given via a 10 - 20 sec injection.) Abnormal myocardial perfusion imaging. Fixed defect consistent with prior myocardial infarction. Myocardial perfusion imaging supports a low risk stress test.   There is a prior study available for comparison. . As compared to the previous study, there are no significant changes. · Baseline ECG: Normal sinus rhythm. · Negative stress test.  · Gated SPECT: Left ventricular function post-stress was normal. Calculated ejection fraction is 68%. The TID ratio is 1.26. · Left ventricular perfusion is abnormal.  · Myocardial perfusion imaging defect 1: There is a defect that is small in size with a moderate reduction in uptake present in the apical inferior location(s) that is non-reversible. There is abnormal wall motion in the defect area. The defect appears to be infarction. · Abnormal myocardial perfusion imaging. Fixed defect consistent with prior myocardial infarction.  Myocardial perfusion imaging supports a low risk stress test.    Consults: Cardiology    Significant Diagnostic Studies:   Recent Results (from the past 24 hour(s))   EKG, 12 LEAD, INITIAL    Collection Time: 05/17/20 10:30 PM   Result Value Ref Range    Ventricular Rate 76 BPM    Atrial Rate 76 BPM    P-R Interval 152 ms    QRS Duration 82 ms    Q-T Interval 378 ms    QTC Calculation (Bezet) 425 ms    Calculated P Axis 9 degrees    Calculated R Axis 34 degrees    Calculated T Axis 37 degrees    Diagnosis       Normal sinus rhythm  Inferior infarct , age undetermined  Abnormal ECG  When compared with ECG of 16-FEB-2020 01:16,  Inferior infarct is now present  Confirmed by Dawood Rodriguez MD, --- ([id number]) on 5/18/2020 8:40:10 AM     CBC WITH AUTOMATED DIFF    Collection Time: 05/17/20 10:36 PM   Result Value Ref Range    WBC 7.4 4.6 - 13.2 K/uL    RBC 4.88 4.20 - 5.30 M/uL    HGB 12.1 12.0 - 16.0 g/dL    HCT 38.6 35.0 - 45.0 %    MCV 79.1 74.0 - 97.0 FL    MCH 24.8 24.0 - 34.0 PG    MCHC 31.3 31.0 - 37.0 g/dL    RDW 18.0 (H) 11.6 - 14.5 %    PLATELET 188 (H) 712 - 420 K/uL    MPV 10.3 9.2 - 11.8 FL    NEUTROPHILS 44 40 - 73 %    LYMPHOCYTES 46 21 - 52 %    MONOCYTES 6 3 - 10 %    EOSINOPHILS 4 0 - 5 %    BASOPHILS 0 0 - 2 %    ABS. NEUTROPHILS 3.2 1.8 - 8.0 K/UL    ABS. LYMPHOCYTES 3.4 0.9 - 3.6 K/UL    ABS. MONOCYTES 0.4 0.05 - 1.2 K/UL    ABS. EOSINOPHILS 0.3 0.0 - 0.4 K/UL    ABS. BASOPHILS 0.0 0.0 - 0.1 K/UL    DF AUTOMATED     METABOLIC PANEL, COMPREHENSIVE    Collection Time: 05/17/20 10:36 PM   Result Value Ref Range    Sodium 140 136 - 145 mmol/L    Potassium 3.8 3.5 - 5.5 mmol/L    Chloride 106 100 - 111 mmol/L    CO2 25 21 - 32 mmol/L    Anion gap 9 3.0 - 18 mmol/L    Glucose 94 74 - 99 mg/dL    BUN 9 7.0 - 18 MG/DL    Creatinine 0.64 0.6 - 1.3 MG/DL    BUN/Creatinine ratio 14 12 - 20      GFR est AA >60 >60 ml/min/1.73m2    GFR est non-AA >60 >60 ml/min/1.73m2    Calcium 8.5 8.5 - 10.1 MG/DL    Bilirubin, total 0.1 (L) 0.2 - 1.0 MG/DL    ALT (SGPT) 20 13 - 56 U/L    AST (SGOT) 19 10 - 38 U/L    Alk.  phosphatase 76 45 - 117 U/L    Protein, total 7.8 6.4 - 8.2 g/dL    Albumin 3.1 (L) 3.4 - 5.0 g/dL    Globulin 4.7 (H) 2.0 - 4.0 g/dL    A-G Ratio 0.7 (L) 0.8 - 1.7     CARDIAC PANEL,(CK, CKMB & TROPONIN)    Collection Time: 05/17/20 10:36 PM   Result Value Ref Range    CK - MB <1.0 <3.6 ng/ml    CK-MB Index  0.0 - 4.0 %     CALCULATION NOT PERFORMED WHEN RESULT IS BELOW LINEAR LIMIT    CK 94 26 - 192 U/L    Troponin-I, QT <0.02 0.0 - 0.045 NG/ML   LIPASE    Collection Time: 05/17/20 10:36 PM   Result Value Ref Range    Lipase 36 (L) 73 - 393 U/L   TROPONIN I    Collection Time: 05/18/20  1:56 AM Result Value Ref Range    Troponin-I, QT <0.02 0.0 - 0.045 NG/ML   NUCLEAR CARDIAC STRESS TEST    Collection Time: 05/18/20 12:19 PM   Result Value Ref Range    Target  bpm    Exercise duration time 00:04:00     Stress Base Systolic  mmHg    Stress Base Diastolic BP 98 mmHg    Post peak  BPM    Baseline HR 66 BPM    Estimated workload 1.0 METS    Percent HR 68 %    ST Elevation (mm) 0 mm    ST Depression (mm) 0 mm    Stress Rate Pressure Product 18,544 BPM*mmHg    Baseline  mmHg    Stress Base Diastolic BP 77 mmHg   CARDIAC PANEL,(CK, CKMB & TROPONIN)    Collection Time: 05/18/20 12:48 PM   Result Value Ref Range    CK - MB <1.0 <3.6 ng/ml    CK-MB Index  0.0 - 4.0 %     CALCULATION NOT PERFORMED WHEN RESULT IS BELOW LINEAR LIMIT    CK 65 26 - 192 U/L    Troponin-I, QT <0.02 0.0 - 9.512 NG/ML   METABOLIC PANEL, BASIC    Collection Time: 05/18/20 12:48 PM   Result Value Ref Range    Sodium 141 136 - 145 mmol/L    Potassium 3.8 3.5 - 5.5 mmol/L    Chloride 108 100 - 111 mmol/L    CO2 28 21 - 32 mmol/L    Anion gap 5 3.0 - 18 mmol/L    Glucose 92 74 - 99 mg/dL    BUN 7 7.0 - 18 MG/DL    Creatinine 0.61 0.6 - 1.3 MG/DL    BUN/Creatinine ratio 11 (L) 12 - 20      GFR est AA >60 >60 ml/min/1.73m2    GFR est non-AA >60 >60 ml/min/1.73m2    Calcium 8.6 8.5 - 10.1 MG/DL   LIPID PANEL    Collection Time: 05/18/20 12:48 PM   Result Value Ref Range    LIPID PROFILE          Cholesterol, total 129 <200 MG/DL    Triglyceride 100 <150 MG/DL    HDL Cholesterol 34 (L) 40 - 60 MG/DL    LDL, calculated 75 0 - 100 MG/DL    VLDL, calculated 20 MG/DL    CHOL/HDL Ratio 3.8 0 - 5.0     HEMOGLOBIN A1C W/O EAG    Collection Time: 05/18/20 12:48 PM   Result Value Ref Range    Hemoglobin A1c 6.0 (H) 4.2 - 5.6 %     XR Results (most recent):  Results from Hospital Encounter encounter on 05/17/20   XR CHEST PORT    Narrative AP CHEST, PORTABLE    INDICATION: Chest pain    COMPARISON: Prior chest x-rays, most recent 2/16/2020    FINDINGS:  EKG leads overlie the patient. The cardiac silhouette is normal in size. The pulmonary vasculature is  unremarkable. Mild bibasilar streaky/hazy opacities. No pleural effusion or  pneumothorax. No acute osseous abnormalities are identified. Impression Impression:    Mild bibasilar streaky/hazy opacities, probably atelectasis but early  infiltrate not excluded. Discharge Exam:  General: WD, WN. Alert, cooperative, no acute distress    HEENT: NC, Atraumatic. PERRLA, EOMI. Anicteric sclerae. Lungs:  CTA Bilaterally. No Wheezing/Rhonchi/Rales. Heart:  Regular  rhythm,  No murmur, No Rubs, No Gallops  Abdomen: Soft, Non distended, Non tender. +Bowel sounds, no HSM  Extremities: No c/c/e  Psych:   Good insight. Not anxious or agitated. Neurologic:  CN 2-12 grossly intact, oriented X 3. No acute neurological                                 Deficits    * Discharge Condition: improved  * Disposition: Home    Discharge Medications:  Current Discharge Medication List      CONTINUE these medications which have NOT CHANGED    Details   ascorbic acid, vitamin C, (VITAMIN C) 1,000 mg tablet Take 1 Tab by mouth daily. Qty: 90 Tab, Refills: 0      fluticasone propionate (FLONASE) 50 mcg/actuation nasal spray 2 Sprays by Both Nostrils route daily. Qty: 1 Bottle, Refills: 5      cetirizine (ZYRTEC) 10 mg tablet Take 1 Tab by mouth daily as needed for Allergies. Qty: 90 Tab, Refills: 3      acetaminophen (TYLENOL EXTRA STRENGTH) 500 mg tablet Take 500 mg by mouth every four (4) hours as needed. amLODIPine (NORVASC) 5 mg tablet Take 5 mg by mouth nightly. nadolol (CORGARD) 20 mg tablet Take 10 mg by mouth daily. albuterol (PROVENTIL HFA, VENTOLIN HFA, PROAIR HFA) 90 mcg/actuation inhaler Take 2 Puffs by inhalation every four (4) hours as needed for Wheezing. Qty: 1 Inhaler, Refills: 0      aspirin 81 mg chewable tablet Take 81 mg by mouth daily. nitroglycerin (NITROSTAT) 0.4 mg SL tablet 0.4 mg. STOP taking these medications       albuterol (PROVENTIL VENTOLIN) 2.5 mg /3 mL (0.083 %) nebu Comments:   Reason for Stopping:         metroNIDAZOLE (METROGEL) 0.75 % gel Comments:   Reason for Stopping:               * Follow-up Care/Patient Instructions:   Activity: Activity as tolerated  Diet: Cardiac Diet  Wound Care: None needed    Follow-up Information     Follow up With Specialties Details Why Contact Info    [facility] [address]  [phone]            Signed:  Jarrod Ramirez [id number]  5/18/2020  2:48 PM

## 2020-05-18 NOTE — CONSULTS
[facility] - Consult Note    Consultation request by Magnolia Barbour MD for advice/opinion related to evaluating Chest pain [R07.9]  Chest pain [R07.9]    Date of  Admission: 5/17/2020 10:27 PM   Primary Care Physician:  Gisela Mendenhall MD     Assessment:     Patient Active Problem List   Diagnosis Code    Tobacco use Z72.0    Obesity E66.9    Hypertension I10    Prediabetes R73.03    Low HDL (under 40) E78.6    Obesity, morbid ([address] E66.01    ST elevation myocardial infarction (STEMI) ([address] I21.3    Iron deficiency anemia due to chronic blood loss D50.0    Coronary artery disease involving native coronary artery of native heart with angina pectoris (HCC) I25.119    Adjustment disorder with depressed mood F43.21    Epigastric pain R10.13    Calculus of gallbladder without cholecystitis without obstruction K80.20    Hyperlipidemia LDL goal <70 E78.5    Former smoker Z87.891    Chest pain R07.9     -Chest pain  -CAD with cath from STEMI 2018 that showed:   1. Anterior wall ST-elevation myocardial infarction secondary to 100% occlusion  of the distal left anterior descending artery successfully treated with a [employer] Synergy drug-eluting stent 2.75 x 16 mm. No residual stenosis. Normal antegrade flow. 2. Residual coronary disease includes an ostial 80% stenosis of the first obtuse  marginal branch and a 50% stenosis of the mid left anterior descending artery. This is going to be managed with medical therapy. 3. Hyperdynamic left ventricular systolic function with an ejection fraction of 70%. 4. No aortic stenosis. 5. No mitral regurgitation.  -History of HTN  -Former tobacco abuse  -Dyslipidemia    -Primary cardiologist Dr. Radha Obrien at [address] [place] [address]:     Independently seen and evaluated. Agree with below. Her symptoms are somewhat atypical; however, she does have known history of residual CAD. We will proceed with nuclear scan as outlined.   If the nuclear scan shows any significant reversible defect, will proceed with repeat coronary angiography and possible revascularization procedure. All questions were answered. This was discussed with the patient. She agrees with the plan. -Stable  -patient with recent cath in 9/2019 that revealed some mild disease and no area for intervention. She had been doing well prior to this event. -Given patients history and risk factors will proceed with nuclear scan with results pending. Will keep NPO and make more recommendations pending test results  -Continue with amlodipine, ASA and BB.   -More recommendations to follow   -Will follow along with you. History of Present Illness: This is a 37 y.o. female admitted for Chest pain [R07.9]  Chest pain [R07.9]. Patient complains of:  Patient presented with chest pain that was not relieved after one sl NTg at home but then got relief after an additional NTG in the ER. Prior to this episode the patient was feeling well and without symptoms. She recently started back at exercising. No other symptoms or complaints. Cardiac risk factors: family history, dyslipidemia, obesity, sedentary life style, hypertension, CAD      Review of Symptoms:  Except as stated above include:  Constitutional:  negative  Respiratory:  negative  Cardiovascular:  negative  Gastrointestinal: negative  Genitourinary:  negative  Musculoskeletal:  Negative  Neurological:  Negative  Dermatological:  Negative  Endocrinological: Negative  Psychological:  Negative    Pertinent items are noted in HPI. Past Medical History:     Past Medical History:   Diagnosis Date    Cardiac echocardiogram 07/21/2016    Sm LV cavity. EF 65-70%. No RWMA. Mod-marked LVH w/dynamic obstruction, mid-cavity obliteration & peak grad of 25 mmHg. Indeterminate diastolic fx. No significant valvular pathology.  Cardiac nuclear imaging test 07/22/2016    Low risk.   Very sm distal anterior & apical partially reversible defect, likely artifact, but sm area of ischemia not excluded. No RWMA. EF 66%. Neg EKG on pharm stress test.    Diverticulitis     Gall stones     GERD (gastroesophageal reflux disease)     [facility]     01/27/2018    Hypertension     Infectious disease     Bacteria vaginosis    Nausea & vomiting     Non-ST elevated myocardial infarction ([facility]     STEMI (ST elevation myocardial infarction) ([facility] 01/2018      History of STEMI 2018:  CORONARY ANGIOGRAPHY:  1. Right coronary artery:  The right coronary artery is a large dominant vessel. It gives rise to a large posterior descending branch, it is angiographically normal.  2. Left main coronary:  The left main coronary artery is angiographically normal.  3. Circumflex artery:  The circumflex artery travels in AV groove. It gives rise to a small to moderate size first obtuse marginal branch, which contains an ostial 80% stenosis. The ongoing circumflex artery gives rise to a large distal posterolateral branch, which is widely patent. 4. Left anterior descending artery. The left anterior descending artery proximal segment is patent. The mid vessel contains a 50% stenosis at the origin of a small diagonal branch. The distal left anterior descending artery is 100% occluded with no antegrade flow. 5. Left coronary angiography following intervention. The left main coronary artery and circumflex artery angiographically unchanged. The left anterior descending artery demonstrates that the proximal and mid vessel are unchanged. There is a mid vessel 50% stenosis. The distal 100% stenosis is reduced to 0%. There is normal antegrade flow. No residual stenosis. 6. Left ventriculography. Ejection fraction is 70%. Hyperdynamic systolic function. There are no clear wall motion abnormalities. No mitral regurgitation. IMPRESSION:  1.  Anterior wall ST-elevation myocardial infarction secondary to 100% occlusion of the distal left anterior descending artery successfully treated with a [employer] Synergy drug-eluting stent 2.75 x 16 mm. No residual stenosis. Normal antegrade flow. 2. Residual coronary disease includes an ostial 80% stenosis of the first obtuse marginal branch and a 50% stenosis of the mid left anterior descending artery. This is going to be managed with medical therapy. 3. Hyperdynamic left ventricular systolic function with an ejection fraction of 70%. 4. No aortic stenosis. 5. No mitral regurgitation. Cath 2019:  Cath 19 ([place]) - patent LM, 20% mid LAD, patent mid-distal stent LAD, 30-40% distal LAD, patent LCx, 80% ostial OM1, patent RCA, EF 60% -- medical therapy (small size of OMB and large native LCx, felt OM to be poor target for PCI).        Social History:     Social History     Socioeconomic History    Marital status: SINGLE     Spouse name: Not on file    Number of children: Not on file    Years of education: Not on file    Highest education level: Not on file   Tobacco Use    Smoking status: Former Smoker     Packs/day: 0.25     Years: 20.00     Pack years: 5.00     Last attempt to quit: 2018     Years since quittin.8    Smokeless tobacco: Never Used   Substance and Sexual Activity    Alcohol use: No     Frequency: Never    Drug use: No    Sexual activity: Yes     Partners: Male     Birth control/protection: Surgical     Comment: tubal ligation        Family History:     Family History   Problem Relation Age of Onset    Depression Mother     Asthma Mother     Migraines Mother     Hypertension Mother     Stroke Mother     Heart Attack Mother    Cleophas Lanes Mother     Depression Brother     Alcohol abuse Father     Substance Abuse Father         tobacco    Drug Abuse Father     Hypertension Father     High Cholesterol Father     Stroke Father     Rashes/Skin Problems Father     Substance Abuse Brother         tobacco    Drug Abuse Brother     Asthma Brother     Migraines Brother     Hypertension Brother     High Cholesterol Brother     Hypertension Paternal Grandmother     Diabetes Paternal Grandmother     Hypertension Maternal Grandmother     Colon Cancer Maternal Grandmother         Medications:      Allergies   Allergen Reactions    Lisinopril Cough    Metoprolol Palpitations    Statins-Hmg-Coa Reductase Inhibitors Other (comments)     Muscle/joint pain    Vaccine Adjuvant Emulsion Combination No. 1 Seizures     MMR        Current Facility-Administered Medications   Medication Dose Route Frequency    aspirin chewable tablet 81 mg  81 mg Oral DAILY    fluticasone propionate (FLONASE) 50 mcg/actuation nasal spray 2 Spray  2 Spray Both Nostrils DAILY    loratadine (CLARITIN) tablet 10 mg  10 mg Oral DAILY PRN    ascorbic acid (vitamin C) (VITAMIN C) tablet 1,000 mg  1,000 mg Oral DAILY    albuterol (PROVENTIL VENTOLIN) nebulizer solution 2.5 mg  2.5 mg Nebulization Q4H PRN    amLODIPine (NORVASC) tablet 5 mg  5 mg Oral QHS    acetaminophen (TYLENOL) tablet 650 mg  650 mg Oral Q4H PRN    HYDROcodone-acetaminophen (NORCO) 5-325 mg per tablet 1 Tab  1 Tab Oral Q4H PRN    ondansetron (ZOFRAN) injection 4 mg  4 mg IntraVENous Q4H PRN    magnesium hydroxide (MILK OF MAGNESIA) 400 mg/5 mL oral suspension 30 mL  30 mL Oral DAILY PRN    enoxaparin (LOVENOX) injection 40 mg  40 mg SubCUTAneous Q24H    nadoloL (CORGARD) tablet 10 mg  10 mg Oral QPM         Physical Exam:     Visit Vitals  /67 (BP 1 Location: Right arm, BP Patient Position: At rest;Head of bed elevated (Comment degrees))   Pulse 77   Temp 98.5 °F (36.9 °C)   Resp 18   Ht 5' (1.524 m)   Wt 104.3 kg (230 lb)   SpO2 100%   Breastfeeding No   BMI 44.92 kg/m²     BP Readings from Last 3 Encounters:   05/18/20 112/67   03/20/20 122/80   02/21/20 126/80     Pulse Readings from Last 3 Encounters:   05/18/20 77   03/20/20 68   02/21/20 62     Wt Readings from Last 3 Encounters: 05/17/20 104.3 kg (230 lb)   03/20/20 101.6 kg (224 lb)   02/21/20 103 kg (227 lb)       General:  alert, cooperative, no distress, appears stated age  Neck:  nontender, no carotid bruit, no JVD  Lungs:  clear to auscultation bilaterally  Heart:  regular rate and rhythm, S1, S2 normal, no murmur, click, rub or gallop  Abdomen:  abdomen is soft without significant tenderness, masses, organomegaly or guarding  Extremities:  extremities normal, atraumatic, no cyanosis or edema  Skin: Warm and dry. no hyperpigmentation, vitiligo, or suspicious lesions  Neuro: alert, oriented x3, affect appropriate, no focal neurological deficits, moves all extremities well, no involuntary movements  Psych: non focal     Data Review:     Recent Labs     05/17/20  2236   WBC 7.4   HGB 12.1   HCT 38.6   *     Recent Labs     05/17/20  2236      K 3.8      CO2 25   GLU 94   BUN 9   CREA 0.64   CA 8.5   ALB 3.1*   SGOT 19   ALT 20       Results for orders placed or performed during the hospital encounter of 05/17/20   EKG, 12 LEAD, INITIAL   Result Value Ref Range    Ventricular Rate 76 BPM    Atrial Rate 76 BPM    P-R Interval 152 ms    QRS Duration 82 ms    Q-T Interval 378 ms    QTC Calculation (Bezet) 425 ms    Calculated P Axis 9 degrees    Calculated R Axis 34 degrees    Calculated T Axis 37 degrees    Diagnosis       Normal sinus rhythm  Inferior infarct , age undetermined  Abnormal ECG  When compared with ECG of 16-FEB-2020 01:16,  Inferior infarct is now present  Confirmed by Tomas Land MD, --- ([id number]) on 5/18/2020 8:40:10 AM     Results for orders placed or performed in visit on 08/16/16   AMB POC EKG ROUTINE W/ 12 LEADS, INTER & REP    Impression    Normal sinus rhythm. Normal axis. No ST/T changes. Normal EKG.        All Cardiac Markers in the last 24 hours:    Lab Results   Component Value Date/Time    CPK 94 05/17/2020 10:36 PM    CKMB <1.0 05/17/2020 10:36 PM    CKND1  05/17/2020 10:36 PM     CALCULATION NOT PERFORMED WHEN RESULT IS BELOW LINEAR LIMIT    TROIQ <0.02 05/18/2020 01:56 AM    TROIQ <0.02 05/17/2020 10:36 PM       Last Lipid:    Lab Results   Component Value Date/Time    Cholesterol, total 90 (L) 11/18/2019 12:00 AM    HDL Cholesterol 36 (L) 11/18/2019 12:00 AM    LDL, calculated 39 11/18/2019 12:00 AM    Triglyceride 73 11/18/2019 12:00 AM    CHOL/HDL Ratio 3.9 07/22/2016 01:13 AM       Signed By: GAURI Arthur     May 18, 2020

## 2020-05-18 NOTE — ROUTINE PROCESS
Bedside and Verbal shift change report given to Kaycee Salazar RN (oncoming nurse) by Shy Figueroa RN (offgoing nurse). Report included the following information SBAR, Kardex, MAR and Recent Results. SITUATION: 
Code Status: Full Code Reason for Admission: Chest pain [R07.9] Chest pain [R07.9] Hospital day: 1 Problem List:  
   
Hospital Problems  Date Reviewed: 3/20/2020 Codes Class Noted POA Chest pain ICD-10-CM: R07.9 ICD-9-CM: 786.50  5/18/2020 Unknown BACKGROUND: 
 Past Medical History:  
Past Medical History:  
Diagnosis Date  Cardiac echocardiogram 07/21/2016 Sm LV cavity. EF 65-70%. No RWMA. Mod-marked LVH w/dynamic obstruction, mid-cavity obliteration & peak grad of 25 mmHg. Indeterminate diastolic fx. No significant valvular pathology.  Cardiac nuclear imaging test 07/22/2016 Low risk. Very sm distal anterior & apical partially reversible defect, likely artifact, but sm area of ischemia not excluded. No RWMA. EF 66%. Neg EKG on pharm stress test.  
 Diverticulitis  Gall stones  GERD (gastroesophageal reflux disease)  Heart attack ([facility] [address]   
 01/27/2018  Hypertension  Infectious disease Bacteria vaginosis  Nausea & vomiting  Non-ST elevated myocardial infarction ([facility] [address]  STEMI (ST elevation myocardial infarction) ([facility] [address] 01/2018 Patient taking anticoagulants yes Patient has a defibrillator: no  
 History of shots NO for example, flu, pneumonia, tetanus Isolation History NO for example, MRSA, CDiff ASSESSMENT: 
Changes in Assessment Throughout Shift: NONE Significant Changes in 24 hours (for example, RR/code, fall) Patient has Central Line: no  
Patient has Thomas Cath: no  
Mobility Issues PT 
IV Patency OR Checklist 
Pending Tests Last Vitals: 
Vitals w/ MEWS Score (last day) Date/Time MEWS Score Pulse Resp Temp BP Level of Consciousness SpO2 05/18/20 0329  1  76  18  97.1 °F (36.2 °C)  108/73  Alert  96 % 05/18/20 0230    64  18    110/63    97 % 05/18/20 0200  1  70  19  98.4 °F (36.9 °C)  128/69  Alert  98 % 05/18/20 0130    64  19    115/69    98 % 05/18/20 0101    72      128/80      
 05/18/20 0030    67  16    140/86    97 % 05/18/20 0000          143/87      
 05/17/20 2330    69  20    137/78    98 % 05/17/20 2300    74  19    131/68    97 % 05/17/20 2235  1  78  18  98.7 °F (37.1 °C)  129/77  Alert  99 % PAIN Pain Assessment Pain Intensity 1: 2 (05/18/20 0452) Pain Location 1: Chest 
  Pain Intervention(s) 1: Distraction Patient Stated Pain Goal: 0 Intervention effective: yes Time of last intervention: 0130 Reassessment Completed: yes Other actions taken for pain: Distraction Last 3 Weights: 
Last 3 Recorded Weights in this Encounter 05/17/20 2235 Weight: 104.3 kg (230 lb) Weight change: INTAKE/OUPUT Current Shift: No intake/output data recorded. Last three shifts: No intake/output data recorded. RECOMMENDATIONS AND DISCHARGE PLANNING Patient needs and requests: NONE Pending tests/procedures: Stress Test, labs Discharge plan for patient: Home Discharge planning Needs or Barriers: NONE Estimated Discharge Date: 5/19/2020 Posted on Whiteboard in [facility] Room: yes \"HEALS\" SAFETY CHECK A safety check occurred in the patient's room between off going nurse and oncoming nurse listed above. The safety check included the below items: 
 
H High Alert Medications Verify all high alert medication drips (heparin, PCA, etc.) E Equipment Suction is set up for ALL patients (with yanker) Red plugs utilized for all equipment (IV pumps, etc.) WOWs wiped down at end of shift. Room stocked with oxygen, suction, and other unit-specific supplies A Alarms Bed alarm is set for fall risk patients Ensure chair alarm is in place and activated if patient is up in a chair L Lines Check IV for any infiltration Thomas bag is empty if patient has a Thomas Tubing and IV bags are labeled Rosakey Chapin Safety Room is clean, patient is clean, and equipment is clean. Hallways are clear from equipment besides carts. Fall bracelet on for fall risk patients Ensure room is clear and free of clutter Suction is set up for ALL patients (with ivan) Hallways are clear from equipment besides carts. Isolation precautions followed, supplies available outside room, sign posted Jamin Jarrell RN

## 2020-05-18 NOTE — PROGRESS NOTES
Problem: Falls - Risk of  Goal: *Absence of Falls  Description: Document Ac aMrch Fall Risk and appropriate interventions in the flowsheet.   Outcome: Progressing Towards Goal  Note: Fall Risk Interventions:            Medication Interventions: Patient to call before getting OOB, Teach patient to arise slowly                   Problem: Patient Education: Go to Patient Education Activity  Goal: Patient/Family Education  Outcome: Progressing Towards Goal     Problem: Patient Education: Go to Patient Education Activity  Goal: Patient/Family Education  Outcome: Progressing Towards Goal     Problem: Unstable angina/NSTEMI: Day of Admission/Day 1  Goal: Off Pathway (Use only if patient is Off Pathway)  Outcome: Progressing Towards Goal  Goal: Activity/Safety  Outcome: Progressing Towards Goal  Goal: Consults, if ordered  Outcome: Progressing Towards Goal  Goal: Diagnostic Test/Procedures  Outcome: Progressing Towards Goal  Goal: Nutrition/Diet  Outcome: Progressing Towards Goal  Goal: Discharge Planning  Outcome: Progressing Towards Goal  Goal: Medications  Outcome: Progressing Towards Goal  Goal: Respiratory  Outcome: Progressing Towards Goal  Goal: Treatments/Interventions/Procedures  Outcome: Progressing Towards Goal  Goal: Psychosocial  Outcome: Progressing Towards Goal  Goal: *Hemodynamically stable  Outcome: Progressing Towards Goal  Goal: *Optimal pain control at patient's stated goal  Outcome: Progressing Towards Goal  Goal: *Lungs clear or at baseline  Outcome: Progressing Towards Goal     Problem: Unstable angina/NSTEMI: Day 2  Goal: Off Pathway (Use only if patient is Off Pathway)  Outcome: Progressing Towards Goal  Goal: Activity/Safety  Outcome: Progressing Towards Goal  Goal: Consults, if ordered  Outcome: Progressing Towards Goal  Goal: Diagnostic Test/Procedures  Outcome: Progressing Towards Goal  Goal: Nutrition/Diet  Outcome: Progressing Towards Goal  Goal: Discharge Planning  Outcome: Progressing Towards Goal  Goal: Medications  Outcome: Progressing Towards Goal  Goal: Respiratory  Outcome: Progressing Towards Goal  Goal: Treatments/Interventions/Procedures  Outcome: Progressing Towards Goal  Goal: Psychosocial  Outcome: Progressing Towards Goal  Goal: *Hemodynamically stable  Outcome: Progressing Towards Goal  Goal: *Optimal pain control at patient's stated goal  Outcome: Progressing Towards Goal  Goal: *Lungs clear or at baseline  Outcome: Progressing Towards Goal     Problem: Unstable Angina/NSTEMI: Discharge Outcomes  Goal: *Hemodynamically stable  Outcome: Progressing Towards Goal  Goal: *Stable cardiac rhythm  Outcome: Progressing Towards Goal  Goal: *Lungs clear or at baseline  Outcome: Progressing Towards Goal  Goal: *Optimal pain control at patient's stated goal  Outcome: Progressing Towards Goal  Goal: *Identifies cardiac risk factors  Outcome: Progressing Towards Goal  Goal: *Verbalizes home exercise program, activity guidelines, cardiac precautions  Outcome: Progressing Towards Goal  Goal: *Verbalizes understanding and describes prescribed diet  Outcome: Progressing Towards Goal  Goal: *Verbalizes name, dosage, time, side effects, and number of days to continue medications  Outcome: Progressing Towards Goal  Goal: *Anxiety reduced or absent  Outcome: Progressing Towards Goal  Goal: *Understands and describes signs and symptoms to report to providers(Stroke Metric)  Outcome: Progressing Towards Goal  Goal: *Describes follow-up/return visits to physicians  Outcome: Progressing Towards Goal  Goal: *Describes available resources and support systems  Outcome: Progressing Towards Goal  Goal: *Influenza immunization  Outcome: Progressing Towards Goal  Goal: *Pneumococcal immunization  Outcome: Progressing Towards Goal  Goal: *Describes smoking cessation resources  Outcome: Progressing Towards Goal

## 2020-05-18 NOTE — ED NOTES
Report given and care turned over to [employer] for transport to [facility].

## 2020-05-18 NOTE — PROGRESS NOTES
Reason for Admission:  Chest pain [R07.9]  Chest pain [R07.9]                 RRAT Score:   9%           Plan for utilizing home health:  Patient has no home health orders in place, at this time. This writer will continue to monitor for discharge planning to ensure a safe discharge home from [facility]. Likelihood of Readmission:   LOW                         Transition of Care Plan:  Patient will return home with help from her family and fijudy. Patient's [facility] [place] will transport her home, upon discharge. Initial assessment completed with patient. Cognitive status of patient: Alert and oriented. Face sheet information confirmed:  yes. The patient designates her mother John Clayton to participate in her discharge plan and to receive any needed information. This patient lives in a 2-story house, with her young kids. Patient is able to navigate steps as needed. Prior to hospitalization, patient was considered to be independent with ADLs/IADLS : yes . Patient has a current ACP document on file: no     Patient's [facility] [place] will be available to transport patient home upon discharge. The patient has no medical equipment available in the home. Patient is not currently active with home health. Patient has not stayed in a skilled nursing facility or rehab. This patient is on dialysis :no    Currently, the discharge plan is for patient to return home with help from her family and fijudy. Patient's [facility] will transport her home, upon discharge. Patient's mother is Liset Rashid, ROSA ISELA#[phone]). Patient's PCP is Dr. Anastacio Stanley. Patient is insured through [employer]. The patient states that she can obtain her medications from the pharmacy, and take her medications as directed. This writer will continue to monitor for discharge planning to ensure a safe discharge home from [facility].       Care Management Interventions  PCP Verified by CM: Yes(Dr. Michelle Farias)  Palliative Care Criteria Met (RRAT>21 & CHF Dx)?: No  Mode of Transport at Discharge: Other (see comment)([facility]) will transport.)  Transition of Care Consult (CM Consult): Discharge Planning  Current Support Network: Own Home, Family Lives Nearby(Her kids live with her.)  Confirm Follow Up Transport: Family  Discharge Location  Discharge Placement: Home with family assistance        Ernesto Donis MSW  Care Manager  Pager#: [phone]

## 2020-05-18 NOTE — PROGRESS NOTES
[facility] Hospitalist Group  Progress Note    Patient: Stefan Coulter Age: 37 y.o. : 1980 MR#: [id number] SSN: [id number]  Date: 2020     Subjective:   Patient denies any sxs, no chest pain since hospitalization. Assessment/Plan:   MS. Stefan Coulter is a 37 y.o.  female with past medical history of coronary artery disease status post PCI in , cath in 2019 showed distal ALEJANDRA 80% lesion on medical treatment comes to the emergency room complaining of chest pain. Consults- Cardiology    1. Chest pain- rule out ACS  2. Hypertension  3. CAD status post PCI in , Cath 19 80% ostial OM1, patent RCA, EF 60% on medical therapy (small size of OMB and large native LCx, felt OM to be poor target for PCI)  4. Dyslipidemia: Patient did not tolerate statins due to myalgia  5. Hx of tobacco abuse   6. Iron deficiency. Iron level 29     - Cardiology follows. Plan for nuclear scan today. Keep NPO. Resume home  aspirin, BB, amlodipine. Intolerance to statins in past.   - lipid panel and a1c pending.   - will start on iron supplementation.    - encourage OOB     FULL CODE   DVT/GI Prophylaxis: Lovenox  NPO      Additional Notes:      Case discussed with:  [x]Patient  []Family  []Nursing  []Case Management  DVT Prophylaxis:  [x]Lovenox  []Hep SQ  []SCDs  []Coumadin   []On Heparin gtt    Objective:   VS:   Visit Vitals  /67 (BP 1 Location: Right arm, BP Patient Position: At rest;Head of bed elevated (Comment degrees))   Pulse 77   Temp 98.5 °F (36.9 °C)   Resp 18   Ht 5' (1.524 m)   Wt 104.3 kg (230 lb)   LMP 05/10/2020 (Approximate)   SpO2 100%   Breastfeeding No   BMI 44.92 kg/m²      Tmax/24hrs: Temp (24hrs), Av.2 °F (36.8 °C), Min:97.1 °F (36.2 °C), Max:98.7 °F (37.1 °C)      Intake/Output Summary (Last 24 hours) at 2020 1043  Last data filed at 2020 0853  Gross per 24 hour   Intake 0 ml   Output    Net 0 ml       General: Obese AA woman laying in bed,  NAD  Cardiovascular:  RRR  Pulmonary:  On room air, LSC throughout; respiratory effort WNL  GI:  +BS in all four quadrants, soft, non-tender  Extremities:  No edema; 2+ dorsalis pedis pulses bilaterally  Neuro: awake, alert, ox3, moving extremities, follows commands. Labs:    Recent Results (from the past 24 hour(s))   EKG, 12 LEAD, INITIAL    Collection Time: 05/17/20 10:30 PM   Result Value Ref Range    Ventricular Rate 76 BPM    Atrial Rate 76 BPM    P-R Interval 152 ms    QRS Duration 82 ms    Q-T Interval 378 ms    QTC Calculation (Bezet) 425 ms    Calculated P Axis 9 degrees    Calculated R Axis 34 degrees    Calculated T Axis 37 degrees    Diagnosis       Normal sinus rhythm  Inferior infarct , age undetermined  Abnormal ECG  When compared with ECG of 16-FEB-2020 01:16,  Inferior infarct is now present  Confirmed by Tomas Bonilla MD, --- ([id number]) on 5/18/2020 8:40:10 AM     CBC WITH AUTOMATED DIFF    Collection Time: 05/17/20 10:36 PM   Result Value Ref Range    WBC 7.4 4.6 - 13.2 K/uL    RBC 4.88 4.20 - 5.30 M/uL    HGB 12.1 12.0 - 16.0 g/dL    HCT 38.6 35.0 - 45.0 %    MCV 79.1 74.0 - 97.0 FL    MCH 24.8 24.0 - 34.0 PG    MCHC 31.3 31.0 - 37.0 g/dL    RDW 18.0 (H) 11.6 - 14.5 %    PLATELET 624 (H) 831 - 420 K/uL    MPV 10.3 9.2 - 11.8 FL    NEUTROPHILS 44 40 - 73 %    LYMPHOCYTES 46 21 - 52 %    MONOCYTES 6 3 - 10 %    EOSINOPHILS 4 0 - 5 %    BASOPHILS 0 0 - 2 %    ABS. NEUTROPHILS 3.2 1.8 - 8.0 K/UL    ABS. LYMPHOCYTES 3.4 0.9 - 3.6 K/UL    ABS. MONOCYTES 0.4 0.05 - 1.2 K/UL    ABS. EOSINOPHILS 0.3 0.0 - 0.4 K/UL    ABS.  BASOPHILS 0.0 0.0 - 0.1 K/UL    DF AUTOMATED     METABOLIC PANEL, COMPREHENSIVE    Collection Time: 05/17/20 10:36 PM   Result Value Ref Range    Sodium 140 136 - 145 mmol/L    Potassium 3.8 3.5 - 5.5 mmol/L    Chloride 106 100 - 111 mmol/L    CO2 25 21 - 32 mmol/L    Anion gap 9 3.0 - 18 mmol/L    Glucose 94 74 - 99 mg/dL    BUN 9 7.0 - 18 MG/DL    Creatinine 0. 64 0.6 - 1.3 MG/DL    BUN/Creatinine ratio 14 12 - 20      GFR est AA >60 >60 ml/min/1.73m2    GFR est non-AA >60 >60 ml/min/1.73m2    Calcium 8.5 8.5 - 10.1 MG/DL    Bilirubin, total 0.1 (L) 0.2 - 1.0 MG/DL    ALT (SGPT) 20 13 - 56 U/L    AST (SGOT) 19 10 - 38 U/L    Alk.  phosphatase 76 45 - 117 U/L    Protein, total 7.8 6.4 - 8.2 g/dL    Albumin 3.1 (L) 3.4 - 5.0 g/dL    Globulin 4.7 (H) 2.0 - 4.0 g/dL    A-G Ratio 0.7 (L) 0.8 - 1.7     CARDIAC PANEL,(CK, CKMB & TROPONIN)    Collection Time: 05/17/20 10:36 PM   Result Value Ref Range    CK - MB <1.0 <3.6 ng/ml    CK-MB Index  0.0 - 4.0 %     CALCULATION NOT PERFORMED WHEN RESULT IS BELOW LINEAR LIMIT    CK 94 26 - 192 U/L    Troponin-I, QT <0.02 0.0 - 0.045 NG/ML   LIPASE    Collection Time: 05/17/20 10:36 PM   Result Value Ref Range    Lipase 36 (L) 73 - 393 U/L   TROPONIN I    Collection Time: 05/18/20  1:56 AM   Result Value Ref Range    Troponin-I, QT <0.02 0.0 - 0.045 NG/ML   NUCLEAR CARDIAC STRESS TEST    Collection Time: 05/18/20  9:37 AM   Result Value Ref Range    Target  bpm       Signed By: GAURI Rodriguez     May 18, 2020

## 2020-05-18 NOTE — PROGRESS NOTES
D/c instructions given to pt with verbal understanding of instructions. No prescripts given. D/cd off floor via wheelchair.

## 2020-05-18 NOTE — ROUTINE PROCESS
TRANSFER - IN REPORT: 
 
Verbal report received from Mamadou Hampton RN(name) on Rakesh Bains  being received from [facility]-ED(unit) for routine progression of care Report consisted of patients Situation, Background, Assessment and  
Recommendations(SBAR). Information from the following report(s) SBAR, Kardex, ED Summary, MAR, Recent Results and Cardiac Rhythm NSR was reviewed with the receiving nurse. Opportunity for questions and clarification was provided. Assessment completed upon patients arrival to unit and care assumed.

## 2020-05-18 NOTE — PROGRESS NOTES
Discharge:    Patient will discharge home today (5/18/2020) with family assistance. Her family will also transport home, upon discharge. There are no home health orders in place, at this time. There are no care management concerns regarding this discharge. This writer will continue to monitor for discharge planning to ensure a safe discharge home from [facility]. Ernesto Garcia MSW  Care Manager  Pager#: [phone]

## 2020-05-18 NOTE — DISCHARGE INSTRUCTIONS
DISCHARGE SUMMARY from Nurse    PATIENT INSTRUCTIONS:    After general anesthesia or intravenous sedation, for 24 hours or while taking prescription Narcotics:  · Limit your activities  · Do not drive and operate hazardous machinery  · Do not make important personal or business decisions  · Do  not drink alcoholic beverages  · If you have not urinated within 8 hours after discharge, please contact your surgeon on call. Report the following to your surgeon:  · Excessive pain, swelling, redness or odor of or around the surgical area  · Temperature over 100.5  · Nausea and vomiting lasting longer than 4 hours or if unable to take medications  · Any signs of decreased circulation or nerve impairment to extremity: change in color, persistent  numbness, tingling, coldness or increase pain  · Any questions    What to do at Home:  Recommended activity: Activity as tolerated. If you experience any of the following symptoms fever 101 or greater, nausea, change in mental status, chest pain, vomiting, diarrhea, shortness of breath, any problems or concerns. Please give a list of your current medications to your Primary Care Provider. *  Please update this list whenever your medications are discontinued, doses are      changed, or new medications (including over-the-counter products) are added. *  Please carry medication information at all times in case of emergency situations. These are general instructions for a healthy lifestyle:    No smoking/ No tobacco products/ Avoid exposure to second hand smoke  Surgeon General's Warning:  Quitting smoking now greatly reduces serious risk to your health.     Obesity, smoking, and sedentary lifestyle greatly increases your risk for illness    A healthy diet, regular physical exercise & weight monitoring are important for maintaining a healthy lifestyle    You may be retaining fluid if you have a history of heart failure or if you experience any of the following symptoms:  Weight gain of 3 pounds or more overnight or 5 pounds in a week, increased swelling in our hands or feet or shortness of breath while lying flat in bed. Please call your doctor as soon as you notice any of these symptoms; do not wait until your next office visit. Patient armband removed and shredded  MyChart Activation    Thank you for requesting access to [employer] Please follow the instructions below to securely access and download your online medical record. [employer] allows you to send messages to your doctor, view your test results, renew your prescriptions, schedule appointments, and more. How Do I Sign Up? 1. In your internet browser, go to [email or web address]  2. Click on the First Time User? Click Here link in the Sign In box. You will be redirect to the New Member Sign Up page. 3. Enter your [employer] Access Code exactly as it appears below. You will not need to use this code after youve completed the sign-up process. If you do not sign up before the expiration date, you must request a new code. [employer] Access Code: Activation code not generated  Current [employer] Status: Active (This is the date your [employer] access code will )    4. Enter the last four digits of your Social Security Number (xxxx) and Date of Birth (mm/dd/yyyy) as indicated and click Submit. You will be taken to the next sign-up page. 5. Create a [employer] ID. This will be your [employer] login ID and cannot be changed, so think of one that is secure and easy to remember. 6. Create a [employer] password. You can change your password at any time. 7. Enter your Password Reset Question and Answer. This can be used at a later time if you forget your password. 8. Enter your e-mail address. You will receive e-mail notification when new information is available in [address] 9. Click Sign Up. You can now view and download portions of your medical record.   10. Click the Download Summary menu link to download a portable copy of your medical information. Additional Information    If you have questions, please visit the Frequently Asked Questions section of the [email or web address] website at [email or web address] Remember, [employer] is NOT to be used for urgent needs. For medical emergencies, dial 911. The discharge information has been reviewed with the patient. The patient verbalized understanding. Discharge medications reviewed with the patient and appropriate educational materials and side effects teaching were provided.   ___________________________________________________________________________________________________________________________________

## 2020-05-18 NOTE — H&P
History & Physical    Patient: Gerry Armstrong MRN: [id number]  CSN: [id number]    YOB: 1980  Age: 37 y.o. Sex: female      DOA: 2020    Chief Complaint:   Chief Complaint   Patient presents with    Chest Pain          HPI:     Gerry Armstrong is a 37 y.o.  female with past medical history of coronary artery disease status post PCI in , cath in 2019 showed distal ALEJANDRA 80% lesion on medical treatment comes to the emergency room complaining of chest pain. Patient was watching TV and started having retrosternal pain which was intermittent in nature, 8/10 in severity scale, nonradiating, not associated with any diaphoresis or shortness of breath. Pain improved with the second nitroglycerin in the ED. Patient has been chest pain-free since then. Past Medical History:   Diagnosis Date    Cardiac echocardiogram 2016    Sm LV cavity. EF 65-70%. No RWMA. Mod-marked LVH w/dynamic obstruction, mid-cavity obliteration & peak grad of 25 mmHg. Indeterminate diastolic fx. No significant valvular pathology.  Cardiac nuclear imaging test 2016    Low risk. Very sm distal anterior & apical partially reversible defect, likely artifact, but sm area of ischemia not excluded. No RWMA. EF 66%.   Neg EKG on pharm stress test.    Diverticulitis     Gall stones     GERD (gastroesophageal reflux disease)     Heart attack ([facility] [address]     2018    Hypertension     Infectious disease     Bacteria vaginosis    Nausea & vomiting     Non-ST elevated myocardial infarction [facility])     STEMI (ST elevation myocardial infarction) ([facility] [address] 2018       Past Surgical History:   Procedure Laterality Date    CARDIAC SURG PROCEDURE UNLIST      stent    HX  SECTION  2008    HX CORONARY STENT PLACEMENT      HX TUBAL LIGATION  2008       Family History   Problem Relation Age of Onset    Depression Mother     Asthma Mother     Migraines Mother    [place] Hypertension Mother     Stroke Mother     Heart Attack Mother     Arthritis-osteo Mother     Depression Brother     Alcohol abuse Father     Substance Abuse Father         tobacco    Drug Abuse Father     Hypertension Father     High Cholesterol Father     Stroke Father     Rashes/Skin Problems Father     Substance Abuse Brother         tobacco    Drug Abuse Brother     Asthma Brother     Migraines Brother     Hypertension Brother     High Cholesterol Brother     Hypertension Paternal Grandmother     Diabetes Paternal Grandmother     Hypertension Maternal Grandmother     Colon Cancer Maternal Grandmother        Social History     Socioeconomic History    Marital status: SINGLE     Spouse name: Not on file    Number of children: Not on file    Years of education: Not on file    Highest education level: Not on file   Tobacco Use    Smoking status: Former Smoker     Packs/day: 0.25     Years: 20.00     Pack years: 5.00     Last attempt to quit: 2018     Years since quittin.8    Smokeless tobacco: Never Used   Substance and Sexual Activity    Alcohol use: No     Frequency: Never    Drug use: No    Sexual activity: Yes     Partners: Male     Birth control/protection: Surgical     Comment: tubal ligation       Prior to Admission medications    Medication Sig Start Date End Date Taking? Authorizing Provider   ascorbic acid, vitamin C, (VITAMIN C) 1,000 mg tablet Take 1 Tab by mouth daily. 20  Yes Tiffany Thompson MD   fluticasone propionate (FLONASE) 50 mcg/actuation nasal spray 2 Sprays by Both Nostrils route daily. 20  Yes Rancho Keita MD   cetirizine (ZYRTEC) 10 mg tablet Take 1 Tab by mouth daily as needed for Allergies. 20  Yes Rancho Keita MD   albuterol (PROVENTIL VENTOLIN) 2.5 mg /3 mL (0.083 %) nebu 3 mL by Nebulization route every four (4) hours as needed for Wheezing.  20  Yes Jayne Akhtar MD   acetaminophen (TYLENOL EXTRA STRENGTH) 500 mg tablet Take 500 mg by mouth every four (4) hours as needed. 8/18/18  Yes Provider, Historical   metroNIDAZOLE (METROGEL) 0.75 % gel Insert 5 g into vagina nightly for 5 days. 5/12/20 5/17/20  Chelsy Francois MD   amLODIPine (NORVASC) 5 mg tablet Take 5 mg by mouth nightly. Provider, Historical   nadolol (CORGARD) 20 mg tablet Take 10 mg by mouth daily. Provider, Historical   albuterol (PROVENTIL HFA, VENTOLIN HFA, PROAIR HFA) 90 mcg/actuation inhaler Take 2 Puffs by inhalation every four (4) hours as needed for Wheezing. 2/27/19   Chelsy Francois MD   aspirin 81 mg chewable tablet Take 81 mg by mouth daily. 1/30/18   Provider, Historical   nitroglycerin (NITROSTAT) 0.4 mg SL tablet 0.4 mg. 7/22/16   Provider, Historical       Allergies   Allergen Reactions    Lisinopril Cough    Metoprolol Palpitations    Statins-Hmg-Coa Reductase Inhibitors Other (comments)     Muscle/joint pain    Vaccine Adjuvant Emulsion Combination No. 1 Seizures     MMR         Review of Systems  GENERAL: Patient alert, awake and oriented times 3, able to communicate full sentences and not in distress. HEENT: No change in vision, no earache, tinnitus, sore throat or sinus congestion. NECK: No pain or stiffness. PULMONARY: No shortness of breath, cough or wheeze. Cardiovascular: no pnd or orthopnea, + CP  GASTROINTESTINAL: No abdominal pain, nausea, vomiting or diarrhea, melena or bright red blood per rectum. GENITOURINARY: No urinary frequency, urgency, hesitancy or dysuria. MUSCULOSKELETAL: No joint or muscle pain, no back pain, no recent trauma. DERMATOLOGIC: No rash, no itching, no lesions. ENDOCRINE: No polyuria, polydipsia, no heat or cold intolerance. No recent change in weight. HEMATOLOGICAL: No anemia or easy bruising or bleeding. NEUROLOGIC: No headache, seizures, numbness, tingling or weakness.        Physical Exam:     Physical Exam:  Visit Vitals  /73 (BP 1 Location: Right arm, BP Patient Position: At rest;Head of bed elevated (Comment degrees))   Pulse 76   Temp 97.1 °F (36.2 °C)   Resp 18   Ht 5' (1.524 m)   Wt 104.3 kg (230 lb)   LMP 05/10/2020 (Approximate)   SpO2 96%   Breastfeeding No   BMI 44.92 kg/m²      O2 Device: Room air    Temp (24hrs), Av.1 °F (36.7 °C), Min:97.1 °F (36.2 °C), Max:98.7 °F (37.1 °C)    No intake/output data recorded. No intake/output data recorded. General:  Alert, cooperative, no distress, appears stated age. Head: Normocephalic, without obvious abnormality, atraumatic. Eyes:  Conjunctivae/corneas clear. PERRL, EOMs intact. Nose: Nares normal. No drainage or sinus tenderness. Neck: Supple, symmetrical, trachea midline, no adenopathy, thyroid: no enlargement, no carotid bruit and no JVD. Lungs:   Clear to auscultation bilaterally. Heart:  Regular rate and rhythm, S1, S2 normal.  Retrosternal reproducible pain+     Abdomen: Soft, non-tender. Bowel sounds normal.    Extremities: Extremities normal, atraumatic, no cyanosis or edema. Skin:  No rashes or lesions   Neurologic: AAOx3, No focal motor or sensory deficit.        Labs Reviewed:    BMP:   Lab Results   Component Value Date/Time     2020 10:36 PM    K 3.8 2020 10:36 PM     2020 10:36 PM    CO2 25 2020 10:36 PM    AGAP 9 2020 10:36 PM    GLU 94 2020 10:36 PM    BUN 9 2020 10:36 PM    CREA 0.64 2020 10:36 PM    GFRAA >60 2020 10:36 PM    GFRNA >60 2020 10:36 PM     CMP:   Lab Results   Component Value Date/Time     2020 10:36 PM    K 3.8 2020 10:36 PM     2020 10:36 PM    CO2 25 2020 10:36 PM    AGAP 9 2020 10:36 PM    GLU 94 2020 10:36 PM    BUN 9 2020 10:36 PM    CREA 0.64 2020 10:36 PM    GFRAA >60 2020 10:36 PM    GFRNA >60 2020 10:36 PM    CA 8.5 2020 10:36 PM    ALB 3.1 (L) 2020 10:36 PM    TP 7.8 2020 10:36 PM    GLOB 4.7 (H) 05/17/2020 10:36 PM    AGRAT 0.7 (L) 05/17/2020 10:36 PM    SGOT 19 05/17/2020 10:36 PM    ALT 20 05/17/2020 10:36 PM     CBC:   Lab Results   Component Value Date/Time    WBC 7.4 05/17/2020 10:36 PM    HGB 12.1 05/17/2020 10:36 PM    HCT 38.6 05/17/2020 10:36 PM     (H) 05/17/2020 10:36 PM     All Cardiac Markers in the last 24 hours:   Lab Results   Component Value Date/Time    CPK 94 05/17/2020 10:36 PM    CKMB <1.0 05/17/2020 10:36 PM    CKND1  05/17/2020 10:36 PM     CALCULATION NOT PERFORMED WHEN RESULT IS BELOW LINEAR LIMIT    TROIQ <0.02 05/18/2020 01:56 AM    TROIQ <0.02 05/17/2020 10:36 PM     CXR and EKG noted, follow-up official results. Procedures/imaging: see electronic medical records for all procedures/Xrays and details which were not copied into this note but were reviewed prior to creation of Plan      Assessment/Plan     1. Chest pain rule out ACS: Patient chest pain-free now, continue aspirin, BB. Patient did not tolerate statin in the past.  Cardiology been consulted. Will keep n.p.o. for now for possible stress test.  2. Hypertension: BP stable, continue amlodipine and nadolol. 3. CAD status post PCI in 2008, Cath 9/4/19 80% ostial OM1, patent RCA, EF 60% on medical therapy (small size of OMB and large native LCx, felt OM to be poor target for PCI)  4. Dyslipidemia: Patient did not tolerate statins due to myalgia, will get lipid profile   5. DVT/GI Prophylaxis: Lovenox    Discussed with patient at bedside about hospital admission and my plan care, she understood and agree with my plan care. Army Dance, MD  5/18/2020 6:05 AM    Disclaimer: Sections of this note are dictated using utilizing voice recognition software. Minor typographical errors may be present. If questions arise, please do not hesitate to contact me or call our department.

## 2020-05-18 NOTE — ED PROVIDER NOTES
HPI Pt reports she started having intermittent sharp chest pains in left chest that radiates to her left upper back after exercising tonight at 8pm. She took one SL Ntg tab and chest pain (3/10) resolved off but later came back. Denies any other symptoms. Patient had a MI 2 years ago and had stent inserted at that time. Past Medical History:   Diagnosis Date    Cardiac echocardiogram 2016    Sm LV cavity. EF 65-70%. No RWMA. Mod-marked LVH w/dynamic obstruction, mid-cavity obliteration & peak grad of 25 mmHg. Indeterminate diastolic fx. No significant valvular pathology.  Cardiac nuclear imaging test 2016    Low risk. Very sm distal anterior & apical partially reversible defect, likely artifact, but sm area of ischemia not excluded. No RWMA. EF 66%.   Neg EKG on pharm stress test.    Diverticulitis     Gall stones     GERD (gastroesophageal reflux disease)     Heart attack ([facility] [address]     2018    Hypertension     Infectious disease     Bacteria vaginosis    Nausea & vomiting     Non-ST elevated myocardial infarction ([facility] [address]     STEMI (ST elevation myocardial infarction) ([facility] [address] 2018       Past Surgical History:   Procedure Laterality Date    CARDIAC SURG PROCEDURE UNLIST      stent    HX  SECTION  2008    HX CORONARY STENT PLACEMENT      HX TUBAL LIGATION  2008         Family History:   Problem Relation Age of Onset    Depression Mother     Asthma Mother     Migraines Mother     Hypertension Mother     Stroke Mother     Heart Attack Mother     Benuel Fearing Mother     Depression Brother     Alcohol abuse Father     Substance Abuse Father         tobacco    Drug Abuse Father     Hypertension Father     High Cholesterol Father     Stroke Father     Rashes/Skin Problems Father     Substance Abuse Brother         tobacco    Drug Abuse Brother     Asthma Brother     Migraines Brother     Hypertension Brother     High Cholesterol Brother  Hypertension Paternal Grandmother     Diabetes Paternal Grandmother     Hypertension Maternal Grandmother     Colon Cancer Maternal Grandmother        Social History     Socioeconomic History    Marital status: SINGLE     Spouse name: Not on file    Number of children: Not on file    Years of education: Not on file    Highest education level: Not on file   Occupational History    Not on file   Social Needs    Financial resource strain: Not on file    Food insecurity     Worry: Not on file     Inability: Not on file    Transportation needs     Medical: Not on file     Non-medical: Not on file   Tobacco Use    Smoking status: Former Smoker     Packs/day: 0.25     Years: 20.00     Pack years: 5.00     Last attempt to quit: 2018     Years since quittin.8    Smokeless tobacco: Never Used   Substance and Sexual Activity    Alcohol use: No     Frequency: Never    Drug use: No    Sexual activity: Yes     Partners: Male     Birth control/protection: Surgical     Comment: tubal ligation   Lifestyle    Physical activity     Days per week: Not on file     Minutes per session: Not on file    Stress: Not on file   Relationships    Social connections     Talks on phone: Not on file     Gets together: Not on file     Attends Church service: Not on file     Active member of club or organization: Not on file     Attends meetings of clubs or organizations: Not on file     Relationship status: Not on file    Intimate partner violence     Fear of current or ex partner: Not on file     Emotionally abused: Not on file     Physically abused: Not on file     Forced sexual activity: Not on file   Other Topics Concern    Not on file   Social History Narrative    Not on file         ALLERGIES: Lisinopril; Metoprolol; Statins-hmg-coa reductase inhibitors; and Vaccine adjuvant emulsion combination no. 1    Review of Systems   Constitutional: Negative. HENT: Negative. Eyes: Negative.     Respiratory: Negative. Cardiovascular: Negative. Gastrointestinal: Negative. Endocrine: Negative. Genitourinary: Negative. Musculoskeletal: Negative. Skin: Negative. Allergic/Immunologic: Negative. Neurological: Negative. Hematological: Negative. Psychiatric/Behavioral: Negative. All other systems reviewed and are negative. Vitals:    05/17/20 2235   BP: 129/77   Pulse: 78   Resp: 18   Temp: 98.7 °F (37.1 °C)   SpO2: 99%   Weight: 104.3 kg (230 lb)   Height: 5' (1.524 m)            Physical Exam  Vitals signs and nursing note reviewed. Constitutional:       General: She is not in acute distress. Appearance: She is well-developed. She is not diaphoretic. HENT:      Head: Normocephalic. Right Ear: External ear normal.      Left Ear: External ear normal.      Mouth/Throat:      Pharynx: No oropharyngeal exudate. Eyes:      General: No scleral icterus. Right eye: No discharge. Left eye: No discharge. Conjunctiva/sclera: Conjunctivae normal.      Pupils: Pupils are equal, round, and reactive to light. Neck:      Musculoskeletal: Normal range of motion and neck supple. Thyroid: No thyromegaly. Vascular: No JVD. Trachea: No tracheal deviation. Cardiovascular:      Rate and Rhythm: Normal rate and regular rhythm. Heart sounds: Normal heart sounds. No murmur. No friction rub. No gallop. Pulmonary:      Effort: Pulmonary effort is normal. No respiratory distress. Breath sounds: Normal breath sounds. No stridor. No wheezing or rales. Chest:      Chest wall: No tenderness. Abdominal:      General: Bowel sounds are normal. There is no distension. Palpations: Abdomen is soft. There is no mass. Tenderness: There is no abdominal tenderness. There is no guarding or rebound. Musculoskeletal: Normal range of motion. General: No tenderness. Lymphadenopathy:      Cervical: No cervical adenopathy.    Skin:     General: Skin is warm and dry. Coloration: Skin is not pale. Findings: No erythema or rash. Neurological:      Mental Status: She is alert and oriented to person, place, and time. Cranial Nerves: No cranial nerve deficit. Motor: No abnormal muscle tone. Coordination: Coordination normal.      Deep Tendon Reflexes: Reflexes normal.          MDM  Number of Diagnoses or Management Options     Amount and/or Complexity of Data Reviewed  Clinical lab tests: ordered and reviewed  Tests in the radiology section of CPT®: ordered and reviewed    Risk of Complications, Morbidity, and/or Mortality  Presenting problems: moderate  Diagnostic procedures: moderate  Management options: moderate            Hospital course: Patient was treated with topical NTG. Her chest pain resolved. Procedures    Consultation: Cardiology - [place], [place]. She accepted patient for admission. Case discussed with Dr. Amy Valdez. He accepted patient for admission to telemetry. Dx: chest pain    Disp: admit    Dictation disclaimer:  Please note that this dictation was completed with [employer], the computer voice recognition software. Quite often unanticipated grammatical, syntax, homophones, and other interpretive errors are inadvertently transcribed by the computer software. Please disregard these errors. Please excuse any errors that have escaped final proofreading.

## 2020-05-18 NOTE — INTERDISCIPLINARY ROUNDS
Interdisciplinary Round Note Patient Information: Patient Information: Radha Lopez [birth year] Reason for Admission: Chest pain [R07.9] Chest pain [R07.9] Attending Provider:   Kyle Meza MD 
 Past Medical History:  
Past Medical History:  
Diagnosis Date  Cardiac echocardiogram 07/21/2016 Sm LV cavity. EF 65-70%. No RWMA. Mod-marked LVH w/dynamic obstruction, mid-cavity obliteration & peak grad of 25 mmHg. Indeterminate diastolic fx. No significant valvular pathology.  Cardiac nuclear imaging test 07/22/2016 Low risk. Very sm distal anterior & apical partially reversible defect, likely artifact, but sm area of ischemia not excluded. No RWMA. EF 66%. Neg EKG on pharm stress test.  
 Diverticulitis  Gall stones  GERD (gastroesophageal reflux disease)  Heart attack ([facility] [address]   
 01/27/2018  Hypertension  Infectious disease Bacteria vaginosis  Nausea & vomiting  Non-ST elevated myocardial infarction ([facility] [address]  STEMI (ST elevation myocardial infarction) ([facility] [address] 01/2018 Hospital day: 1 Estimated discharge date: Today, if her stress test is negative. RRAT Score: Low Risk   
      
 9 Total Score 3 Has Seen PCP in Last 6 Months (Yes=3, No=0)  
 4 Pt. Coverage (Medicare=5 , Medicaid, or Self-Pay=4) 2 Charlson Comorbidity Score (Age + Comorbid Conditions) Criteria that do not apply:  
 . Living with Significant Other. Assisted Living. LTAC. SNF. or  
Rehab Patient Length of Stay (>5 days = 3) IP Visits Last 12 Months (1-3=4, 4=9, >4=11) Goals for Today: Stress test 
 
 
 
  
VITAL SIGNS Vitals:  
 05/18/20 0230 05/18/20 0329 05/18/20 0830 05/18/20 1220 BP: 110/63 108/73 112/67 115/77 Pulse: 64 76 77 Resp: 18 18 18 Temp:  97.1 °F (36.2 °C) 98.5 °F (36.9 °C) SpO2: 97% 96% 100% Weight:    104.3 kg (230 lb) Height:    5' (1.524 m) LMP: 05/10/2020 Lines, Drains, & Airways Peripheral IV 05/17/20 Left Antecubital-Site Assessment: Clean, dry, & intact VTE Prophylaxis Intake and Output:  
No intake/output data recorded. No intake/output data recorded. Current Diet: DIET CARDIAC Regular Abdominal  
Last Bowel Movement Date: 05/16/20 Abdominal Assessment: Soft Appetite: NPO Bowel Sounds: Active Recent Glucose Results:  
Lab Results Component Value Date/Time GLU 92 05/18/2020 12:48 PM  
 GLU 94 05/17/2020 10:36 PM  
 
 
  
IV Antibiotics? NO Activity Level: Activity Level: Up ad josefa Needs assistance with ADLs: NO 
PT Consult Status: NO Current Immunizations: 
Immunization History Administered Date(s) Administered  Tdap 04/01/2011 Recommendations:  
Discharge Disposition: Home COVID status: Not tested Will the patient require COVID testing prior to discharge for placement?: NO, she will return home. Medication Reconciliation Completed: NO 
 
Cardiac/Pulmonary Rehab Ordered:  NO 
 
Needs for Discharge: Medical team recommendations for discharge. Likely no needs. Recommendations from IDR team: Stress test and discharge if negative. Ernesto Pierson. MSW Care Manager Pager#: [phone]

## 2020-05-18 NOTE — ED NOTES
Called [facility] to arrange to [facility], Room [address] via [address] [place] [address] Street with Cardiac Monitor.

## 2020-05-18 NOTE — ROUTINE PROCESS
TRANSFER - OUT REPORT: 
 
Verbal report given to Xenia Tatum RN (name) on Stefan Coulter  being transferred to [address] [place] [address] [address] (unit) for routine progression of care Report consisted of patients Situation, Background, Assessment and  
Recommendations(SBAR). Information from the following report(s) SBAR, ER Record, MAR,  was reviewed with the receiving nurse. Lines:  
Peripheral IV 05/17/20 Left Antecubital (Active) Site Assessment Clean, dry, & intact 5/17/2020 10:38 PM  
Phlebitis Assessment 0 5/17/2020 10:38 PM  
Infiltration Assessment 0 5/17/2020 10:38 PM  
Dressing Status Clean, dry, & intact 5/17/2020 10:38 PM  
Dressing Type Transparent;Tape 5/17/2020 10:38 PM  
Hub Color/Line Status Flushed 5/17/2020 10:38 PM  
Action Taken Blood drawn 5/17/2020 10:38 PM  
  
 
Opportunity for questions and clarification was provided. Patient transported with: 
 Monitor IV Lock

## 2020-05-18 NOTE — PROGRESS NOTES
In bed sleeping, easily aroused. A/OX4. Denies pain. Appears to be in no resp. distress. Call bell phone within reach. Side rails X3.

## 2020-05-18 NOTE — ED TRIAGE NOTES
Pt reports she started having intermittent sharp chest pains in left chest to back after exercize tonight at 8pm. Took one SL Ntg tab and pain eased off but then later came back. Pain has eased off for now. Denies any other symptoms.

## 2020-05-18 NOTE — PROGRESS NOTES
Fer Garcia is a 37 y.o. female who was seen by synchronous (real-time) audio-video technology on 5/12/2020. Consent: Fer Garcia, who was seen by synchronous (real-time) audio-video technology, and/or her healthcare decision maker, is aware that this patient-initiated, Telehealth encounter on 5/12/2020 is a billable service, with coverage as determined by her insurance carrier. She is aware that she may receive a bill and has provided verbal consent to proceed: Yes. 712  Subjective:   Fer Garcia is a 37 y.o. female who was seen for No chief complaint on file. Vaginal discharge x few days    C/o odorous vaginal discharge w/ itching, no pelvic pain, new partner, fever. She ahas not tried anythig for this.      SAPPHIRE- continues to take po fe, she is following with gyne dr. Enrico Escalante and offered partial hysterectomy she is still considering.  hgb 10.8>11     CAD s/p LAD stent 2018/Cardiac cath 9/2019 showing widely patent LAD stent, with small to moderate 1st obtuse marginal vessel 80% stenosis. Denies cp, sob. Reviewed cards dr. Rae Fang note 4/2020 no changes, to cont bb, asa, norvasc, crestor     HTN/Prediabetes-She continues to be off smoking (quit 2018)     Prior to Admission medications    Medication Sig Start Date End Date Taking? Authorizing Provider   metroNIDAZOLE (METROGEL) 0.75 % gel Insert 5 g into vagina nightly for 5 days. 5/12/20 5/17/20 Yes Jamil Thompson MD   ascorbic acid, vitamin C, (VITAMIN C) 1,000 mg tablet Take 1 Tab by mouth daily. 2/26/20  Yes Jamil Thompson MD   fluticasone propionate (FLONASE) 50 mcg/actuation nasal spray 2 Sprays by Both Nostrils route daily. 2/21/20  Yes Angelica Coleman MD   cetirizine (ZYRTEC) 10 mg tablet Take 1 Tab by mouth daily as needed for Allergies. 2/21/20  Yes Angelica Coleman MD   albuterol (PROVENTIL VENTOLIN) 2.5 mg /3 mL (0.083 %) nebu 3 mL by Nebulization route every four (4) hours as needed for Wheezing.  2/16/20  Yes Kelly Plummer MD   amLODIPine (NORVASC) 5 mg tablet Take 5 mg by mouth nightly. Yes Provider, Historical   acetaminophen (TYLENOL EXTRA STRENGTH) 500 mg tablet Take 500 mg by mouth every four (4) hours as needed. 8/18/18  Yes Provider, Historical   albuterol (PROVENTIL HFA, VENTOLIN HFA, PROAIR HFA) 90 mcg/actuation inhaler Take 2 Puffs by inhalation every four (4) hours as needed for Wheezing. 2/27/19  Yes Shi Sullivan MD   aspirin 81 mg chewable tablet Take 81 mg by mouth daily. 1/30/18  Yes Provider, Historical   nadolol (CORGARD) 20 mg tablet Take 10 mg by mouth daily.     Provider, Historical   nitroglycerin (NITROSTAT) 0.4 mg SL tablet 0.4 mg. 7/22/16   Provider, Historical     Allergies   Allergen Reactions    Lisinopril Cough    Metoprolol Palpitations    Statins-Hmg-Coa Reductase Inhibitors Other (comments)     Muscle/joint pain    Vaccine Adjuvant Emulsion Combination No. 1 Seizures     MMR       Patient Active Problem List    Diagnosis Date Noted    Chest pain 05/18/2020    Hyperlipidemia LDL goal <70 08/26/2019    Former smoker 08/26/2019    Adjustment disorder with depressed mood 11/09/2018    Epigastric pain 11/09/2018    Calculus of gallbladder without cholecystitis without obstruction 11/09/2018    Coronary artery disease involving native coronary artery of native heart with angina pectoris ([facility] [place] [address] 07/05/2018    Iron deficiency anemia due to chronic blood loss 06/07/2018    Obesity, morbid ([facility] [place] [address] 04/26/2018    ST elevation myocardial infarction (STEMI) ([facility] [place] [address] 04/26/2018    Low HDL (under 40) 01/27/2016    Prediabetes 11/14/2014    Tobacco use 10/30/2014    Obesity 10/30/2014    Hypertension 10/30/2014     Facility-Administered Medications Ordered in Other Visits   Medication Dose Route Frequency Provider Last Rate Last Dose    aspirin chewable tablet 81 mg  81 mg Oral DAILY Sapphire Hamilton MD   81 mg at 05/18/20 0827    fluticasone propionate (FLONASE) 50 mcg/actuation nasal spray 2 Spray  2 Spray Both Nostrils DAILY Olesya Eason MD        loratadine (CLARITIN) tablet 10 mg  10 mg Oral DAILY PRN Michael Priest MD        ascorbic acid (vitamin C) (VITAMIN C) tablet 1,000 mg  1,000 mg Oral DAILY Michael Priest MD   1,000 mg at 05/18/20 0827    albuterol (PROVENTIL VENTOLIN) nebulizer solution 2.5 mg  2.5 mg Nebulization Q4H PRN Michael Priest MD        amLODIPine (NORVASC) tablet 5 mg  5 mg Oral QHS Michael Priest MD        acetaminophen (TYLENOL) tablet 650 mg  650 mg Oral Q4H PRN Olesya Eason MD        HYDROcodone-acetaminophen (NORCO) 5-325 mg per tablet 1 Tab  1 Tab Oral Q4H PRN Olesya Eason MD        ondansetron (ZOFRAN) injection 4 mg  4 mg IntraVENous Q4H PRN Michael Priest MD        magnesium hydroxide (MILK OF MAGNESIA) 400 mg/5 mL oral suspension 30 mL  30 mL Oral DAILY PRN Olesya Eason MD        enoxaparin (LOVENOX) injection 40 mg  40 mg SubCUTAneous Q24H Olesya Eason MD   40 mg at 05/18/20 7130    nadoloL (CORGARD) tablet 10 mg  10 mg Oral QPM Olesya Eason MD         Allergies   Allergen Reactions    Lisinopril Cough    Metoprolol Palpitations    Statins-Hmg-Coa Reductase Inhibitors Other (comments)     Muscle/joint pain    Vaccine Adjuvant Emulsion Combination No. 1 Seizures     MMR     Past Medical History:   Diagnosis Date    Cardiac echocardiogram 07/21/2016    Sm LV cavity. EF 65-70%. No RWMA. Mod-marked LVH w/dynamic obstruction, mid-cavity obliteration & peak grad of 25 mmHg. Indeterminate diastolic fx. No significant valvular pathology.  Cardiac nuclear imaging test 07/22/2016    Low risk. Very sm distal anterior & apical partially reversible defect, likely artifact, but sm area of ischemia not excluded. No RWMA. EF 66%.   Neg EKG on pharm stress test.    Diverticulitis     Gall stones     GERD (gastroesophageal reflux disease)     Heart attack ([facility] [address]     2018    Hypertension     Infectious disease     Bacteria vaginosis    Nausea & vomiting     Non-ST elevated myocardial infarction ([facility] [address]     STEMI (ST elevation myocardial infarction) ([address] 2018     Past Surgical History:   Procedure Laterality Date    CARDIAC SURG PROCEDURE UNLIST      stent    HX  SECTION  2008    HX CORONARY STENT PLACEMENT      HX TUBAL LIGATION  2008     Family History   Problem Relation Age of Onset    Depression Mother     Asthma Mother     Migraines Mother     Hypertension Mother     Stroke Mother     Heart Attack Mother     Arthritis-osteo Mother     Depression Brother     Alcohol abuse Father     Substance Abuse Father         tobacco    Drug Abuse Father     Hypertension Father     High Cholesterol Father     Stroke Father     Rashes/Skin Problems Father     Substance Abuse Brother         tobacco    Drug Abuse Brother     Asthma Brother     Migraines Brother     Hypertension Brother     High Cholesterol Brother     Hypertension Paternal Grandmother     Diabetes Paternal Grandmother     Hypertension Maternal Grandmother     Colon Cancer Maternal Grandmother      Social History     Tobacco Use    Smoking status: Former Smoker     Packs/day: 0.25     Years: 20.00     Pack years: 5.00     Last attempt to quit: 2018     Years since quittin.8    Smokeless tobacco: Never Used   Substance Use Topics    Alcohol use: No     Frequency: Never       ROS      Objective:     Visit Vitals  LMP 05/10/2020 (Approximate)      General: alert, cooperative, no distress   Mental  status: normal mood, behavior, speech, dress, motor activity, and thought processes, able to follow commands   HENT: NCAT   Neck: no visualized mass   Resp: no respiratory distress   Neuro: no gross deficits   Skin: no discoloration or lesions of concern on visible areas   Psychiatric: normal affect, consistent with stated mood, no evidence of hallucinations     Additional exam findings: We discussed the expected course, resolution and complications of the diagnosis(es) in detail. Medication risks, benefits, costs, interactions, and alternatives were discussed as indicated. I advised her to contact the office if her condition worsens, changes or fails to improve as anticipated. She expressed understanding with the diagnosis(es) and plan. Rakesh Bains is a 37 y.o. female who was evaluated by a video visit encounter for concerns as above. Patient identification was verified prior to start of the visit. A caregiver was present when appropriate. Due to this being a TeleHealth encounter (During ODKNN-14 public health emergency), evaluation of the following organ systems was limited: Vitals/Constitutional/EENT/Resp/CV/GI//MS/Neuro/Skin/Heme-Lymph-Imm. Pursuant to the emergency declaration under the [facility], [facility]5 waiver authority and the [employer] and [employer] Act, this Virtual  Visit was conducted, with patient's (and/or legal guardian's) consent, to reduce the patient's risk of exposure to COVID-19 and provide necessary medical care. Services were provided through a video synchronous discussion virtually to substitute for in-person clinic visit. Patient and provider were located at their individual homes. Assessment & Plan:   Diagnoses and all orders for this visit:    1. Vaginal discharge  Start:  -     fluconazole (DIFLUCAN) 150 mg tablet; Take 1 Tab by mouth daily for 2 days. FDA advises cautious prescribing of oral fluconazole in pregnancy. -     metroNIDAZOLE (METROGEL) 0.75 % gel; Insert 5 g into vagina nightly for 5 days. -     CHLAMYDIA/NEISSERIA AMPLIFICATION; Future    2.  Coronary artery disease involving native coronary artery of native heart with angina pectoris (HCC)  Asymptomatic  Cont asa, bb, statin    3. Hyperlipidemia LDL goal <70  Cont crestor    4. Prediabetes  Tlcs, monitoring    5.  Essential hypertension  Controlled  Cont current meds    Ff-up in 3 months or sooner pascual Sams MD

## 2020-05-21 ENCOUNTER — VIRTUAL VISIT (OUTPATIENT)
Dept: FAMILY MEDICINE CLINIC | Age: 40
End: 2020-05-21

## 2020-05-21 DIAGNOSIS — I10 ESSENTIAL HYPERTENSION: ICD-10-CM

## 2020-05-21 DIAGNOSIS — R73.03 PREDIABETES: ICD-10-CM

## 2020-05-21 DIAGNOSIS — R07.9 CHEST PAIN, UNSPECIFIED TYPE: Primary | ICD-10-CM

## 2020-05-21 DIAGNOSIS — E78.5 HYPERLIPIDEMIA LDL GOAL <70: ICD-10-CM

## 2020-05-21 DIAGNOSIS — I25.119 CORONARY ARTERY DISEASE INVOLVING NATIVE CORONARY ARTERY OF NATIVE HEART WITH ANGINA PECTORIS (HCC): ICD-10-CM

## 2020-05-21 RX ORDER — ROSUVASTATIN CALCIUM 5 MG/1
2.5 TABLET, COATED ORAL
Qty: 45 TAB | Refills: 0 | Status: SHIPPED | OUTPATIENT
Start: 2020-05-21 | End: 2020-09-14

## 2020-05-21 NOTE — PATIENT INSTRUCTIONS
A Healthy Heart: Care Instructions Your Care Instructions Heart disease occurs when a substance called plaque builds up in the vessels that supply oxygen-rich blood to your heart. This can narrow the blood vessels and reduce blood flow. A heart attack happens when blood flow is completely blocked. A high-fat diet, smoking, and other factors increase the risk of heart disease. Your doctor has found that you have a chance of having heart disease. You can do lots of things to keep your heart healthy. It may not be easy, but you can change your diet, exercise more, and quit smoking. These steps really work to lower your chance of heart disease. Follow-up care is a key part of your treatment and safety. Be sure to make and go to all appointments, and call your doctor if you are having problems. It's also a good idea to know your test results and keep a list of the medicines you take. How can you care for yourself at home? Diet 
  · Use less salt when you cook and eat. This helps lower your blood pressure. Taste food before salting. Add only a little salt when you think you need it. With time, your taste buds will adjust to less salt.  
  · Eat fewer snack items, fast foods, canned soups, and other high-salt, high-fat, processed foods.  
  · Read food labels and try to avoid saturated and trans fats. They increase your risk of heart disease by raising cholesterol levels.  
  · Limit the amount of solid fat-butter, margarine, and shortening-you eat. Use olive, peanut, or canola oil when you cook. Bake, broil, and steam foods instead of frying them.  
  · Eat a variety of fruit and vegetables every day. Dark green, deep orange, red, or yellow fruits and vegetables are especially good for you. Examples include spinach, carrots, peaches, and berries.  
  · Foods high in fiber can reduce your cholesterol and provide important vitamins and minerals.  High-fiber foods include whole-grain cereals and breads, oatmeal, beans, brown rice, citrus fruits, and apples.  
  · Eat lean proteins. Heart-healthy proteins include seafood, lean meats and poultry, eggs, beans, peas, nuts, seeds, and soy products.  
  · Limit drinks and foods with added sugar. These include candy, desserts, and soda pop.  
 Lifestyle changes 
  · If your doctor recommends it, get more exercise. Walking is a good choice. Bit by bit, increase the amount you walk every day. Try for at least 30 minutes on most days of the week. You also may want to swim, bike, or do other activities.  
  · Do not smoke. If you need help quitting, talk to your doctor about stop-smoking programs and medicines. These can increase your chances of quitting for good. Quitting smoking may be the most important step you can take to protect your heart. It is never too late to quit. You will get health benefits right away.  
  · Limit alcohol to 2 drinks a day for men and 1 drink a day for women. Too much alcohol can cause health problems. Medicines 
  · Take your medicines exactly as prescribed. Call your doctor if you think you are having a problem with your medicine.  
  · If your doctor recommends aspirin, take the amount directed each day. Make sure you take aspirin and not another kind of pain reliever, such as acetaminophen (Tylenol). If you take ibuprofen (such as Advil or Motrin) for other problems, take aspirin at least 2 hours before taking ibuprofen. When should you call for help? Call 911 if you have symptoms of a heart attack.  These may include: 
  · Chest pain or pressure, or a strange feeling in the chest.  
  · Sweating.  
  · Shortness of breath.  
  · Pain, pressure, or a strange feeling in the back, neck, jaw, or upper belly or in one or both shoulders or arms.  
  · Lightheadedness or sudden weakness.  
  · A fast or irregular heartbeat.  
 After you call  911, the  may tell you to chew 1 adult-strength or 2 to 4 low-dose aspirin. Wait for an ambulance. Do not try to drive yourself. 
 Watch closely for changes in your health, and be sure to contact your doctor if you have any problems. Where can you learn more? Go to http://kim-tritsin.info/ Enter G770 in the search box to learn more about \"A Healthy Heart: Care Instructions. \" Current as of: December 15, 2019Content Version: 12.4 © 0142-2823 [employer]. Care instructions adapted under license by [employer] (which disclaims liability or warranty for this information). If you have questions about a medical condition or this instruction, always ask your healthcare professional. [address] any warranty or liability for your use of this information.

## 2020-05-21 NOTE — PROGRESS NOTES
Mami Forte is a 37 y.o. female who was seen by synchronous (real-time) audio-video technology on 5/21/2020. Consent: Mami Forte, who was seen by synchronous (real-time) audio-video technology, and/or her healthcare decision maker, is aware that this patient-initiated, Telehealth encounter on 5/21/2020 is a billable service, with coverage as determined by her insurance carrier. She is aware that she may receive a bill and has provided verbal consent to proceed: Yes. 712  Subjective:   Mami Forte is a 37 y.o. female who was seen for Hospital Follow Up (Chest Pain 05/18/2020-05/18/2020); Admitted 5/18/2020 for chest pain    Pt w/ h/o CAD s/p PCI 2008, cath 9/2019 80% ostial OM1, patent RCA EF 60% on med therapy (small size of OMB and large native LCx, felt OM to be poor target for PCI), HL, HTN. Presented to ED for sternal chest pain after longer walk that day. Took nitro with no relief. Overnight stay for r/o ACS. nuc stress test showing fixed defect consistent with prior MI, considered low risk. Trop neg x 3, was advised continued med management    Reviewed labs a1c 6, known prediabetes  LDL 75, intolerant to 5 mg crestor, she came off of this medication about a month ago. HTN- she continues to take BB, CCB, asa  She is chest pain free. She has appt with cards 5/28    Prior to Admission medications    Medication Sig Start Date End Date Taking? Authorizing Provider   rosuvastatin (CRESTOR) 5 mg tablet Take 0.5 Tabs by mouth nightly. 5/21/20  Yes Johanny Thompson MD   amLODIPine (NORVASC) 5 mg tablet Take 5 mg by mouth nightly. Yes Provider, Historical   nadolol (CORGARD) 20 mg tablet Take 10 mg by mouth daily. Yes Provider, Historical   acetaminophen (TYLENOL EXTRA STRENGTH) 500 mg tablet Take 500 mg by mouth every four (4) hours as needed.  8/18/18  Yes Provider, Historical   albuterol (PROVENTIL HFA, VENTOLIN HFA, PROAIR HFA) 90 mcg/actuation inhaler Take 2 Puffs by inhalation every four (4) hours as needed for Wheezing. 2/27/19  Yes Sarah Reddy MD   aspirin 81 mg chewable tablet Take 81 mg by mouth daily. 1/30/18  Yes Provider, Historical   nitroglycerin (NITROSTAT) 0.4 mg SL tablet 0.4 mg. 7/22/16  Yes Provider, Historical   ascorbic acid, vitamin C, (VITAMIN C) 1,000 mg tablet Take 1 Tab by mouth daily. 2/26/20   Sarah Reddy MD   fluticasone propionate (FLONASE) 50 mcg/actuation nasal spray 2 Sprays by Both Nostrils route daily. 2/21/20   Sarah Reddy MD   cetirizine (ZYRTEC) 10 mg tablet Take 1 Tab by mouth daily as needed for Allergies. 2/21/20   Sarah Reddy MD     Allergies   Allergen Reactions    Lisinopril Cough    Metoprolol Palpitations    Statins-Hmg-Coa Reductase Inhibitors Other (comments)     Muscle/joint pain    Vaccine Adjuvant Emulsion Combination No. 1 Seizures     MMR       Patient Active Problem List    Diagnosis Date Noted    Chest pain 05/18/2020    Hyperlipidemia LDL goal <70 08/26/2019    Former smoker 08/26/2019    Adjustment disorder with depressed mood 11/09/2018    Epigastric pain 11/09/2018    Calculus of gallbladder without cholecystitis without obstruction 11/09/2018    Coronary artery disease involving native coronary artery of native heart with angina pectoris ([facility] [address] 07/05/2018    Iron deficiency anemia due to chronic blood loss 06/07/2018    Obesity, morbid ([facility] [address] 04/26/2018    ST elevation myocardial infarction (STEMI) ([facility] [address] 04/26/2018    Low HDL (under 40) 01/27/2016    Prediabetes 11/14/2014    Tobacco use 10/30/2014    Obesity 10/30/2014    Hypertension 10/30/2014     Current Outpatient Medications   Medication Sig Dispense Refill    rosuvastatin (CRESTOR) 5 mg tablet Take 0.5 Tabs by mouth nightly. 45 Tab 0    amLODIPine (NORVASC) 5 mg tablet Take 5 mg by mouth nightly.  nadolol (CORGARD) 20 mg tablet Take 10 mg by mouth daily.       acetaminophen (TYLENOL EXTRA STRENGTH) 500 mg tablet Take 500 mg by mouth every four (4) hours as needed.  albuterol (PROVENTIL HFA, VENTOLIN HFA, PROAIR HFA) 90 mcg/actuation inhaler Take 2 Puffs by inhalation every four (4) hours as needed for Wheezing. 1 Inhaler 0    aspirin 81 mg chewable tablet Take 81 mg by mouth daily.  nitroglycerin (NITROSTAT) 0.4 mg SL tablet 0.4 mg.      ascorbic acid, vitamin C, (VITAMIN C) 1,000 mg tablet Take 1 Tab by mouth daily. 90 Tab 0    fluticasone propionate (FLONASE) 50 mcg/actuation nasal spray 2 Sprays by Both Nostrils route daily. 1 Bottle 5    cetirizine (ZYRTEC) 10 mg tablet Take 1 Tab by mouth daily as needed for Allergies. 90 Tab 3     Allergies   Allergen Reactions    Lisinopril Cough    Metoprolol Palpitations    Statins-Hmg-Coa Reductase Inhibitors Other (comments)     Muscle/joint pain    Vaccine Adjuvant Emulsion Combination No. 1 Seizures     MMR     Past Medical History:   Diagnosis Date    Cardiac echocardiogram 2016    Sm LV cavity. EF 65-70%. No RWMA. Mod-marked LVH w/dynamic obstruction, mid-cavity obliteration & peak grad of 25 mmHg. Indeterminate diastolic fx. No significant valvular pathology.  Cardiac nuclear imaging test 2016    Low risk. Very sm distal anterior & apical partially reversible defect, likely artifact, but sm area of ischemia not excluded. No RWMA. EF 66%.   Neg EKG on pharm stress test.    Diverticulitis     Gall stones     GERD (gastroesophageal reflux disease)     Heart attack ([facility] [address]     2018    Hypertension     Infectious disease     Bacteria vaginosis    Nausea & vomiting     Non-ST elevated myocardial infarction [facility])     STEMI (ST elevation myocardial infarction) ([facility] [address] 2018     Past Surgical History:   Procedure Laterality Date    CARDIAC SURG PROCEDURE UNLIST      stent    HX  SECTION  2008    HX CORONARY STENT PLACEMENT      HX TUBAL LIGATION  2008     Family History   Problem Relation Age of Onset    Depression Mother     Asthma Mother     Migraines Mother     Hypertension Mother     Stroke Mother     Heart Attack Mother     Arthritis-osteo Mother     Depression Brother     Alcohol abuse Father     Substance Abuse Father         tobacco    Drug Abuse Father     Hypertension Father     High Cholesterol Father     Stroke Father     Rashes/Skin Problems Father     Substance Abuse Brother         tobacco    Drug Abuse Brother     Asthma Brother     Migraines Brother     Hypertension Brother     High Cholesterol Brother     Hypertension Paternal Grandmother     Diabetes Paternal Grandmother     Hypertension Maternal Grandmother     Colon Cancer Maternal Grandmother      Social History     Tobacco Use    Smoking status: Former Smoker     Packs/day: 0.25     Years: 20.00     Pack years: 5.00     Last attempt to quit: 2018     Years since quittin.9    Smokeless tobacco: Never Used   Substance Use Topics    Alcohol use: No     Frequency: Never       ROS      Objective:     Visit Vitals  LMP 05/10/2020 (Approximate)      General: alert, cooperative, no distress   Mental  status: normal mood, behavior, speech, dress, motor activity, and thought processes, able to follow commands   HENT: NCAT   Neck: no visualized mass   Resp: no respiratory distress   Neuro: no gross deficits   Skin: no discoloration or lesions of concern on visible areas   Psychiatric: normal affect, consistent with stated mood, no evidence of hallucinations     Additional exam findings:   Results for orders placed or performed during the hospital encounter of 20   CBC WITH AUTOMATED DIFF   Result Value Ref Range    WBC 7.4 4.6 - 13.2 K/uL    RBC 4.88 4.20 - 5.30 M/uL    HGB 12.1 12.0 - 16.0 g/dL    HCT 38.6 35.0 - 45.0 %    MCV 79.1 74.0 - 97.0 FL    MCH 24.8 24.0 - 34.0 PG    MCHC 31.3 31.0 - 37.0 g/dL    RDW 18.0 (H) 11.6 - 14.5 %    PLATELET 951 (H) 743 - 420 K/uL    MPV 10.3 9.2 - 11.8 FL    NEUTROPHILS 44 40 - 73 %    LYMPHOCYTES 46 21 - 52 %    MONOCYTES 6 3 - 10 %    EOSINOPHILS 4 0 - 5 %    BASOPHILS 0 0 - 2 %    ABS. NEUTROPHILS 3.2 1.8 - 8.0 K/UL    ABS. LYMPHOCYTES 3.4 0.9 - 3.6 K/UL    ABS. MONOCYTES 0.4 0.05 - 1.2 K/UL    ABS. EOSINOPHILS 0.3 0.0 - 0.4 K/UL    ABS. BASOPHILS 0.0 0.0 - 0.1 K/UL    DF AUTOMATED     METABOLIC PANEL, COMPREHENSIVE   Result Value Ref Range    Sodium 140 136 - 145 mmol/L    Potassium 3.8 3.5 - 5.5 mmol/L    Chloride 106 100 - 111 mmol/L    CO2 25 21 - 32 mmol/L    Anion gap 9 3.0 - 18 mmol/L    Glucose 94 74 - 99 mg/dL    BUN 9 7.0 - 18 MG/DL    Creatinine 0.64 0.6 - 1.3 MG/DL    BUN/Creatinine ratio 14 12 - 20      GFR est AA >60 >60 ml/min/1.73m2    GFR est non-AA >60 >60 ml/min/1.73m2    Calcium 8.5 8.5 - 10.1 MG/DL    Bilirubin, total 0.1 (L) 0.2 - 1.0 MG/DL    ALT (SGPT) 20 13 - 56 U/L    AST (SGOT) 19 10 - 38 U/L    Alk.  phosphatase 76 45 - 117 U/L    Protein, total 7.8 6.4 - 8.2 g/dL    Albumin 3.1 (L) 3.4 - 5.0 g/dL    Globulin 4.7 (H) 2.0 - 4.0 g/dL    A-G Ratio 0.7 (L) 0.8 - 1.7     CARDIAC PANEL,(CK, CKMB & TROPONIN)   Result Value Ref Range    CK - MB <1.0 <3.6 ng/ml    CK-MB Index  0.0 - 4.0 %     CALCULATION NOT PERFORMED WHEN RESULT IS BELOW LINEAR LIMIT    CK 94 26 - 192 U/L    Troponin-I, QT <0.02 0.0 - 0.045 NG/ML   LIPASE   Result Value Ref Range    Lipase 36 (L) 73 - 393 U/L   TROPONIN I   Result Value Ref Range    Troponin-I, QT <0.02 0.0 - 0.045 NG/ML   CARDIAC PANEL,(CK, CKMB & TROPONIN)   Result Value Ref Range    CK - MB <1.0 <3.6 ng/ml    CK-MB Index  0.0 - 4.0 %     CALCULATION NOT PERFORMED WHEN RESULT IS BELOW LINEAR LIMIT    CK 65 26 - 192 U/L    Troponin-I, QT <0.02 0.0 - 5.943 NG/ML   METABOLIC PANEL, BASIC   Result Value Ref Range    Sodium 141 136 - 145 mmol/L    Potassium 3.8 3.5 - 5.5 mmol/L    Chloride 108 100 - 111 mmol/L    CO2 28 21 - 32 mmol/L    Anion gap 5 3.0 - 18 mmol/L    Glucose 92 74 - 99 mg/dL    BUN 7 7.0 - 18 MG/DL    Creatinine 0.61 0.6 - 1.3 MG/DL    BUN/Creatinine ratio 11 (L) 12 - 20      GFR est AA >60 >60 ml/min/1.73m2    GFR est non-AA >60 >60 ml/min/1.73m2    Calcium 8.6 8.5 - 10.1 MG/DL   LIPID PANEL   Result Value Ref Range    LIPID PROFILE          Cholesterol, total 129 <200 MG/DL    Triglyceride 100 <150 MG/DL    HDL Cholesterol 34 (L) 40 - 60 MG/DL    LDL, calculated 75 0 - 100 MG/DL    VLDL, calculated 20 MG/DL    CHOL/HDL Ratio 3.8 0 - 5.0     HEMOGLOBIN A1C W/O EAG   Result Value Ref Range    Hemoglobin A1c 6.0 (H) 4.2 - 5.6 %   EKG, 12 LEAD, INITIAL   Result Value Ref Range    Ventricular Rate 76 BPM    Atrial Rate 76 BPM    P-R Interval 152 ms    QRS Duration 82 ms    Q-T Interval 378 ms    QTC Calculation (Bezet) 425 ms    Calculated P Axis 9 degrees    Calculated R Axis 34 degrees    Calculated T Axis 37 degrees    Diagnosis       Normal sinus rhythm  Inferior infarct , age undetermined  Abnormal ECG  When compared with ECG of 16-FEB-2020 01:16,  Inferior infarct is now present  Confirmed by Geoff Kirby MD, --- ([id number]) on 5/18/2020 8:40:10 AM     NUCLEAR CARDIAC STRESS TEST   Result Value Ref Range    Target  bpm    Exercise duration time 00:04:00     Stress Base Systolic  mmHg    Stress Base Diastolic BP 98 mmHg    Post peak  BPM    Baseline HR 66 BPM    Estimated workload 1.0 METS    Percent HR 68 %    ST Elevation (mm) 0 mm    ST Depression (mm) 0 mm    Stress Rate Pressure Product 18,544 BPM*mmHg    Baseline  mmHg    Stress Base Diastolic BP 77 mmHg         We discussed the expected course, resolution and complications of the diagnosis(es) in detail. Medication risks, benefits, costs, interactions, and alternatives were discussed as indicated. I advised her to contact the office if her condition worsens, changes or fails to improve as anticipated. She expressed understanding with the diagnosis(es) and plan.      Fer Garcia is a 37 y.o. female who was evaluated by a video visit encounter for concerns as above. Patient identification was verified prior to start of the visit. A caregiver was present when appropriate. Due to this being a TeleHealth encounter (During YJUMZ-27 public health emergency), evaluation of the following organ systems was limited: Vitals/Constitutional/EENT/Resp/CV/GI//MS/Neuro/Skin/Heme-Lymph-Imm. Pursuant to the emergency declaration under the [address] [place] [address] waiver authority and the [employer] Act, this Virtual  Visit was conducted, with patient's (and/or legal guardian's) consent, to reduce the patient's risk of exposure to COVID-19 and provide necessary medical care. Services were provided through a video synchronous discussion virtually to substitute for in-person clinic visit. Patient and provider were located at their individual homes. Assessment & Plan:   Diagnoses and all orders for this visit:    1. Chest pain, unspecified type  asymptomatic  acs ruled out, low risk cardiac stress test, trop x 3 neg 5/18  Known CAD  Keep appt with cards 5/28    2. Prediabetes  Discussed weight loss strategies  -     HEMOGLOBIN A1C W/O EAG; Future    3. Essential hypertension  Controlled  Cont norvasc, bb  -     METABOLIC PANEL, COMPREHENSIVE; Future    4. Coronary artery disease involving native coronary artery of native heart with angina pectoris ([address]   s/p PCI 2008, cath 9/2019 80% ostial OM1, patent RCA EF 60% on med therapy (small size of OMB and large native LCx, felt OM to be poor target for PCI)  Currently asymptomatic  On med mgt, asa, bb  Intolerant to crestor 5 mg, advised to try 2.5 mg or every other night  Recheck lipids in 3 months  -     LIPID PANEL; Future    5. Hyperlipidemia LDL goal <70  See above    Other orders  -     rosuvastatin (CRESTOR) 5 mg tablet; Take 0.5 Tabs by mouth nightly.     Ff-up in 3 months or sooner prn    Katerine Green MD

## 2020-06-08 NOTE — ED NOTES
Miguel sensors renewed per 3/6/2020 office visit; \"continue FreeStyle Miguel sensor.\"   Pt discharged to home; affect calm/conversant, respirations regular/non labored/skin warm and dry. Denies dizziness/shortness of breath/chest pain/other concerns. Pt instructed to follow up with her PCP, to take medication as prescribed, and to return to ED for worsening/other concerns. Pt voiced her understanding/ambulatory to home with strong/steady gait.

## 2020-06-23 ENCOUNTER — ANESTHESIA EVENT (OUTPATIENT)
Dept: ENDOSCOPY | Age: 40
End: 2020-06-23
Payer: MEDICAID

## 2020-06-26 ENCOUNTER — HOSPITAL ENCOUNTER (OUTPATIENT)
Age: 40
Setting detail: OUTPATIENT SURGERY
Discharge: HOME | End: 2020-06-26
Attending: COLON & RECTAL SURGERY | Admitting: COLON & RECTAL SURGERY
Payer: MEDICAID

## 2020-06-26 ENCOUNTER — ANESTHESIA (OUTPATIENT)
Dept: ENDOSCOPY | Age: 40
End: 2020-06-26
Payer: MEDICAID

## 2020-06-26 VITALS
TEMPERATURE: 98 F | DIASTOLIC BLOOD PRESSURE: 74 MMHG | OXYGEN SATURATION: 97 % | RESPIRATION RATE: 20 BRPM | HEART RATE: 70 BPM | SYSTOLIC BLOOD PRESSURE: 123 MMHG | WEIGHT: 226.5 LBS | BODY MASS INDEX: 44.47 KG/M2 | HEIGHT: 60 IN

## 2020-06-26 LAB — HCG UR QL: NEGATIVE

## 2020-06-26 PROCEDURE — 77030008565 HC TBNG SUC IRR ERBE -B: Performed by: COLON & RECTAL SURGERY

## 2020-06-26 PROCEDURE — 74011000250 HC RX REV CODE- 250: Performed by: NURSE ANESTHETIST, CERTIFIED REGISTERED

## 2020-06-26 PROCEDURE — 74011250636 HC RX REV CODE- 250/636: Performed by: NURSE ANESTHETIST, CERTIFIED REGISTERED

## 2020-06-26 PROCEDURE — 74011250637 HC RX REV CODE- 250/637: Performed by: NURSE ANESTHETIST, CERTIFIED REGISTERED

## 2020-06-26 PROCEDURE — 77030021593 HC FCPS BIOP ENDOSC BSC -A: Performed by: COLON & RECTAL SURGERY

## 2020-06-26 PROCEDURE — 76040000019: Performed by: COLON & RECTAL SURGERY

## 2020-06-26 PROCEDURE — 81025 URINE PREGNANCY TEST: CPT

## 2020-06-26 PROCEDURE — C1729 CATH, DRAINAGE: HCPCS | Performed by: COLON & RECTAL SURGERY

## 2020-06-26 PROCEDURE — 76060000031 HC ANESTHESIA FIRST 0.5 HR: Performed by: COLON & RECTAL SURGERY

## 2020-06-26 PROCEDURE — 77030018846 HC SOL IRR STRL H20 ICUM -A: Performed by: COLON & RECTAL SURGERY

## 2020-06-26 PROCEDURE — 77030013992 HC SNR POLYP ENDOSC BSC -B: Performed by: COLON & RECTAL SURGERY

## 2020-06-26 RX ORDER — SODIUM CHLORIDE 0.9 % (FLUSH) 0.9 %
5-40 SYRINGE (ML) INJECTION EVERY 8 HOURS
Status: DISCONTINUED | OUTPATIENT
Start: 2020-06-26 | End: 2020-06-26 | Stop reason: HOSPADM

## 2020-06-26 RX ORDER — LIDOCAINE HYDROCHLORIDE 10 MG/ML
0.1 INJECTION, SOLUTION EPIDURAL; INFILTRATION; INTRACAUDAL; PERINEURAL AS NEEDED
Status: DISCONTINUED | OUTPATIENT
Start: 2020-06-26 | End: 2020-06-26 | Stop reason: HOSPADM

## 2020-06-26 RX ORDER — PROPOFOL 10 MG/ML
INJECTION, EMULSION INTRAVENOUS AS NEEDED
Status: DISCONTINUED | OUTPATIENT
Start: 2020-06-26 | End: 2020-06-26 | Stop reason: HOSPADM

## 2020-06-26 RX ORDER — SODIUM CHLORIDE 0.9 % (FLUSH) 0.9 %
5-40 SYRINGE (ML) INJECTION EVERY 8 HOURS
Status: CANCELLED | OUTPATIENT
Start: 2020-06-26

## 2020-06-26 RX ORDER — FAMOTIDINE 20 MG/1
20 TABLET, FILM COATED ORAL ONCE
Status: COMPLETED | OUTPATIENT
Start: 2020-06-26 | End: 2020-06-26

## 2020-06-26 RX ORDER — SODIUM CHLORIDE, SODIUM LACTATE, POTASSIUM CHLORIDE, CALCIUM CHLORIDE 600; 310; 30; 20 MG/100ML; MG/100ML; MG/100ML; MG/100ML
75 INJECTION, SOLUTION INTRAVENOUS CONTINUOUS
Status: DISCONTINUED | OUTPATIENT
Start: 2020-06-26 | End: 2020-06-26 | Stop reason: HOSPADM

## 2020-06-26 RX ORDER — SODIUM CHLORIDE 0.9 % (FLUSH) 0.9 %
5-40 SYRINGE (ML) INJECTION AS NEEDED
Status: CANCELLED | OUTPATIENT
Start: 2020-06-26

## 2020-06-26 RX ORDER — SODIUM CHLORIDE 0.9 % (FLUSH) 0.9 %
5-40 SYRINGE (ML) INJECTION AS NEEDED
Status: DISCONTINUED | OUTPATIENT
Start: 2020-06-26 | End: 2020-06-26 | Stop reason: HOSPADM

## 2020-06-26 RX ORDER — SODIUM CHLORIDE, SODIUM LACTATE, POTASSIUM CHLORIDE, CALCIUM CHLORIDE 600; 310; 30; 20 MG/100ML; MG/100ML; MG/100ML; MG/100ML
50 INJECTION, SOLUTION INTRAVENOUS CONTINUOUS
Status: CANCELLED | OUTPATIENT
Start: 2020-06-26

## 2020-06-26 RX ORDER — LIDOCAINE HYDROCHLORIDE 20 MG/ML
INJECTION, SOLUTION EPIDURAL; INFILTRATION; INTRACAUDAL; PERINEURAL AS NEEDED
Status: DISCONTINUED | OUTPATIENT
Start: 2020-06-26 | End: 2020-06-26 | Stop reason: HOSPADM

## 2020-06-26 RX ADMIN — PROPOFOL 50 MG: 10 INJECTION, EMULSION INTRAVENOUS at 10:04

## 2020-06-26 RX ADMIN — LIDOCAINE HYDROCHLORIDE 100 MG: 20 INJECTION, SOLUTION EPIDURAL; INFILTRATION; INTRACAUDAL; PERINEURAL at 10:04

## 2020-06-26 RX ADMIN — FAMOTIDINE 20 MG: 20 TABLET ORAL at 09:05

## 2020-06-26 RX ADMIN — SODIUM CHLORIDE, SODIUM LACTATE, POTASSIUM CHLORIDE, AND CALCIUM CHLORIDE 75 ML/HR: 600; 310; 30; 20 INJECTION, SOLUTION INTRAVENOUS at 09:05

## 2020-06-26 RX ADMIN — PROPOFOL 50 MG: 10 INJECTION, EMULSION INTRAVENOUS at 10:03

## 2020-06-26 RX ADMIN — PROPOFOL 40 MG: 10 INJECTION, EMULSION INTRAVENOUS at 10:07

## 2020-06-26 RX ADMIN — PROPOFOL 100 MG: 10 INJECTION, EMULSION INTRAVENOUS at 10:01

## 2020-06-26 RX ADMIN — SODIUM CHLORIDE 10 ML: 9 INJECTION, SOLUTION INTRAMUSCULAR; INTRAVENOUS; SUBCUTANEOUS at 09:00

## 2020-06-26 RX ADMIN — PROPOFOL 40 MG: 10 INJECTION, EMULSION INTRAVENOUS at 10:10

## 2020-06-26 NOTE — H&P
HPI: Analisa Parra is a 37 y.o. female presenting with chief complain of diverticulitis. Past Medical History:   Diagnosis Date    Cardiac echocardiogram 2016    Sm LV cavity. EF 65-70%. No RWMA. Mod-marked LVH w/dynamic obstruction, mid-cavity obliteration & peak grad of 25 mmHg. Indeterminate diastolic fx. No significant valvular pathology.  Cardiac nuclear imaging test 2016    Low risk. Very sm distal anterior & apical partially reversible defect, likely artifact, but sm area of ischemia not excluded. No RWMA. EF 66%.   Neg EKG on pharm stress test.    Diverticulitis     Gall stones     GERD (gastroesophageal reflux disease)     Heart attack ([address]     2018    Hypertension     Infectious disease     Bacteria vaginosis    Nausea & vomiting     Non-ST elevated myocardial infarction ([address]     STEMI (ST elevation myocardial infarction) ([facility] [address] 2018       Past Surgical History:   Procedure Laterality Date    CARDIAC SURG PROCEDURE UNLIST      stent    HX  SECTION  2008    HX CORONARY STENT PLACEMENT      HX TUBAL LIGATION  2008       Family History   Problem Relation Age of Onset    Depression Mother     Asthma Mother     Migraines Mother     Hypertension Mother     Stroke Mother     Heart Attack Mother    [place] Mother     Depression Brother     Alcohol abuse Father     Substance Abuse Father         tobacco    Drug Abuse Father     Hypertension Father     High Cholesterol Father     Stroke Father     Rashes/Skin Problems Father     Substance Abuse Brother         tobacco    Drug Abuse Brother     Asthma Brother     Migraines Brother     Hypertension Brother     High Cholesterol Brother     Hypertension Paternal Grandmother     Diabetes Paternal Grandmother     Hypertension Maternal Grandmother     Colon Cancer Maternal Grandmother        Social History     Socioeconomic History    Marital status: SINGLE     Spouse name: Not on file    Number of children: Not on file    Years of education: Not on file    Highest education level: Not on file   Tobacco Use    Smoking status: Former Smoker     Packs/day: 0.25     Years: 20.00     Pack years: 5.00     Last attempt to quit: 2018     Years since quittin.0    Smokeless tobacco: Never Used   Substance and Sexual Activity    Alcohol use: No     Frequency: Never    Drug use: No    Sexual activity: Yes     Partners: Male     Birth control/protection: Surgical     Comment: tubal ligation       Review of Systems - neg    Outpatient Medications Marked as Taking for the 20 encounter [facility] Encounter)   Medication Sig Dispense Refill    amLODIPine (NORVASC) 5 mg tablet Take 5 mg by mouth nightly.  nadolol (CORGARD) 20 mg tablet Take 10 mg by mouth daily.  [DISCONTINUED] rosuvastatin (CRESTOR) 5 mg tablet Take 1 Tab by mouth nightly. 90 Tab 3    [DISCONTINUED] ezetimibe (ZETIA) 10 mg tablet Take 10 mg by mouth.  acetaminophen (TYLENOL EXTRA STRENGTH) 500 mg tablet Take 500 mg by mouth every four (4) hours as needed.  [DISCONTINUED] diclofenac (VOLTAREN) 1 % gel APPLY 2 GRAMS AA QID  1    [DISCONTINUED] ondansetron (ZOFRAN ODT) 4 mg disintegrating tablet Take 1 Tab by mouth every eight (8) hours as needed for Nausea. 6 Tab 1    albuterol (PROVENTIL HFA, VENTOLIN HFA, PROAIR HFA) 90 mcg/actuation inhaler Take 2 Puffs by inhalation every four (4) hours as needed for Wheezing. 1 Inhaler 0    [DISCONTINUED] fluticasone (FLONASE) 50 mcg/actuation nasal spray 2 Sprays by Both Nostrils route daily. 1 Bottle 0    [DISCONTINUED] cetirizine (ZYRTEC) 10 mg tablet Take 1 Tab by mouth daily as needed for Allergies. 90 Tab 0    aspirin 81 mg chewable tablet Take 81 mg by mouth daily.  nitroglycerin (NITROSTAT) 0.4 mg SL tablet 0.4 mg.          Allergies   Allergen Reactions    Lisinopril Cough    Metoprolol Palpitations    Statins-Hmg-Coa Reductase Inhibitors Other (comments)     Muscle/joint pain    Vaccine Adjuvant Emulsion Combination No. 1 Seizures     MMR       Vitals:    12/30/19 1608   Weight: 104.3 kg (230 lb)   Height: 5' (1.524 m)       Physical Exam  Constitutional:       Appearance: She is well-developed. HENT:      Head: Normocephalic and atraumatic. Eyes:      Conjunctiva/sclera: Conjunctivae normal.   Abdominal:      General: There is no distension. Palpations: Abdomen is soft. Tenderness: There is no abdominal tenderness. Musculoskeletal: Normal range of motion. Lymphadenopathy:      Cervical: No cervical adenopathy. Skin:     General: Skin is warm and dry. Findings: No rash. Neurological:      Sensory: No sensory deficit. Psychiatric:         Speech: Speech normal.         Assessment / Plan    colonoscopy    The diagnoses and plan were discussed with the patient. All questions answered. Plan of care agreed to by all concerned.

## 2020-06-26 NOTE — PROCEDURES
[facility]  [address], [address]   [place], [address]  [phone]                    Colonoscopy Procedure Note      Tee Quintana  1980  [id number]                Date of Procedure: 6/26/2020    Preoperative diagnosis: K57.32,  Diverticulitis of colon    Postoperative diagnosis: diverticulitis    :  Leatha Menon MD    Assistant(s): Endoscopy Technician-1: Hussein Henriquez  Endoscopy RN-1: Marcio Winkler RN; Gabrielle Clifton RN    Sedation: MAC    Complications: None    Implants: None    Procedure Details:  Prior to the procedure, a history and physical were performed. The patients medications, allergies and sensitivities were reviewed and all questions were answered. After informed consent was obtained for the procedure, with all risks and benefits of procedure explained. The patient was taken to the endoscopy suite and placed in the left lateral decubitus position. Patient identification and proposed procedure were verified prior to the procedure by the nurse and I. After sequential anesthesia administered by anesthesiologist, a digital rectal exam was performed and was normal.  The Olympus video colonoscope was introduced through the anus and advanced to cecum, which was identified by the ileocecal valve and appendiceal orifice. The quality of preparation was excellent. The colonoscope was slowly withdrawn and the mucosa examined for any abnormalities. Cecal withdrawal time was greater than 6 minutes. The patient tolerated the procedure well. There were no complications. Findings/Interventions:   Polyps - none    EBL: none    Recommendations: -For colon cancer screening in this average-risk patient, colonoscopy may be repeated in 10 years. Resume normal medication(s).      Discharge Disposition:  Otis Banerjee MD  6/26/2020  10:13 AM

## 2020-06-26 NOTE — DISCHARGE INSTRUCTIONS
DISCHARGE SUMMARY from Nurse    PATIENT INSTRUCTIONS:    After general anesthesia or intravenous sedation, for 24 hours or while taking prescription Narcotics:  · Limit your activities  · Do not drive and operate hazardous machinery  · Do not make important personal or business decisions  · Do  not drink alcoholic beverages  · If you have not urinated within 8 hours after discharge, please contact your surgeon on call. Report the following to your surgeon:  · Excessive pain, swelling, redness or odor of or around the surgical area  · Temperature over 100.5  · Nausea and vomiting lasting longer than 4 hours or if unable to take medications  · Any signs of decreased circulation or nerve impairment to extremity: change in color, persistent  numbness, tingling, coldness or increase pain  · Any questions    What to do at Home:  Recommended activity: Activity as tolerated and no driving for today. *  Please give a list of your current medications to your Primary Care Provider. *  Please update this list whenever your medications are discontinued, doses are      changed, or new medications (including over-the-counter products) are added. *  Please carry medication information at all times in case of emergency situations. These are general instructions for a healthy lifestyle:    No smoking/ No tobacco products/ Avoid exposure to second hand smoke  Surgeon General's Warning:  Quitting smoking now greatly reduces serious risk to your health. Obesity, smoking, and sedentary lifestyle greatly increases your risk for illness    A healthy diet, regular physical exercise & weight monitoring are important for maintaining a healthy lifestyle    You may be retaining fluid if you have a history of heart failure or if you experience any of the following symptoms:  Weight gain of 3 pounds or more overnight or 5 pounds in a week, increased swelling in our hands or feet or shortness of breath while lying flat in bed. Please call your doctor as soon as you notice any of these symptoms; do not wait until your next office visit. The discharge information has been reviewed with the patient. The patient verbalized understanding. Discharge medications reviewed with the patient and appropriate educational materials and side effects teaching were provided. ___________________________________________________________________________________________________________________________________    Patient Education        Colonoscopy: What to Expect at Home  Your Recovery  After a colonoscopy, you'll stay at the clinic for 1 to 2 hours until the medicines wear off. Then you can go home. But you'll need to arrange for a ride. Your doctor will tell you when you can eat and do your other usual activities. Your doctor will talk to you about when you'll need your next colonoscopy. Your doctor can help you decide how often you need to be checked. This will depend on the results of your test and your risk for colorectal cancer. After the test, you may be bloated or have gas pains. You may need to pass gas. If a biopsy was done or a polyp was removed, you may have streaks of blood in your stool (feces) for a few days. Problems such as heavy rectal bleeding may not occur until several weeks after the test. This isn't common. But it can happen after polyps are removed. This care sheet gives you a general idea about how long it will take for you to recover. But each person recovers at a different pace. Follow the steps below to get better as quickly as possible. How can you care for yourself at home? Activity  · Rest when you feel tired. · You can do your normal activities when it feels okay to do so. Diet  · Follow your doctor's directions for eating. · Unless your doctor has told you not to, drink plenty of fluids. This helps to replace the fluids that were lost during the colon prep. · Do not drink alcohol.   Medicines  · Your doctor will tell you if and when you can restart your medicines. He or she will also give you instructions about taking any new medicines. · If you take aspirin or some other blood thinner, ask your doctor if and when to start taking it again. Make sure that you understand exactly what your doctor wants you to do. · If polyps were removed or a biopsy was done during the test, your doctor may tell you not to take aspirin or other anti-inflammatory medicines for a few days. These include ibuprofen (Advil, Motrin) and naproxen (Aleve). Other instructions  · For your safety, do not drive or operate machinery until the medicine wears off and you can think clearly. Your doctor may tell you not to drive or operate machinery until the day after your test.  · Do not sign legal documents or make major decisions until the medicine wears off and you can think clearly. The anesthesia can make it hard for you to fully understand what you are agreeing to. Follow-up care is a key part of your treatment and safety. Be sure to make and go to all appointments, and call your doctor if you are having problems. It's also a good idea to know your test results and keep a list of the medicines you take. When should you call for help? [id number] anytime you think you may need emergency care. For example, call if:  · You passed out (lost consciousness). · You pass maroon or bloody stools. · You have trouble breathing. Call your doctor now or seek immediate medical care if:  · You have pain that does not get better after you take pain medicine. · You are sick to your stomach or cannot drink fluids. · You have new or worse belly pain. · You have blood in your stools. · You have a fever. · You cannot pass stools or gas. Watch closely for changes in your health, and be sure to contact your doctor if you have any problems. Where can you learn more?   Go to http://kim-tristin.info/  Enter E264 in the search box to learn more about \"Colonoscopy: What to Expect at Home. \"  Current as of: August 22, 2019               Content Version: 12.5  © 5156-0883 [employer]. Care instructions adapted under license by [employer] (which disclaims liability or warranty for this information). If you have questions about a medical condition or this instruction, always ask your healthcare professional. [address] any warranty or liability for your use of this information. Patient Education        Diverticulosis: Care Instructions  Your Care Instructions  In diverticulosis, pouches called diverticula form in the wall of the large intestine (colon). The pouches do not cause any pain or other symptoms. Most people who have diverticulosis do not know they have it. But the pouches sometimes bleed, and if they become infected, they can cause pain and other symptoms. When this happens, it is called diverticulitis. Diverticula form when pressure pushes the wall of the colon outward at certain weak points. A diet that is too low in fiber can cause diverticula. Follow-up care is a key part of your treatment and safety. Be sure to make and go to all appointments, and call your doctor if you are having problems. It's also a good idea to know your test results and keep a list of the medicines you take. How can you care for yourself at home? · Include fruits, leafy green vegetables, beans, and whole grains in your diet each day. These foods are high in fiber. · Take a fiber supplement, such as Citrucel or Metamucil, every day if needed. Read and follow all instructions on the label. · Drink plenty of fluids, enough so that your urine is light yellow or clear like water. If you have kidney, heart, or liver disease and have to limit fluids, talk with your doctor before you increase the amount of fluids you drink. · Get at least 30 minutes of exercise on most days of the week. Walking is a good choice.  You also may want to do other activities, such as running, swimming, cycling, or playing tennis or team sports. · Cut out foods that cause gas, pain, or other symptoms. When should you call for help? Call your doctor now or seek immediate medical care if:  · You have belly pain. · You pass maroon or very bloody stools. · You have a fever. · You have nausea and vomiting. · You have unusual changes in your bowel movements or abdominal swelling. · You have burning pain when you urinate. · You have abnormal vaginal discharge. · You have shoulder pain. · You have cramping pain that does not get better when you have a bowel movement or pass gas. · You pass gas or stool from your urethra while urinating. Watch closely for changes in your health, and be sure to contact your doctor if you have any problems. Where can you learn more? Go to http://kim-tristin.info/  Enter [id number] in the search box to learn more about \"Diverticulosis: Care Instructions. \"  Current as of: August 12, 2019               Content Version: 12.5  © 0409-4375 [employer]. Care instructions adapted under license by [employer] (which disclaims liability or warranty for this information). If you have questions about a medical condition or this instruction, always ask your healthcare professional. [address] any warranty or liability for your use of this information.

## 2020-06-26 NOTE — ANESTHESIA PREPROCEDURE EVALUATION
Relevant Problems   No relevant active problems       Anesthetic History               Review of Systems / Medical History  Patient summary reviewed and pertinent labs reviewed    Pulmonary                   Neuro/Psych              Cardiovascular    Hypertension: well controlled          CAD         GI/Hepatic/Renal     GERD: well controlled           Endo/Other        Morbid obesity     Other Findings              Physical Exam    Airway  Mallampati: II  TM Distance: 4 - 6 cm  Neck ROM: normal range of motion, short neck   Mouth opening: Normal     Cardiovascular  Regular rate and rhythm,  S1 and S2 normal,  no murmur, click, rub, or gallop             Dental  No notable dental hx       Pulmonary  Breath sounds clear to auscultation               Abdominal  GI exam deferred       Other Findings            Anesthetic Plan    ASA: 3  Anesthesia type: MAC          Induction: Intravenous  Anesthetic plan and risks discussed with: Patient

## 2020-06-27 NOTE — ANESTHESIA POSTPROCEDURE EVALUATION
Procedure(s):  COLONOSCOPY. MAC    Anesthesia Post Evaluation      Multimodal analgesia: multimodal analgesia used between 6 hours prior to anesthesia start to PACU discharge  Patient location during evaluation: bedside  Patient participation: complete - patient participated  Level of consciousness: awake  Pain management: adequate  Airway patency: patent  Anesthetic complications: no  Cardiovascular status: stable  Respiratory status: acceptable  Hydration status: acceptable  Post anesthesia nausea and vomiting:  controlled      INITIAL Post-op Vital signs:   Vitals Value Taken Time   /66 6/26/2020 10:37 AM   Temp 36.7 °C (98.1 °F) 6/26/2020 10:19 AM   Pulse 67 6/26/2020 10:42 AM   Resp 20 6/26/2020 10:42 AM   SpO2 98 % 6/26/2020 10:42 AM   Vitals shown include unvalidated device data.

## 2020-07-07 ENCOUNTER — TELEPHONE (OUTPATIENT)
Dept: FAMILY MEDICINE CLINIC | Age: 40
End: 2020-07-07

## 2020-07-07 RX ORDER — METRONIDAZOLE 500 MG/1
500 TABLET ORAL 2 TIMES DAILY
Qty: 14 TAB | Refills: 0 | Status: SHIPPED | OUTPATIENT
Start: 2020-07-07 | End: 2020-07-14

## 2020-07-07 NOTE — TELEPHONE ENCOUNTER
Patient identified with 2 identifiers (name and ). Patient aware that flagyl has been e scribed to pharmacy.

## 2020-07-07 NOTE — TELEPHONE ENCOUNTER
This patient contacted office for the following prescriptions to be filled:    Medication requested : metroNIDAZOLE (FLAGYL) 500 mg tablet [[id number]]        PCP: maggi  Pharmacy or Print: [facility]  Mail order or Local pharmacy [phone]    Scheduled appointment if not seen by current providers in office: 5/21/2020-8/28/2020

## 2020-07-07 NOTE — TELEPHONE ENCOUNTER
Spoke with patient and she states she dis having vaginal odor and clear vaginal discharge. Patient is requesting Flagyl the pill instead of the gel. Please advise. Patient states she just returned from vacation hand has used a different soap.

## 2020-07-29 ENCOUNTER — TELEPHONE (OUTPATIENT)
Dept: FAMILY MEDICINE CLINIC | Age: 40
End: 2020-07-29

## 2020-07-29 RX ORDER — ERYTHROMYCIN 5 MG/G
OINTMENT OPHTHALMIC
Qty: 1 TUBE | Refills: 0 | Status: SHIPPED | OUTPATIENT
Start: 2020-07-29 | End: 2020-08-26

## 2020-07-29 NOTE — TELEPHONE ENCOUNTER
Spoke with patient. She has redness, irritation, yellow drainage and itching in right eye since yesterday. Patient does report that her child was diagnosed with bilateral pink eye last week. Patient uses [facility] [address] in [facility] [address].

## 2020-07-29 NOTE — TELEPHONE ENCOUNTER
Pt called stating she has pink eye and would like a prescription for a ointment called Erythromycin that she previously has used that was prescribed by the hospital. Please advise.

## 2020-08-24 ENCOUNTER — HOSPITAL ENCOUNTER (OUTPATIENT)
Dept: LAB | Age: 40
Discharge: HOME | End: 2020-08-24

## 2020-08-25 ENCOUNTER — HOSPITAL ENCOUNTER (EMERGENCY)
Age: 40
Discharge: HOME | End: 2020-08-25
Attending: EMERGENCY MEDICINE
Payer: MEDICAID

## 2020-08-25 VITALS
DIASTOLIC BLOOD PRESSURE: 86 MMHG | SYSTOLIC BLOOD PRESSURE: 152 MMHG | OXYGEN SATURATION: 98 % | WEIGHT: 225 LBS | RESPIRATION RATE: 18 BRPM | TEMPERATURE: 98.3 F | HEART RATE: 72 BPM | HEIGHT: 60 IN | BODY MASS INDEX: 44.17 KG/M2

## 2020-08-25 DIAGNOSIS — K08.89 DENTALGIA: Primary | ICD-10-CM

## 2020-08-25 PROCEDURE — 99282 EMERGENCY DEPT VISIT SF MDM: CPT

## 2020-08-25 RX ORDER — NAPROXEN 500 MG/1
500 TABLET ORAL 2 TIMES DAILY WITH MEALS
Qty: 20 TAB | Refills: 0 | Status: SHIPPED | OUTPATIENT
Start: 2020-08-25 | End: 2021-03-31

## 2020-08-25 RX ORDER — HYDROCODONE BITARTRATE AND ACETAMINOPHEN 5; 325 MG/1; MG/1
1 TABLET ORAL
Qty: 5 TAB | Refills: 0 | Status: SHIPPED | OUTPATIENT
Start: 2020-08-25 | End: 2020-08-26

## 2020-08-25 RX ORDER — CYCLOBENZAPRINE HCL 5 MG
10 TABLET ORAL
Qty: 9 TAB | Refills: 0 | Status: SHIPPED | OUTPATIENT
Start: 2020-08-25 | End: 2020-08-26

## 2020-08-25 RX ORDER — PENICILLIN V POTASSIUM 500 MG/1
500 TABLET, FILM COATED ORAL 2 TIMES DAILY
Qty: 20 TAB | Refills: 0 | Status: SHIPPED | OUTPATIENT
Start: 2020-08-25 | End: 2020-09-04

## 2020-08-25 NOTE — DISCHARGE INSTRUCTIONS
Patient Education        Tooth and Gum Pain: Care Instructions  Your Care Instructions    The most common causes of dental pain are tooth decay and gum disease. Pain can also be caused by an infection of the tooth (abscess) or the gums. Or you may have pain from a broken or cracked tooth. Other causes of pain include infection and damage to a tooth from nervous grinding of your teeth. A wisdom tooth can be painful when it is coming in but cannot break through the gum. It can also be painful when the tooth is only partway in and extra gum tissue has formed around it. The tissue can get inflamed (pericoronitis), and sometimes it gets infected. Prompt dental care can help find the cause of your toothache and keep the tooth from dying or gum disease from getting worse. Self-care at home may reduce your pain and discomfort. Follow-up care is a key part of your treatment and safety. Be sure to make and go to all appointments, and call your dentist or doctor if you are having problems. It's also a good idea to know your test results and keep a list of the medicines you take. How can you care for yourself at home? · To reduce pain and facial swelling, put an ice or cold pack on the outside of your cheek for 10 to 20 minutes at a time. Put a thin cloth between the ice and your skin. Do not use heat. · If your doctor prescribed antibiotics, take them as directed. Do not stop taking them just because you feel better. You need to take the full course of antibiotics. · Ask your doctor if you can take an over-the-counter pain medicine, such as acetaminophen (Tylenol), ibuprofen (Advil, Motrin), or naproxen (Aleve). Be safe with medicines. Read and follow all instructions on the label. · Avoid very hot, cold, or sweet foods and drinks if they increase your pain. · Rinse your mouth with warm salt water every 2 hours to help relieve pain and swelling. Mix 1 teaspoon of salt in 8 ounces of water.   · Talk to your dentist about using special toothpaste for sensitive teeth. To reduce pain on contact with heat or cold or when brushing, brush with this toothpaste regularly or rub a small amount of the paste on the sensitive area with a clean finger 2 or 3 times a day. Floss gently between your teeth. · Do not smoke or use spit tobacco. Tobacco use can make gum problems worse, decreases your ability to fight infection in your gums, and delays healing. If you need help quitting, talk to your doctor about stop-smoking programs and medicines. These can increase your chances of quitting for good. When should you call for help? [id number] anytime you think you may need emergency care. For example, call if:  · You have trouble breathing. Call your dentist or doctor now or seek immediate medical care if:  · You have signs of infection, such as:  ? Increased pain, swelling, warmth, or redness. ? Red streaks leading from the area. ? Pus draining from the area. ? A fever. Watch closely for changes in your health, and be sure to contact your doctor if:  · You do not get better as expected. Where can you learn more? Go to http://kim-tristin.info/  Enter H417 in the search box to learn more about \"Tooth and Gum Pain: Care Instructions. \"  Current as of: March 25, 2020               Content Version: 12.5  © 0342-6732 Healthwise, Incorporated. Care instructions adapted under license by [employer] (which disclaims liability or warranty for this information). If you have questions about a medical condition or this instruction, always ask your healthcare professional. [place] [address] any warranty or liability for your use of this information.

## 2020-08-25 NOTE — ED PROVIDER NOTES
EMERGENCY DEPARTMENT HISTORY AND PHYSICAL EXAM    5:00 PM      Date: 8/25/2020  Patient Name: Dilip Iyer    History of Presenting Illness     Chief Complaint   Patient presents with    Dental Pain         History Provided By: Patient    Additional History (Context): Dilip Iyer is a 37 y.o. female with hypertension and myocardial infarction who presents with right lower. Tooth pain for the last couple days. Patient has an appointment with dentist.  Patient denies fever, nausea, vomiting or diarrhea. PCP: Ramandeep Pantoja MD        Current Outpatient Medications   Medication Sig Dispense Refill    naproxen (NAPROSYN) 500 mg tablet Take 1 Tab by mouth two (2) times daily (with meals). 20 Tab 0    cyclobenzaprine (FLEXERIL) 5 mg tablet Take 2 Tabs by mouth three (3) times daily (with meals). 9 Tab 0    erythromycin (ILOTYCIN) ophthalmic ointment Apply 1 cm ribbon to affected eye q4h x 7 days 1 Tube 0    rosuvastatin (CRESTOR) 5 mg tablet Take 0.5 Tabs by mouth nightly. 45 Tab 0    ascorbic acid, vitamin C, (VITAMIN C) 1,000 mg tablet Take 1 Tab by mouth daily. 90 Tab 0    fluticasone propionate (FLONASE) 50 mcg/actuation nasal spray 2 Sprays by Both Nostrils route daily. 1 Bottle 5    cetirizine (ZYRTEC) 10 mg tablet Take 1 Tab by mouth daily as needed for Allergies. 90 Tab 3    amLODIPine (NORVASC) 5 mg tablet Take 5 mg by mouth nightly.  nadolol (CORGARD) 20 mg tablet Take 10 mg by mouth daily.  acetaminophen (TYLENOL EXTRA STRENGTH) 500 mg tablet Take 500 mg by mouth every four (4) hours as needed.  albuterol (PROVENTIL HFA, VENTOLIN HFA, PROAIR HFA) 90 mcg/actuation inhaler Take 2 Puffs by inhalation every four (4) hours as needed for Wheezing. 1 Inhaler 0    aspirin 81 mg chewable tablet Take 81 mg by mouth daily.  nitroglycerin (NITROSTAT) 0.4 mg SL tablet 0.4 mg.          Past History     Past Medical History:  Past Medical History:   Diagnosis Date    Cardiac echocardiogram 07/21/2016    Sm LV cavity. EF 65-70%. No RWMA. Mod-marked LVH w/dynamic obstruction, mid-cavity obliteration & peak grad of 25 mmHg. Indeterminate diastolic fx. No significant valvular pathology.  Cardiac nuclear imaging test 07/22/2016    Low risk. Very sm distal anterior & apical partially reversible defect, likely artifact, but sm area of ischemia not excluded. No RWMA. EF 66%.   Neg EKG on pharm stress test.    Diverticulitis     Gall stones     GERD (gastroesophageal reflux disease)     Heart attack ([facility]     01/27/2018    Hypertension     Infectious disease     Bacteria vaginosis    Nausea & vomiting     Non-ST elevated myocardial infarction [facility])     STEMI (ST elevation myocardial infarction) ([facility] 01/2018       Past Surgical History:  Past Surgical History:   Procedure Laterality Date    CARDIAC SURG PROCEDURE UNLIST      stent    COLONOSCOPY N/A 6/26/2020    COLONOSCOPY performed by Jong Vo MD at [facility]    HX [address] [place] [address]  09/28/2008    [facility] TUBAL LIGATION  09/28/2008       Family History:  Family History   Problem Relation Age of Onset    Depression Mother     Asthma Mother     Migraines Mother     Hypertension Mother     Stroke Mother     Heart Attack Mother    [place] Mother     Depression Brother     Alcohol abuse Father     Substance Abuse Father         tobacco    Drug Abuse Father     Hypertension Father     High Cholesterol Father     Stroke Father     Rashes/Skin Problems Father     Substance Abuse Brother         tobacco    Drug Abuse Brother     Asthma Brother     Migraines Brother     Hypertension Brother     High Cholesterol Brother     Hypertension Paternal Grandmother     Diabetes Paternal Grandmother     Hypertension Maternal Grandmother     Colon Cancer Maternal Grandmother        Social History:  Social History     Tobacco Use    Smoking status: Former Smoker Packs/day: 0.25     Years: 20.00     Pack years: 5.00     Last attempt to quit: 2018     Years since quittin.1    Smokeless tobacco: Never Used   Substance Use Topics    Alcohol use: No     Frequency: Never    Drug use: No       Allergies: Allergies   Allergen Reactions    Lisinopril Cough    Metoprolol Palpitations    Statins-Hmg-Coa Reductase Inhibitors Other (comments)     Muscle/joint pain    Vaccine Adjuvant Emulsion Combination No. 1 Seizures     MMR         Review of Systems       Review of Systems   Constitutional: Negative. Negative for chills, diaphoresis and fever. HENT: Negative. Negative for congestion, rhinorrhea and sore throat. Eyes: Negative. Negative for pain, discharge and redness. Respiratory: Negative. Negative for cough, chest tightness, shortness of breath and wheezing. Cardiovascular: Negative. Negative for chest pain. Gastrointestinal: Negative. Negative for abdominal pain, constipation, diarrhea, nausea and vomiting. Genitourinary: Negative. Negative for dysuria, flank pain, frequency, hematuria and urgency. Musculoskeletal: Negative. Negative for back pain and neck pain. Skin: Negative. Negative for rash. Neurological: Negative. Negative for syncope, weakness, numbness and headaches. Psychiatric/Behavioral: Negative. All other systems reviewed and are negative. Physical Exam     Visit Vitals  /86 (BP 1 Location: Left arm, BP Patient Position: At rest)   Pulse 72   Temp 98.3 °F (36.8 °C)   Resp 18   Ht 5' (1.524 m)   Wt 102.1 kg (225 lb)   SpO2 98%   BMI 43.94 kg/m²         Physical Exam  Vitals signs and nursing note reviewed. Constitutional:       General: She is not in acute distress. Appearance: Normal appearance. She is well-developed. She is not ill-appearing, toxic-appearing or diaphoretic. HENT:      Head: Normocephalic and atraumatic. Mouth/Throat:      Pharynx: No oropharyngeal exudate.      Eyes: General: No scleral icterus. Conjunctiva/sclera: Conjunctivae normal.      Pupils: Pupils are equal, round, and reactive to light. Neck:      Musculoskeletal: Normal range of motion and neck supple. Thyroid: No thyromegaly. Vascular: No hepatojugular reflux or JVD. Trachea: No tracheal deviation. Cardiovascular:      Rate and Rhythm: Normal rate and regular rhythm. Pulses: Normal pulses. Radial pulses are 2+ on the right side and 2+ on the left side. Dorsalis pedis pulses are 2+ on the right side and 2+ on the left side. Heart sounds: Normal heart sounds, S1 normal and S2 normal. No murmur. No gallop. No S3 or S4 sounds. Pulmonary:      Effort: Pulmonary effort is normal. No respiratory distress. Breath sounds: Normal breath sounds. No decreased breath sounds, wheezing, rhonchi or rales. Abdominal:      General: Bowel sounds are normal. There is no distension. Palpations: Abdomen is soft. Abdomen is not rigid. There is no mass. Tenderness: There is no abdominal tenderness. There is no guarding or rebound. Negative signs include Schneider's sign and McBurney's sign. Musculoskeletal: Normal range of motion. Lymphadenopathy:      Head:      Right side of head: No submental, submandibular, preauricular or occipital adenopathy. Left side of head: No submental, submandibular, preauricular or occipital adenopathy. Cervical: No cervical adenopathy. Upper Body:      Right upper body: No supraclavicular adenopathy. Left upper body: No supraclavicular adenopathy. Skin:     General: Skin is warm and dry. Findings: No rash. Neurological:      Mental Status: She is alert. She is not disoriented. GCS: GCS eye subscore is 4. GCS verbal subscore is 5. GCS motor subscore is 6. Cranial Nerves: No cranial nerve deficit. Sensory: No sensory deficit.       Coordination: Coordination normal.      Gait: Gait normal.      Deep Tendon Reflexes: Reflexes are normal and symmetric. Psychiatric:         Speech: Speech normal.         Behavior: Behavior normal.         Thought Content: Thought content normal.         Judgment: Judgment normal.           Diagnostic Study Results     Labs -  No results found for this or any previous visit (from the past 12 hour(s)). Radiologic Studies -   No orders to display         Medical Decision Making   Provider Notes (Medical Decision Making):  MDM  Number of Diagnoses or Management Options  Dentalgia:       I am the first provider for this patient. I reviewed the vital signs, available nursing notes, past medical history, past surgical history, family history and social history. Vital Signs-Reviewed the patient's vital signs. Records Reviewed: Nursing Notes (Time of Review: 5:00 PM)    ED Course: Progress Notes, Reevaluation, and Consults:        Diagnosis       I have reassessed the patient. Patient is feeling well. Patient will be prescribed Naproxen, Penicillin and Vicodin. Patient was discharged in stable condition. Patient is to return to emergency department if any new or worsening condition. Clinical Impression:   1. Dentalgia        Disposition: Discharged home     Follow-up Information     Follow up With Specialties Details Why Contact Info    Yumiko Yan MD Family Medicine In 2 days  [address] [place] [address]  [facility]  [phone]               Attestation        Provider Attestation:     I personally performed the services described in the documentation, reviewed the documentation and it accurately and completely records my words and actions utilizing the [address] [place] August 25, 2020 at 5:00 PM - [place], [address]. It is dictated using utilizing voice recognition software. Unfortunately this leads to occasional typographical errors. I apologize in advance if the situation occurs.   If questions arise please do not hesitate to contact me or call our department.

## 2020-08-26 ENCOUNTER — VIRTUAL VISIT (OUTPATIENT)
Dept: FAMILY MEDICINE CLINIC | Age: 40
End: 2020-08-26

## 2020-08-26 DIAGNOSIS — E78.5 HYPERLIPIDEMIA LDL GOAL <70: ICD-10-CM

## 2020-08-26 DIAGNOSIS — I10 ESSENTIAL HYPERTENSION: ICD-10-CM

## 2020-08-26 DIAGNOSIS — D50.0 IRON DEFICIENCY ANEMIA DUE TO CHRONIC BLOOD LOSS: ICD-10-CM

## 2020-08-26 DIAGNOSIS — I25.119 CORONARY ARTERY DISEASE INVOLVING NATIVE CORONARY ARTERY OF NATIVE HEART WITH ANGINA PECTORIS (HCC): Primary | ICD-10-CM

## 2020-08-26 DIAGNOSIS — R73.03 PREDIABETES: ICD-10-CM

## 2020-08-26 RX ORDER — METOPROLOL SUCCINATE 25 MG/1
12.5 TABLET, EXTENDED RELEASE ORAL DAILY
COMMUNITY
Start: 2020-05-28 | End: 2020-09-14

## 2020-08-26 NOTE — PROGRESS NOTES
Ramón Padron is a 37 y.o. female who was seen by synchronous (real-time) audio-video technology on 8/26/2020. Consent: Ramón Padron, who was seen by synchronous (real-time) audio-video technology, and/or her healthcare decision maker, is aware that this patient-initiated, Telehealth encounter on 8/26/2020 is a billable service, with coverage as determined by her insurance carrier. She is aware that she may receive a bill and has provided verbal consent to proceed: Yes. 712  Subjective:   Ramón Padron is a 37 y.o. female who was seen for No chief complaint on file. Ff-up  Pt w/ h/o CAD s/p PCI 2008, cath 9/2019 80% ostial OM1, patent RCA EF 60% on med therapy (small size of OMB and large native LCx, felt OM to be poor target for PCI), HL, HTN. Reviewed cards notes switch nadolol to metorpolol  HL- she has resumed crestor 5 mg      prediabetes, continues to be off smoking    HTN- she continues to take BB, CCB, asa    H/o anemia thought to be due menstrual cycle. Ed visit yesterday for dental pain,improved on antibx/    Prior to Admission medications    Medication Sig Start Date End Date Taking? Authorizing Provider   metoprolol succinate (TOPROL-XL) 25 mg XL tablet Take 12.5 mg by mouth daily. 5/28/20  Yes Provider, Historical   penicillin v potassium (VEETID) 500 mg tablet Take 1 Tab by mouth two (2) times a day for 10 days. 8/25/20 9/4/20 Yes Umer Small Speak, DO   rosuvastatin (CRESTOR) 5 mg tablet Take 0.5 Tabs by mouth nightly. 5/21/20  Yes Adelaida Thompson MD   ascorbic acid, vitamin C, (VITAMIN C) 1,000 mg tablet Take 1 Tab by mouth daily. 2/26/20  Yes Adelaida Thompson MD   fluticasone propionate (FLONASE) 50 mcg/actuation nasal spray 2 Sprays by Both Nostrils route daily. 2/21/20  Yes Ines Pop MD   cetirizine (ZYRTEC) 10 mg tablet Take 1 Tab by mouth daily as needed for Allergies.  2/21/20  Yes Ines Pop MD   amLODIPine (NORVASC) 5 mg tablet Take 5 mg by mouth nightly. Yes Provider, Historical   acetaminophen (TYLENOL EXTRA STRENGTH) 500 mg tablet Take 500 mg by mouth every four (4) hours as needed. 8/18/18  Yes Provider, Historical   albuterol (PROVENTIL HFA, VENTOLIN HFA, PROAIR HFA) 90 mcg/actuation inhaler Take 2 Puffs by inhalation every four (4) hours as needed for Wheezing. 2/27/19  Yes Eric Vidal MD   aspirin 81 mg chewable tablet Take 81 mg by mouth daily. 1/30/18  Yes Provider, Historical   nitroglycerin (NITROSTAT) 0.4 mg SL tablet 0.4 mg. 7/22/16  Yes Provider, Historical   naproxen (NAPROSYN) 500 mg tablet Take 1 Tab by mouth two (2) times daily (with meals). 8/25/20   Livia Small DO   cyclobenzaprine (FLEXERIL) 5 mg tablet Take 2 Tabs by mouth three (3) times daily (with meals). 8/25/20 8/26/20  Livia Small DO   HYDROcodone-acetaminophen (NORCO) 5-325 mg per tablet Take 1 Tab by mouth every four (4) hours as needed for Pain for up to 3 days. Max Daily Amount: 6 Tabs. 8/25/20 8/26/20  Livia Small DO   erythromycin (ILOTYCIN) ophthalmic ointment Apply 1 cm ribbon to affected eye q4h x 7 days 7/29/20 8/26/20  Eric Vidal MD   nadolol (CORGARD) 20 mg tablet Take 10 mg by mouth daily.   8/26/20  Provider, Historical     Allergies   Allergen Reactions    Lisinopril Cough    Metoprolol Palpitations    Statins-Hmg-Coa Reductase Inhibitors Other (comments)     Muscle/joint pain    Vaccine Adjuvant Emulsion Combination No. 1 Seizures     MMR       Patient Active Problem List    Diagnosis Date Noted    Chest pain 05/18/2020    Hyperlipidemia LDL goal <70 08/26/2019    Former smoker 08/26/2019    Adjustment disorder with depressed mood 11/09/2018    Epigastric pain 11/09/2018    Calculus of gallbladder without cholecystitis without obstruction 11/09/2018    Coronary artery disease involving native coronary artery of native heart with angina pectoris ([facility] [address] 07/05/2018    Iron deficiency anemia due to chronic blood loss 06/07/2018  Obesity, morbid ([facility] 04/26/2018    ST elevation myocardial infarction (STEMI) ([facility] 04/26/2018    Low HDL (under 40) 01/27/2016    Prediabetes 11/14/2014    Tobacco use 10/30/2014    Obesity 10/30/2014    Hypertension 10/30/2014     Current Outpatient Medications   Medication Sig Dispense Refill    metoprolol succinate (TOPROL-XL) 25 mg XL tablet Take 12.5 mg by mouth daily.  penicillin v potassium (VEETID) 500 mg tablet Take 1 Tab by mouth two (2) times a day for 10 days. 20 Tab 0    rosuvastatin (CRESTOR) 5 mg tablet Take 0.5 Tabs by mouth nightly. 45 Tab 0    ascorbic acid, vitamin C, (VITAMIN C) 1,000 mg tablet Take 1 Tab by mouth daily. 90 Tab 0    fluticasone propionate (FLONASE) 50 mcg/actuation nasal spray 2 Sprays by Both Nostrils route daily. 1 Bottle 5    cetirizine (ZYRTEC) 10 mg tablet Take 1 Tab by mouth daily as needed for Allergies. 90 Tab 3    amLODIPine (NORVASC) 5 mg tablet Take 5 mg by mouth nightly.  acetaminophen (TYLENOL EXTRA STRENGTH) 500 mg tablet Take 500 mg by mouth every four (4) hours as needed.  albuterol (PROVENTIL HFA, VENTOLIN HFA, PROAIR HFA) 90 mcg/actuation inhaler Take 2 Puffs by inhalation every four (4) hours as needed for Wheezing. 1 Inhaler 0    aspirin 81 mg chewable tablet Take 81 mg by mouth daily.  nitroglycerin (NITROSTAT) 0.4 mg SL tablet 0.4 mg.      naproxen (NAPROSYN) 500 mg tablet Take 1 Tab by mouth two (2) times daily (with meals). 20 Tab 0     Allergies   Allergen Reactions    Lisinopril Cough    Metoprolol Palpitations    Statins-Hmg-Coa Reductase Inhibitors Other (comments)     Muscle/joint pain    Vaccine Adjuvant Emulsion Combination No. 1 Seizures     MMR     Past Medical History:   Diagnosis Date    Cardiac echocardiogram 07/21/2016    Sm LV cavity. EF 65-70%. No RWMA. Mod-marked LVH w/dynamic obstruction, mid-cavity obliteration & peak grad of 25 mmHg. Indeterminate diastolic fx.   No significant valvular pathology.  Cardiac nuclear imaging test 2016    Low risk. Very sm distal anterior & apical partially reversible defect, likely artifact, but sm area of ischemia not excluded. No RWMA. EF 66%. Neg EKG on pharm stress test.    Diverticulitis     Gall stones     GERD (gastroesophageal reflux disease)     Heart attack ([facility]     2018    Hypertension     Infectious disease     Bacteria vaginosis    Nausea & vomiting     Non-ST elevated myocardial infarction [facility])     [facility]     Past Surgical History:   Procedure Laterality Date    CARDIAC SURG PROCEDURE UNLIST      stent    COLONOSCOPY N/A 2020    COLONOSCOPY performed by Linda Torrez MD at [facility]     [address]  [address]    HX CORONARY STENT PLACEMENT      HX TUBAL LIGATION  2008     Family History   Problem Relation Age of Onset    Depression Mother     Asthma Mother     Migraines Mother     Hypertension Mother     Stroke Mother     Heart Attack Mother     Arthritis-osteo Mother     Depression Brother     Alcohol abuse Father     Substance Abuse Father         tobacco    Drug Abuse Father     Hypertension Father     High Cholesterol Father     Stroke Father     Rashes/Skin Problems Father     Substance Abuse Brother         tobacco    Drug Abuse Brother     Asthma Brother     Migraines Brother     Hypertension Brother     High Cholesterol Brother     Hypertension Paternal Grandmother     Diabetes Paternal Grandmother     Hypertension Maternal Grandmother     Colon Cancer Maternal Grandmother      Social History     Tobacco Use    Smoking status: Former Smoker     Packs/day: 0.25     Years: 20.00     Pack years: 5.00     Last attempt to quit: 2018     Years since quittin.1    Smokeless tobacco: Never Used   Substance Use Topics    Alcohol use: No     Frequency: Never       ROS      Objective:    There were no vitals taken for this visit. General: alert, cooperative, no distress   Mental  status: normal mood, behavior, speech, dress, motor activity, and thought processes, able to follow commands   HENT: NCAT   Neck: no visualized mass   Resp: no respiratory distress   Neuro: no gross deficits   Skin: no discoloration or lesions of concern on visible areas   Psychiatric: normal affect, consistent with stated mood, no evidence of hallucinations     Additional exam findings: We discussed the expected course, resolution and complications of the diagnosis(es) in detail. Medication risks, benefits, costs, interactions, and alternatives were discussed as indicated. I advised her to contact the office if her condition worsens, changes or fails to improve as anticipated. She expressed understanding with the diagnosis(es) and plan. Sabrina Levy is a 37 y.o. female who was evaluated by a video visit encounter for concerns as above. Patient identification was verified prior to start of the visit. A caregiver was present when appropriate. Due to this being a TeleHealth encounter (During UKBKV-01 public health emergency), evaluation of the following organ systems was limited: Vitals/Constitutional/EENT/Resp/CV/GI//MS/Neuro/Skin/Heme-Lymph-Imm. Pursuant to the emergency declaration under the [facility] waiver authority and the [employer] Act, this Virtual  Visit was conducted, with patient's (and/or legal guardian's) consent, to reduce the patient's risk of exposure to COVID-19 and provide necessary medical care. Services were provided through a video synchronous discussion virtually to substitute for in-person clinic visit. Patient and provider were located at their individual homes.     Assessment & Plan:   Coronary artery disease involving native coronary artery of native heart with angina pectoris [facility])   s/p PCI 2008, cath 9/2019 80% ostial OM1, patent RCA EF 60% on med therapy (small size of OMB and large native LCx, felt OM to be poor target for PCI)  Currently asymptomatic  On med mgt, asa, bb, statin  Recheck lipids in 3 months  -     LIPID PANEL; Future    Prediabetes  Discussed weight loss strategies  -     HEMOGLOBIN A1C W/O EAG; Future     Essential hypertension  Controlled  Cont norvasc, bb  -     METABOLIC PANEL, COMPREHENSIVE;  Future    Hyperlipidemia LDL goal <70  See above     Iron deficiency anemia, due to chronic blood loss  Recheck CBC       Ff-up in 3 months or sooner pascual Garcia MD

## 2020-08-26 NOTE — PATIENT INSTRUCTIONS
DASH Diet: Care Instructions Your Care Instructions The DASH diet is an eating plan that can help lower your blood pressure. DASH stands for Dietary Approaches to Stop Hypertension. Hypertension is high blood pressure. The DASH diet focuses on eating foods that are high in calcium, potassium, and magnesium. These nutrients can lower blood pressure. The foods that are highest in these nutrients are fruits, vegetables, low-fat dairy products, nuts, seeds, and legumes. But taking calcium, potassium, and magnesium supplements instead of eating foods that are high in those nutrients does not have the same effect. The DASH diet also includes whole grains, fish, and poultry. The DASH diet is one of several lifestyle changes your doctor may recommend to lower your high blood pressure. Your doctor may also want you to decrease the amount of sodium in your diet. Lowering sodium while following the DASH diet can lower blood pressure even further than just the DASH diet alone. Follow-up care is a key part of your treatment and safety. Be sure to make and go to all appointments, and call your doctor if you are having problems. It's also a good idea to know your test results and keep a list of the medicines you take. How can you care for yourself at home? Following the DASH diet · Eat 4 to 5 servings of fruit each day. A serving is 1 medium-sized piece of fruit, ½ cup chopped or canned fruit, 1/4 cup dried fruit, or 4 ounces (½ cup) of fruit juice. Choose fruit more often than fruit juice. · Eat 4 to 5 servings of vegetables each day. A serving is 1 cup of lettuce or raw leafy vegetables, ½ cup of chopped or cooked vegetables, or 4 ounces (½ cup) of vegetable juice. Choose vegetables more often than vegetable juice. · Get 2 to 3 servings of low-fat and fat-free dairy each day. A serving is 8 ounces of milk, 1 cup of yogurt, or 1 ½ ounces of cheese. · Eat 6 to 8 servings of grains each day. A serving is 1 slice of bread, 1 ounce of dry cereal, or ½ cup of cooked rice, pasta, or cooked cereal. Try to choose whole-grain products as much as possible. · Limit lean meat, poultry, and fish to 2 servings each day. A serving is 3 ounces, about the size of a deck of cards. · Eat 4 to 5 servings of nuts, seeds, and legumes (cooked dried beans, lentils, and split peas) each week. A serving is 1/3 cup of nuts, 2 tablespoons of seeds, or ½ cup of cooked beans or peas. · Limit fats and oils to 2 to 3 servings each day. A serving is 1 teaspoon of vegetable oil or 2 tablespoons of salad dressing. · Limit sweets and added sugars to 5 servings or less a week. A serving is 1 tablespoon jelly or jam, ½ cup sorbet, or 1 cup of lemonade. · Eat less than 2,300 milligrams (mg) of sodium a day. If you limit your sodium to 1,500 mg a day, you can lower your blood pressure even more. Tips for success · Start small. Do not try to make dramatic changes to your diet all at once. You might feel that you are missing out on your favorite foods and then be more likely to not follow the plan. Make small changes, and stick with them. Once those changes become habit, add a few more changes. · Try some of the following: ? Make it a goal to eat a fruit or vegetable at every meal and at snacks. This will make it easy to get the recommended amount of fruits and vegetables each day. ? Try yogurt topped with fruit and nuts for a snack or healthy dessert. ? Add lettuce, tomato, cucumber, and onion to sandwiches. ? Combine a ready-made pizza crust with low-fat mozzarella cheese and lots of vegetable toppings. Try using tomatoes, squash, spinach, broccoli, carrots, cauliflower, and onions. ? Have a variety of cut-up vegetables with a low-fat dip as an appetizer instead of chips and dip. ? Sprinkle sunflower seeds or chopped almonds over salads.  Or try adding chopped walnuts or almonds to cooked vegetables. ? Try some vegetarian meals using beans and peas. Add garbanzo or kidney beans to salads. Make burritos and tacos with mashed beal beans or black beans. Where can you learn more? Go to http://kim-tristin.info/ Enter O158 in the search box to learn more about \"DASH Diet: Care Instructions. \" Current as of: December 16, 2019               Content Version: 12.5 © 8047-3449 [employer]. Care instructions adapted under license by [employer] (which disclaims liability or warranty for this information). If you have questions about a medical condition or this instruction, always ask your healthcare professional. [address] any warranty or liability for your use of this information.

## 2020-09-02 ENCOUNTER — HOSPITAL ENCOUNTER (OUTPATIENT)
Dept: LAB | Age: 40
Discharge: HOME | End: 2020-09-02

## 2020-09-02 LAB — XX-LABCORP SPECIMEN COL,LCBCF: NORMAL

## 2020-09-02 PROCEDURE — 99001 SPECIMEN HANDLING PT-LAB: CPT

## 2020-09-03 LAB
ALBUMIN SERPL-MCNC: 3.8 G/DL (ref 3.8–4.8)
ALBUMIN/GLOB SERPL: 1.2 {RATIO} (ref 1.2–2.2)
ALP SERPL-CCNC: 71 IU/L (ref 39–117)
ALT SERPL-CCNC: 15 IU/L (ref 0–32)
AST SERPL-CCNC: 14 IU/L (ref 0–40)
BASOPHILS # BLD AUTO: 0 X10E3/UL (ref 0–0.2)
BASOPHILS NFR BLD AUTO: 0 %
BILIRUB SERPL-MCNC: <0.2 MG/DL (ref 0–1.2)
BUN SERPL-MCNC: 9 MG/DL (ref 6–24)
BUN/CREAT SERPL: 15 (ref 9–23)
CALCIUM SERPL-MCNC: 9.2 MG/DL (ref 8.7–10.2)
CHLORIDE SERPL-SCNC: 104 MMOL/L (ref 96–106)
CHOLEST SERPL-MCNC: 134 MG/DL (ref 100–199)
CO2 SERPL-SCNC: 23 MMOL/L (ref 20–29)
CREAT SERPL-MCNC: 0.59 MG/DL (ref 0.57–1)
EOSINOPHIL # BLD AUTO: 0.2 X10E3/UL (ref 0–0.4)
EOSINOPHIL NFR BLD AUTO: 3 %
ERYTHROCYTE [DISTWIDTH] IN BLOOD BY AUTOMATED COUNT: 16.7 % (ref 11.7–15.4)
GLOBULIN SER CALC-MCNC: 3.3 G/DL (ref 1.5–4.5)
GLUCOSE SERPL-MCNC: 86 MG/DL (ref 65–99)
HBA1C MFR BLD: 5.6 % (ref 4.8–5.6)
HCT VFR BLD AUTO: 38 % (ref 34–46.6)
HDLC SERPL-MCNC: 39 MG/DL
HGB BLD-MCNC: 11.8 G/DL (ref 11.1–15.9)
IMM GRANULOCYTES # BLD AUTO: 0 X10E3/UL (ref 0–0.1)
IMM GRANULOCYTES NFR BLD AUTO: 0 %
INTERPRETATION, 910389: NORMAL
LDLC SERPL CALC-MCNC: 83 MG/DL (ref 0–99)
LYMPHOCYTES # BLD AUTO: 2.9 X10E3/UL (ref 0.7–3.1)
LYMPHOCYTES NFR BLD AUTO: 40 %
MCH RBC QN AUTO: 25.2 PG (ref 26.6–33)
MCHC RBC AUTO-ENTMCNC: 31.1 G/DL (ref 31.5–35.7)
MCV RBC AUTO: 81 FL (ref 79–97)
MONOCYTES # BLD AUTO: 0.4 X10E3/UL (ref 0.1–0.9)
MONOCYTES NFR BLD AUTO: 5 %
NEUTROPHILS # BLD AUTO: 3.7 X10E3/UL (ref 1.4–7)
NEUTROPHILS NFR BLD AUTO: 52 %
PLATELET # BLD AUTO: 407 X10E3/UL (ref 150–450)
POTASSIUM SERPL-SCNC: 3.9 MMOL/L (ref 3.5–5.2)
PROT SERPL-MCNC: 7.1 G/DL (ref 6–8.5)
RBC # BLD AUTO: 4.68 X10E6/UL (ref 3.77–5.28)
SODIUM SERPL-SCNC: 141 MMOL/L (ref 134–144)
TRIGL SERPL-MCNC: 58 MG/DL (ref 0–149)
VLDLC SERPL CALC-MCNC: 12 MG/DL (ref 5–40)
WBC # BLD AUTO: 7.2 X10E3/UL (ref 3.4–10.8)

## 2020-09-04 ENCOUNTER — TELEPHONE (OUTPATIENT)
Dept: FAMILY MEDICINE CLINIC | Age: 40
End: 2020-09-04

## 2020-09-04 NOTE — PROGRESS NOTES
Labs are normal including hemoglobin level, cholesterol and prediabetes has improved  pls notify pt.

## 2020-09-04 NOTE — PROGRESS NOTES
[phone] ([place]) 2 patient identifiers verified with G[facility]. She was advised that her labs show hemoglobin, cholesterol and A1c have all improved in the normal range.

## 2020-09-14 ENCOUNTER — APPOINTMENT (OUTPATIENT)
Dept: GENERAL RADIOLOGY | Age: 40
End: 2020-09-14
Attending: EMERGENCY MEDICINE
Payer: MEDICAID

## 2020-09-14 ENCOUNTER — HOSPITAL ENCOUNTER (EMERGENCY)
Age: 40
Discharge: HOME | End: 2020-09-15
Attending: EMERGENCY MEDICINE | Admitting: EMERGENCY MEDICINE
Payer: MEDICAID

## 2020-09-14 DIAGNOSIS — R06.00 DYSPNEA, UNSPECIFIED TYPE: Primary | ICD-10-CM

## 2020-09-14 LAB
ANION GAP SERPL CALC-SCNC: 4 MMOL/L (ref 3–18)
BASOPHILS # BLD: 0 K/UL (ref 0–0.1)
BASOPHILS NFR BLD: 0 % (ref 0–2)
BNP SERPL-MCNC: 30 PG/ML (ref 0–450)
BUN SERPL-MCNC: 8 MG/DL (ref 7–18)
BUN/CREAT SERPL: 13 (ref 12–20)
CALCIUM SERPL-MCNC: 8.8 MG/DL (ref 8.5–10.1)
CHLORIDE SERPL-SCNC: 106 MMOL/L (ref 100–111)
CK MB CFR SERPL CALC: NORMAL % (ref 0–4)
CK MB SERPL-MCNC: <1 NG/ML (ref 5–25)
CK SERPL-CCNC: 87 U/L (ref 26–192)
CO2 SERPL-SCNC: 30 MMOL/L (ref 21–32)
CREAT SERPL-MCNC: 0.64 MG/DL (ref 0.6–1.3)
D DIMER PPP FEU-MCNC: 0.42 UG/ML(FEU)
DIFFERENTIAL METHOD BLD: ABNORMAL
EOSINOPHIL # BLD: 0.3 K/UL (ref 0–0.4)
EOSINOPHIL NFR BLD: 4 % (ref 0–5)
ERYTHROCYTE [DISTWIDTH] IN BLOOD BY AUTOMATED COUNT: 17 % (ref 11.6–14.5)
GLUCOSE SERPL-MCNC: 101 MG/DL (ref 74–99)
HCT VFR BLD AUTO: 37.2 % (ref 35–45)
HGB BLD-MCNC: 11.5 G/DL (ref 12–16)
LYMPHOCYTES # BLD: 3.6 K/UL (ref 0.9–3.6)
LYMPHOCYTES NFR BLD: 45 % (ref 21–52)
MCH RBC QN AUTO: 24.7 PG (ref 24–34)
MCHC RBC AUTO-ENTMCNC: 30.9 G/DL (ref 31–37)
MCV RBC AUTO: 79.8 FL (ref 74–97)
MONOCYTES # BLD: 0.4 K/UL (ref 0.05–1.2)
MONOCYTES NFR BLD: 5 % (ref 3–10)
NEUTS SEG # BLD: 3.6 K/UL (ref 1.8–8)
NEUTS SEG NFR BLD: 46 % (ref 40–73)
PLATELET # BLD AUTO: 436 K/UL (ref 135–420)
PMV BLD AUTO: 9.9 FL (ref 9.2–11.8)
POTASSIUM SERPL-SCNC: 3.6 MMOL/L (ref 3.5–5.5)
RBC # BLD AUTO: 4.66 M/UL (ref 4.2–5.3)
SODIUM SERPL-SCNC: 140 MMOL/L (ref 136–145)
TROPONIN I SERPL-MCNC: <0.02 NG/ML (ref 0–0.04)
WBC # BLD AUTO: 7.9 K/UL (ref 4.6–13.2)

## 2020-09-14 PROCEDURE — 93005 ELECTROCARDIOGRAM TRACING: CPT

## 2020-09-14 PROCEDURE — 74011636637 HC RX REV CODE- 636/637: Performed by: EMERGENCY MEDICINE

## 2020-09-14 PROCEDURE — 82550 ASSAY OF CK (CPK): CPT

## 2020-09-14 PROCEDURE — 83880 ASSAY OF NATRIURETIC PEPTIDE: CPT

## 2020-09-14 PROCEDURE — 85379 FIBRIN DEGRADATION QUANT: CPT

## 2020-09-14 PROCEDURE — 85025 COMPLETE CBC W/AUTO DIFF WBC: CPT

## 2020-09-14 PROCEDURE — 71046 X-RAY EXAM CHEST 2 VIEWS: CPT

## 2020-09-14 PROCEDURE — 99284 EMERGENCY DEPT VISIT MOD MDM: CPT

## 2020-09-14 PROCEDURE — 80048 BASIC METABOLIC PNL TOTAL CA: CPT

## 2020-09-14 RX ORDER — PREDNISONE 20 MG/1
60 TABLET ORAL
Status: COMPLETED | OUTPATIENT
Start: 2020-09-14 | End: 2020-09-14

## 2020-09-14 RX ADMIN — PREDNISONE 60 MG: 20 TABLET ORAL at 22:58

## 2020-09-15 VITALS
BODY MASS INDEX: 44.17 KG/M2 | RESPIRATION RATE: 22 BRPM | DIASTOLIC BLOOD PRESSURE: 73 MMHG | TEMPERATURE: 98.3 F | SYSTOLIC BLOOD PRESSURE: 114 MMHG | HEIGHT: 60 IN | HEART RATE: 71 BPM | OXYGEN SATURATION: 99 % | WEIGHT: 225 LBS

## 2020-09-15 LAB
ATRIAL RATE: 72 BPM
CALCULATED P AXIS, ECG09: 18 DEGREES
CALCULATED R AXIS, ECG10: 54 DEGREES
CALCULATED T AXIS, ECG11: 47 DEGREES
DIAGNOSIS, 93000: NORMAL
P-R INTERVAL, ECG05: 152 MS
Q-T INTERVAL, ECG07: 404 MS
QRS DURATION, ECG06: 82 MS
QTC CALCULATION (BEZET), ECG08: 442 MS
VENTRICULAR RATE, ECG03: 72 BPM

## 2020-09-15 RX ORDER — PREDNISONE 20 MG/1
60 TABLET ORAL DAILY
Qty: 12 TAB | Refills: 0 | Status: SHIPPED | OUTPATIENT
Start: 2020-09-15 | End: 2020-09-19

## 2020-09-15 RX ORDER — ALBUTEROL SULFATE 90 UG/1
1-2 AEROSOL, METERED RESPIRATORY (INHALATION)
Qty: 1 INHALER | Refills: 0 | OUTPATIENT
Start: 2020-09-15 | End: 2021-03-16

## 2020-09-15 NOTE — DISCHARGE INSTRUCTIONS
Return for pain, fever not resolving with motrin or tylenol, shortness of breath, vomiting, decreased fluid intake, weakness, numbness, dizziness, or any change or concerns. Patient Education        Shortness of Breath: Care Instructions  Your Care Instructions     Shortness of breath has many causes. Sometimes conditions such as anxiety can lead to shortness of breath. Some people get mild shortness of breath when they exercise. Trouble breathing also can be a symptom of a serious problem, such as asthma, lung disease, emphysema, heart problems, and pneumonia. If your shortness of breath continues, you may need tests and treatment. Watch for any changes in your breathing and other symptoms. Follow-up care is a key part of your treatment and safety. Be sure to make and go to all appointments, and call your doctor if you are having problems. It's also a good idea to know your test results and keep a list of the medicines you take. How can you care for yourself at home? · Do not smoke or allow others to smoke around you. If you need help quitting, talk to your doctor about stop-smoking programs and medicines. These can increase your chances of quitting for good. · Get plenty of rest and sleep. · Take your medicines exactly as prescribed. Call your doctor if you think you are having a problem with your medicine. · Find healthy ways to deal with stress. ? Exercise daily. ? Get plenty of sleep. ? Eat regularly and well. When should you call for help? Call 911 anytime you think you may need emergency care. For example, call if:    · You have severe shortness of breath.     · You have symptoms of a heart attack. These may include:  ? Chest pain or pressure, or a strange feeling in the chest.  ? Sweating. ? Shortness of breath. ? Nausea or vomiting. ? Pain, pressure, or a strange feeling in the back, neck, jaw, or upper belly or in one or both shoulders or arms.   ? Lightheadedness or sudden weakness. ? A fast or irregular heartbeat. After you call 911, the  may tell you to chew 1 adult-strength or 2 to 4 low-dose aspirin. Wait for an ambulance. Do not try to drive yourself. Call your doctor now or seek immediate medical care if:    · Your shortness of breath gets worse or you start to wheeze. Wheezing is a high-pitched sound when you breathe.     · You wake up at night out of breath or have to prop your head up on several pillows to breathe.     · You are short of breath after only light activity or while at rest.   Watch closely for changes in your health, and be sure to contact your doctor if:    · You do not get better over the next 1 to 2 days. Where can you learn more? Go to http://kim-tristin.info/  Enter S780 in the search box to learn more about \"Shortness of Breath: Care Instructions. \"  Current as of: February 24, 2020               Content Version: 12.6  © 2006-2020 [employer]. Care instructions adapted under license by [employer] (which disclaims liability or warranty for this information). If you have questions about a medical condition or this instruction, always ask your healthcare professional. [place] [address] any warranty or liability for your use of this information.

## 2020-09-15 NOTE — ED PROVIDER NOTES
Pt c/o sob, x 2 weeks, off and on, random, no change w exertion. No chest pain, no leg pain or swelling. Uses alb mdi, since bronchitis dx 2 yrs ago, feels similar. Alb helping some but not for long. No nausea. No fever. No cough. Quit smoking 2 yrs ago but lives w smokers. Past Medical History:   Diagnosis Date    Cardiac echocardiogram 07/21/2016    Sm LV cavity. EF 65-70%. No RWMA. Mod-marked LVH w/dynamic obstruction, mid-cavity obliteration & peak grad of 25 mmHg. Indeterminate diastolic fx. No significant valvular pathology.  Cardiac nuclear imaging test 07/22/2016    Low risk. Very sm distal anterior & apical partially reversible defect, likely artifact, but sm area of ischemia not excluded. No RWMA. EF 66%.   Neg EKG on pharm stress test.    Diverticulitis     Gall stones     GERD (gastroesophageal reflux disease)     Heart attack ([facility] [address]     01/27/2018    Hypertension     Infectious disease     Bacteria vaginosis    Nausea & vomiting     Non-ST elevated myocardial infarction [facility])     STEMI (ST elevation myocardial infarction) ([facility] [address] 01/2018       Past Surgical History:   Procedure Laterality Date    CARDIAC SURG PROCEDURE UNLIST      stent    COLONOSCOPY N/A 6/26/2020    COLONOSCOPY performed by Morena Wade MD at [facility] [address] [place] [address]  09/28/2008    HX CORONARY STENT PLACEMENT      HX TUBAL LIGATION  09/28/2008         Family History:   Problem Relation Age of Onset    Depression Mother     Asthma Mother     Migraines Mother     Hypertension Mother     Stroke Mother     Heart Attack Mother     Britt Brink Mother     Depression Brother     Alcohol abuse Father     Substance Abuse Father         tobacco    Drug Abuse Father     Hypertension Father     High Cholesterol Father     Stroke Father     Rashes/Skin Problems Father     Substance Abuse Brother         tobacco    Drug Abuse Brother     Asthma Brother     Migraines Brother     Hypertension Brother     High Cholesterol Brother     Hypertension Paternal Grandmother     Diabetes Paternal Grandmother     Hypertension Maternal Grandmother     Colon Cancer Maternal Grandmother        Social History     Socioeconomic History    Marital status: SINGLE     Spouse name: Not on file    Number of children: Not on file    Years of education: Not on file    Highest education level: Not on file   Occupational History    Not on file   Social Needs    Financial resource strain: Not on file    Food insecurity     Worry: Not on file     Inability: Not on file    Transportation needs     Medical: Not on file     Non-medical: Not on file   Tobacco Use    Smoking status: Former Smoker     Packs/day: 0.25     Years: 20.00     Pack years: 5.00     Last attempt to quit: 2018     Years since quittin.2    Smokeless tobacco: Never Used   Substance and Sexual Activity    Alcohol use: No     Frequency: Never    Drug use: No    Sexual activity: Yes     Partners: Male     Birth control/protection: Surgical     Comment: tubal ligation   Lifestyle    Physical activity     Days per week: Not on file     Minutes per session: Not on file    Stress: Not on file   Relationships    Social connections     Talks on phone: Not on file     Gets together: Not on file     Attends Sabianist service: Not on file     Active member of club or organization: Not on file     Attends meetings of clubs or organizations: Not on file     Relationship status: Not on file    Intimate partner violence     Fear of current or ex partner: Not on file     Emotionally abused: Not on file     Physically abused: Not on file     Forced sexual activity: Not on file   Other Topics Concern    Not on file   Social History Narrative    Not on file         ALLERGIES: Lisinopril; Metoprolol; Statins-hmg-coa reductase inhibitors; and Vaccine adjuvant emulsion combination no. 1    Review of Systems   Constitutional: Negative for fever. HENT: Negative for congestion. Respiratory: Positive for shortness of breath. Negative for cough. Cardiovascular: Negative for chest pain. Gastrointestinal: Negative for abdominal pain and vomiting. Musculoskeletal: Negative for back pain. Skin: Negative for rash. Neurological: Negative for light-headedness. All other systems reviewed and are negative. Vitals:    09/14/20 2237 09/14/20 2300 09/14/20 2330   BP: (!) 151/93 121/73 114/73   Pulse: 79 83 71   Resp: 16 22 22   Temp: 98.3 °F (36.8 °C)     SpO2: 98% 99% 99%   Weight: 102.1 kg (225 lb)     Height: 5' (1.524 m)              Physical Exam  Vitals signs and nursing note reviewed. Constitutional:       Appearance: She is well-developed. She is not diaphoretic. HENT:      Head: Normocephalic and atraumatic. Eyes:      Pupils: Pupils are equal, round, and reactive to light. Neck:      Musculoskeletal: Normal range of motion. Cardiovascular:      Rate and Rhythm: Normal rate and regular rhythm. Heart sounds: No murmur. Pulmonary:      Effort: Pulmonary effort is normal.      Breath sounds: No wheezing. Abdominal:      Palpations: Abdomen is soft. Tenderness: There is no abdominal tenderness. Musculoskeletal:         General: No tenderness. Skin:     General: Skin is dry. Capillary Refill: Capillary refill takes less than 2 seconds. Findings: No rash. Neurological:      Mental Status: She is alert and oriented to person, place, and time. MDM       Procedures    Vitals:  No data found. Medications ordered:   Medications   predniSONE (DELTASONE) tablet 60 mg (60 mg Oral Given 9/14/20 2258)         Lab findings:  No results found for this or any previous visit (from the past 12 hour(s)).         X-Ray, CT or other radiology findings or impressions:  XR CHEST PA LAT   Final Result   IMPRESSION: Lungs demonstrate a small amount of linear atelectatic change on the   left in the mid and lower lung field however this is stable and unchanged from   CT scans performed July 2019             Progress notes, Consult notes or additional Procedure notes:   12:12 AM pt feels better, nl sats, no curr complaints. Stable for dc and close f/u. Unchanged streaky bibasiler cxr findings. Pulmonology referral given. Pt agrees w dc plan and verb und of detailed ret inst given. Not c/w pe/ptx/cad/dissection/pna/hypoxia/sepsis. Diagnosis:   1. Dyspnea, unspecified type        Disposition: home    Follow-up Information     Follow up With Specialties Details Why Contact Info    Malissa Ortiz MD Pulmonary Disease Schedule an appointment as soon as possible for a visit in 2 days  [facility],4Th Floor [facility] DEPT Emergency Medicine Go to As needed, If symptoms worsen [address] [facility]    Joselin Smyth MD Family Medicine Schedule an appointment as soon as possible for a visit in 2 days  [address]   [address] [address]             Discharge Medication List as of 9/15/2020 12:12 AM      START taking these medications    Details   predniSONE (DELTASONE) 20 mg tablet Take 60 mg by mouth daily for 4 days. , Print, Disp-12 Tab,R-0         CONTINUE these medications which have CHANGED    Details   albuterol (PROVENTIL HFA, VENTOLIN HFA, PROAIR HFA) 90 mcg/actuation inhaler Take 1-2 Puffs by inhalation every four (4) hours as needed for Wheezing., Print, Disp-1 Inhaler,R-0         CONTINUE these medications which have NOT CHANGED    Details   naproxen (NAPROSYN) 500 mg tablet Take 1 Tab by mouth two (2) times daily (with meals). , Print, Disp-20 Tab,R-0      amLODIPine (NORVASC) 5 mg tablet Take 5 mg by mouth nightly., Historical Med      acetaminophen (TYLENOL EXTRA STRENGTH) 500 mg tablet Take 500 mg by mouth every four (4) hours as needed., Historical Med aspirin 81 mg chewable tablet Take 81 mg by mouth daily. , Historical Med      nitroglycerin (NITROSTAT) 0.4 mg SL tablet 0.4 mg., Historical Med

## 2020-09-15 NOTE — ED TRIAGE NOTES
Pt complains of SOB for the past 2 weeks, reports chronic bronchitis history since she stopped smoking 2 years ago. Denies COPD.

## 2020-10-12 ENCOUNTER — TELEPHONE (OUTPATIENT)
Dept: FAMILY MEDICINE CLINIC | Age: 40
End: 2020-10-12

## 2020-10-12 NOTE — TELEPHONE ENCOUNTER
Patient identified with 2 identifiers (name and ). Spoke with patient and she states she woke up with sore throat and ear pain today. Yoniromero Jese was diagnosed with strep on friday. Patient states no cough, congestion , fever or post nasal drip   No SOB.    Please advise

## 2020-10-12 NOTE — TELEPHONE ENCOUNTER
Pt called and stated she was around her grandson who has step throat. Pt stated she woke up today with a sore throat as well. Please call pt at your earliest convenience.

## 2020-11-24 RX ORDER — ROSUVASTATIN CALCIUM 5 MG/1
2.5 TABLET, COATED ORAL
Qty: 45 TAB | Refills: 0 | Status: CANCELLED | OUTPATIENT
Start: 2020-11-24

## 2020-11-24 NOTE — TELEPHONE ENCOUNTER
This pharmacy faxed over request for the following prescriptions to be filled:    Medication requested :   Requested Prescriptions     Pending Prescriptions Disp Refills    rosuvastatin (CRESTOR) 5 mg tablet 45 Tab 0     Sig: Take 0.5 Tabs by mouth nightly.      PCP: [address] [place] [address] or Print: [facility]   Mail order or Local pharmacy [address]     Scheduled appointment if not seen by current providers in office: LOV 8/26/2020 f/u 12/10/2020

## 2020-11-24 NOTE — TELEPHONE ENCOUNTER
pls verify if she still taking medication? Another clinic says not taking, if instructed by cardiologist then we can stop. If not, then we should continue.

## 2020-11-30 NOTE — TELEPHONE ENCOUNTER
Patient identified with 2 identifiers (name and ). Patient states she is not taking the crestor right now. She has discussed this with her cardiologist Dr. Verna Rosales. Patient states she takes it for a month then stops due to muscle cramping in her legs. She will stay off for a month then try again.  Patient states she does not need refill and will discuss with Dr. Alisia Quan at her next visit 12/10/2020

## 2020-12-10 ENCOUNTER — VIRTUAL VISIT (OUTPATIENT)
Dept: FAMILY MEDICINE CLINIC | Age: 40
End: 2020-12-10
Payer: MEDICAID

## 2020-12-10 DIAGNOSIS — I25.119 CORONARY ARTERY DISEASE INVOLVING NATIVE CORONARY ARTERY OF NATIVE HEART WITH ANGINA PECTORIS (HCC): Primary | ICD-10-CM

## 2020-12-10 DIAGNOSIS — E66.01 OBESITY, MORBID (HCC): ICD-10-CM

## 2020-12-10 DIAGNOSIS — E78.5 HYPERLIPIDEMIA LDL GOAL <70: ICD-10-CM

## 2020-12-10 DIAGNOSIS — R73.03 PREDIABETES: ICD-10-CM

## 2020-12-10 PROCEDURE — 99214 OFFICE O/P EST MOD 30 MIN: CPT | Performed by: FAMILY MEDICINE

## 2020-12-10 RX ORDER — METOPROLOL SUCCINATE 25 MG/1
12.5 TABLET, EXTENDED RELEASE ORAL DAILY
COMMUNITY
End: 2022-10-05 | Stop reason: SDUPTHER

## 2020-12-10 NOTE — PATIENT INSTRUCTIONS
A Healthy Heart: Care Instructions Your Care Instructions Coronary artery disease, also called heart disease, occurs when a substance called plaque builds up in the vessels that supply oxygen-rich blood to your heart muscle. This can narrow the blood vessels and reduce blood flow. A heart attack happens when blood flow is completely blocked. A high-fat diet, smoking, and other factors increase the risk of heart disease. Your doctor has found that you have a chance of having heart disease. You can do lots of things to keep your heart healthy. It may not be easy, but you can change your diet, exercise more, and quit smoking. These steps really work to lower your chance of heart disease. Follow-up care is a key part of your treatment and safety. Be sure to make and go to all appointments, and call your doctor if you are having problems. It's also a good idea to know your test results and keep a list of the medicines you take. How can you care for yourself at home? Diet 
  · Use less salt when you cook and eat. This helps lower your blood pressure. Taste food before salting. Add only a little salt when you think you need it. With time, your taste buds will adjust to less salt.  
  · Eat fewer snack items, fast foods, canned soups, and other high-salt, high-fat, processed foods.  
  · Read food labels and try to avoid saturated and trans fats. They increase your risk of heart disease by raising cholesterol levels.  
  · Limit the amount of solid fat-butter, margarine, and shortening-you eat. Use olive, peanut, or canola oil when you cook. Bake, broil, and steam foods instead of frying them.  
  · Eat a variety of fruit and vegetables every day. Dark green, deep orange, red, or yellow fruits and vegetables are especially good for you.  Examples include spinach, carrots, peaches, and berries.  
  · Foods high in fiber can reduce your cholesterol and provide important vitamins and minerals. High-fiber foods include whole-grain cereals and breads, oatmeal, beans, brown rice, citrus fruits, and apples.  
  · Eat lean proteins. Heart-healthy proteins include seafood, lean meats and poultry, eggs, beans, peas, nuts, seeds, and soy products.  
  · Limit drinks and foods with added sugar. These include candy, desserts, and soda pop. Lifestyle changes 
  · If your doctor recommends it, get more exercise. Walking is a good choice. Bit by bit, increase the amount you walk every day. Try for at least 30 minutes on most days of the week. You also may want to swim, bike, or do other activities.  
  · Do not smoke. If you need help quitting, talk to your doctor about stop-smoking programs and medicines. These can increase your chances of quitting for good. Quitting smoking may be the most important step you can take to protect your heart. It is never too late to quit.  
  · Limit alcohol to 2 drinks a day for men and 1 drink a day for women. Too much alcohol can cause health problems.  
  · Manage other health problems such as diabetes, high blood pressure, and high cholesterol. If you think you may have a problem with alcohol or drug use, talk to your doctor. Medicines 
  · Take your medicines exactly as prescribed. Call your doctor if you think you are having a problem with your medicine.  
  · If your doctor recommends aspirin, take the amount directed each day. Make sure you take aspirin and not another kind of pain reliever, such as acetaminophen (Tylenol). When should you call for help? Call 911 if you have symptoms of a heart attack. These may include: 
  · Chest pain or pressure, or a strange feeling in the chest.  
  · Sweating.  
  · Shortness of breath.  
  · Pain, pressure, or a strange feeling in the back, neck, jaw, or upper belly or in one or both shoulders or arms.  
  · Lightheadedness or sudden weakness.  
  · A fast or irregular heartbeat. After you call 911, the  may tell you to chew 1 adult-strength or 2 to 4 low-dose aspirin. Wait for an ambulance. Do not try to drive yourself. Watch closely for changes in your health, and be sure to contact your doctor if you have any problems. Where can you learn more? Go to http://www.gray.com/ Enter M653 in the search box to learn more about \"A Healthy Heart: Care Instructions. \" Current as of: December 16, 2019               Content Version: 12.6 © 8536-1214 [employer]. Care instructions adapted under license by [employer] (which disclaims liability or warranty for this information). If you have questions about a medical condition or this instruction, always ask your healthcare professional. [address] any warranty or liability for your use of this information.

## 2020-12-10 NOTE — PROGRESS NOTES
Katja Mendoza is a 41 y.o. female who was seen by synchronous (real-time) audio-video technology on 12/10/2020. Consent: Katja Menodza, who was seen by synchronous (real-time) audio-video technology, and/or her healthcare decision maker, is aware that this patient-initiated, Telehealth encounter on 12/10/2020 is a billable service, with coverage as determined by her insurance carrier. She is aware that she may receive a bill and has provided verbal consent to proceed: Yes. 712  Subjective:   Katja Mendoza is a 41 y.o. female who was seen for No chief complaint on file. Ff-up  Pt w/ h/o CAD s/p PCI 2008, cath 9/2019 80% ostial OM1, patent RCA EF 60% on med therapy (small size of OMB and large native LCx, felt OM to be poor target for PCI)  She is currently on BB, ASA, prn nitro  ED visit 10/27 reviewed atypical MSK pain responded to nsaids. prediabetes, continues to be off smoking     HTN- she continues to take BB, CCB, asa     H/o anemia thought to be due menstrual cycle.      She is requesting routine gyne with me this month. Says her gynecology office is no longer taking her insurance and due for pap/mammo this month, no other issues. Prior to Admission medications    Medication Sig Start Date End Date Taking? Authorizing Provider   metoprolol succinate (TOPROL-XL) 25 mg XL tablet Take 12.5 mg by mouth daily. Yes Provider, Historical   albuterol (PROVENTIL HFA, VENTOLIN HFA, PROAIR HFA) 90 mcg/actuation inhaler Take 1-2 Puffs by inhalation every four (4) hours as needed for Wheezing. 9/15/20  Yes Kenny Blood MD   naproxen (NAPROSYN) 500 mg tablet Take 1 Tab by mouth two (2) times daily (with meals). 8/25/20  Yes Keyon Small,    amLODIPine (NORVASC) 5 mg tablet Take 5 mg by mouth nightly. Yes Provider, Historical   acetaminophen (TYLENOL EXTRA STRENGTH) 500 mg tablet Take 500 mg by mouth every four (4) hours as needed.  8/18/18  Yes Provider, Historical   aspirin 81 mg chewable tablet Take 81 mg by mouth daily. 1/30/18  Yes Provider, Historical   nitroglycerin (NITROSTAT) 0.4 mg SL tablet 0.4 mg. 7/22/16  Yes Provider, Historical     Allergies   Allergen Reactions    Lisinopril Cough    Metoprolol Palpitations    Statins-Hmg-Coa Reductase Inhibitors Other (comments)     Muscle/joint pain    Vaccine Adjuvant Emulsion Combination No. 1 Seizures     MMR       Patient Active Problem List    Diagnosis Date Noted    Chest pain 05/18/2020    Hyperlipidemia LDL goal <70 08/26/2019    Former smoker 08/26/2019    Adjustment disorder with depressed mood 11/09/2018    Epigastric pain 11/09/2018    Calculus of gallbladder without cholecystitis without obstruction 11/09/2018    Coronary artery disease involving native coronary artery of native heart with angina pectoris [facility] 07/05/2018    Iron deficiency anemia due to chronic blood loss 06/07/2018    Obesity, morbid ([facility] 04/26/2018    ST elevation myocardial infarction (STEMI) ([facility] 04/26/2018    Low HDL (under 40) 01/27/2016    Prediabetes 11/14/2014    Tobacco use 10/30/2014    Obesity 10/30/2014    Hypertension 10/30/2014     Current Outpatient Medications   Medication Sig Dispense Refill    metoprolol succinate (TOPROL-XL) 25 mg XL tablet Take 12.5 mg by mouth daily.  albuterol (PROVENTIL HFA, VENTOLIN HFA, PROAIR HFA) 90 mcg/actuation inhaler Take 1-2 Puffs by inhalation every four (4) hours as needed for Wheezing. 1 Inhaler 0    naproxen (NAPROSYN) 500 mg tablet Take 1 Tab by mouth two (2) times daily (with meals). 20 Tab 0    amLODIPine (NORVASC) 5 mg tablet Take 5 mg by mouth nightly.  acetaminophen (TYLENOL EXTRA STRENGTH) 500 mg tablet Take 500 mg by mouth every four (4) hours as needed.  aspirin 81 mg chewable tablet Take 81 mg by mouth daily.  nitroglycerin (NITROSTAT) 0.4 mg SL tablet 0.4 mg.        Allergies   Allergen Reactions    Lisinopril Cough    Metoprolol Palpitations    Statins-Hmg-Coa Reductase Inhibitors Other (comments)     Muscle/joint pain    Vaccine Adjuvant Emulsion Combination No. 1 Seizures     MMR     Past Medical History:   Diagnosis Date    Cardiac echocardiogram 07/21/2016    Sm LV cavity. EF 65-70%. No RWMA. Mod-marked LVH w/dynamic obstruction, mid-cavity obliteration & peak grad of 25 mmHg. Indeterminate diastolic fx. No significant valvular pathology.  Cardiac nuclear imaging test 07/22/2016    Low risk. Very sm distal anterior & apical partially reversible defect, likely artifact, but sm area of ischemia not excluded. No RWMA. EF 66%.   Neg EKG on pharm stress test.    Diverticulitis     Gall stones     GERD (gastroesophageal reflux disease)     Heart attack ([facility]     01/27/2018    Hypertension     Infectious disease     Bacteria vaginosis    Nausea & vomiting     Non-ST elevated myocardial infarction [facility])     STEMI (ST elevation myocardial infarction) ([facility] 01/2018     Past Surgical History:   Procedure Laterality Date    CARDIAC SURG PROCEDURE UNLIST      stent    COLONOSCOPY N/A 6/26/2020    COLONOSCOPY performed by Ene Hill MD at [address]X [address] [place] [address]  09/28/2008    [address]      HX TUBAL LIGATION  09/28/2008     Family History   Problem Relation Age of Onset    Depression Mother     Asthma Mother     Migraines Mother     Hypertension Mother     Stroke Mother     Heart Attack Mother    Rosalia Coryell Mother     Depression Brother     Alcohol abuse Father     Substance Abuse Father         tobacco    Drug Abuse Father     Hypertension Father     High Cholesterol Father     Stroke Father     Rashes/Skin Problems Father     Substance Abuse Brother         tobacco    Drug Abuse Brother     Asthma Brother     Migraines Brother     Hypertension Brother     High Cholesterol Brother     Hypertension Paternal Grandmother     Diabetes Paternal Grandmother     Hypertension Maternal Grandmother     Colon Cancer Maternal Grandmother      Social History     Tobacco Use    Smoking status: Former Smoker     Packs/day: 0.25     Years: 20.00     Pack years: 5.00     Last attempt to quit: 2018     Years since quittin.4    Smokeless tobacco: Never Used   Substance Use Topics    Alcohol use: No     Frequency: Never       ROS      Objective: There were no vitals taken for this visit. General: alert, cooperative, no distress   Mental  status: normal mood, behavior, speech, dress, motor activity, and thought processes, able to follow commands   HENT: NCAT   Neck: no visualized mass   Resp: no respiratory distress   Neuro: no gross deficits   Skin: no discoloration or lesions of concern on visible areas   Psychiatric: normal affect, consistent with stated mood, no evidence of hallucinations     Additional exam findings: We discussed the expected course, resolution and complications of the diagnosis(es) in detail. Medication risks, benefits, costs, interactions, and alternatives were discussed as indicated. I advised her to contact the office if her condition worsens, changes or fails to improve as anticipated. She expressed understanding with the diagnosis(es) and plan. Christina oFx is a 41 y.o. female who was evaluated by a video visit encounter for concerns as above. Patient identification was verified prior to start of the visit. A caregiver was present when appropriate. Due to this being a TeleHealth encounter ([facility]), evaluation of the following organ systems was limited: Vitals/Constitutional/EENT/Resp/CV/GI//MS/Neuro/Skin/Heme-Lymph-Imm.   Pursuant to the emergency declaration under the [facility], [facility] waiver authority and the [employer] Act, this Virtual  Visit was conducted, with patient's (and/or legal guardian's) consent, to reduce the patient's risk of exposure to COVID-19 and provide necessary medical care. Services were provided through a video synchronous discussion virtually to substitute for in-person clinic visit. Patient and provider were located at their individual homes.     Assessment & Plan:   Coronary artery disease involving native coronary artery of native heart with angina pectoris ([facility]   s/p PCI 2008, cath 9/2019 80% ostial OM1, patent RCA EF 60% on med therapy (small size of OMB and large native LCx, felt OM to be poor target for PCI)  Currently asymptomatic  On med mgt, asa, bb, intolerant to statin     Prediabetes  Improving  Tlcs, monitoring    Essential hypertension  Controlled  Cont norvasc, bb    Hyperlipidemia LDL goal <70  Intolerant tos tatin          Ff-up end of month for [employer].      Colton Kocher, MD Colton Kocher, MD

## 2020-12-30 ENCOUNTER — TELEPHONE (OUTPATIENT)
Dept: FAMILY MEDICINE CLINIC | Age: 40
End: 2020-12-30

## 2020-12-31 ENCOUNTER — OFFICE VISIT (OUTPATIENT)
Dept: FAMILY MEDICINE CLINIC | Age: 40
End: 2020-12-31
Payer: MEDICAID

## 2020-12-31 VITALS
HEIGHT: 60 IN | BODY MASS INDEX: 44.76 KG/M2 | RESPIRATION RATE: 16 BRPM | WEIGHT: 228 LBS | HEART RATE: 80 BPM | DIASTOLIC BLOOD PRESSURE: 70 MMHG | TEMPERATURE: 98.1 F | SYSTOLIC BLOOD PRESSURE: 122 MMHG

## 2020-12-31 DIAGNOSIS — Z12.31 BREAST CANCER SCREENING BY MAMMOGRAM: ICD-10-CM

## 2020-12-31 DIAGNOSIS — Z01.419 WELL WOMAN EXAM WITH ROUTINE GYNECOLOGICAL EXAM: Primary | ICD-10-CM

## 2020-12-31 DIAGNOSIS — Z12.4 CERVICAL CANCER SCREENING: ICD-10-CM

## 2020-12-31 DIAGNOSIS — N89.8 VAGINAL DISCHARGE: ICD-10-CM

## 2020-12-31 DIAGNOSIS — I10 ESSENTIAL HYPERTENSION: ICD-10-CM

## 2020-12-31 PROCEDURE — 99396 PREV VISIT EST AGE 40-64: CPT | Performed by: FAMILY MEDICINE

## 2020-12-31 RX ORDER — FLUCONAZOLE 150 MG/1
150 TABLET ORAL DAILY
Qty: 1 TAB | Refills: 0 | Status: SHIPPED | OUTPATIENT
Start: 2020-12-31 | End: 2021-01-01

## 2020-12-31 RX ORDER — METRONIDAZOLE 7.5 MG/G
1 GEL VAGINAL
Qty: 25 G | Refills: 0 | Status: SHIPPED | OUTPATIENT
Start: 2020-12-31 | End: 2021-01-05

## 2020-12-31 NOTE — PROGRESS NOTES
Subjective:   41 y.o. female for Well Woman Check. Patient's last menstrual period was 12/19/2020 (approximate). C/o vaginal discharge past few days with mild  Itching, no odor, no pelvic pain ,fever or new partner. Patient Active Problem List    Diagnosis Date Noted    Chest pain 05/18/2020    Hyperlipidemia LDL goal <70 08/26/2019    Former smoker 08/26/2019    Adjustment disorder with depressed mood 11/09/2018    Epigastric pain 11/09/2018    Calculus of gallbladder without cholecystitis without obstruction 11/09/2018    Coronary artery disease involving native coronary artery of native heart with angina pectoris ([facility] 07/05/2018    Iron deficiency anemia due to chronic blood loss 06/07/2018    Obesity, morbid ([facility] 04/26/2018    ST elevation myocardial infarction (STEMI) ([facility] 04/26/2018    Low HDL (under 40) 01/27/2016    Prediabetes 11/14/2014    Tobacco use 10/30/2014    Obesity 10/30/2014    Hypertension 10/30/2014     Current Outpatient Medications   Medication Sig Dispense Refill    fluconazole (DIFLUCAN) 150 mg tablet Take 1 Tab by mouth daily for 1 day. FDA advises cautious prescribing of oral fluconazole in pregnancy. 1 Tab 0    metroNIDAZOLE (METROGEL) 0.75 % gel Insert 5 g into vagina nightly for 5 days. 25 g 0    metoprolol succinate (TOPROL-XL) 25 mg XL tablet Take 12.5 mg by mouth daily.  albuterol (PROVENTIL HFA, VENTOLIN HFA, PROAIR HFA) 90 mcg/actuation inhaler Take 1-2 Puffs by inhalation every four (4) hours as needed for Wheezing. 1 Inhaler 0    naproxen (NAPROSYN) 500 mg tablet Take 1 Tab by mouth two (2) times daily (with meals). 20 Tab 0    amLODIPine (NORVASC) 5 mg tablet Take 5 mg by mouth nightly.  acetaminophen (TYLENOL EXTRA STRENGTH) 500 mg tablet Take 500 mg by mouth every four (4) hours as needed.  aspirin 81 mg chewable tablet Take 81 mg by mouth daily.  nitroglycerin (NITROSTAT) 0.4 mg SL tablet 0.4 mg.        Allergies   Allergen Reactions    Lisinopril Cough    Metoprolol Palpitations     Not allergic     Statins-Hmg-Coa Reductase Inhibitors Other (comments)     Muscle/joint pain    Vaccine Adjuvant Emulsion Combination No. 1 Seizures     MMR     Past Medical History:   Diagnosis Date    Cardiac echocardiogram 07/21/2016    Sm LV cavity. EF 65-70%. No RWMA. Mod-marked LVH w/dynamic obstruction, mid-cavity obliteration & peak grad of 25 mmHg. Indeterminate diastolic fx. No significant valvular pathology.  Cardiac nuclear imaging test 07/22/2016    Low risk. Very sm distal anterior & apical partially reversible defect, likely artifact, but sm area of ischemia not excluded. No RWMA. EF 66%.   Neg EKG on pharm stress test.    Diverticulitis     Gall stones     GERD (gastroesophageal reflux disease)     Heart attack ([facility]     01/27/2018    Hypertension     Infectious disease     Bacteria vaginosis    Nausea & vomiting     Non-ST elevated myocardial infarction [facility])     STEMI (ST elevation myocardial infarction) ([facility] 01/2018     Past Surgical History:   Procedure Laterality Date    CARDIAC SURG PROCEDURE UNLIST      stent    COLONOSCOPY N/A 6/26/2020    COLONOSCOPY performed by Tana Celis MD at [address] [facility] [address] [place] [address]  09/28/2008    HX CORONARY STENT PLACEMENT      HX TUBAL LIGATION  09/28/2008     Family History   Problem Relation Age of Onset    Depression Mother     Asthma Mother     Migraines Mother     Hypertension Mother     Stroke Mother     Heart Attack Mother    Tammie Piper Mother     Depression Brother     Alcohol abuse Father     Substance Abuse Father         tobacco    Drug Abuse Father     Hypertension Father     High Cholesterol Father     Stroke Father     Rashes/Skin Problems Father     Substance Abuse Brother         tobacco    Drug Abuse Brother     Asthma Brother     Migraines Brother     Hypertension Brother     High Cholesterol Brother     Hypertension Paternal Grandmother     Diabetes Paternal Grandmother     Hypertension Maternal Grandmother     Colon Cancer Maternal Grandmother      Social History     Tobacco Use    Smoking status: Former Smoker     Packs/day: 0.25     Years: 20.00     Pack years: 5.00     Quit date: 2018     Years since quittin.5    Smokeless tobacco: Never Used   Substance Use Topics    Alcohol use: No     Frequency: Never        ROS:  Feeling well. No dyspnea or chest pain on exertion. No abdominal pain, change in bowel habits, black or bloody stools. No urinary tract symptoms. GYN ROS: normal menses, no abnormal bleeding, pelvic pain or discharge, no breast pain or new or enlarging lumps on self exam. No neurological complaints. Objective:     Visit Vitals  /70 (BP 1 Location: Left arm, BP Patient Position: Sitting)   Pulse 80   Temp 98.1 °F (36.7 °C) (Oral)   Resp 16   Ht 5' (1.524 m)   Wt 228 lb (103.4 kg)   LMP 2020 (Approximate)   BMI 44.53 kg/m²     The patient appears well, alert, oriented x 3, in no distress. ENT normal.  Neck supple. No adenopathy or thyromegaly. ANNALISA. Lungs are clear, good air entry, no wheezes, rhonchi or rales. S1 and S2 normal, no murmurs, regular rate and rhythm. Abdomen soft without tenderness, guarding, mass or organomegaly. Extremities show no edema, normal peripheral pulses. Neurological is normal, no focal findings. BREAST EXAM: breasts appear normal, no suspicious masses, no skin or nipple changes or axillary nodes    PELVIC EXAM: normal external genitalia, vulva, vagina, cervix, uterus and adnexa    Assessment/Plan:   Diagnoses and all orders for this visit:    1. Well woman exam with routine gynecological exam  well woman  Mammogram- 2020, order placed  pap smear- done today  return annually or prn  reviewed diet, exercise and weight control. 2. Vaginal discharge  -     PAP IG, CT-NG-TV, APTIMA HPV AND RFX 71/67,33([id number]);  Future  Given diflucan/metrogel    3. Cervical cancer screening  -     PAP IG, CT-NG-TV, APTIMA HPV AND RFX 81/05,58([id number]); Future    4. Breast cancer screening by mammogram  -     [facility] MAMMO BI SCREENING INCL CAD; Future    5. Essential hypertension  Controlled  H/o CAD/prediabetes/HL  -     METABOLIC PANEL, COMPREHENSIVE; Future    Other orders  -     fluconazole (DIFLUCAN) 150 mg tablet; Take 1 Tab by mouth daily for 1 day. FDA advises cautious prescribing of oral fluconazole in pregnancy. -     metroNIDAZOLE (METROGEL) 0.75 % gel; Insert 5 g into vagina nightly for 5 days. Ff-up in 3 months or sooner prn    Patient/guardian understands plan of care. Patient has provided input and agrees with goals. Future labs to be discussed on next visit.

## 2020-12-31 NOTE — PROGRESS NOTES
1. Have you been to the ER, urgent care clinic since your last visit? Hospitalized since your last visit? No    2. Have you seen or consulted any other health care providers outside of the [address] [place] [address] since your last visit? Include any pap smears or colon screening. No     LMP;12/19/2020  Contraception type:tubal  Vaginal problems:discharge  Last mammogram:02/11/2020  Last Pap:10/24/2016  Last Tdap:04/01/2011  Family hx of ovarian ca.  no  Family hx of breast ca:no

## 2020-12-31 NOTE — PATIENT INSTRUCTIONS
A Healthy Heart: Care Instructions Your Care Instructions Coronary artery disease, also called heart disease, occurs when a substance called plaque builds up in the vessels that supply oxygen-rich blood to your heart muscle. This can narrow the blood vessels and reduce blood flow. A heart attack happens when blood flow is completely blocked. A high-fat diet, smoking, and other factors increase the risk of heart disease. Your doctor has found that you have a chance of having heart disease. You can do lots of things to keep your heart healthy. It may not be easy, but you can change your diet, exercise more, and quit smoking. These steps really work to lower your chance of heart disease. Follow-up care is a key part of your treatment and safety. Be sure to make and go to all appointments, and call your doctor if you are having problems. It's also a good idea to know your test results and keep a list of the medicines you take. How can you care for yourself at home? Diet 
  · Use less salt when you cook and eat. This helps lower your blood pressure. Taste food before salting. Add only a little salt when you think you need it. With time, your taste buds will adjust to less salt.  
  · Eat fewer snack items, fast foods, canned soups, and other high-salt, high-fat, processed foods.  
  · Read food labels and try to avoid saturated and trans fats. They increase your risk of heart disease by raising cholesterol levels.  
  · Limit the amount of solid fat-butter, margarine, and shortening-you eat. Use olive, peanut, or canola oil when you cook. Bake, broil, and steam foods instead of frying them.  
  · Eat a variety of fruit and vegetables every day. Dark green, deep orange, red, or yellow fruits and vegetables are especially good for you. Examples include spinach, carrots, peaches, and berries.   · Foods high in fiber can reduce your cholesterol and provide important vitamins and minerals. High-fiber foods include whole-grain cereals and breads, oatmeal, beans, brown rice, citrus fruits, and apples.  
  · Eat lean proteins. Heart-healthy proteins include seafood, lean meats and poultry, eggs, beans, peas, nuts, seeds, and soy products.  
  · Limit drinks and foods with added sugar. These include candy, desserts, and soda pop. Lifestyle changes 
  · If your doctor recommends it, get more exercise. Walking is a good choice. Bit by bit, increase the amount you walk every day. Try for at least 30 minutes on most days of the week. You also may want to swim, bike, or do other activities.  
  · Do not smoke. If you need help quitting, talk to your doctor about stop-smoking programs and medicines. These can increase your chances of quitting for good. Quitting smoking may be the most important step you can take to protect your heart. It is never too late to quit.  
  · Limit alcohol to 2 drinks a day for men and 1 drink a day for women. Too much alcohol can cause health problems.  
  · Manage other health problems such as diabetes, high blood pressure, and high cholesterol. If you think you may have a problem with alcohol or drug use, talk to your doctor. Medicines 
  · Take your medicines exactly as prescribed. Call your doctor if you think you are having a problem with your medicine.  
  · If your doctor recommends aspirin, take the amount directed each day. Make sure you take aspirin and not another kind of pain reliever, such as acetaminophen (Tylenol). When should you call for help? Call 911 if you have symptoms of a heart attack. These may include: 
  · Chest pain or pressure, or a strange feeling in the chest.  
  · Sweating.  
  · Shortness of breath.  
  · Pain, pressure, or a strange feeling in the back, neck, jaw, or upper belly or in one or both shoulders or arms.   · Lightheadedness or sudden weakness.  
  · A fast or irregular heartbeat. After you call 911, the  may tell you to chew 1 adult-strength or 2 to 4 low-dose aspirin. Wait for an ambulance. Do not try to drive yourself. Watch closely for changes in your health, and be sure to contact your doctor if you have any problems. Where can you learn more? Go to http://www.gray.com/ Enter Z966 in the search box to learn more about \"A Healthy Heart: Care Instructions. \" Current as of: December 16, 2019               Content Version: 12.6 © 2342-4371 [employer]. Care instructions adapted under license by [employer] (which disclaims liability or warranty for this information). If you have questions about a medical condition or this instruction, always ask your healthcare professional. [address] any warranty or liability for your use of this information.

## 2021-01-07 LAB
C TRACH RRNA CVX QL NAA+PROBE: NEGATIVE
CYTOLOGIST CVX/VAG CYTO: NORMAL
CYTOLOGY CVX/VAG DOC CYTO: NORMAL
CYTOLOGY CVX/VAG DOC THIN PREP: NORMAL
DX ICD CODE: NORMAL
HPV I/H RISK 4 DNA CVX QL PROBE+SIG AMP: NEGATIVE
Lab: NORMAL
N GONORRHOEA RRNA CVX QL NAA+PROBE: NEGATIVE
OTHER STN SPEC: NORMAL
STAT OF ADQ CVX/VAG CYTO-IMP: NORMAL
T VAGINALIS RRNA SPEC QL NAA+PROBE: NEGATIVE

## 2021-02-11 DIAGNOSIS — Z12.31 BREAST CANCER SCREENING BY MAMMOGRAM: ICD-10-CM

## 2021-02-24 ENCOUNTER — HOSPITAL ENCOUNTER (EMERGENCY)
Age: 41
Discharge: HOME | End: 2021-02-24
Attending: EMERGENCY MEDICINE
Payer: MEDICAID

## 2021-02-24 VITALS
OXYGEN SATURATION: 100 % | DIASTOLIC BLOOD PRESSURE: 87 MMHG | TEMPERATURE: 98.7 F | RESPIRATION RATE: 16 BRPM | SYSTOLIC BLOOD PRESSURE: 143 MMHG | HEART RATE: 76 BPM

## 2021-02-24 DIAGNOSIS — R68.84 JAW PAIN: ICD-10-CM

## 2021-02-24 DIAGNOSIS — K05.10 GINGIVITIS: Primary | ICD-10-CM

## 2021-02-24 PROCEDURE — 74011250637 HC RX REV CODE- 250/637: Performed by: EMERGENCY MEDICINE

## 2021-02-24 PROCEDURE — 99283 EMERGENCY DEPT VISIT LOW MDM: CPT

## 2021-02-24 PROCEDURE — 99281 EMR DPT VST MAYX REQ PHY/QHP: CPT

## 2021-02-24 RX ORDER — ACETAMINOPHEN AND CODEINE PHOSPHATE 300; 30 MG/1; MG/1
1 TABLET ORAL
Qty: 12 TAB | Refills: 0 | Status: SHIPPED | OUTPATIENT
Start: 2021-02-24 | End: 2021-03-03

## 2021-02-24 RX ORDER — IBUPROFEN 600 MG/1
600 TABLET ORAL
Qty: 18 TAB | Refills: 0 | Status: SHIPPED | OUTPATIENT
Start: 2021-02-24 | End: 2021-03-31

## 2021-02-24 RX ORDER — PENICILLIN V POTASSIUM 250 MG/1
500 TABLET, FILM COATED ORAL
Status: COMPLETED | OUTPATIENT
Start: 2021-02-24 | End: 2021-02-24

## 2021-02-24 RX ORDER — ACETAMINOPHEN AND CODEINE PHOSPHATE 300; 30 MG/1; MG/1
1 TABLET ORAL
Status: COMPLETED | OUTPATIENT
Start: 2021-02-24 | End: 2021-02-24

## 2021-02-24 RX ORDER — PENICILLIN V POTASSIUM 500 MG/1
500 TABLET, FILM COATED ORAL 3 TIMES DAILY
Qty: 21 TAB | Refills: 0 | Status: SHIPPED | OUTPATIENT
Start: 2021-02-24 | End: 2021-03-03

## 2021-02-24 RX ADMIN — ACETAMINOPHEN AND CODEINE PHOSPHATE 1 TABLET: 300; 30 TABLET ORAL at 23:40

## 2021-02-24 RX ADMIN — PENICILLIN V POTASSIUM 500 MG: 250 TABLET, FILM COATED ORAL at 23:39

## 2021-02-25 NOTE — ED NOTES
Written and verbal discharge instructions given. Patient verbalizes understanding of same. Patient denies  further questions about treatment and discharge instructions. Left ED with patent airway and steady gait. Arm band removed shredded.  Patient left ED with 3 RX to obtain    [address] [facility], [address]

## 2021-02-25 NOTE — ED TRIAGE NOTES
Patient A/O x 4, presented to the ED with complaint of right sided lower jaw pain x yesterday. Patient denies dental pain, chest pain, N/V, SOB, abdominal pain and other complaints.

## 2021-02-25 NOTE — ED PROVIDER NOTES
Pt c/o left lower gum pain and adjacent facial  Pain. X one day. Getting worse. Says most teeth gone in the that area, but gums are hurting. No fever. No cough or difficulty swallowing. Took motrin, mild relief only. No injury. Sx's moderate, worseinng. Dull pain. Past Medical History:   Diagnosis Date    Cardiac echocardiogram 07/21/2016    Sm LV cavity. EF 65-70%. No RWMA. Mod-marked LVH w/dynamic obstruction, mid-cavity obliteration & peak grad of 25 mmHg. Indeterminate diastolic fx. No significant valvular pathology.  Cardiac nuclear imaging test 07/22/2016    Low risk. Very sm distal anterior & apical partially reversible defect, likely artifact, but sm area of ischemia not excluded. No RWMA. EF 66%.   Neg EKG on pharm stress test.    Diverticulitis     Gall stones     GERD (gastroesophageal reflux disease)     Heart attack ([facility]     01/27/2018    Hypertension     Infectious disease     Bacteria vaginosis    Nausea & vomiting     Non-ST elevated myocardial infarction [facility])     STEMI (ST elevation myocardial infarction) ([facility] 01/2018       Past Surgical History:   Procedure Laterality Date    COLONOSCOPY N/A 6/26/2020    COLONOSCOPY performed by Irena Teran MD at [facility] HX [address] [place] [address]  09/28/2008    HX CORONARY STENT PLACEMENT      HX TUBAL LIGATION  09/28/2008    [place] CARDIAC SURG PROCEDURE UNLIST      stent         Family History:   Problem Relation Age of Onset    Depression Mother     Asthma Mother     Migraines Mother     Hypertension Mother     Stroke Mother     Heart Attack Mother    Delorise Sierra Mother     Depression Brother     Alcohol abuse Father     Substance Abuse Father         tobacco    Drug Abuse Father     Hypertension Father     High Cholesterol Father     Stroke Father     Rashes/Skin Problems Father     Substance Abuse Brother         tobacco    Drug Abuse Brother     Asthma Brother     Migraines Brother  Hypertension Brother     High Cholesterol Brother     Hypertension Paternal Grandmother     Diabetes Paternal Grandmother     Hypertension Maternal Grandmother     Colon Cancer Maternal Grandmother        Social History     Socioeconomic History    Marital status: SINGLE     Spouse name: Not on file    Number of children: Not on file    Years of education: Not on file    Highest education level: Not on file   Occupational History    Not on file   Social Needs    Financial resource strain: Not on file    Food insecurity     Worry: Not on file     Inability: Not on file   [employer] needs     Medical: Not on file     Non-medical: Not on file   Tobacco Use    Smoking status: Former Smoker     Packs/day: 0.25     Years: 20.00     Pack years: 5.00     Quit date: 2018     Years since quittin.6    Smokeless tobacco: Never Used   Substance and Sexual Activity    Alcohol use: No     Frequency: Never    Drug use: No    Sexual activity: Yes     Partners: Male     Birth control/protection: Surgical     Comment: tubal ligation   Lifestyle    Physical activity     Days per week: Not on file     Minutes per session: Not on file    Stress: Not on file   Relationships    Social connections     Talks on phone: Not on file     Gets together: Not on file     Attends Mu-ism service: Not on file     Active member of club or organization: Not on file     Attends meetings of clubs or organizations: Not on file     Relationship status: Not on file    Intimate partner violence     Fear of current or ex partner: Not on file     Emotionally abused: Not on file     Physically abused: Not on file     Forced sexual activity: Not on file   Other Topics Concern    Not on file   Social History Narrative    Not on file         ALLERGIES: Lisinopril, Metoprolol, Statins-hmg-coa reductase inhibitors, and Vaccine adjuvant emulsion combination no. 1    Review of Systems   Constitutional: Negative for fever. HENT: Negative for drooling. Respiratory: Negative for cough. Gastrointestinal: Negative for nausea. All other systems reviewed and are negative. Vitals:    02/24/21 2259   BP: (!) 143/87   Pulse: 76   Resp: 16   Temp: 98.7 °F (37.1 °C)   SpO2: 100%            Physical Exam  Vitals signs and nursing note reviewed. Constitutional:       Appearance: She is well-developed. She is not diaphoretic. HENT:      Head: Normocephalic and atraumatic. Comments: Left lower buccal gingiva erythema, mild swelling adjacent to tooth 18. No induration/fluctuance. No drainage. No facial swelling/erythema  Eyes:      Conjunctiva/sclera: Conjunctivae normal.   Neck:      Musculoskeletal: Normal range of motion. Pulmonary:      Effort: Pulmonary effort is normal.   Musculoskeletal: Normal range of motion. Skin:     General: Skin is dry. Findings: No rash. Neurological:      General: No focal deficit present. Mental Status: She is alert. Psychiatric:         Mood and Affect: Mood normal.          MDM       Procedures    Vitals:  Patient Vitals for the past 12 hrs:   Temp Pulse Resp BP SpO2   02/24/21 2259 98.7 °F (37.1 °C) 76 16 (!) 143/87 100 %         Medications ordered:   Medications   penicillin v potassium (VEETID) tablet 500 mg (500 mg Oral Given 2/24/21 2339)   acetaminophen-codeine (TYLENOL #3) per tablet 1 Tab (1 Tab Oral Given 2/24/21 2340)         Lab findings:  No results found for this or any previous visit (from the past 12 hour(s)). X-Ray, CT or other radiology findings or impressions:  No orders to display       Progress notes, Consult notes or additional Procedure notes:   No emc, not c/w abscess/columba/sepsis. Stable for dc and close f/u. Pt verb und of dc plan and agrees. Diagnosis:   1. Gingivitis    2.  Jaw pain        Disposition: home    Follow-up Information     Follow up With Specialties Details Why [facility] EMERGENCY DEPT Emergency Medicine   [id number] [facility] [id number]  [address] [facility] [address], [address] [place] [address]  Schedule an appointment as soon as possible for a visit in 2 days  [address] [place] [address]  [phone]           Discharge Medication List as of 2/24/2021 11:30 PM      START taking these medications    Details   acetaminophen-codeine (Tylenol-Codeine #3) 300-30 mg per tablet Take 1 Tab by mouth every six (6) hours as needed for Pain for up to 7 days. Max Daily Amount: 4 Tabs., Normal, Disp-12 Tab, R-0      penicillin v potassium (VEETID) 500 mg tablet Take 1 Tab by mouth three (3) times daily for 7 days. , Normal, Disp-21 Tab, R-0      ibuprofen (MOTRIN) 600 mg tablet Take 1 Tab by mouth every six (6) hours as needed for Pain., Normal, Disp-18 Tab, R-0         CONTINUE these medications which have NOT CHANGED    Details   metoprolol succinate (TOPROL-XL) 25 mg XL tablet Take 12.5 mg by mouth daily. , Historical Med      albuterol (PROVENTIL HFA, VENTOLIN HFA, PROAIR HFA) 90 mcg/actuation inhaler Take 1-2 Puffs by inhalation every four (4) hours as needed for Wheezing., Print, Disp-1 Inhaler,R-0      naproxen (NAPROSYN) 500 mg tablet Take 1 Tab by mouth two (2) times daily (with meals). , Print, Disp-20 Tab,R-0      amLODIPine (NORVASC) 5 mg tablet Take 5 mg by mouth nightly., Historical Med      acetaminophen (TYLENOL EXTRA STRENGTH) 500 mg tablet Take 500 mg by mouth every four (4) hours as needed., Historical Med      aspirin 81 mg chewable tablet Take 81 mg by mouth daily. , Historical Med      nitroglycerin (NITROSTAT) 0.4 mg SL tablet 0.4 mg., Historical Med

## 2021-03-15 PROCEDURE — 96374 THER/PROPH/DIAG INJ IV PUSH: CPT

## 2021-03-15 PROCEDURE — 99285 EMERGENCY DEPT VISIT HI MDM: CPT

## 2021-03-15 PROCEDURE — U0005 INFEC AGEN DETEC AMPLI PROBE: HCPCS

## 2021-03-15 PROCEDURE — 94640 AIRWAY INHALATION TREATMENT: CPT

## 2021-03-16 ENCOUNTER — APPOINTMENT (OUTPATIENT)
Dept: CT IMAGING | Age: 41
End: 2021-03-16
Attending: EMERGENCY MEDICINE
Payer: MEDICAID

## 2021-03-16 ENCOUNTER — HOSPITAL ENCOUNTER (EMERGENCY)
Age: 41
Discharge: HOME | End: 2021-03-16
Attending: EMERGENCY MEDICINE
Payer: MEDICAID

## 2021-03-16 VITALS
TEMPERATURE: 98.7 F | WEIGHT: 230 LBS | OXYGEN SATURATION: 95 % | BODY MASS INDEX: 44.92 KG/M2 | SYSTOLIC BLOOD PRESSURE: 135 MMHG | DIASTOLIC BLOOD PRESSURE: 78 MMHG | HEART RATE: 91 BPM | RESPIRATION RATE: 21 BRPM

## 2021-03-16 DIAGNOSIS — E87.6 HYPOKALEMIA: ICD-10-CM

## 2021-03-16 DIAGNOSIS — J98.01 ACUTE BRONCHOSPASM: ICD-10-CM

## 2021-03-16 DIAGNOSIS — J18.9 PNEUMONIA DUE TO INFECTIOUS ORGANISM, UNSPECIFIED LATERALITY, UNSPECIFIED PART OF LUNG: Primary | ICD-10-CM

## 2021-03-16 LAB
ANION GAP SERPL CALC-SCNC: 5 MMOL/L (ref 3–18)
BASOPHILS # BLD: 0 K/UL (ref 0–0.06)
BASOPHILS NFR BLD: 0 % (ref 0–3)
BNP SERPL-MCNC: 7 PG/ML (ref 0–450)
BUN SERPL-MCNC: 7 MG/DL (ref 7–18)
BUN/CREAT SERPL: 10 (ref 12–20)
CALCIUM SERPL-MCNC: 8.8 MG/DL (ref 8.5–10.1)
CHLORIDE SERPL-SCNC: 105 MMOL/L (ref 100–111)
CO2 SERPL-SCNC: 32 MMOL/L (ref 21–32)
CREAT SERPL-MCNC: 0.69 MG/DL (ref 0.6–1.3)
D DIMER PPP FEU-MCNC: 0.71 UG/ML(FEU)
DIFFERENTIAL METHOD BLD: ABNORMAL
EOSINOPHIL # BLD: 0.1 K/UL (ref 0–0.4)
EOSINOPHIL NFR BLD: 3 % (ref 0–5)
ERYTHROCYTE [DISTWIDTH] IN BLOOD BY AUTOMATED COUNT: 17.6 % (ref 11.6–14.5)
GLUCOSE SERPL-MCNC: 111 MG/DL (ref 74–99)
HCT VFR BLD AUTO: 35.7 % (ref 35–45)
HGB BLD-MCNC: 11.1 G/DL (ref 12–16)
LYMPHOCYTES # BLD: 2.2 K/UL (ref 0.8–3.5)
LYMPHOCYTES NFR BLD: 48 % (ref 20–51)
MCH RBC QN AUTO: 24.3 PG (ref 24–34)
MCHC RBC AUTO-ENTMCNC: 31.1 G/DL (ref 31–37)
MCV RBC AUTO: 78.1 FL (ref 74–97)
MONOCYTES # BLD: 0.3 K/UL (ref 0–1)
MONOCYTES NFR BLD: 6 % (ref 2–9)
NEUTS SEG # BLD: 1.8 K/UL (ref 1.8–8)
NEUTS SEG NFR BLD: 39 % (ref 42–75)
OTHER CELLS NFR BLD MANUAL: 4 %
PLATELET # BLD AUTO: 349 K/UL (ref 135–420)
PLATELET COMMENTS,PCOM: ABNORMAL
PMV BLD AUTO: 10.4 FL (ref 9.2–11.8)
POTASSIUM SERPL-SCNC: 3.2 MMOL/L (ref 3.5–5.5)
RBC # BLD AUTO: 4.57 M/UL (ref 4.2–5.3)
RBC MORPH BLD: ABNORMAL
SARS-COV-2, COV2: NORMAL
SODIUM SERPL-SCNC: 142 MMOL/L (ref 136–145)
TROPONIN I SERPL-MCNC: <0.02 NG/ML (ref 0–0.04)
WBC # BLD AUTO: 4.6 K/UL (ref 4.6–13.2)

## 2021-03-16 PROCEDURE — 85379 FIBRIN DEGRADATION QUANT: CPT

## 2021-03-16 PROCEDURE — 74011000636 HC RX REV CODE- 636: Performed by: EMERGENCY MEDICINE

## 2021-03-16 PROCEDURE — 84484 ASSAY OF TROPONIN QUANT: CPT

## 2021-03-16 PROCEDURE — 83880 ASSAY OF NATRIURETIC PEPTIDE: CPT

## 2021-03-16 PROCEDURE — 96374 THER/PROPH/DIAG INJ IV PUSH: CPT

## 2021-03-16 PROCEDURE — 74011000250 HC RX REV CODE- 250: Performed by: EMERGENCY MEDICINE

## 2021-03-16 PROCEDURE — 71275 CT ANGIOGRAPHY CHEST: CPT

## 2021-03-16 PROCEDURE — 74011250637 HC RX REV CODE- 250/637: Performed by: EMERGENCY MEDICINE

## 2021-03-16 PROCEDURE — 80048 BASIC METABOLIC PNL TOTAL CA: CPT

## 2021-03-16 PROCEDURE — 85025 COMPLETE CBC W/AUTO DIFF WBC: CPT

## 2021-03-16 PROCEDURE — 74011250636 HC RX REV CODE- 250/636: Performed by: EMERGENCY MEDICINE

## 2021-03-16 RX ORDER — POTASSIUM CHLORIDE 20 MEQ/1
20 TABLET, EXTENDED RELEASE ORAL DAILY
Qty: 5 TAB | Refills: 0 | Status: SHIPPED | OUTPATIENT
Start: 2021-03-16 | End: 2021-03-21

## 2021-03-16 RX ORDER — POTASSIUM CHLORIDE 20 MEQ/1
40 TABLET, EXTENDED RELEASE ORAL
Status: COMPLETED | OUTPATIENT
Start: 2021-03-16 | End: 2021-03-16

## 2021-03-16 RX ORDER — ALBUTEROL SULFATE 90 UG/1
1-2 AEROSOL, METERED RESPIRATORY (INHALATION)
Qty: 1 INHALER | Refills: 0 | Status: SHIPPED | OUTPATIENT
Start: 2021-03-16 | End: 2021-07-25 | Stop reason: SDUPTHER

## 2021-03-16 RX ORDER — PREDNISONE 20 MG/1
60 TABLET ORAL DAILY
Qty: 12 TAB | Refills: 0 | Status: SHIPPED | OUTPATIENT
Start: 2021-03-16 | End: 2021-03-20

## 2021-03-16 RX ORDER — AZITHROMYCIN 250 MG/1
250 TABLET, FILM COATED ORAL DAILY
Qty: 4 TAB | Refills: 0 | Status: SHIPPED | OUTPATIENT
Start: 2021-03-16 | End: 2021-03-20

## 2021-03-16 RX ORDER — AZITHROMYCIN 250 MG/1
500 TABLET, FILM COATED ORAL
Status: COMPLETED | OUTPATIENT
Start: 2021-03-16 | End: 2021-03-16

## 2021-03-16 RX ORDER — AMOXICILLIN 250 MG/1
500 CAPSULE ORAL
Status: COMPLETED | OUTPATIENT
Start: 2021-03-16 | End: 2021-03-16

## 2021-03-16 RX ORDER — AMOXICILLIN 500 MG/1
500 TABLET, FILM COATED ORAL 3 TIMES DAILY
Qty: 21 TAB | Refills: 0 | Status: SHIPPED | OUTPATIENT
Start: 2021-03-16 | End: 2021-03-23

## 2021-03-16 RX ORDER — IPRATROPIUM BROMIDE AND ALBUTEROL SULFATE 2.5; .5 MG/3ML; MG/3ML
3 SOLUTION RESPIRATORY (INHALATION)
Status: COMPLETED | OUTPATIENT
Start: 2021-03-16 | End: 2021-03-16

## 2021-03-16 RX ORDER — BENZONATATE 100 MG/1
100 CAPSULE ORAL
Qty: 20 CAP | Refills: 0 | Status: SHIPPED | OUTPATIENT
Start: 2021-03-16 | End: 2021-03-23

## 2021-03-16 RX ADMIN — AZITHROMYCIN MONOHYDRATE 500 MG: 250 TABLET ORAL at 04:27

## 2021-03-16 RX ADMIN — POTASSIUM CHLORIDE 40 MEQ: 1500 TABLET, EXTENDED RELEASE ORAL at 04:27

## 2021-03-16 RX ADMIN — IOPAMIDOL 80 ML: 755 INJECTION, SOLUTION INTRAVENOUS at 02:06

## 2021-03-16 RX ADMIN — METHYLPREDNISOLONE SODIUM SUCCINATE 125 MG: 125 INJECTION, POWDER, FOR SOLUTION INTRAMUSCULAR; INTRAVENOUS at 01:25

## 2021-03-16 RX ADMIN — IPRATROPIUM BROMIDE AND ALBUTEROL SULFATE 3 ML: .5; 3 SOLUTION RESPIRATORY (INHALATION) at 01:25

## 2021-03-16 RX ADMIN — AMOXICILLIN 500 MG: 250 CAPSULE ORAL at 04:27

## 2021-03-16 NOTE — DISCHARGE INSTRUCTIONS
You may have covid 19 and need to self quarantine as discussed. Return for pain, fever not resolving with motrin or tylenol, shortness of breath, vomiting, decreased fluid intake, weakness, numbness, dizziness, or any change or concerns.

## 2021-03-16 NOTE — LETTER
Dear Rosi Gunderson Patient, Thank you for entrusting us with your care. Here is how you can expect to hear about your test results:   If your COVID-19 test is positive, you will receive a phone call from a member     of our team, and your results will be available in [address], our secure patient     portal. 
   If your COVID-19 test is negative, your results will be available on [employer],       our secure patient portal. If your employer is requiring you to show proof of     your negative test result, you'll be able to download and print off the test result     record. If you already have a [employer], visit [employer] and click Log in to [employer] to view any test results. If you don't have a Pulselockerhart, you can register online by visiting [employer] and clicking Sign up for [employer]. Thank you for choosing [facility] for your care. [facility]

## 2021-03-16 NOTE — ED PROVIDER NOTES
Pt c/o nasal congestoin, cough, sob w wheezing. Cp only w breathing. Sob only w cough/exertion. H/o same w wheezing in past.  Using alb mdi at home w mild relief. No back or abd pain. No nausea. Feels like cold/congestion per pt. No weakness. Cough non productive. Past Medical History:   Diagnosis Date    Cardiac echocardiogram 07/21/2016    Sm LV cavity. EF 65-70%. No RWMA. Mod-marked LVH w/dynamic obstruction, mid-cavity obliteration & peak grad of 25 mmHg. Indeterminate diastolic fx. No significant valvular pathology.  Cardiac nuclear imaging test 07/22/2016    Low risk. Very sm distal anterior & apical partially reversible defect, likely artifact, but sm area of ischemia not excluded. No RWMA. EF 66%.   Neg EKG on pharm stress test.    Diverticulitis     Gall stones     GERD (gastroesophageal reflux disease)     Heart attack ([facility]     01/27/2018    Hypertension     Infectious disease     Bacteria vaginosis    Nausea & vomiting     Non-ST elevated myocardial infarction [facility])     STEMI (ST elevation myocardial infarction) ([facility] 01/2018       Past Surgical History:   Procedure Laterality Date    COLONOSCOPY N/A 6/26/2020    COLONOSCOPY performed by Cliff Matt MD at [facility] HX [address] [place] [address]  09/28/2008    HX CORONARY STENT PLACEMENT      HX TUBAL LIGATION  09/28/2008    [place] CARDIAC SURG PROCEDURE UNLIST      stent         Family History:   Problem Relation Age of Onset    Depression Mother     Asthma Mother     Migraines Mother     Hypertension Mother     Stroke Mother     Heart Attack Mother    Chan Lees Mother     Depression Brother     Alcohol abuse Father     Substance Abuse Father         tobacco    Drug Abuse Father     Hypertension Father     High Cholesterol Father     Stroke Father     Rashes/Skin Problems Father     Substance Abuse Brother         tobacco    Drug Abuse Brother     Asthma Brother     Migraines Brother     Hypertension Brother     High Cholesterol Brother     Hypertension Paternal Grandmother     Diabetes Paternal Grandmother     Hypertension Maternal Grandmother     Colon Cancer Maternal Grandmother        Social History     Socioeconomic History    Marital status: SINGLE     Spouse name: Not on file    Number of children: Not on file    Years of education: Not on file    Highest education level: Not on file   Occupational History    Not on file   Social Needs    Financial resource strain: Not on file    Food insecurity     Worry: Not on file     Inability: Not on file   [employer] needs     Medical: Not on file     Non-medical: Not on file   Tobacco Use    Smoking status: Former Smoker     Packs/day: 0.25     Years: 20.00     Pack years: 5.00     Quit date: 2018     Years since quittin.7    Smokeless tobacco: Never Used   Substance and Sexual Activity    Alcohol use: No     Frequency: Never    Drug use: No    Sexual activity: Yes     Partners: Male     Birth control/protection: Surgical     Comment: tubal ligation   Lifestyle    Physical activity     Days per week: Not on file     Minutes per session: Not on file    Stress: Not on file   Relationships    Social connections     Talks on phone: Not on file     Gets together: Not on file     Attends Christian service: Not on file     Active member of club or organization: Not on file     Attends meetings of clubs or organizations: Not on file     Relationship status: Not on file    Intimate partner violence     Fear of current or ex partner: Not on file     Emotionally abused: Not on file     Physically abused: Not on file     Forced sexual activity: Not on file   Other Topics Concern    Not on file   Social History Narrative    Not on file         ALLERGIES: Lisinopril, Metoprolol, Statins-hmg-coa reductase inhibitors, and Vaccine adjuvant emulsion combination no. 1    Review of Systems   Constitutional: Negative for fever. HENT: Positive for congestion. Respiratory: Positive for cough and shortness of breath. Cardiovascular: Positive for chest pain. Gastrointestinal: Negative for abdominal pain and vomiting. Musculoskeletal: Negative for back pain. Skin: Negative for rash. Neurological: Negative for light-headedness. All other systems reviewed and are negative. Vitals:    03/15/21 2313 03/16/21 0200 03/16/21 0327 03/16/21 0415   BP: 128/82 137/76 130/70 135/78   Pulse: 87 96 86 91   Resp: 16 20 18 21   Temp: 98.9 °F (37.2 °C)   98.7 °F (37.1 °C)   SpO2: 100% 95% 96% 95%   Weight: 104.3 kg (230 lb)               Physical Exam  Vitals signs and nursing note reviewed. Constitutional:       Appearance: She is well-developed. She is not diaphoretic. HENT:      Head: Normocephalic and atraumatic. Eyes:      Pupils: Pupils are equal, round, and reactive to light. Neck:      Musculoskeletal: Normal range of motion. Cardiovascular:      Rate and Rhythm: Normal rate and regular rhythm. Heart sounds: No murmur. Pulmonary:      Effort: Pulmonary effort is normal.      Breath sounds: No wheezing. Abdominal:      Palpations: Abdomen is soft. Tenderness: There is no abdominal tenderness. Musculoskeletal:         General: No tenderness. Skin:     General: Skin is dry. Capillary Refill: Capillary refill takes less than 2 seconds. Findings: No rash. Neurological:      Mental Status: She is alert and oriented to person, place, and time. MDM       Procedures    Vitals:  No data found.       Medications ordered:   Medications   albuterol-ipratropium (DUO-NEB) 2.5 MG-0.5 MG/3 ML (3 mL Nebulization Given 3/16/21 0125)   methylPREDNISolone (PF) (Solu-MEDROL) injection 125 mg (125 mg IntraVENous Given 3/16/21 0125)   iopamidoL (ISOVUE-370) 76 % injection 80 mL (80 mL IntraVENous Given 3/16/21 0206)   amoxicillin (AMOXIL) capsule 500 mg (500 mg Oral Given 3/16/21 0427)   azithromycin [facility]) tablet 500 mg (500 mg Oral Given 3/16/21 0427)   potassium chloride (K-DUR, KLOR-CON) SR tablet 40 mEq (40 mEq Oral Given 3/16/21 0427)         Lab findings:  No results found for this or any previous visit (from the past 12 hour(s)). X-Ray, CT or other radiology findings or impressions:  CTA CHEST W OR W WO CONT   Final Result   No evidence for pulmonary emboli. Multifocal pneumonia, most significant in the left lung. Findings could be seen   with Covid 19 pneumonia although the imaging features are not highly specific   for Covid 19 infection. Alternative tubes remain possible. Mild mediastinal and hilar adenopathy, nonspecific. Progress notes, Consult notes or additional Procedure notes:   4:13 AM pt w no sob, feels much better, nl sats, nl rr, told of findings, need for close f/u, abx,  Pt agrees w dc plan. No emc. Verb und of detailed ret inst given. No hypoxia. covid test sent. Pt to self quarantine. Pulse ox given to pt at dc. Not c/w cad/pe/ptx      Diagnosis:   1. Pneumonia due to infectious organism, unspecified laterality, unspecified part of lung    2. Hypokalemia    3. Acute bronchospasm        Disposition: home    Follow-up Information     Follow up With Specialties Details Why Contact Info    [facility] EMERGENCY DEPT Emergency Medicine Go to  As needed Marbin Valencia [address] [place] [address] 285, [address]      Elizabet Young MD Pulmonary Disease, Internal Medicine Schedule an appointment as soon as possible for a visit in 2 days  Eduard Bryant  [phone]             Discharge Medication List as of 3/16/2021  4:11 AM      START taking these medications    Details   azithromycin (Zithromax Z-Froylan) 250 mg tablet Take 1 Tab by mouth daily for 4 days. , Normal, Disp-4 Tab, R-0      amoxicillin 500 mg tab Take 500 mg by mouth three (3) times daily for 7 days. , Normal, Disp-21 Tab, R-0      predniSONE (DELTASONE) 20 mg tablet Take 60 mg by mouth daily for 4 days. , Normal, Disp-12 Tab, R-0      benzonatate (Tessalon Perles) 100 mg capsule Take 1 Cap by mouth three (3) times daily as needed for Cough for up to 7 days. , Normal, Disp-20 Cap, R-0      potassium chloride (K-DUR, KLOR-CON) 20 mEq tablet Take 1 Tab by mouth daily for 5 days. , Normal, Disp-5 Tab, R-0         CONTINUE these medications which have CHANGED    Details   albuterol (PROVENTIL HFA, VENTOLIN HFA, PROAIR HFA) 90 mcg/actuation inhaler Take 1-2 Puffs by inhalation every four (4) hours as needed for Wheezing., Normal, Disp-1 Inhaler, R-0         CONTINUE these medications which have NOT CHANGED    Details   ibuprofen (MOTRIN) 600 mg tablet Take 1 Tab by mouth every six (6) hours as needed for Pain., Normal, Disp-18 Tab, R-0      metoprolol succinate (TOPROL-XL) 25 mg XL tablet Take 12.5 mg by mouth daily. , Historical Med      naproxen (NAPROSYN) 500 mg tablet Take 1 Tab by mouth two (2) times daily (with meals). , Print, Disp-20 Tab,R-0      amLODIPine (NORVASC) 5 mg tablet Take 5 mg by mouth nightly., Historical Med      acetaminophen (TYLENOL EXTRA STRENGTH) 500 mg tablet Take 500 mg by mouth every four (4) hours as needed., Historical Med      aspirin 81 mg chewable tablet Take 81 mg by mouth daily. , Historical Med      nitroglycerin (NITROSTAT) 0.4 mg SL tablet 0.4 mg., Historical Med

## 2021-03-16 NOTE — LETTER
[address] [place] [address] Dr [facility] EMERGENCY DEPT 
[address] [facility] [address] [phone] Work/School Note Date: 3/15/2021 To Whom It May concern: 
 
Shailesh Garrett was evaulated by the following provider(s): 
Attending Provider: Kraig Cantu MD.   Ginger Albright virus is suspected. Per the CDC guidelines we recommend home isolation until the following conditions are all met: 1. At least 10 days have passed since symptoms first appeared and 2. At least 24 hours have passed since last fever without the use of fever-reducing medications and 
3. Symptoms (e.g., cough, shortness of breath) have improved Sincerely, 
 
 
 
 
Dr. Amalia Kirk

## 2021-03-16 NOTE — ED NOTES
I have reviewed discharge instructions with the patient. The patient verbalized understanding. Patient armband removed and given to patient to take home. Patient was informed of the privacy risks if armband lost or stolen  Current Discharge Medication List      START taking these medications    Details   azithromycin (Zithromax Z-Frolyan) 250 mg tablet Take 1 Tab by mouth daily for 4 days. Qty: 4 Tab, Refills: 0      amoxicillin 500 mg tab Take 500 mg by mouth three (3) times daily for 7 days. Qty: 21 Tab, Refills: 0      predniSONE (DELTASONE) 20 mg tablet Take 60 mg by mouth daily for 4 days. Qty: 12 Tab, Refills: 0      benzonatate (Tessalon Perles) 100 mg capsule Take 1 Cap by mouth three (3) times daily as needed for Cough for up to 7 days. Qty: 20 Cap, Refills: 0      potassium chloride (K-DUR, KLOR-CON) 20 mEq tablet Take 1 Tab by mouth daily for 5 days. Qty: 5 Tab, Refills: 0         CONTINUE these medications which have CHANGED    Details   albuterol (PROVENTIL HFA, VENTOLIN HFA, PROAIR HFA) 90 mcg/actuation inhaler Take 1-2 Puffs by inhalation every four (4) hours as needed for Wheezing.   Qty: 1 Inhaler, Refills: 0

## 2021-03-17 ENCOUNTER — PATIENT OUTREACH (OUTPATIENT)
Dept: CASE MANAGEMENT | Age: 41
End: 2021-03-17

## 2021-03-17 LAB — SARS-COV-2, COV2NT: DETECTED

## 2021-03-17 NOTE — PROGRESS NOTES
Date/Time:  3/17/2021 10:20 AM   Call within 2 business days of discharge: Yes   Attempted to reach patient by telephone. Left HIPPA compliant message requesting a return call. Will attempt to reach patient again.

## 2021-03-18 ENCOUNTER — PATIENT OUTREACH (OUTPATIENT)
Dept: CASE MANAGEMENT | Age: 41
End: 2021-03-18

## 2021-03-18 NOTE — PROGRESS NOTES
Patient contacted regarding [facility] diagnosis\". Discussed COVID-19 related testing which was available at this time. Test results were positive. Patient informed of results, if available? yes     Ambulatory Care Manager contacted the patient by telephone to perform post discharge assessment. Call within 2 business days of discharge: Yes Verified name and  with patient as identifiers. Provided introduction to self, and explanation of the CTN/ACM role, and reason for call due to risk factors for infection and/or exposure to COVID-19. Symptoms reviewed with patient who verbalized the following symptoms: no new symptoms and no worsening symptoms      Due to no new or worsening symptoms encounter was not routed to provider for escalation. Discussed follow-up appointments. If no appointment was previously scheduled, appointment scheduling offered:  [facility] follow up appointment(s):   Future Appointments   Date Time Provider Abby Helton   2021 10:00 AM Chad Sullivan MD [facility] AMB          Advance Care Planning:   Does patient have an Advance Directive:  not on file; education provided. Patient has following risk factors of: no known risk factors. ACM reviewed discharge instructions, medical action plan and red flags such as increased shortness of breath, increasing fever and signs of decompensation with patient who verbalized understanding. Discussed exposure protocols and quarantine with CDC Guidelines What to do if you are sick with coronavirus disease .  Patient was given an opportunity for questions and concerns. The patient agrees to contact the [facility] exposure line [phone], [facility] [address]  [phone] and PCP office for questions related to their healthcare. ACM provided contact information for future needs.     Reviewed and educated patient on any new and changed medications related to discharge diagnosis     Was patient discharged with a pulse oximeter? yes Discussed and confirmed pulse oximeter discharge instructions and when to notify provider or seek emergency care. Patient/family/caregiver given information for [employer] and agrees to enroll yes  Patient's preferred e-mail: [email or web address]   Patient's preferred phone number:   Based on Loop alert triggers, patient will be contacted by nurse care manager for worsening symptoms. Plan for follow-up call in 5-7 days based on severity of symptoms and risk factors.

## 2021-03-30 RX ORDER — CIPROFLOXACIN 500 MG/1
500 TABLET ORAL 2 TIMES DAILY
Qty: 20 TAB | Refills: 0 | Status: SHIPPED | OUTPATIENT
Start: 2021-03-30 | End: 2021-08-12

## 2021-03-30 RX ORDER — METRONIDAZOLE 500 MG/1
TABLET ORAL
Qty: 30 TAB | Refills: 0 | Status: SHIPPED | OUTPATIENT
Start: 2021-03-30 | End: 2021-08-12

## 2021-03-31 ENCOUNTER — PATIENT OUTREACH (OUTPATIENT)
Dept: CASE MANAGEMENT | Age: 41
End: 2021-03-31

## 2021-03-31 ENCOUNTER — APPOINTMENT (OUTPATIENT)
Dept: CT IMAGING | Age: 41
End: 2021-03-31
Attending: EMERGENCY MEDICINE
Payer: MEDICAID

## 2021-03-31 ENCOUNTER — HOSPITAL ENCOUNTER (EMERGENCY)
Age: 41
Discharge: HOME | End: 2021-03-31
Attending: EMERGENCY MEDICINE
Payer: MEDICAID

## 2021-03-31 VITALS
DIASTOLIC BLOOD PRESSURE: 79 MMHG | SYSTOLIC BLOOD PRESSURE: 127 MMHG | RESPIRATION RATE: 17 BRPM | TEMPERATURE: 99 F | OXYGEN SATURATION: 100 % | BODY MASS INDEX: 46.87 KG/M2 | WEIGHT: 240 LBS | HEART RATE: 84 BPM

## 2021-03-31 DIAGNOSIS — K57.92 DIVERTICULITIS: ICD-10-CM

## 2021-03-31 DIAGNOSIS — R10.9 ABDOMINAL PAIN, UNSPECIFIED ABDOMINAL LOCATION: Primary | ICD-10-CM

## 2021-03-31 LAB
ALBUMIN SERPL-MCNC: 3 G/DL (ref 3.4–5)
ALBUMIN/GLOB SERPL: 0.6 {RATIO} (ref 0.8–1.7)
ALP SERPL-CCNC: 71 U/L (ref 45–117)
ALT SERPL-CCNC: 25 U/L (ref 13–56)
ANION GAP SERPL CALC-SCNC: 6 MMOL/L (ref 3–18)
APPEARANCE UR: CLEAR
AST SERPL-CCNC: 13 U/L (ref 10–38)
BASOPHILS # BLD: 0 K/UL (ref 0–0.1)
BASOPHILS NFR BLD: 0 % (ref 0–2)
BILIRUB DIRECT SERPL-MCNC: 0.1 MG/DL (ref 0–0.2)
BILIRUB SERPL-MCNC: 0.3 MG/DL (ref 0.2–1)
BILIRUB UR QL: NEGATIVE
BUN SERPL-MCNC: 7 MG/DL (ref 7–18)
BUN/CREAT SERPL: 10 (ref 12–20)
CALCIUM SERPL-MCNC: 8.5 MG/DL (ref 8.5–10.1)
CHLORIDE SERPL-SCNC: 107 MMOL/L (ref 100–111)
CO2 SERPL-SCNC: 26 MMOL/L (ref 21–32)
COLOR UR: YELLOW
CREAT SERPL-MCNC: 0.68 MG/DL (ref 0.6–1.3)
DIFFERENTIAL METHOD BLD: ABNORMAL
EOSINOPHIL # BLD: 0.1 K/UL (ref 0–0.4)
EOSINOPHIL NFR BLD: 1 % (ref 0–5)
ERYTHROCYTE [DISTWIDTH] IN BLOOD BY AUTOMATED COUNT: 18.9 % (ref 11.6–14.5)
GLOBULIN SER CALC-MCNC: 4.9 G/DL (ref 2–4)
GLUCOSE SERPL-MCNC: 115 MG/DL (ref 74–99)
GLUCOSE UR STRIP.AUTO-MCNC: NEGATIVE MG/DL
HCT VFR BLD AUTO: 37.3 % (ref 35–45)
HGB BLD-MCNC: 11.8 G/DL (ref 12–16)
HGB UR QL STRIP: NEGATIVE
KETONES UR QL STRIP.AUTO: NEGATIVE MG/DL
LEUKOCYTE ESTERASE UR QL STRIP.AUTO: NEGATIVE
LIPASE SERPL-CCNC: 49 U/L (ref 73–393)
LYMPHOCYTES # BLD: 2.4 K/UL (ref 0.9–3.6)
LYMPHOCYTES NFR BLD: 29 % (ref 21–52)
MCH RBC QN AUTO: 25 PG (ref 24–34)
MCHC RBC AUTO-ENTMCNC: 31.6 G/DL (ref 31–37)
MCV RBC AUTO: 79 FL (ref 74–97)
MONOCYTES # BLD: 0.4 K/UL (ref 0.05–1.2)
MONOCYTES NFR BLD: 5 % (ref 3–10)
NEUTS SEG # BLD: 5.2 K/UL (ref 1.8–8)
NEUTS SEG NFR BLD: 65 % (ref 40–73)
NITRITE UR QL STRIP.AUTO: NEGATIVE
PH UR STRIP: 7.5 [PH] (ref 5–8)
PLATELET # BLD AUTO: 436 K/UL (ref 135–420)
PMV BLD AUTO: 10 FL (ref 9.2–11.8)
POTASSIUM SERPL-SCNC: 3.8 MMOL/L (ref 3.5–5.5)
PROT SERPL-MCNC: 7.9 G/DL (ref 6.4–8.2)
PROT UR STRIP-MCNC: NEGATIVE MG/DL
RBC # BLD AUTO: 4.72 M/UL (ref 4.2–5.3)
SODIUM SERPL-SCNC: 139 MMOL/L (ref 136–145)
SP GR UR REFRACTOMETRY: 1.01 (ref 1–1.03)
TROPONIN I SERPL-MCNC: <0.02 NG/ML (ref 0–0.04)
UROBILINOGEN UR QL STRIP.AUTO: 1 EU/DL (ref 0.2–1)
WBC # BLD AUTO: 8.1 K/UL (ref 4.6–13.2)

## 2021-03-31 PROCEDURE — 74177 CT ABD & PELVIS W/CONTRAST: CPT

## 2021-03-31 PROCEDURE — 81003 URINALYSIS AUTO W/O SCOPE: CPT

## 2021-03-31 PROCEDURE — 83690 ASSAY OF LIPASE: CPT

## 2021-03-31 PROCEDURE — 96374 THER/PROPH/DIAG INJ IV PUSH: CPT

## 2021-03-31 PROCEDURE — 99284 EMERGENCY DEPT VISIT MOD MDM: CPT

## 2021-03-31 PROCEDURE — 96375 TX/PRO/DX INJ NEW DRUG ADDON: CPT

## 2021-03-31 PROCEDURE — 84484 ASSAY OF TROPONIN QUANT: CPT

## 2021-03-31 PROCEDURE — 80076 HEPATIC FUNCTION PANEL: CPT

## 2021-03-31 PROCEDURE — 85025 COMPLETE CBC W/AUTO DIFF WBC: CPT

## 2021-03-31 PROCEDURE — 74011000636 HC RX REV CODE- 636: Performed by: EMERGENCY MEDICINE

## 2021-03-31 PROCEDURE — 74011250636 HC RX REV CODE- 250/636: Performed by: EMERGENCY MEDICINE

## 2021-03-31 PROCEDURE — 80048 BASIC METABOLIC PNL TOTAL CA: CPT

## 2021-03-31 RX ORDER — ONDANSETRON 2 MG/ML
4 INJECTION INTRAMUSCULAR; INTRAVENOUS
Status: COMPLETED | OUTPATIENT
Start: 2021-03-31 | End: 2021-03-31

## 2021-03-31 RX ORDER — PROCHLORPERAZINE MALEATE 5 MG
5 TABLET ORAL
Qty: 21 TAB | Refills: 0 | Status: SHIPPED | OUTPATIENT
Start: 2021-03-31 | End: 2021-03-31 | Stop reason: ALTCHOICE

## 2021-03-31 RX ORDER — KETOROLAC TROMETHAMINE 15 MG/ML
15 INJECTION, SOLUTION INTRAMUSCULAR; INTRAVENOUS
Status: COMPLETED | OUTPATIENT
Start: 2021-03-31 | End: 2021-03-31

## 2021-03-31 RX ORDER — PROMETHAZINE HYDROCHLORIDE 25 MG/1
25 TABLET ORAL
Qty: 12 TAB | Refills: 0 | Status: SHIPPED | OUTPATIENT
Start: 2021-03-31 | End: 2021-04-07 | Stop reason: SDUPTHER

## 2021-03-31 RX ORDER — SODIUM CHLORIDE 9 MG/ML
1000 INJECTION, SOLUTION INTRAVENOUS ONCE
Status: COMPLETED | OUTPATIENT
Start: 2021-03-31 | End: 2021-03-31

## 2021-03-31 RX ORDER — OXYCODONE AND ACETAMINOPHEN 5; 325 MG/1; MG/1
1 TABLET ORAL
Qty: 12 TAB | Refills: 0 | Status: SHIPPED | OUTPATIENT
Start: 2021-03-31 | End: 2021-04-03

## 2021-03-31 RX ADMIN — IOPAMIDOL 100 ML: 612 INJECTION, SOLUTION INTRAVENOUS at 07:36

## 2021-03-31 RX ADMIN — ONDANSETRON 4 MG: 2 INJECTION INTRAMUSCULAR; INTRAVENOUS at 06:57

## 2021-03-31 RX ADMIN — KETOROLAC TROMETHAMINE 15 MG: 15 INJECTION, SOLUTION INTRAMUSCULAR; INTRAVENOUS at 06:56

## 2021-03-31 RX ADMIN — SODIUM CHLORIDE 1000 ML: 900 INJECTION, SOLUTION INTRAVENOUS at 06:55

## 2021-03-31 NOTE — ED PROVIDER NOTES
Pt c/o left mid abdominal pain, x 4 days, waxing/waning, severe at times. Currently 8/10. Mild nausea, no vomiting. No back pain. Was dx w covid pna here 2 weeks ago, says all symptoms had resolved prior to the abdominal pain starting. No fever or cough. States no h/o renal stones. Does have h/o diverticulitis, has had 2-3 times in past per pt. Sees dr Leann Orlando for it, says he called in cipro/flagyl for it yeserday. Has taken 2 doses so far, but no improvement. Says not pregnant, h/o prior btl. Past Medical History:   Diagnosis Date    Cardiac echocardiogram 07/21/2016    Sm LV cavity. EF 65-70%. No RWMA. Mod-marked LVH w/dynamic obstruction, mid-cavity obliteration & peak grad of 25 mmHg. Indeterminate diastolic fx. No significant valvular pathology.  Cardiac nuclear imaging test 07/22/2016    Low risk. Very sm distal anterior & apical partially reversible defect, likely artifact, but sm area of ischemia not excluded. No RWMA. EF 66%.   Neg EKG on pharm stress test.    Diverticulitis     Gall stones     GERD (gastroesophageal reflux disease)     Heart attack ([address]     01/27/2018    Hypertension     Infectious disease     Bacteria vaginosis    Nausea & vomiting     Non-ST elevated myocardial infarction [facility])     STEMI (ST elevation myocardial infarction) ([address] 01/2018       Past Surgical History:   Procedure Laterality Date    COLONOSCOPY N/A 6/26/2020    COLONOSCOPY performed by Wilder Frank MD at [address] [facility] [address] [place] [address]  09/28/2008    HX CORONARY STENT PLACEMENT      HX TUBAL LIGATION  09/28/2008    [place] CARDIAC SUR[facility] PROCEDURE UNLIST      stent         Family History:   Problem Relation Age of Onset    Depression Mother     Asthma Mother     Migraines Mother     Hypertension Mother     Stroke Mother     Heart Attack Mother     Arthritis-osteo Mother     Depression Brother     Alcohol abuse Father     Substance Abuse Father         tobacco    Drug Abuse Father     Hypertension Father     High Cholesterol Father     Stroke Father     Rashes/Skin Problems Father     Substance Abuse Brother         tobacco    Drug Abuse Brother     Asthma Brother     Migraines Brother     Hypertension Brother     High Cholesterol Brother     Hypertension Paternal Grandmother     Diabetes Paternal Grandmother     Hypertension Maternal Grandmother     Colon Cancer Maternal Grandmother        Social History     Socioeconomic History    Marital status: SINGLE     Spouse name: Not on file    Number of children: Not on file    Years of education: Not on file    Highest education level: Not on file   Occupational History    Not on file   Social Needs    Financial resource strain: Not on file    Food insecurity     Worry: Not on file     Inability: Not on file   [employer] needs     Medical: Not on file     Non-medical: Not on file   Tobacco Use    Smoking status: Former Smoker     Packs/day: 0.25     Years: 20.00     Pack years: 5.00     Quit date: 2018     Years since quittin.7    Smokeless tobacco: Never Used   Substance and Sexual Activity    Alcohol use: No     Frequency: Never    Drug use: No    Sexual activity: Yes     Partners: Male     Birth control/protection: Surgical     Comment: tubal ligation   Lifestyle    Physical activity     Days per week: Not on file     Minutes per session: Not on file    Stress: Not on file   Relationships    Social connections     Talks on phone: Not on file     Gets together: Not on file     Attends Christianity service: Not on file     Active member of club or organization: Not on file     Attends meetings of clubs or organizations: Not on file     Relationship status: Not on file    Intimate partner violence     Fear of current or ex partner: Not on file     Emotionally abused: Not on file     Physically abused: Not on file     Forced sexual activity: Not on file   Other Topics Concern    Not on file   Social History Narrative    Not on file         ALLERGIES: Lisinopril, Metoprolol, Statins-hmg-coa reductase inhibitors, and Vaccine adjuvant emulsion combination no. 1    Review of Systems   Constitutional: Negative for fever. HENT: Negative for congestion. Respiratory: Negative for cough and shortness of breath. Cardiovascular: Negative for chest pain. Gastrointestinal: Positive for abdominal pain and nausea. Negative for vomiting. Musculoskeletal: Negative for back pain. Skin: Negative for rash. Neurological: Negative for light-headedness. All other systems reviewed and are negative. Vitals:    03/31/21 0603   BP: 138/67   Pulse: 86   Resp: 20   Temp: 98.9 °F (37.2 °C)   SpO2: 98%            Physical Exam  Vitals signs and nursing note reviewed. Constitutional:       Appearance: She is well-developed. She is not diaphoretic. HENT:      Head: Normocephalic and atraumatic. Eyes:      Pupils: Pupils are equal, round, and reactive to light. Neck:      Musculoskeletal: Normal range of motion. Cardiovascular:      Rate and Rhythm: Normal rate and regular rhythm. Heart sounds: No murmur. Pulmonary:      Effort: Pulmonary effort is normal.      Breath sounds: No wheezing. Abdominal:      Palpations: Abdomen is soft. Tenderness: There is abdominal tenderness (+ left mid abd ttp). Musculoskeletal:         General: No tenderness. Skin:     General: Skin is dry. Capillary Refill: Capillary refill takes less than 2 seconds. Findings: No rash. Neurological:      Mental Status: She is alert and oriented to person, place, and time.    Psychiatric:         Mood and Affect: Mood normal.          MDM       Procedures    Vitals:  Patient Vitals for the past 12 hrs:   Temp Pulse Resp BP SpO2   03/31/21 0603 98.9 °F (37.2 °C) 86 20 138/67 98 %         Medications ordered:   Medications   ondansetron (ZOFRAN) injection 4 mg (has no administration in time range) ketorolac (TORADOL) injection 15 mg (has no administration in time range)   0.9% sodium chloride infusion 1,000 mL (has no administration in time range)         Lab findings:  No results found for this or any previous visit (from the past 12 hour(s)). X-Ray, CT or other radiology findings or impressions:  No orders to display       Progress notes, Consult notes or additional Procedure notes:   Ct pending. Signed out to dr Joann Bull      Diagnosis:   1. Abdominal pain, unspecified abdominal location          Follow-up Information    None          Patient's Medications   Start Taking    No medications on file   Continue Taking    ACETAMINOPHEN (TYLENOL EXTRA STRENGTH) 500 MG TABLET    Take 500 mg by mouth every four (4) hours as needed. ALBUTEROL (PROVENTIL HFA, VENTOLIN HFA, PROAIR HFA) 90 MCG/ACTUATION INHALER    Take 1-2 Puffs by inhalation every four (4) hours as needed for Wheezing. AMLODIPINE (NORVASC) 5 MG TABLET    Take 5 mg by mouth nightly. ASPIRIN 81 MG CHEWABLE TABLET    Take 81 mg by mouth daily. CIPROFLOXACIN HCL (CIPRO) 500 MG TABLET    Take 1 Tab by mouth two (2) times a day. IBUPROFEN (MOTRIN) 600 MG TABLET    Take 1 Tab by mouth every six (6) hours as needed for Pain. METOPROLOL SUCCINATE (TOPROL-XL) 25 MG XL TABLET    Take 12.5 mg by mouth daily. METRONIDAZOLE (FLAGYL) 500 MG TABLET    Take one tab 3x day for 10 days    NAPROXEN (NAPROSYN) 500 MG TABLET    Take 1 Tab by mouth two (2) times daily (with meals). NITROGLYCERIN (NITROSTAT) 0.4 MG SL TABLET    0.4 mg.    These Medications have changed    No medications on file   Stop Taking    No medications on file

## 2021-03-31 NOTE — ED NOTES
Pt given update on plan of care and staff change over. Reports pain to 5/10. Affect calm/conversant, respirations regular/non labored. Pt to CT. Report has been given to Jasmeet.

## 2021-03-31 NOTE — PROGRESS NOTES
Sohail Tijerina was seen in ER on 3-31-21 for exacerbation of diverticulitis. States she is doing well at present. Will follow Pt. For an additional week to assess status and any further needs. Patient contacted regarding COVID-19 risk and screening. Discussed COVID-19 related testing which was available at this time. Test results were positive. Patient informed of results, if available? yes     Ambulatory Care Manager contacted the patient by telephone to perform follow-up assessment. Verified name and  with patient as identifiers. Patient has following risk factors of: no known risk factors. Symptoms reviewed with patient who verbalized the following symptoms: no new symptoms and no worsening symptoms. Was patient discharged with a pulse oximeter? no Discussed and confirmed pulse oximeter discharge instructions and when to notify provider or seek emergency care. Due to no new or worsening symptoms encounter was not routed to provider for escalation. Education provided regarding infection prevention, and signs and symptoms of COVID-19 and when to seek medical attention with patient who verbalized understanding. Discussed exposure protocols and quarantine from [address] you at higher risk for severe illness  and given an opportunity for questions and concerns. The patient agrees to contact the COVID-19 hotline [phone] or PCP office for questions related to their healthcare. [facility] provided contact information for future reference. From CDC: Are you at higher risk for severe illness?  Wash your hands often.  Avoid close contact (6 feet, which is about two arm lengths) with people who are sick.  Put distance between yourself and other people if COVID-19 is spreading in your community.  Clean and disinfect frequently touched surfaces.  Avoid all cruise travel and non-essential air travel.    Call your healthcare professional if you have concerns about COVID-19 and your underlying condition or if you are sick. For more information on steps you can take to protect yourself, see CDC's How to [place] for follow-up call in 7-14 days based on severity of symptoms and risk factors.

## 2021-03-31 NOTE — ED NOTES
07:00 Assumed care  by Dr. Jin Olivarez pending CT Abdo pelvis. Acute diverticulitis of the descending colon. Patient will be given nausea medicine and pain medicine.   Already on antibiotics ciprofloxacin and Flagyl will be followed up with Dr. Ana María Nicholas tomorrow

## 2021-04-01 ENCOUNTER — OFFICE VISIT (OUTPATIENT)
Dept: SURGERY | Age: 41
End: 2021-04-01
Payer: MEDICAID

## 2021-04-01 VITALS
HEIGHT: 60 IN | OXYGEN SATURATION: 99 % | DIASTOLIC BLOOD PRESSURE: 64 MMHG | WEIGHT: 227 LBS | BODY MASS INDEX: 44.57 KG/M2 | RESPIRATION RATE: 18 BRPM | TEMPERATURE: 97.4 F | HEART RATE: 76 BPM | SYSTOLIC BLOOD PRESSURE: 106 MMHG

## 2021-04-01 DIAGNOSIS — K57.32 DIVERTICULITIS OF COLON: Primary | ICD-10-CM

## 2021-04-01 PROCEDURE — 99213 OFFICE O/P EST LOW 20 MIN: CPT | Performed by: COLON & RECTAL SURGERY

## 2021-04-01 NOTE — PROGRESS NOTES
Chief Complaint   Patient presents with    Abdominal Pain     went to er yesterday because pain worse started cipro and flagyl on tues   1. Have you been to the ER, urgent care clinic since your last visit? Hospitalized since your last visit? yes sob and abd pain two different visits    2. Have you seen or consulted any other health care providers outside of the [address] [place] [address] since your last visit? Include any pap smears or colon screening.  No

## 2021-04-01 NOTE — PROGRESS NOTES
Subjective: She developed recurrent left-sided abdominal pain. I placed her on antibiotics. She went to the emergency department where CT imaging showed distal descending diverticulitis. Pain is mildly improved. Past medical history and ROS were reviewed and unchanged. Abdomen: Soft, tender in the left lateral region, nondistended    CT from the ER shows distal descending diverticulitis    Assessment / Plan    Recurrent diverticulitis, simple  Obtain disc of diverticulitis from March 2019  Complete antibiotic course, follow-up in 2 weeks  She has had several episodes, discussed surgery with her at next visit    20 minutes was spent in patient care. The diagnoses and plan were discussed with patient. All questions answered. Plan of care agreed to by all concerned.

## 2021-04-02 ENCOUNTER — VIRTUAL VISIT (OUTPATIENT)
Dept: FAMILY MEDICINE CLINIC | Age: 41
End: 2021-04-02

## 2021-04-06 DIAGNOSIS — R11.0 NAUSEA: Primary | ICD-10-CM

## 2021-04-06 RX ORDER — ONDANSETRON 4 MG/1
4 TABLET, ORALLY DISINTEGRATING ORAL
Qty: 20 TAB | Refills: 0 | Status: SHIPPED | OUTPATIENT
Start: 2021-04-06 | End: 2021-04-08 | Stop reason: SINTOL

## 2021-04-07 ENCOUNTER — PATIENT OUTREACH (OUTPATIENT)
Dept: CASE MANAGEMENT | Age: 41
End: 2021-04-07

## 2021-04-07 RX ORDER — PROMETHAZINE HYDROCHLORIDE 25 MG/1
25 TABLET ORAL
Qty: 20 TAB | Refills: 0 | Status: SHIPPED | OUTPATIENT
Start: 2021-04-07 | End: 2021-04-23 | Stop reason: SDUPTHER

## 2021-04-07 NOTE — PROGRESS NOTES
Changed Phenergan rx from tablet to liquid form for patient. Ok per Dr. Howard Cooper. Spoke to pharmacist. Patient notified.

## 2021-04-07 NOTE — PROGRESS NOTES
Attempted to reach patient by telephone. Left HIPPA compliant message requesting a return call. Patient resolved from Transition of Care episode on  4/7/2021  Patient/family has been provided the following resources and education related to COVID-19:                         Signs, symptoms and red flags related to COVID-19            CDC exposure and quarantine guidelines            [facility] exposure contact - [phone]            Contact for their local Department of Health                   No further outreach scheduled with this CTN/ACM. Episode of Care resolved. Patient has this CTN/ACM contact information if future needs arise.

## 2021-04-15 ENCOUNTER — OFFICE VISIT (OUTPATIENT)
Dept: SURGERY | Age: 41
End: 2021-04-15
Payer: MEDICAID

## 2021-04-15 VITALS
WEIGHT: 223 LBS | HEIGHT: 60 IN | BODY MASS INDEX: 43.78 KG/M2 | HEART RATE: 76 BPM | TEMPERATURE: 97.9 F | DIASTOLIC BLOOD PRESSURE: 78 MMHG | SYSTOLIC BLOOD PRESSURE: 130 MMHG | RESPIRATION RATE: 18 BRPM

## 2021-04-15 DIAGNOSIS — K57.32 DIVERTICULITIS OF COLON: Primary | ICD-10-CM

## 2021-04-15 PROCEDURE — 99214 OFFICE O/P EST MOD 30 MIN: CPT | Performed by: COLON & RECTAL SURGERY

## 2021-04-15 RX ORDER — METRONIDAZOLE 500 MG/1
500 TABLET ORAL 3 TIMES DAILY
Qty: 3 TAB | Refills: 0 | Status: SHIPPED | OUTPATIENT
Start: 2021-04-15 | End: 2021-04-16

## 2021-04-15 RX ORDER — NEOMYCIN SULFATE 500 MG/1
1000 TABLET ORAL 3 TIMES DAILY
Qty: 6 TAB | Refills: 0 | Status: SHIPPED | OUTPATIENT
Start: 2021-04-15 | End: 2021-04-16

## 2021-04-15 NOTE — PROGRESS NOTES
Subjective: Pain is resolved. Moving her bowels. Past medical history and ROS were reviewed and unchanged. Abdomen: Soft, nontender nondistended    Assessment / Plan    Recurrent diverticulitis, simple  Multiple attacks over several years  Recommend sigmoid colectomy  Patient wishes to proceed  Extensive discussion about robotic sigmoid colectomy with possible transrectal extraction    30 minutes was spent in patient care. The diagnoses and plan were discussed with patient. All questions answered. Plan of care agreed to by all concerned.

## 2021-04-15 NOTE — PROGRESS NOTES
Chief Complaint   Patient presents with    Follow-up     recurrent left-sided abdominal pain placed on antibiotics. She went to the emergency department where CT imaging showed distal descending diverticulitis   1. Have you been to the ER, urgent care clinic since your last visit? Hospitalized since your last visit? yes for trigger finger    2. Have you seen or consulted any other health care providers outside of the [address] [place] [address] since your last visit? Include any pap smears or colon screening.  No

## 2021-04-15 NOTE — LETTER
4/15/2021 Patient: Miguel Campbell YOB: 1980 Date of Visit: 4/15/2021 Slick Blackmon, [address] [id number] [place] [address] [address] [employer] Dear Elizabeth Linn, 
 
Ms. Terrell Yang is seen in follow-up for diverticulitis. She had a recurrent episode and has completed an antibiotic course. CT imaging shows inflammation at the junction of descending and sigmoid colon. She has had issues with diverticulitis for a number of years and wishes to proceed with surgery. We will schedule her for robotic sigmoid colectomy shortly. If you have questions, please do not hesitate to call me. I look forward to following your patient along with you. Sincerely, Brandi Gorman MD

## 2021-04-23 ENCOUNTER — VIRTUAL VISIT (OUTPATIENT)
Dept: FAMILY MEDICINE CLINIC | Age: 41
End: 2021-04-23
Payer: MEDICAID

## 2021-04-23 DIAGNOSIS — R73.03 PREDIABETES: ICD-10-CM

## 2021-04-23 DIAGNOSIS — I25.119 CORONARY ARTERY DISEASE INVOLVING NATIVE CORONARY ARTERY OF NATIVE HEART WITH ANGINA PECTORIS (HCC): Primary | ICD-10-CM

## 2021-04-23 DIAGNOSIS — I10 ESSENTIAL HYPERTENSION: ICD-10-CM

## 2021-04-23 DIAGNOSIS — Z78.9 STATIN INTOLERANCE: ICD-10-CM

## 2021-04-23 DIAGNOSIS — K57.92 DIVERTICULITIS: ICD-10-CM

## 2021-04-23 DIAGNOSIS — R12 HEARTBURN: ICD-10-CM

## 2021-04-23 DIAGNOSIS — E78.5 HYPERLIPIDEMIA LDL GOAL <70: ICD-10-CM

## 2021-04-23 PROCEDURE — 99214 OFFICE O/P EST MOD 30 MIN: CPT | Performed by: FAMILY MEDICINE

## 2021-04-23 RX ORDER — PROMETHAZINE HYDROCHLORIDE 25 MG/1
25 TABLET ORAL
Qty: 20 TAB | Refills: 0 | Status: SHIPPED | OUTPATIENT
Start: 2021-04-23 | End: 2021-04-26 | Stop reason: ALTCHOICE

## 2021-04-23 RX ORDER — PANTOPRAZOLE SODIUM 40 MG/1
40 TABLET, DELAYED RELEASE ORAL DAILY
Qty: 90 TAB | Refills: 0 | Status: SHIPPED | OUTPATIENT
Start: 2021-04-23 | End: 2021-09-21

## 2021-04-23 NOTE — PATIENT INSTRUCTIONS
A Healthy Heart: Care Instructions Your Care Instructions Coronary artery disease, also called heart disease, occurs when a substance called plaque builds up in the vessels that supply oxygen-rich blood to your heart muscle. This can narrow the blood vessels and reduce blood flow. A heart attack happens when blood flow is completely blocked. A high-fat diet, smoking, and other factors increase the risk of heart disease. Your doctor has found that you have a chance of having heart disease. You can do lots of things to keep your heart healthy. It may not be easy, but you can change your diet, exercise more, and quit smoking. These steps really work to lower your chance of heart disease. Follow-up care is a key part of your treatment and safety. Be sure to make and go to all appointments, and call your doctor if you are having problems. It's also a good idea to know your test results and keep a list of the medicines you take. How can you care for yourself at home? Diet 
  · Use less salt when you cook and eat. This helps lower your blood pressure. Taste food before salting. Add only a little salt when you think you need it. With time, your taste buds will adjust to less salt.  
  · Eat fewer snack items, fast foods, canned soups, and other high-salt, high-fat, processed foods.  
  · Read food labels and try to avoid saturated and trans fats. They increase your risk of heart disease by raising cholesterol levels.  
  · Limit the amount of solid fat-butter, margarine, and shortening-you eat. Use olive, peanut, or canola oil when you cook. Bake, broil, and steam foods instead of frying them.  
  · Eat a variety of fruit and vegetables every day. Dark green, deep orange, red, or yellow fruits and vegetables are especially good for you. Examples include spinach, carrots, peaches, and berries.  
  · Foods high in fiber can reduce your cholesterol and provide important vitamins and minerals.  High-fiber foods include whole-grain cereals and breads, oatmeal, beans, brown rice, citrus fruits, and apples.  
  · Eat lean proteins. Heart-healthy proteins include seafood, lean meats and poultry, eggs, beans, peas, nuts, seeds, and soy products.  
  · Limit drinks and foods with added sugar. These include candy, desserts, and soda pop. Lifestyle changes 
  · If your doctor recommends it, get more exercise. Walking is a good choice. Bit by bit, increase the amount you walk every day. Try for at least 30 minutes on most days of the week. You also may want to swim, bike, or do other activities.  
  · Do not smoke. If you need help quitting, talk to your doctor about stop-smoking programs and medicines. These can increase your chances of quitting for good. Quitting smoking may be the most important step you can take to protect your heart. It is never too late to quit.  
  · Limit alcohol to 2 drinks a day for men and 1 drink a day for women. Too much alcohol can cause health problems.  
  · Manage other health problems such as diabetes, high blood pressure, and high cholesterol. If you think you may have a problem with alcohol or drug use, talk to your doctor. Medicines 
  · Take your medicines exactly as prescribed. Call your doctor if you think you are having a problem with your medicine.  
  · If your doctor recommends aspirin, take the amount directed each day. Make sure you take aspirin and not another kind of pain reliever, such as acetaminophen (Tylenol). When should you call for help? Call 911 if you have symptoms of a heart attack. These may include: 
  · Chest pain or pressure, or a strange feeling in the chest.  
  · Sweating.  
  · Shortness of breath.  
  · Pain, pressure, or a strange feeling in the back, neck, jaw, or upper belly or in one or both shoulders or arms.  
  · Lightheadedness or sudden weakness.  
  · A fast or irregular heartbeat.   
After you call 911, the  may tell you to chew 1 adult-strength or 2 to 4 low-dose aspirin. Wait for an ambulance. Do not try to drive yourself. Watch closely for changes in your health, and be sure to contact your doctor if you have any problems. Where can you learn more? Go to http://www.gray.com/ Enter B151 in the search box to learn more about \"A Healthy Heart: Care Instructions. \" Current as of: August 31, 2020               Content Version: 12.8 © 2006-2021 [employer]. Care instructions adapted under license by [employer] (which disclaims liability or warranty for this information). If you have questions about a medical condition or this instruction, always ask your healthcare professional. [address] any warranty or liability for your use of this information.

## 2021-04-23 NOTE — PROGRESS NOTES
Patient identified with 2 identifiers (name and ). 1. Have you been to the ER, urgent care clinic since your last visit? Hospitalized since your last visit? Yes    2. Have you seen or consulted any other health care providers outside of the [address] [place] [address] since your last visit? Include any pap smears or colon screening.  Dr. Gordy Rucker colon  Seeing ortho 2021for trigger finger

## 2021-04-26 ENCOUNTER — TELEPHONE (OUTPATIENT)
Dept: FAMILY MEDICINE CLINIC | Age: 41
End: 2021-04-26

## 2021-04-26 RX ORDER — PROMETHAZINE HYDROCHLORIDE 6.25 MG/5ML
6.25 SYRUP ORAL
Qty: 1 BOTTLE | Refills: 0 | Status: SHIPPED | OUTPATIENT
Start: 2021-04-26 | End: 2021-09-22 | Stop reason: SDUPTHER

## 2021-04-26 NOTE — TELEPHONE ENCOUNTER
Pt called stating her rx promethazine (PHENERGAN) 25 mg tablet was suppose to be a liquid. Pt states she did not  the medicine from the pharmacy. Please advise.

## 2021-04-26 NOTE — PROGRESS NOTES
Sumanth Acuna is a 41 y.o. female who was seen by synchronous (real-time) audio-video technology on 4/23/2021. Consent: Sumanth Acuna, who was seen by synchronous (real-time) audio-video technology, and/or her healthcare decision maker, is aware that this patient-initiated, Telehealth encounter on 4/23/2021 is a billable service, with coverage as determined by her insurance carrier. She is aware that she may receive a bill and has provided verbal consent to proceed: Yes. 712  Subjective:   Sumanth Acuna is a 41 y.o. female who was seen for Hypertension, Diverticulitis (surgery Dr. Jeff Gonzalez 5/11/2021 sigmoid colectomy), and Nausea (requesting refill on phenergan)    Ff-up  Pt w/ h/o CAD s/p PCI 2008, cath 9/2019 80% ostial OM1, patent RCA EF 60% on med therapy (small size of OMB and large native LCx, felt OM to be poor target for PCI)  She is currently on BB, ASA, prn nitro. She is intolerant to statins including zetia     prediabetes, continues to be off smoking     HTN- she continues to take BB, CCB, asa     H/o anemia thought to be due menstrual cycle.      Recurrent diverticulitis- upcoming partial colectomy with dr. Jeff Gonzalez. Requesting refill of phenergan for recurrent nausea related to  Heartburn symptoms. No dysphagia, weight loss, melena, she is not currently  On PPI. Prior to Admission medications    Medication Sig Start Date End Date Taking? Authorizing Provider   promethazine (PHENERGAN) 25 mg tablet Take 1 Tab by mouth every six (6) hours as needed for Nausea for up to 7 days. 4/23/21 4/30/21 Yes Mimi Thompson MD   pantoprazole (PROTONIX) 40 mg tablet Take 1 Tab by mouth daily. 4/23/21  Yes Ariela Hernandez MD   albuterol (PROVENTIL HFA, VENTOLIN HFA, PROAIR HFA) 90 mcg/actuation inhaler Take 1-2 Puffs by inhalation every four (4) hours as needed for Wheezing. 3/16/21  Yes Moris Woo MD   metoprolol succinate (TOPROL-XL) 25 mg XL tablet Take 12.5 mg by mouth daily. Yes Provider, Historical   amLODIPine (NORVASC) 5 mg tablet Take 5 mg by mouth nightly. Yes Provider, Historical   acetaminophen (TYLENOL EXTRA STRENGTH) 500 mg tablet Take 500 mg by mouth every four (4) hours as needed. 8/18/18  Yes Provider, Historical   aspirin 81 mg chewable tablet Take 81 mg by mouth daily. 1/30/18  Yes Provider, Historical   nitroglycerin (NITROSTAT) 0.4 mg SL tablet 0.4 mg. 7/22/16  Yes Provider, Historical   acetaminophen with codeine (TYLENOL-CODEINE #3 PO) Take  by mouth. Other, MD Lissette   ciprofloxacin HCl (CIPRO) 500 mg tablet Take 1 Tab by mouth two (2) times a day.  3/30/21   Bridget Nolan MD   metroNIDAZOLE (FlagyL) 500 mg tablet Take one tab 3x day for 10 days 3/30/21   Bridget Nolan MD     Allergies   Allergen Reactions    Lisinopril Cough    Metoprolol Palpitations     Not allergic     Statins-Hmg-Coa Reductase Inhibitors Other (comments)     Muscle/joint pain    Vaccine Adjuvant Emulsion Combination No. 1 Seizures     MMR    Zofran Odt [Ondansetron] Swelling     migraines    Zofran [Ondansetron Hcl] Swelling     migraines       Patient Active Problem List    Diagnosis Date Noted    Chest pain 05/18/2020    Hyperlipidemia LDL goal <70 08/26/2019    Former smoker 08/26/2019    Adjustment disorder with depressed mood 11/09/2018    Epigastric pain 11/09/2018    Calculus of gallbladder without cholecystitis without obstruction 11/09/2018    Coronary artery disease involving native coronary artery of native heart with angina pectoris ([facility] [address] 07/05/2018    Iron deficiency anemia due to chronic blood loss 06/07/2018    Obesity, morbid ([facility] [address] 04/26/2018    ST elevation myocardial infarction (STEMI) ([facility] [address] 04/26/2018    Low HDL (under 40) 01/27/2016    Prediabetes 11/14/2014    Tobacco use 10/30/2014    Obesity 10/30/2014    Hypertension 10/30/2014     Current Outpatient Medications   Medication Sig Dispense Refill    promethazine (PHENERGAN) 25 mg tablet Take 1 Tab by mouth every six (6) hours as needed for Nausea for up to 7 days. 20 Tab 0    pantoprazole (PROTONIX) 40 mg tablet Take 1 Tab by mouth daily. 90 Tab 0    albuterol (PROVENTIL HFA, VENTOLIN HFA, PROAIR HFA) 90 mcg/actuation inhaler Take 1-2 Puffs by inhalation every four (4) hours as needed for Wheezing. 1 Inhaler 0    metoprolol succinate (TOPROL-XL) 25 mg XL tablet Take 12.5 mg by mouth daily.  amLODIPine (NORVASC) 5 mg tablet Take 5 mg by mouth nightly.  acetaminophen (TYLENOL EXTRA STRENGTH) 500 mg tablet Take 500 mg by mouth every four (4) hours as needed.  aspirin 81 mg chewable tablet Take 81 mg by mouth daily.  nitroglycerin (NITROSTAT) 0.4 mg SL tablet 0.4 mg.      acetaminophen with codeine (TYLENOL-CODEINE #3 PO) Take  by mouth.  ciprofloxacin HCl (CIPRO) 500 mg tablet Take 1 Tab by mouth two (2) times a day. 20 Tab 0    metroNIDAZOLE (FlagyL) 500 mg tablet Take one tab 3x day for 10 days 30 Tab 0     Allergies   Allergen Reactions    Lisinopril Cough    Metoprolol Palpitations     Not allergic     Statins-Hmg-Coa Reductase Inhibitors Other (comments)     Muscle/joint pain    Vaccine Adjuvant Emulsion Combination No. 1 Seizures     MMR    Zofran Odt [Ondansetron] Swelling     migraines    Zofran [Ondansetron Hcl] Swelling     migraines     Past Medical History:   Diagnosis Date    Cardiac echocardiogram 07/21/2016    Sm LV cavity. EF 65-70%. No RWMA. Mod-marked LVH w/dynamic obstruction, mid-cavity obliteration & peak grad of 25 mmHg. Indeterminate diastolic fx. No significant valvular pathology.  Cardiac nuclear imaging test 07/22/2016    Low risk. Very sm distal anterior & apical partially reversible defect, likely artifact, but sm area of ischemia not excluded. No RWMA. EF 66%.   Neg EKG on pharm stress test.    COVID-19     Diverticulitis     Gall stones     GERD (gastroesophageal reflux disease)     Heart attack ([facility] [address]     01/27/2018    Hypertension     Infectious disease     Bacteria vaginosis    Nausea & vomiting     Non-ST elevated myocardial infarction [facility])     STEMI (ST elevation myocardial infarction) ([facility] [address] 2018     Past Surgical History:   Procedure Laterality Date    COLONOSCOPY N/A 2020    COLONOSCOPY performed by Oral Danielson MD at [facility]     [address]  2008    HX CORONARY STENT PLACEMENT      HX TUBAL LIGATION  2008    GA CARDIAC SURG PROCEDURE UNLIST      stent     Family History   Problem Relation Age of Onset    Depression Mother     Asthma Mother     Migraines Mother     Hypertension Mother     Stroke Mother     Heart Attack Mother     Arthritis-osteo Mother     Depression Brother     Alcohol abuse Father     Substance Abuse Father         tobacco    Drug Abuse Father     Hypertension Father     High Cholesterol Father     Stroke Father     Rashes/Skin Problems Father     Substance Abuse Brother         tobacco    Drug Abuse Brother     Asthma Brother     Migraines Brother     Hypertension Brother     High Cholesterol Brother     Hypertension Paternal Grandmother     Diabetes Paternal Grandmother     Hypertension Maternal Grandmother     Colon Cancer Maternal Grandmother      Social History     Tobacco Use    Smoking status: Former Smoker     Packs/day: 0.25     Years: 20.00     Pack years: 5.00     Quit date: 2018     Years since quittin.8    Smokeless tobacco: Never Used   Substance Use Topics    Alcohol use: No     Frequency: Never       ROS      Objective: There were no vitals taken for this visit.    General: alert, cooperative, no distress   Mental  status: normal mood, behavior, speech, dress, motor activity, and thought processes, able to follow commands   HENT: NCAT   Neck: no visualized mass   Resp: no respiratory distress   Neuro: no gross deficits   Skin: no discoloration or lesions of concern on visible areas   Psychiatric: normal affect, consistent with stated mood, no evidence of hallucinations     Additional exam findings: We discussed the expected course, resolution and complications of the diagnosis(es) in detail. Medication risks, benefits, costs, interactions, and alternatives were discussed as indicated. I advised her to contact the office if her condition worsens, changes or fails to improve as anticipated. She expressed understanding with the diagnosis(es) and plan. Tiera Call is a 41 y.o. female who was evaluated by a video visit encounter for concerns as above. Patient identification was verified prior to start of the visit. A caregiver was present when appropriate. Due to this being a TeleHealth encounter (During IVNFX-16 public health emergency), evaluation of the following organ systems was limited: Vitals/Constitutional/EENT/Resp/CV/GI//MS/Neuro/Skin/Heme-Lymph-Imm. Pursuant to the emergency declaration under the [address] [place] [address], [facility] waiver authority and the [employer] Act, this Virtual  Visit was conducted, with patient's (and/or legal guardian's) consent, to reduce the patient's risk of exposure to COVID-19 and provide necessary medical care. Services were provided through a video synchronous discussion virtually to substitute for in-person clinic visit. Patient and provider were located at their individual homes.     Assessment & Plan:   Coronary artery disease involving native coronary artery of native heart with angina pectoris ([facility] [address]   s/p PCI 2008, cath 9/2019 80% ostial OM1, patent RCA EF 60% on med therapy (small size of OMB and large native LCx, felt OM to be poor target for PCI)  Currently asymptomatic  On med mgt, asa, bb, intolerant to statin     Prediabetes  Improving  Tlcs, monitoring    Essential hypertension  Controlled  Cont norvasc, bb    Hyperlipidemia LDL goal <70  Intolerant to statin     Heartburn  Start protonix 40 mg  Prn phenergan    Recurrent diverticulitis  Upcoming colectomy with dr. Cayla Young, would recommend cardiology clearance for this  From my standpoint, low risk    Ff-up in 3 months or sooner prseferino Quarles MD

## 2021-05-04 ENCOUNTER — TELEPHONE (OUTPATIENT)
Dept: SURGERY | Age: 41
End: 2021-05-04

## 2021-05-04 NOTE — TELEPHONE ENCOUNTER
05/03/2021 left a voicemail for patient to let her know that Dr. Howard Cooper was canceling her surgery for 5/11/2021 because her EKG and labs were not done. Dr. Howard Cooper said not to put her back on the surgery schedule until all her labs and EKG are done. Dr. Howard Cooper also wanted her to see her cardiologist and get a clearance. I asked the patient to call the office ASAP. Surgery was canceled per Dr. Howard Cooper.

## 2021-05-04 NOTE — TELEPHONE ENCOUNTER
I called Ms. Eduardo Sampson at 9:28am and left a voice message for her to call me back in reference to her upcoming procedure. I also called and spoke to her mother at 9:33am and asked that Ms. Eduardo Sampson please call me about her procedure and that it needs to be rescheduled. I stressed that it is very important to hear back from her daughter.

## 2021-07-15 ENCOUNTER — TELEPHONE (OUTPATIENT)
Dept: FAMILY MEDICINE CLINIC | Age: 41
End: 2021-07-15

## 2021-07-15 DIAGNOSIS — R73.03 PREDIABETES: ICD-10-CM

## 2021-07-15 DIAGNOSIS — I25.119 CORONARY ARTERY DISEASE INVOLVING NATIVE CORONARY ARTERY OF NATIVE HEART WITH ANGINA PECTORIS (HCC): Primary | ICD-10-CM

## 2021-07-15 NOTE — TELEPHONE ENCOUNTER
Please advise. No upcoming appt has been scheduled. Advised to   Return in about 3 months (around 7/23/2021).

## 2021-07-15 NOTE — TELEPHONE ENCOUNTER
Called patient. No answer, left detailed message for patient that fasting labs have been ordered. Advised patient she is due for a follow up appt.

## 2021-07-15 NOTE — TELEPHONE ENCOUNTER
Spoke with patient and she has been scheduled to see Dr. Sami Freed 08/05/2021. Patient aware to have fasting labs drawn prior to appt.

## 2021-07-25 ENCOUNTER — HOSPITAL ENCOUNTER (EMERGENCY)
Age: 41
Discharge: HOME | End: 2021-07-25
Attending: EMERGENCY MEDICINE
Payer: MEDICAID

## 2021-07-25 ENCOUNTER — APPOINTMENT (OUTPATIENT)
Dept: GENERAL RADIOLOGY | Age: 41
End: 2021-07-25
Attending: EMERGENCY MEDICINE
Payer: MEDICAID

## 2021-07-25 VITALS
TEMPERATURE: 98.6 F | HEIGHT: 60 IN | SYSTOLIC BLOOD PRESSURE: 134 MMHG | RESPIRATION RATE: 16 BRPM | HEART RATE: 87 BPM | BODY MASS INDEX: 44.57 KG/M2 | OXYGEN SATURATION: 100 % | DIASTOLIC BLOOD PRESSURE: 80 MMHG | WEIGHT: 227 LBS

## 2021-07-25 DIAGNOSIS — T78.40XA ALLERGIC REACTION, INITIAL ENCOUNTER: Primary | ICD-10-CM

## 2021-07-25 PROCEDURE — 99282 EMERGENCY DEPT VISIT SF MDM: CPT

## 2021-07-25 PROCEDURE — 71046 X-RAY EXAM CHEST 2 VIEWS: CPT

## 2021-07-25 RX ORDER — ALBUTEROL SULFATE 90 UG/1
2 AEROSOL, METERED RESPIRATORY (INHALATION)
Qty: 1 INHALER | Refills: 0 | Status: SHIPPED | OUTPATIENT
Start: 2021-07-25 | End: 2021-08-12

## 2021-07-25 RX ORDER — ALBUTEROL SULFATE 90 UG/1
1-2 AEROSOL, METERED RESPIRATORY (INHALATION)
Qty: 1 INHALER | Refills: 0 | Status: SHIPPED | OUTPATIENT
Start: 2021-07-25 | End: 2021-08-12

## 2021-07-25 NOTE — ED NOTES
Patient up for discharge. Discharge results have been reviewed with patient by the Provider. Armband removed and shredded per policy. Emphasized the importance of compliance with medications, discharge instructions, and outpatient follow up as listed. Patient discharged in stable condition with no apparent distress. Current Discharge Medication List      CONTINUE these medications which have CHANGED    Details   !! albuterol (Proventil HFA) 90 mcg/actuation inhaler Take 2 Puffs by inhalation every four (4) hours as needed for Wheezing. Qty: 1 Inhaler, Refills: 0  Start date: 7/25/2021      !! albuterol (PROVENTIL HFA, VENTOLIN HFA, PROAIR HFA) 90 mcg/actuation inhaler Take 1-2 Puffs by inhalation every four (4) hours as needed for Wheezing. Qty: 1 Inhaler, Refills: 0  Start date: 7/25/2021       !! - Potential duplicate medications found. Please discuss with provider.

## 2021-07-25 NOTE — DISCHARGE INSTRUCTIONS
Patient Education        Allergic Reaction: Care Instructions  Your Care Instructions     An allergic reaction is an excessive response from your immune system to a medicine, chemical, food, insect bite, or other substance. A reaction can range from mild to life-threatening. Some people have a mild rash, hives, and itching or stomach cramps. In severe reactions, swelling of your tongue and throat can close up your airway so that you cannot breathe. Follow-up care is a key part of your treatment and safety. Be sure to make and go to all appointments, and call your doctor if you are having problems. It's also a good idea to know your test results and keep a list of the medicines you take. How can you care for yourself at home? · If you know what caused your allergic reaction, be sure to avoid it. Your allergy may become more severe each time you have a reaction. · Take an over-the-counter antihistamine, such as cetirizine (Zyrtec) or loratadine (Claritin), to treat mild symptoms. Read and follow directions on the label. Some antihistamines can make you feel sleepy. Do not give antihistamines to a child unless you have checked with your doctor first. Mild symptoms include sneezing or an itchy or runny nose; an itchy mouth; a few hives or mild itching; and mild nausea or stomach discomfort. · Do not scratch hives or a rash. Put a cold, moist towel on them or take cool baths to relieve itching. Put ice packs on hives, swelling, or insect stings for 10 to 15 minutes at a time. Put a thin cloth between the ice pack and your skin. Do not take hot baths or showers. They will make the itching worse. · Your doctor may prescribe a shot of epinephrine to carry with you in case you have a severe reaction. Learn how to give yourself the shot and keep it with you at all times. Make sure it is not .   · Go to the emergency room every time you have a severe reaction, even if you have used your shot of epinephrine and are feeling better. Symptoms can come back after a shot. · Wear medical alert jewelry that lists your allergies. You can buy this at most drugstores. · If your child has a severe allergy, make sure that his or her teachers, babysitters, coaches, and other caregivers know about the allergy. They should have an epinephrine shot, know how and when to give it, and have a plan to take your child to the hospital.  When should you call for help? Give an epinephrine shot if:    · You think you are having a severe allergic reaction.     · You have symptoms in more than one body area, such as mild nausea and an itchy mouth. After giving an epinephrine shot call 911, even if you feel better. Call 911 if:    · You have symptoms of a severe allergic reaction. These may include:  ? Sudden raised, red areas (hives) all over your body. ? Swelling of the throat, mouth, lips, or tongue. ? Trouble breathing. ? Passing out (losing consciousness). Or you may feel very lightheaded or suddenly feel weak, confused, or restless.     · You have been given an epinephrine shot, even if you feel better. Call your doctor now or seek immediate medical care if:    · You have symptoms of an allergic reaction, such as:  ? A rash or hives (raised, red areas on the skin). ? Itching. ? Swelling. ? Belly pain, nausea, or vomiting. Watch closely for changes in your health, and be sure to contact your doctor if:    · You do not get better as expected. Where can you learn more? Go to http://www.gray.com/  Enter B792 in the search box to learn more about \"Allergic Reaction: Care Instructions. \"  Current as of: November 6, 2020               Content Version: 12.8  © 7903-0346 [employer]. Care instructions adapted under license by [employer] (which disclaims liability or warranty for this information).  If you have questions about a medical condition or this instruction, always ask your healthcare professional. [address] any warranty or liability for your use of this information.

## 2021-07-25 NOTE — ED PROVIDER NOTES
HPI  Patient is a 40 yo female who having intermittent shortness of breath x 2 weeks. Symptoms were worse tonight. Denies cough or chest pain. Patient states her son had the attic open and some of the insulation dust got into her home. Past Medical History:   Diagnosis Date    Cardiac echocardiogram 07/21/2016    Sm LV cavity. EF 65-70%. No RWMA. Mod-marked LVH w/dynamic obstruction, mid-cavity obliteration & peak grad of 25 mmHg. Indeterminate diastolic fx. No significant valvular pathology.  Cardiac nuclear imaging test 07/22/2016    Low risk. Very sm distal anterior & apical partially reversible defect, likely artifact, but sm area of ischemia not excluded. No RWMA. EF 66%.   Neg EKG on pharm stress test.    COVID-19     Diverticulitis     Gall stones     GERD (gastroesophageal reflux disease)     Heart attack ([facility] [address]     01/27/2018    Hypertension     Infectious disease     Bacteria vaginosis    Nausea & vomiting     Non-ST elevated myocardial infarction [facility])     STEMI (ST elevation myocardial infarction) ([facility] [address] 01/2018       Past Surgical History:   Procedure Laterality Date    COLONOSCOPY N/A 6/26/2020    COLONOSCOPY performed by Yanelis Milner MD at [facility] [address] [place] [address]  09/28/2008    HX CORONARY STENT PLACEMENT      HX TUBAL LIGATION  09/28/2008    ID CARDIAC SURG PROCEDURE UNLIST      stent         Family History:   Problem Relation Age of Onset    Depression Mother     Asthma Mother     Migraines Mother     Hypertension Mother     Stroke Mother     Heart Attack Mother    Yousif Minder Mother     Depression Brother     Alcohol abuse Father     Substance Abuse Father         tobacco    Drug Abuse Father     Hypertension Father     High Cholesterol Father     Stroke Father     Rashes/Skin Problems Father     Substance Abuse Brother         tobacco    Drug Abuse Brother     Asthma Brother     Migraines Brother     Hypertension Brother     High Cholesterol Brother     Hypertension Paternal Grandmother     Diabetes Paternal Grandmother     Hypertension Maternal Grandmother     Colon Cancer Maternal Grandmother        Social History     Socioeconomic History    Marital status: SINGLE     Spouse name: Not on file    Number of children: Not on file    Years of education: Not on file    Highest education level: Not on file   Occupational History    Not on file   Tobacco Use    Smoking status: Former Smoker     Packs/day: 0.25     Years: 20.00     Pack years: 5.00     Quit date: 6/27/2018     Years since quitting: 3.0    Smokeless tobacco: Never Used   Vaping Use    Vaping Use: Never used   Substance and Sexual Activity    Alcohol use: No    Drug use: Not Currently     Types: Marijuana     Comment: 25 yrs ago    Sexual activity: Yes     Partners: Male     Birth control/protection: Surgical     Comment: tubal ligation   Other Topics Concern    Not on file   Social History Narrative    Not on file     Social Determinants of Health     Financial Resource Strain:     Difficulty of Paying Living Expenses:    Food Insecurity:     Worried About Running Out of Food in the Last Year:     Ran Out of Food in the Last Year:    Transportation Needs:     Lack of Transportation (Medical):  Lack of Transportation (Non-Medical):    Physical Activity:     Days of Exercise per Week:     Minutes of Exercise per Session:    Stress:     Feeling of Stress :    Social Connections:     Frequency of Communication with Friends and Family:     Frequency of Social Gatherings with Friends and Family:     Attends Mosque Services:     Active Member of Clubs or Organizations:     Attends Club or Organization Meetings:     Marital Status:    Intimate Partner Violence:     Fear of Current or Ex-Partner:     Emotionally Abused:     Physically Abused:     Sexually Abused:           ALLERGIES: Lisinopril, Metoprolol, Statins-hmg-coa reductase inhibitors, Vaccine adjuvant emulsion combination no. 1, Zofran odt [ondansetron], and Zofran [ondansetron hcl]    Review of Systems   Constitutional: Negative. HENT: Negative. Eyes: Negative. Respiratory: Positive for shortness of breath. Cardiovascular: Negative. Gastrointestinal: Negative. Endocrine: Negative. Genitourinary: Negative. Musculoskeletal: Negative. Skin: Negative. Allergic/Immunologic: Negative. Neurological: Negative. Hematological: Negative. Psychiatric/Behavioral: Negative. All other systems reviewed and are negative. Vitals:    07/25/21 0229   BP: 134/80   Pulse: 87   Resp: 16   Temp: 98.6 °F (37 °C)   SpO2: 100%   Weight: 103 kg (227 lb)   Height: 5' (1.524 m)            Physical Exam  Vitals and nursing note reviewed. Constitutional:       General: She is not in acute distress. Appearance: She is well-developed. She is not diaphoretic. HENT:      Head: Normocephalic. Right Ear: External ear normal.      Left Ear: External ear normal.      Mouth/Throat:      Pharynx: No oropharyngeal exudate. Eyes:      General: No scleral icterus. Right eye: No discharge. Left eye: No discharge. Conjunctiva/sclera: Conjunctivae normal.      Pupils: Pupils are equal, round, and reactive to light. Neck:      Thyroid: No thyromegaly. Vascular: No JVD. Trachea: No tracheal deviation. Cardiovascular:      Rate and Rhythm: Normal rate and regular rhythm. Heart sounds: Normal heart sounds. No murmur heard. No friction rub. No gallop. Pulmonary:      Effort: Pulmonary effort is normal. No respiratory distress. Breath sounds: Normal breath sounds. No stridor. No wheezing or rales. Chest:      Chest wall: No tenderness. Abdominal:      General: Bowel sounds are normal. There is no distension. Palpations: Abdomen is soft. There is no mass. Tenderness: There is no abdominal tenderness.  There is no guarding or rebound. Musculoskeletal:         General: No tenderness. Normal range of motion. Cervical back: Normal range of motion and neck supple. Lymphadenopathy:      Cervical: No cervical adenopathy. Skin:     General: Skin is warm and dry. Coloration: Skin is not pale. Findings: No erythema or rash. Neurological:      Mental Status: She is alert and oriented to person, place, and time. Cranial Nerves: No cranial nerve deficit. Motor: No abnormal muscle tone. Coordination: Coordination normal.      Deep Tendon Reflexes: Reflexes normal.          MDM       Procedures    Diagnosis: Asthma, allergic reaction, URI,    Hospital course: Upon arrival to ER patient had resolved and did not return while being evaluated. Diagnosis: Allergic reaction resolved    Disposition: Discharge home. Follow-up PCP in 2 to 3 days. Return ER needed. Rx albuterol inhaler. .    Dictation disclaimer:  Please note that this dictation was completed with [employer], the computer voice recognition software. Quite often unanticipated grammatical, syntax, homophones, and other interpretive errors are inadvertently transcribed by the computer software. Please disregard these errors. Please excuse any errors that have escaped final proofreading.

## 2021-07-25 NOTE — ED TRIAGE NOTES
Patient c/o shortness of breath x 2 weeks, worse tonight. Denies cough. Uses inhaler at home prn but did not use tonight.

## 2021-08-09 ENCOUNTER — TELEPHONE (OUTPATIENT)
Dept: FAMILY MEDICINE CLINIC | Age: 41
End: 2021-08-09

## 2021-08-09 NOTE — TELEPHONE ENCOUNTER
Pt would like a call from the nurse to requesting blood work. Pt did not go into detail what she needed but states she would like to get it with her other labs. Please advise.

## 2021-08-09 NOTE — TELEPHONE ENCOUNTER
There is no clear change in management regardless of covid antibody status.  She is recommended to get the vaccine

## 2021-08-09 NOTE — TELEPHONE ENCOUNTER
Spoke with patient she is requesting to get the Antibody test done for COVID 19. Patient states she had tested + for COVID in March 2021. Patient wants to have the antibody test done prior to getting the COVID vaccine done. Please advise.

## 2021-08-10 NOTE — TELEPHONE ENCOUNTER
Left detailed message that Dr. Wilfrido Giang states no change in management regardless of Antibody test. Advised patient to get COVID 19  Vaccine

## 2021-08-12 ENCOUNTER — OFFICE VISIT (OUTPATIENT)
Dept: FAMILY MEDICINE CLINIC | Age: 41
End: 2021-08-12
Payer: MEDICAID

## 2021-08-12 ENCOUNTER — HOSPITAL ENCOUNTER (OUTPATIENT)
Dept: LAB | Age: 41
Discharge: HOME | End: 2021-08-12

## 2021-08-12 VITALS
BODY MASS INDEX: 44.37 KG/M2 | HEART RATE: 80 BPM | DIASTOLIC BLOOD PRESSURE: 82 MMHG | TEMPERATURE: 98.4 F | WEIGHT: 226 LBS | RESPIRATION RATE: 16 BRPM | HEIGHT: 60 IN | SYSTOLIC BLOOD PRESSURE: 128 MMHG

## 2021-08-12 DIAGNOSIS — E78.5 HYPERLIPIDEMIA LDL GOAL <70: ICD-10-CM

## 2021-08-12 DIAGNOSIS — E66.01 OBESITY, MORBID (HCC): ICD-10-CM

## 2021-08-12 DIAGNOSIS — Z78.9 STATIN INTOLERANCE: ICD-10-CM

## 2021-08-12 DIAGNOSIS — I25.119 CORONARY ARTERY DISEASE INVOLVING NATIVE CORONARY ARTERY OF NATIVE HEART WITH ANGINA PECTORIS (HCC): ICD-10-CM

## 2021-08-12 DIAGNOSIS — I10 ESSENTIAL HYPERTENSION: Primary | ICD-10-CM

## 2021-08-12 DIAGNOSIS — R73.03 PREDIABETES: ICD-10-CM

## 2021-08-12 LAB — XX-LABCORP SPECIMEN COL,LCBCF: NORMAL

## 2021-08-12 PROCEDURE — 99214 OFFICE O/P EST MOD 30 MIN: CPT | Performed by: FAMILY MEDICINE

## 2021-08-12 PROCEDURE — 99001 SPECIMEN HANDLING PT-LAB: CPT

## 2021-08-12 NOTE — PROGRESS NOTES
1. Have you been to the ER, urgent care clinic since your last visit? Hospitalized since your last visit? No    2. Have you seen or consulted any other health care providers outside of the [address] [place] [address] since your last visit? Include any pap smears or colon screening.  No

## 2021-08-12 NOTE — PROGRESS NOTES
Rosalee Abad, 41 y.o.,  female    SUBJECTIVE  Ff-up  Pt w/ h/o CAD s/p PCI 2008, cath 9/2019 80% ostial OM1, patent RCA EF 60% on med therapy (small size of OMB and large native LCx, felt OM to be poor target for PCI)  She is currently on BB, ASA, prn nitro. She is intolerant to statins including zetia. Reviewed cardiology notes last month's visit no changes    Prediabetes, continues to be off smoking. Had labs drawn today, will await results     HTN- she continues to take BB, CCB, asa     Recent ED visit for wheezing from dust exposure, this has resolved    ROS:  See HPI, all others negative        Patient Active Problem List   Diagnosis Code    Tobacco use Z72.0    Obesity E66.9    Hypertension I10    Prediabetes R73.03    Low HDL (under 40) E78.6    Obesity, morbid (HCC) E66.01    ST elevation myocardial infarction (STEMI) ([facility] [address] I21.3    Iron deficiency anemia due to chronic blood loss D50.0    Coronary artery disease involving native coronary artery of native heart with angina pectoris (HCC) I25.119    Adjustment disorder with depressed mood F43.21    Epigastric pain R10.13    Calculus of gallbladder without cholecystitis without obstruction K80.20    Hyperlipidemia LDL goal <70 E78.5    Former smoker Z87.891    Chest pain R07.9    Statin intolerance Z78.9       Current Outpatient Medications   Medication Sig Dispense Refill    pantoprazole (PROTONIX) 40 mg tablet Take 1 Tab by mouth daily. 90 Tab 0    metoprolol succinate (TOPROL-XL) 25 mg XL tablet Take 12.5 mg by mouth daily.  amLODIPine (NORVASC) 5 mg tablet Take 5 mg by mouth nightly.  acetaminophen (TYLENOL EXTRA STRENGTH) 500 mg tablet Take 500 mg by mouth every four (4) hours as needed.  aspirin 81 mg chewable tablet Take 81 mg by mouth daily.  nitroglycerin (NITROSTAT) 0.4 mg SL tablet 0.4 mg.          Allergies   Allergen Reactions    Lisinopril Cough    Metoprolol Palpitations     Not allergic     Statins-Hmg-Coa Reductase Inhibitors Other (comments)     Muscle/joint pain    Vaccine Adjuvant Emulsion Combination No. 1 Seizures     MMR    Zofran Odt [Ondansetron] Swelling     migraines    Zofran [Ondansetron Hcl] Swelling     migraines       Past Medical History:   Diagnosis Date    Cardiac echocardiogram 07/21/2016    Sm LV cavity. EF 65-70%. No RWMA. Mod-marked LVH w/dynamic obstruction, mid-cavity obliteration & peak grad of 25 mmHg. Indeterminate diastolic fx. No significant valvular pathology.  Cardiac nuclear imaging test 07/22/2016    Low risk. Very sm distal anterior & apical partially reversible defect, likely artifact, but sm area of ischemia not excluded. No RWMA. EF 66%.   Neg EKG on pharm stress test.    COVID-19     Diverticulitis     Gall stones     GERD (gastroesophageal reflux disease)     Heart attack ([facility] [address]     01/27/2018    Hypertension     Infectious disease     Bacteria vaginosis    Nausea & vomiting     Non-ST elevated myocardial infarction ([facility] [address]     STEMI (ST elevation myocardial infarction) ([facility] [address] 01/2018       Social History     Socioeconomic History    Marital status: SINGLE     Spouse name: Not on file    Number of children: Not on file    Years of education: Not on file    Highest education level: Not on file   Occupational History    Not on file   Tobacco Use    Smoking status: Former Smoker     Packs/day: 0.25     Years: 20.00     Pack years: 5.00     Quit date: 6/27/2018     Years since quitting: 3.1    Smokeless tobacco: Never Used   Vaping Use    Vaping Use: Never used   Substance and Sexual Activity    Alcohol use: No    Drug use: Not Currently     Types: Marijuana     Comment: 25 yrs ago    Sexual activity: Yes     Partners: Male     Birth control/protection: Surgical     Comment: tubal ligation   Other Topics Concern    Not on file   Social History Narrative    Not on file     Social Determinants of Health     Financial Resource Strain:  Difficulty of Paying Living Expenses:    Food Insecurity:     Worried About Running Out of Food in the Last Year:     Ran Out of Food in the Last Year:    Transportation Needs:     Lack of Transportation (Medical):  Lack of Transportation (Non-Medical):    Physical Activity:     Days of Exercise per Week:     Minutes of Exercise per Session:    Stress:     Feeling of Stress :    Social Connections:     Frequency of Communication with Friends and Family:     Frequency of Social Gatherings with Friends and Family:     Attends Confucianist Services:     Active Member of Clubs or Organizations:     Attends Club or Organization Meetings:     Marital Status:    Intimate Partner Violence:     Fear of Current or Ex-Partner:     Emotionally Abused:     Physically Abused:     Sexually Abused:        Family History   Problem Relation Age of Onset    Depression Mother     Asthma Mother     Migraines Mother     Hypertension Mother     Stroke Mother     Heart Attack Mother    Ivan Blood Mother     Depression Brother     Alcohol abuse Father     Substance Abuse Father         tobacco    Drug Abuse Father     Hypertension Father     High Cholesterol Father     Stroke Father     Rashes/Skin Problems Father     Substance Abuse Brother         tobacco    Drug Abuse Brother     Asthma Brother     Migraines Brother     Hypertension Brother     High Cholesterol Brother     Hypertension Paternal Grandmother     Diabetes Paternal Grandmother     Hypertension Maternal Grandmother     Colon Cancer Maternal Grandmother          OBJECTIVE    Physical Exam:     Visit Vitals  /82 (BP 1 Location: Left upper arm, BP Patient Position: Sitting, BP Cuff Size: Adult)   Pulse 80   Temp 98.4 °F (36.9 °C) (Oral)   Resp 16   Ht 5' (1.524 m)   Wt 226 lb (102.5 kg)   LMP 07/18/2021   BMI 44.14 kg/m²       General: alert, well-appearing, obese, AA, in no apparent distress or pain  Head: atraumatic. Non-tender maxillary and frontal sinuses  Neck: supple, no adenopathy palpated  CVS: normal rate, regular rhythm, distinct S1 and S2  Lungs:clear to ausculation bilaterally, no crackles, wheezing or rhonchi noted  Abdomen: normoactive bowel sounds, soft, non-tender  Extremities: no edema, no cyanosis, MSK grossly normal  Skin: warm, no lesions, rashes noted  Psych:  mood and affect normal        ASSESSMENT/PLAN  Diagnoses and all orders for this visit:    1. Essential hypertension  Controlled  Cont norvasc, BB    2. Prediabetes  Tlcs, monitoring, await results    3. Coronary artery disease involving native coronary artery of native heart with angina pectoris [facility])  S/p PCI 2008  Asymptomatic  Cont asa, BB, intolerant to statin/zetia    4. Obesity, morbid ([address]  Discussed wt loss    5. Hyperlipidemia LDL goal <70    6. Statin intolerance        Follow-up and Dispositions    · Return in about 3 months (around 11/12/2021) for fasting labs a week prior to your next visit, routine chronic illness care. Patient understands plan of care. Patient has provided input and agrees with goals.

## 2021-08-12 NOTE — PATIENT INSTRUCTIONS
A Healthy Heart: Care Instructions  Your Care Instructions     Coronary artery disease, also called heart disease, occurs when a substance called plaque builds up in the vessels that supply oxygen-rich blood to your heart muscle. This can narrow the blood vessels and reduce blood flow. A heart attack happens when blood flow is completely blocked. A high-fat diet, smoking, and other factors increase the risk of heart disease. Your doctor has found that you have a chance of having heart disease. You can do lots of things to keep your heart healthy. It may not be easy, but you can change your diet, exercise more, and quit smoking. These steps really work to lower your chance of heart disease. Follow-up care is a key part of your treatment and safety. Be sure to make and go to all appointments, and call your doctor if you are having problems. It's also a good idea to know your test results and keep a list of the medicines you take. How can you care for yourself at home? Diet    · Use less salt when you cook and eat. This helps lower your blood pressure. Taste food before salting. Add only a little salt when you think you need it. With time, your taste buds will adjust to less salt.     · Eat fewer snack items, fast foods, canned soups, and other high-salt, high-fat, processed foods.     · Read food labels and try to avoid saturated and trans fats. They increase your risk of heart disease by raising cholesterol levels.     · Limit the amount of solid fat-butter, margarine, and shortening-you eat. Use olive, peanut, or canola oil when you cook. Bake, broil, and steam foods instead of frying them.     · Eat a variety of fruit and vegetables every day. Dark green, deep orange, red, or yellow fruits and vegetables are especially good for you. Examples include spinach, carrots, peaches, and berries.     · Foods high in fiber can reduce your cholesterol and provide important vitamins and minerals.  High-fiber foods include whole-grain cereals and breads, oatmeal, beans, brown rice, citrus fruits, and apples.     · Eat lean proteins. Heart-healthy proteins include seafood, lean meats and poultry, eggs, beans, peas, nuts, seeds, and soy products.     · Limit drinks and foods with added sugar. These include candy, desserts, and soda pop. Lifestyle changes    · If your doctor recommends it, get more exercise. Walking is a good choice. Bit by bit, increase the amount you walk every day. Try for at least 30 minutes on most days of the week. You also may want to swim, bike, or do other activities.     · Do not smoke. If you need help quitting, talk to your doctor about stop-smoking programs and medicines. These can increase your chances of quitting for good. Quitting smoking may be the most important step you can take to protect your heart. It is never too late to quit.     · Limit alcohol to 2 drinks a day for men and 1 drink a day for women. Too much alcohol can cause health problems.     · Manage other health problems such as diabetes, high blood pressure, and high cholesterol. If you think you may have a problem with alcohol or drug use, talk to your doctor. Medicines    · Take your medicines exactly as prescribed. Call your doctor if you think you are having a problem with your medicine.     · If your doctor recommends aspirin, take the amount directed each day. Make sure you take aspirin and not another kind of pain reliever, such as acetaminophen (Tylenol). When should you call for help? Call 911 if you have symptoms of a heart attack. These may include:    · Chest pain or pressure, or a strange feeling in the chest.     · Sweating.     · Shortness of breath.     · Pain, pressure, or a strange feeling in the back, neck, jaw, or upper belly or in one or both shoulders or arms.     · Lightheadedness or sudden weakness.     · A fast or irregular heartbeat.    After you call 911, the  may tell you to chew 1 adult-strength or 2 to 4 low-dose aspirin. Wait for an ambulance. Do not try to drive yourself. Watch closely for changes in your health, and be sure to contact your doctor if you have any problems. Where can you learn more? Go to http://www.connel[employer].com/  Enter F075 in the search box to learn more about \"A Healthy Heart: Care Instructions. \"  Current as of: August 31, 2020               Content Version: 12.8  © 2006-2021 [employer]. Care instructions adapted under license by [employer] (which disclaims liability or warranty for this information). If you have questions about a medical condition or this instruction, always ask your healthcare professional. [address] any warranty or liability for your use of this information.

## 2021-08-13 LAB
ALBUMIN SERPL-MCNC: 4 G/DL (ref 3.8–4.8)
ALBUMIN/GLOB SERPL: 1.3 {RATIO} (ref 1.2–2.2)
ALP SERPL-CCNC: 75 IU/L (ref 48–121)
ALT SERPL-CCNC: 14 IU/L (ref 0–32)
AST SERPL-CCNC: 14 IU/L (ref 0–40)
BILIRUB SERPL-MCNC: 0.2 MG/DL (ref 0–1.2)
BUN SERPL-MCNC: 9 MG/DL (ref 6–24)
BUN/CREAT SERPL: 15 (ref 9–23)
CALCIUM SERPL-MCNC: 9 MG/DL (ref 8.7–10.2)
CHLORIDE SERPL-SCNC: 105 MMOL/L (ref 96–106)
CHOLEST SERPL-MCNC: 122 MG/DL (ref 100–199)
CO2 SERPL-SCNC: 25 MMOL/L (ref 20–29)
CREAT SERPL-MCNC: 0.59 MG/DL (ref 0.57–1)
EST. AVERAGE GLUCOSE BLD GHB EST-MCNC: 123 MG/DL
GLOBULIN SER CALC-MCNC: 3 G/DL (ref 1.5–4.5)
GLUCOSE SERPL-MCNC: 102 MG/DL (ref 65–99)
HBA1C MFR BLD: 5.9 % (ref 4.8–5.6)
HDLC SERPL-MCNC: 36 MG/DL
IMP & REVIEW OF LAB RESULTS: NORMAL
LDLC SERPL CALC-MCNC: 73 MG/DL (ref 0–99)
POTASSIUM SERPL-SCNC: 4.4 MMOL/L (ref 3.5–5.2)
PROT SERPL-MCNC: 7 G/DL (ref 6–8.5)
SODIUM SERPL-SCNC: 142 MMOL/L (ref 134–144)
TRIGL SERPL-MCNC: 60 MG/DL (ref 0–149)
VLDLC SERPL CALC-MCNC: 13 MG/DL (ref 5–40)

## 2021-08-24 ENCOUNTER — HOSPITAL ENCOUNTER (EMERGENCY)
Age: 41
Discharge: HOME | End: 2021-08-25
Attending: EMERGENCY MEDICINE
Payer: MEDICAID

## 2021-08-24 VITALS
OXYGEN SATURATION: 100 % | WEIGHT: 226 LBS | HEART RATE: 76 BPM | SYSTOLIC BLOOD PRESSURE: 137 MMHG | TEMPERATURE: 98.5 F | BODY MASS INDEX: 44.37 KG/M2 | RESPIRATION RATE: 18 BRPM | DIASTOLIC BLOOD PRESSURE: 80 MMHG | HEIGHT: 60 IN

## 2021-08-24 DIAGNOSIS — R10.32 ABDOMINAL PAIN, LLQ (LEFT LOWER QUADRANT): Primary | ICD-10-CM

## 2021-08-24 DIAGNOSIS — R11.2 NON-INTRACTABLE VOMITING WITH NAUSEA, UNSPECIFIED VOMITING TYPE: ICD-10-CM

## 2021-08-24 LAB
ALBUMIN SERPL-MCNC: 3.3 G/DL (ref 3.4–5)
ALBUMIN/GLOB SERPL: 0.7 {RATIO} (ref 0.8–1.7)
ALP SERPL-CCNC: 77 U/L (ref 45–117)
ALT SERPL-CCNC: 21 U/L (ref 13–56)
ANION GAP SERPL CALC-SCNC: 5 MMOL/L (ref 3–18)
AST SERPL-CCNC: 15 U/L (ref 10–38)
BASOPHILS # BLD: 0 K/UL (ref 0–0.1)
BASOPHILS NFR BLD: 0 % (ref 0–2)
BILIRUB SERPL-MCNC: 0.2 MG/DL (ref 0.2–1)
BUN SERPL-MCNC: 8 MG/DL (ref 7–18)
BUN/CREAT SERPL: 15 (ref 12–20)
CALCIUM SERPL-MCNC: 8.3 MG/DL (ref 8.5–10.1)
CHLORIDE SERPL-SCNC: 108 MMOL/L (ref 100–111)
CO2 SERPL-SCNC: 28 MMOL/L (ref 21–32)
CREAT SERPL-MCNC: 0.54 MG/DL (ref 0.6–1.3)
DIFFERENTIAL METHOD BLD: ABNORMAL
EOSINOPHIL # BLD: 0.2 K/UL (ref 0–0.4)
EOSINOPHIL NFR BLD: 2 % (ref 0–5)
ERYTHROCYTE [DISTWIDTH] IN BLOOD BY AUTOMATED COUNT: 17 % (ref 11.6–14.5)
GLOBULIN SER CALC-MCNC: 4.6 G/DL (ref 2–4)
GLUCOSE SERPL-MCNC: 90 MG/DL (ref 74–99)
HCT VFR BLD AUTO: 36.5 % (ref 35–45)
HGB BLD-MCNC: 11.3 G/DL (ref 12–16)
LIPASE SERPL-CCNC: 37 U/L (ref 73–393)
LYMPHOCYTES # BLD: 3.3 K/UL (ref 0.9–3.6)
LYMPHOCYTES NFR BLD: 41 % (ref 21–52)
MAGNESIUM SERPL-MCNC: 1.9 MG/DL (ref 1.6–2.6)
MCH RBC QN AUTO: 24.4 PG (ref 24–34)
MCHC RBC AUTO-ENTMCNC: 31 G/DL (ref 31–37)
MCV RBC AUTO: 78.7 FL (ref 78–100)
MONOCYTES # BLD: 0.5 K/UL (ref 0.05–1.2)
MONOCYTES NFR BLD: 6 % (ref 3–10)
NEUTS SEG # BLD: 4 K/UL (ref 1.8–8)
NEUTS SEG NFR BLD: 50 % (ref 40–73)
PLATELET # BLD AUTO: 411 K/UL (ref 135–420)
PMV BLD AUTO: 9.9 FL (ref 9.2–11.8)
POTASSIUM SERPL-SCNC: 3.6 MMOL/L (ref 3.5–5.5)
PROT SERPL-MCNC: 7.9 G/DL (ref 6.4–8.2)
RBC # BLD AUTO: 4.64 M/UL (ref 4.2–5.3)
SODIUM SERPL-SCNC: 141 MMOL/L (ref 136–145)
WBC # BLD AUTO: 8.1 K/UL (ref 4.6–13.2)

## 2021-08-24 PROCEDURE — 99283 EMERGENCY DEPT VISIT LOW MDM: CPT

## 2021-08-24 PROCEDURE — 83735 ASSAY OF MAGNESIUM: CPT

## 2021-08-24 PROCEDURE — 83690 ASSAY OF LIPASE: CPT

## 2021-08-24 PROCEDURE — 80053 COMPREHEN METABOLIC PANEL: CPT

## 2021-08-24 PROCEDURE — 85025 COMPLETE CBC W/AUTO DIFF WBC: CPT

## 2021-08-24 RX ORDER — KETOROLAC TROMETHAMINE 15 MG/ML
15 INJECTION, SOLUTION INTRAMUSCULAR; INTRAVENOUS ONCE
Status: COMPLETED | OUTPATIENT
Start: 2021-08-24 | End: 2021-08-25

## 2021-08-25 LAB
APPEARANCE UR: CLEAR
BILIRUB UR QL: NEGATIVE
COLOR UR: YELLOW
GLUCOSE UR STRIP.AUTO-MCNC: NEGATIVE MG/DL
HCG UR QL: NEGATIVE
HGB UR QL STRIP: NEGATIVE
KETONES UR QL STRIP.AUTO: NEGATIVE MG/DL
LEUKOCYTE ESTERASE UR QL STRIP.AUTO: NEGATIVE
NITRITE UR QL STRIP.AUTO: NEGATIVE
PH UR STRIP: 6.5 [PH] (ref 5–8)
PROT UR STRIP-MCNC: NEGATIVE MG/DL
SP GR UR REFRACTOMETRY: 1.02 (ref 1–1.03)
UROBILINOGEN UR QL STRIP.AUTO: 1 EU/DL (ref 0.2–1)

## 2021-08-25 PROCEDURE — 74011250636 HC RX REV CODE- 250/636: Performed by: EMERGENCY MEDICINE

## 2021-08-25 PROCEDURE — 81025 URINE PREGNANCY TEST: CPT

## 2021-08-25 PROCEDURE — 96375 TX/PRO/DX INJ NEW DRUG ADDON: CPT

## 2021-08-25 PROCEDURE — 96365 THER/PROPH/DIAG IV INF INIT: CPT

## 2021-08-25 PROCEDURE — 74011000258 HC RX REV CODE- 258: Performed by: EMERGENCY MEDICINE

## 2021-08-25 PROCEDURE — 81003 URINALYSIS AUTO W/O SCOPE: CPT

## 2021-08-25 RX ORDER — AMOXICILLIN AND CLAVULANATE POTASSIUM 875; 125 MG/1; MG/1
1 TABLET, FILM COATED ORAL 2 TIMES DAILY
Qty: 14 TABLET | Refills: 0 | Status: SHIPPED | OUTPATIENT
Start: 2021-08-25 | End: 2021-09-01

## 2021-08-25 RX ORDER — DICYCLOMINE HYDROCHLORIDE 10 MG/5ML
20 SOLUTION ORAL
Qty: 1 BOTTLE | Refills: 0 | Status: SHIPPED | OUTPATIENT
Start: 2021-08-25 | End: 2021-09-21

## 2021-08-25 RX ORDER — METOCLOPRAMIDE 10 MG/1
10 TABLET ORAL
Qty: 12 TABLET | Refills: 0 | Status: SHIPPED | OUTPATIENT
Start: 2021-08-25 | End: 2021-09-04

## 2021-08-25 RX ORDER — METOCLOPRAMIDE 10 MG/1
10 TABLET ORAL
Qty: 12 TABLET | Refills: 0 | Status: SHIPPED | OUTPATIENT
Start: 2021-08-25 | End: 2021-08-25 | Stop reason: SDUPTHER

## 2021-08-25 RX ADMIN — KETOROLAC TROMETHAMINE 15 MG: 15 INJECTION, SOLUTION INTRAMUSCULAR; INTRAVENOUS at 00:23

## 2021-08-25 RX ADMIN — SODIUM CHLORIDE 25 MG: 900 INJECTION, SOLUTION INTRAVENOUS at 00:23

## 2021-08-25 NOTE — ED TRIAGE NOTES
Pt reports having left lower quadrant abd pain for 2-3 days, states this morning she starting to have vomiting. Pt believes its a flare up of diverticulitis.

## 2021-08-25 NOTE — ED PROVIDER NOTES
EMERGENCY DEPARTMENT HISTORY AND PHYSICAL EXAM    10:04 PM  Date: 8/24/2021  Patient Name: Ciera Gil    History of Presenting Illness     Chief Complaint   Patient presents with    Abdominal Pain    Vomiting    Nausea        History Provided By: Patient    HPI: Ciera Gil is a 41 y.o. female with multiple medical problems as below including previous episodes of diverticulitis. Patient is presenting with left lower quadrant pain for approximately 3 days. Pain was intermittent and crampy. No aggravating or alleviating factors and not related to meals. Today she had multiple episodes of nausea and vomiting. Denies any diarrhea or constipation. Normal flatus. Patient states that this is similar to her previous episodes of diverticulitis. No fever or respiratory symptoms. Vaccinated against COVID-19. Denies sick contact. No vaginal discharge or spotting. No urinary symptoms. Location:  Severity:  Timing/course: Onset/Duration:     PCP: Belle Zimmerman MD    Past History     Past Medical History:  Past Medical History:   Diagnosis Date    Cardiac echocardiogram 07/21/2016    Sm LV cavity. EF 65-70%. No RWMA. Mod-marked LVH w/dynamic obstruction, mid-cavity obliteration & peak grad of 25 mmHg. Indeterminate diastolic fx. No significant valvular pathology.  Cardiac nuclear imaging test 07/22/2016    Low risk. Very sm distal anterior & apical partially reversible defect, likely artifact, but sm area of ischemia not excluded. No RWMA. EF 66%.   Neg EKG on pharm stress test.    COVID-19     Diverticulitis     Gall stones     GERD (gastroesophageal reflux disease)     Heart attack ([facility] [address]     01/27/2018    Hypertension     Infectious disease     Bacteria vaginosis    Nausea & vomiting     Non-ST elevated myocardial infarction [facility])     STEMI (ST elevation myocardial infarction) ([facility] [address] 01/2018       Past Surgical History:  Past Surgical History:   Procedure Laterality Date    COLONOSCOPY N/A 6/26/2020    COLONOSCOPY performed by Adriana Enamorado MD at [facility] ENDOSCOPY     [address]  09/28/2008    HX CORONARY STENT PLACEMENT      HX TUBAL LIGATION  09/28/2008    FL CARDIAC SURG PROCEDURE UNLIST      stent       Family History:  Family History   Problem Relation Age of Onset    Depression Mother     Asthma Mother    [facility] Migraines Mother     Hypertension Mother    [facility] Stroke Mother     Heart Attack Mother     Arthritis-osteo Mother     Depression Brother     Alcohol abuse Father     Substance Abuse Father         tobacco    Drug Abuse Father     Hypertension Father     High Cholesterol Father     Stroke Father     Rashes/Skin Problems Father     Substance Abuse Brother         tobacco    Drug Abuse Brother     Asthma Brother     Migraines Brother     Hypertension Brother     High Cholesterol Brother     Hypertension Paternal Grandmother     Diabetes Paternal Grandmother     Hypertension Maternal Grandmother     Colon Cancer Maternal Grandmother        Social History:  Social History     Tobacco Use    Smoking status: Former Smoker     Packs/day: 0.25     Years: 20.00     Pack years: 5.00     Quit date: 6/27/2018     Years since quitting: 3.1    Smokeless tobacco: Never Used   Vaping Use    Vaping Use: Never used   Substance Use Topics    Alcohol use: No    Drug use: Not Currently     Types: Marijuana     Comment: 25 yrs ago       Allergies: Allergies   Allergen Reactions    Lisinopril Cough    Metoprolol Palpitations     Not allergic     Statins-Hmg-Coa Reductase Inhibitors Other (comments)     Muscle/joint pain    Vaccine Adjuvant Emulsion Combination No. 1 Seizures     MMR    Zofran Odt [Ondansetron] Swelling     migraines    Zofran [Ondansetron Hcl] Swelling     migraines       Review of Systems   Review of Systems   Gastrointestinal: Positive for abdominal pain, nausea and vomiting. All other systems reviewed and are negative.        Physical Exam     Patient Vitals for the past 12 hrs:   Temp Pulse Resp BP SpO2   08/24/21 2148 98.5 °F (36.9 °C) 76 18 137/80 100 %       Physical Exam  Vitals and nursing note reviewed. Constitutional:       General: She is not in acute distress. HENT:      Head: Normocephalic and atraumatic. Eyes:      Extraocular Movements: Extraocular movements intact. Cardiovascular:      Rate and Rhythm: Normal rate. Pulmonary:      Effort: Pulmonary effort is normal. No respiratory distress. Abdominal:      General: There is no distension. Palpations: Abdomen is soft. Tenderness: There is abdominal tenderness in the left lower quadrant. There is no guarding or rebound. Skin:     General: Skin is warm and dry. Neurological:      General: No focal deficit present. Mental Status: She is alert. Psychiatric:         Mood and Affect: Mood normal.         Behavior: Behavior normal.         Diagnostic Study Results     Labs -  No results found for this or any previous visit (from the past 12 hour(s)). Radiologic Studies -   No results found. Medical Decision Making     ED Course: Progress Notes, Reevaluation, and Consults:    10:04 PM Initial assessment performed. The patients presenting problems have been discussed, and they/their family are in agreement with the care plan formulated and outlined with them. I have encouraged them to ask questions as they arise throughout their visit. Provider Notes (Medical Decision Making): 43-year-old female presenting with left lower quadrant pain with vomiting. Well-appearing on exam and not in distress. Abdomen is soft with mild tenderness in the left lower quadrant, no rebound. Discussed with the patient that we will order labs and a CT scan to evaluate for diverticulitis complications however the patient prefers not to get a CT scan since she had multiple scans in the past and is worried about radiation.   Given her exam and normal vitals they think it is reasonable and I discussed with the patient that we could get the screening labs and treat her pain and if she is feeling better she can be discharged with strict return precautions on Augmentin to treat diverticulitis. Patient feels comfortable with this plan. Labs were unremarkable and her general symptoms were resolved. Will discharge on Bentyl, Augmentin and Reglan. Procedures:     Critical Care Time:     Vital Signs-Reviewed the patient's vital signs. Reviewed pt's pulse ox reading. EKG: Interpreted by the EP. Time Interpreted:    Rate:    Rhythm:    Interpretation:   Comparison:     Records Reviewed: Nursing Notes, Old Medical Records, Previous Radiology Studies and Previous Laboratory Studies (Time of Review: 10:04 PM)  -I am the first provider for this patient.  -I reviewed the vital signs, available nursing notes, past medical history, past surgical history, family history and social history. Current Outpatient Medications   Medication Sig Dispense Refill    pantoprazole (PROTONIX) 40 mg tablet Take 1 Tab by mouth daily. 90 Tab 0    metoprolol succinate (TOPROL-XL) 25 mg XL tablet Take 12.5 mg by mouth daily.  amLODIPine (NORVASC) 5 mg tablet Take 5 mg by mouth nightly.  acetaminophen (TYLENOL EXTRA STRENGTH) 500 mg tablet Take 500 mg by mouth every four (4) hours as needed.  aspirin 81 mg chewable tablet Take 81 mg by mouth daily.  nitroglycerin (NITROSTAT) 0.4 mg SL tablet 0.4 mg.          Clinical Impression     Clinical Impression: No diagnosis found. Disposition: dc      This note was dictated utilizing voice recognition software which may lead to typographical errors. I apologize in advance if the situation occurs. If questions arise please do not hesitate to contact me or call our department.     Rigoberto King MD  10:04 PM

## 2021-09-21 ENCOUNTER — HOSPITAL ENCOUNTER (EMERGENCY)
Age: 41
Discharge: HOME | End: 2021-09-21
Attending: EMERGENCY MEDICINE
Payer: MEDICAID

## 2021-09-21 ENCOUNTER — APPOINTMENT (OUTPATIENT)
Dept: CT IMAGING | Age: 41
End: 2021-09-21
Attending: PHYSICIAN ASSISTANT
Payer: MEDICAID

## 2021-09-21 VITALS
BODY MASS INDEX: 44.17 KG/M2 | SYSTOLIC BLOOD PRESSURE: 128 MMHG | HEART RATE: 81 BPM | HEIGHT: 60 IN | OXYGEN SATURATION: 99 % | TEMPERATURE: 98.6 F | WEIGHT: 225 LBS | DIASTOLIC BLOOD PRESSURE: 73 MMHG | RESPIRATION RATE: 16 BRPM

## 2021-09-21 DIAGNOSIS — R10.84 ABDOMINAL PAIN, GENERALIZED: ICD-10-CM

## 2021-09-21 DIAGNOSIS — K57.92 ACUTE DIVERTICULITIS: Primary | ICD-10-CM

## 2021-09-21 LAB
ALBUMIN SERPL-MCNC: 3.1 G/DL (ref 3.4–5)
ALBUMIN/GLOB SERPL: 0.6 {RATIO} (ref 0.8–1.7)
ALP SERPL-CCNC: 81 U/L (ref 45–117)
ALT SERPL-CCNC: 18 U/L (ref 13–56)
ANION GAP SERPL CALC-SCNC: 4 MMOL/L (ref 3–18)
APPEARANCE UR: CLEAR
AST SERPL-CCNC: 11 U/L (ref 10–38)
BASOPHILS # BLD: 0 K/UL (ref 0–0.1)
BASOPHILS NFR BLD: 0 % (ref 0–2)
BILIRUB SERPL-MCNC: 0.3 MG/DL (ref 0.2–1)
BILIRUB UR QL: NEGATIVE
BUN SERPL-MCNC: 7 MG/DL (ref 7–18)
BUN/CREAT SERPL: 13 (ref 12–20)
CALCIUM SERPL-MCNC: 8.2 MG/DL (ref 8.5–10.1)
CHLORIDE SERPL-SCNC: 107 MMOL/L (ref 100–111)
CO2 SERPL-SCNC: 29 MMOL/L (ref 21–32)
COLOR UR: YELLOW
CREAT SERPL-MCNC: 0.56 MG/DL (ref 0.6–1.3)
DIFFERENTIAL METHOD BLD: ABNORMAL
EOSINOPHIL # BLD: 0.2 K/UL (ref 0–0.4)
EOSINOPHIL NFR BLD: 2 % (ref 0–5)
ERYTHROCYTE [DISTWIDTH] IN BLOOD BY AUTOMATED COUNT: 17.4 % (ref 11.6–14.5)
GLOBULIN SER CALC-MCNC: 4.8 G/DL (ref 2–4)
GLUCOSE SERPL-MCNC: 97 MG/DL (ref 74–99)
GLUCOSE UR STRIP.AUTO-MCNC: NEGATIVE MG/DL
HCG UR QL: NEGATIVE
HCT VFR BLD AUTO: 37.2 % (ref 35–45)
HGB BLD-MCNC: 11.7 G/DL (ref 12–16)
HGB UR QL STRIP: NEGATIVE
KETONES UR QL STRIP.AUTO: NEGATIVE MG/DL
LEUKOCYTE ESTERASE UR QL STRIP.AUTO: NEGATIVE
LIPASE SERPL-CCNC: 30 U/L (ref 73–393)
LYMPHOCYTES # BLD: 1.9 K/UL (ref 0.9–3.6)
LYMPHOCYTES NFR BLD: 24 % (ref 21–52)
MAGNESIUM SERPL-MCNC: 2.2 MG/DL (ref 1.6–2.6)
MCH RBC QN AUTO: 24.7 PG (ref 24–34)
MCHC RBC AUTO-ENTMCNC: 31.5 G/DL (ref 31–37)
MCV RBC AUTO: 78.6 FL (ref 78–100)
MONOCYTES # BLD: 0.4 K/UL (ref 0.05–1.2)
MONOCYTES NFR BLD: 5 % (ref 3–10)
NEUTS SEG # BLD: 5.5 K/UL (ref 1.8–8)
NEUTS SEG NFR BLD: 69 % (ref 40–73)
NITRITE UR QL STRIP.AUTO: NEGATIVE
PH UR STRIP: 5.5 [PH] (ref 5–8)
PLATELET # BLD AUTO: 434 K/UL (ref 135–420)
PMV BLD AUTO: 9.8 FL (ref 9.2–11.8)
POTASSIUM SERPL-SCNC: 3.8 MMOL/L (ref 3.5–5.5)
PROT SERPL-MCNC: 7.9 G/DL (ref 6.4–8.2)
PROT UR STRIP-MCNC: NEGATIVE MG/DL
RBC # BLD AUTO: 4.73 M/UL (ref 4.2–5.3)
SODIUM SERPL-SCNC: 140 MMOL/L (ref 136–145)
SP GR UR REFRACTOMETRY: 1.02 (ref 1–1.03)
UROBILINOGEN UR QL STRIP.AUTO: 1 EU/DL (ref 0.2–1)
WBC # BLD AUTO: 8 K/UL (ref 4.6–13.2)

## 2021-09-21 PROCEDURE — 80053 COMPREHEN METABOLIC PANEL: CPT

## 2021-09-21 PROCEDURE — 96374 THER/PROPH/DIAG INJ IV PUSH: CPT

## 2021-09-21 PROCEDURE — 83735 ASSAY OF MAGNESIUM: CPT

## 2021-09-21 PROCEDURE — 74011000636 HC RX REV CODE- 636: Performed by: PHYSICIAN ASSISTANT

## 2021-09-21 PROCEDURE — 99283 EMERGENCY DEPT VISIT LOW MDM: CPT

## 2021-09-21 PROCEDURE — 83690 ASSAY OF LIPASE: CPT

## 2021-09-21 PROCEDURE — 81025 URINE PREGNANCY TEST: CPT

## 2021-09-21 PROCEDURE — 81003 URINALYSIS AUTO W/O SCOPE: CPT

## 2021-09-21 PROCEDURE — 85025 COMPLETE CBC W/AUTO DIFF WBC: CPT

## 2021-09-21 PROCEDURE — 74177 CT ABD & PELVIS W/CONTRAST: CPT

## 2021-09-21 PROCEDURE — 74011250636 HC RX REV CODE- 250/636: Performed by: EMERGENCY MEDICINE

## 2021-09-21 RX ORDER — METRONIDAZOLE 500 MG/1
500 TABLET ORAL 3 TIMES DAILY
Qty: 21 TABLET | Refills: 0 | Status: SHIPPED | OUTPATIENT
Start: 2021-09-21 | End: 2021-09-28

## 2021-09-21 RX ORDER — IBUPROFEN 600 MG/1
600 TABLET ORAL
Qty: 20 TABLET | Refills: 0 | Status: SHIPPED | OUTPATIENT
Start: 2021-09-21 | End: 2022-10-05

## 2021-09-21 RX ORDER — SIMETHICONE 125 MG
125 CAPSULE ORAL
COMMUNITY
End: 2022-03-21 | Stop reason: ALTCHOICE

## 2021-09-21 RX ORDER — CIPROFLOXACIN 500 MG/1
500 TABLET ORAL 2 TIMES DAILY
Qty: 14 TABLET | Refills: 0 | Status: SHIPPED | OUTPATIENT
Start: 2021-09-21 | End: 2021-09-28

## 2021-09-21 RX ORDER — KETOROLAC TROMETHAMINE 15 MG/ML
15 INJECTION, SOLUTION INTRAMUSCULAR; INTRAVENOUS
Status: COMPLETED | OUTPATIENT
Start: 2021-09-21 | End: 2021-09-21

## 2021-09-21 RX ADMIN — KETOROLAC TROMETHAMINE 15 MG: 15 INJECTION, SOLUTION INTRAMUSCULAR; INTRAVENOUS at 13:42

## 2021-09-21 RX ADMIN — IOPAMIDOL 100 ML: 612 INJECTION, SOLUTION INTRAVENOUS at 12:45

## 2021-09-21 NOTE — ED PROVIDER NOTES
EMERGENCY DEPARTMENT HISTORY AND PHYSICAL EXAM    1:56 PM      Date: 9/21/2021  Patient Name: Tim Moura    History of Presenting Illness     Chief Complaint   Patient presents with    Pelvic Pain         History Provided By: Patient  Location/Duration/Severity/Modifying factors   The patient is a 77-year-old female with a history of coronary artery disease on aspirin and blood pressure control, recurrent diverticulitis, the presents emergency department with complaint of several weeks of increasing abdominal pain and cramping. The patient has noted increasing gas and occasionally will have large amount of flatus and instead of having improvement in his symptoms will make the pain worse. The patient for the last several days of head pain is not getting any better and seems to be worsening when she tries to eat or drink anything even water. The patient denies any fevers or chills. Patient denies any diarrhea. Patient denies any constipation. Patient is having some mild nausea. Patient is compliant with all her medications. Patient follows closely with her primary doctor. Patient denies any alcohol or drug use and denies being a current smoker. 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 PCP: Fiona Ledesma MD    Current Outpatient Medications   Medication Sig Dispense Refill    simethicone (Gas-X) 125 mg capsule Take 125 mg by mouth four (4) times daily as needed for Flatulence.  ciprofloxacin HCl (Cipro) 500 mg tablet Take 1 Tablet by mouth two (2) times a day for 7 days. 14 Tablet 0    metroNIDAZOLE (FlagyL) 500 mg tablet Take 1 Tablet by mouth three (3) times daily for 7 days. 21 Tablet 0    ibuprofen (MOTRIN) 600 mg tablet Take 1 Tablet by mouth every six (6) hours as needed for Pain. 20 Tablet 0    metoprolol succinate (TOPROL-XL) 25 mg XL tablet Take 12.5 mg by mouth daily.       amLODIPine (NORVASC) 5 mg tablet Take 5 mg by mouth nightly.  aspirin 81 mg chewable tablet Take 81 mg by mouth daily.  nitroglycerin (NITROSTAT) 0.4 mg SL tablet 0.4 mg. Past History     Past Medical History:  Past Medical History:   Diagnosis Date    Cardiac echocardiogram 07/21/2016    Sm LV cavity. EF 65-70%. No RWMA. Mod-marked LVH w/dynamic obstruction, mid-cavity obliteration & peak grad of 25 mmHg. Indeterminate diastolic fx. No significant valvular pathology.  Cardiac nuclear imaging test 07/22/2016    Low risk. Very sm distal anterior & apical partially reversible defect, likely artifact, but sm area of ischemia not excluded. No RWMA. EF 66%.   Neg EKG on pharm stress test.    COVID-19     Diverticulitis     Gall stones     GERD (gastroesophageal reflux disease)     Heart attack ([facility] [address]     01/27/2018    Hypertension     Infectious disease     Bacteria vaginosis    Nausea & vomiting     Non-ST elevated myocardial infarction [facility])     STEMI (ST elevation myocardial infarction) ([facility] [address] 01/2018       Past Surgical History:  Past Surgical History:   Procedure Laterality Date    COLONOSCOPY N/A 6/26/2020    COLONOSCOPY performed by Giovanni Anaya MD at [facility]X [address] [place] [address]  09/28/2008    HX CORONARY STENT PLACEMENT      HX TUBAL LIGATION  09/28/2008    AR CARDIAC SURG PROCEDURE UNLIST      stent       Family History:  Family History   Problem Relation Age of Onset    Depression Mother     Asthma Mother     Migraines Mother     Hypertension Mother     Stroke Mother     Heart Attack Mother    Claudette Berumen Mother     Depression Brother     Alcohol abuse Father     Substance Abuse Father         tobacco    Drug Abuse Father     Hypertension Father     High Cholesterol Father     Stroke Father     Rashes/Skin Problems Father     Substance Abuse Brother         tobacco    Drug Abuse Brother     Asthma Brother     Migraines Brother     Hypertension Brother     High Cholesterol Brother     Hypertension Paternal Grandmother     Diabetes Paternal Grandmother     Hypertension Maternal Grandmother     Colon Cancer Maternal Grandmother        Social History:  Social History     Tobacco Use    Smoking status: Former Smoker     Packs/day: 0.25     Years: 20.00     Pack years: 5.00     Quit date: 6/27/2018     Years since quitting: 3.2    Smokeless tobacco: Never Used   Vaping Use    Vaping Use: Never used   Substance Use Topics    Alcohol use: No    Drug use: Not Currently     Types: Marijuana     Comment: 25 yrs ago       Allergies: Allergies   Allergen Reactions    Lisinopril Cough    Metoprolol Palpitations     Not allergic     Statins-Hmg-Coa Reductase Inhibitors Other (comments)     Muscle/joint pain    Vaccine Adjuvant Emulsion Combination No. 1 Seizures     MMR    Zofran Odt [Ondansetron] Swelling     migraines    Zofran [Ondansetron Hcl] Swelling     migraines         Review of Systems       Review of Systems   Constitutional: Negative for activity change, fatigue and fever. HENT: Negative for congestion and rhinorrhea. Eyes: Negative for visual disturbance. Respiratory: Negative for shortness of breath. Cardiovascular: Negative for chest pain and palpitations. Gastrointestinal: Positive for abdominal pain and nausea. Negative for diarrhea and vomiting. Genitourinary: Negative for dysuria and hematuria. Musculoskeletal: Negative for back pain. Skin: Negative for rash. Neurological: Negative for dizziness, weakness and light-headedness. All other systems reviewed and are negative. Physical Exam     Visit Vitals  /73 (BP 1 Location: Left upper arm, BP Patient Position: At rest)   Pulse 81   Temp 98.6 °F (37 °C)   Resp 16   Ht 5' (1.524 m)   Wt 102.1 kg (225 lb)   LMP 09/10/2021   SpO2 99%   BMI 43.94 kg/m²         Physical Exam  Vitals and nursing note reviewed. Constitutional:       General: She is not in acute distress. Appearance: She is well-developed. Comments: Elevated BMI   HENT:      Head: Normocephalic and atraumatic. Right Ear: External ear normal.      Left Ear: External ear normal.      Nose: Nose normal.   Eyes:      General: No scleral icterus. Conjunctiva/sclera: Conjunctivae normal.      Pupils: Pupils are equal, round, and reactive to light. Neck:      Thyroid: No thyromegaly. Vascular: No JVD. Trachea: No tracheal deviation. Cardiovascular:      Rate and Rhythm: Normal rate and regular rhythm. Heart sounds: Normal heart sounds. No murmur heard. No friction rub. No gallop. Pulmonary:      Effort: Pulmonary effort is normal.      Breath sounds: Normal breath sounds. Chest:      Chest wall: No tenderness. Abdominal:      General: Bowel sounds are normal. There is no distension. Palpations: Abdomen is soft. Tenderness: There is abdominal tenderness. There is no guarding or rebound. Comments: Mild right upper quadrant and right lower quadrant pain without guarding   Musculoskeletal:         General: No tenderness. Normal range of motion. Cervical back: Normal range of motion and neck supple. Lymphadenopathy:      Cervical: No cervical adenopathy. Skin:     General: Skin is warm and dry. Neurological:      Mental Status: She is alert and oriented to person, place, and time. Cranial Nerves: No cranial nerve deficit. Coordination: Coordination normal.      Comments: No sensory loss, Gait normal, Motor 5/5   Psychiatric:         Behavior: Behavior normal.         Thought Content:  Thought content normal.         Judgment: Judgment normal.           Diagnostic Study Results     Labs -  Recent Results (from the past 12 hour(s))   URINALYSIS W/ RFLX MICROSCOPIC    Collection Time: 09/21/21 11:27 AM   Result Value Ref Range    Color YELLOW      Appearance CLEAR      Specific gravity 1.024 1.005 - 1.030      pH (UA) 5.5 5.0 - 8.0      Protein Negative NEG mg/dL    Glucose Negative NEG mg/dL    Ketone Negative NEG mg/dL    Bilirubin Negative NEG      Blood Negative NEG      Urobilinogen 1.0 0.2 - 1.0 EU/dL    Nitrites Negative NEG      Leukocyte Esterase Negative NEG     HCG URINE, QL    Collection Time: 09/21/21 11:27 AM   Result Value Ref Range    HCG urine, QL Negative NEG     CBC WITH AUTOMATED DIFF    Collection Time: 09/21/21 11:33 AM   Result Value Ref Range    WBC 8.0 4.6 - 13.2 K/uL    RBC 4.73 4.20 - 5.30 M/uL    HGB 11.7 (L) 12.0 - 16.0 g/dL    HCT 37.2 35.0 - 45.0 %    MCV 78.6 78.0 - 100.0 FL    MCH 24.7 24.0 - 34.0 PG    MCHC 31.5 31.0 - 37.0 g/dL    RDW 17.4 (H) 11.6 - 14.5 %    PLATELET 382 (H) 909 - 420 K/uL    MPV 9.8 9.2 - 11.8 FL    NEUTROPHILS 69 40 - 73 %    LYMPHOCYTES 24 21 - 52 %    MONOCYTES 5 3 - 10 %    EOSINOPHILS 2 0 - 5 %    BASOPHILS 0 0 - 2 %    ABS. NEUTROPHILS 5.5 1.8 - 8.0 K/UL    ABS. LYMPHOCYTES 1.9 0.9 - 3.6 K/UL    ABS. MONOCYTES 0.4 0.05 - 1.2 K/UL    ABS. EOSINOPHILS 0.2 0.0 - 0.4 K/UL    ABS. BASOPHILS 0.0 0.0 - 0.1 K/UL    DF AUTOMATED     METABOLIC PANEL, COMPREHENSIVE    Collection Time: 09/21/21 11:33 AM   Result Value Ref Range    Sodium 140 136 - 145 mmol/L    Potassium 3.8 3.5 - 5.5 mmol/L    Chloride 107 100 - 111 mmol/L    CO2 29 21 - 32 mmol/L    Anion gap 4 3.0 - 18 mmol/L    Glucose 97 74 - 99 mg/dL    BUN 7 7.0 - 18 MG/DL    Creatinine 0.56 (L) 0.6 - 1.3 MG/DL    BUN/Creatinine ratio 13 12 - 20      GFR est AA >60 >60 ml/min/1.73m2    GFR est non-AA >60 >60 ml/min/1.73m2    Calcium 8.2 (L) 8.5 - 10.1 MG/DL    Bilirubin, total 0.3 0.2 - 1.0 MG/DL    ALT (SGPT) 18 13 - 56 U/L    AST (SGOT) 11 10 - 38 U/L    Alk.  phosphatase 81 45 - 117 U/L    Protein, total 7.9 6.4 - 8.2 g/dL    Albumin 3.1 (L) 3.4 - 5.0 g/dL    Globulin 4.8 (H) 2.0 - 4.0 g/dL    A-G Ratio 0.6 (L) 0.8 - 1.7     LIPASE    Collection Time: 09/21/21 11:33 AM   Result Value Ref Range    Lipase 30 (L) 73 - 393 U/L   MAGNESIUM    Collection Time: 09/21/21 11:33 AM   Result Value Ref Range    Magnesium 2.2 1.6 - 2.6 mg/dL       Radiologic Studies -   CT ABD PELV W CONT   Final Result   1. Acute uncomplicated descending colon diverticulitis.   -No evidence for abscess or perforation.   -Consider colonoscopy after resolution of symptoms, if not previously performed,   to exclude underlying lesion. Medical Decision Making   I am the first provider for this patient. I reviewed the vital signs, available nursing notes, past medical history, past surgical history, family history and social history. Vital Signs-Reviewed the patient's vital signs. Records Reviewed: Nursing Notes, Old Medical Records, Previous Radiology Studies and Previous Laboratory Studies (Time of Review: 1:56 PM)    ED Course: Progress Notes, Reevaluation, and Consults: Workup and recommendations were reviewed with the patient and all questions were answered. The patient understands the plan and will proceed with close outpatient care. I have encouraged the patient to return if at all worsened or concerned. Noelle Guerrero,  2:00 PM      Provider Notes (Medical Decision Making):   MDM  Number of Diagnoses or Management Options  Abdominal pain, generalized  Acute diverticulitis  Diagnosis management comments: Patient is a 68-year-old female with a history of coronary disease with intervention, recurrent diverticulitis, the presents emergency department with right-sided abdominal pain is been increasing for several weeks and now worsens when she has anything to eat and has no improvement with bowel movements. The patient CT shows acute uncomplicated descending diverticulitis and suspect that the source of her pain. Patient's labs are otherwise reassuring and will have her follow closely with her primary doctor as well as gastroenterology and start a low residue diet and take antibiotics as directed. The patient will return if at all worsened or concerned. The patient has not toxic at this time. Procedures      Diagnosis     Clinical Impression:   1. Acute diverticulitis    2. Abdominal pain, generalized        Disposition: [place]    Follow-up Information     Follow up With Specialties Details Why Contact Info    Jaz Victoria MD Family Medicine In 2 days  [facility]  [address] [place]   [address] [address]      Angela Resendiz MD Gastroenterology Schedule an appointment as soon as possible for a visit   [address] [facility] [address] [facility] [address] [facility]      [address] [facility] EMERGENCY DEPT Emergency Medicine  As needed, If symptoms worsen [address] [facility]  [phone]           Patient's Medications   Start Taking    CIPROFLOXACIN HCL (CIPRO) 500 MG TABLET    Take 1 Tablet by mouth two (2) times a day for 7 days. IBUPROFEN (MOTRIN) 600 MG TABLET    Take 1 Tablet by mouth every six (6) hours as needed for Pain. METRONIDAZOLE (FLAGYL) 500 MG TABLET    Take 1 Tablet by mouth three (3) times daily for 7 days. Continue Taking    AMLODIPINE (NORVASC) 5 MG TABLET    Take 5 mg by mouth nightly. ASPIRIN 81 MG CHEWABLE TABLET    Take 81 mg by mouth daily. METOPROLOL SUCCINATE (TOPROL-XL) 25 MG XL TABLET    Take 12.5 mg by mouth daily. NITROGLYCERIN (NITROSTAT) 0.4 MG SL TABLET    0.4 mg. SIMETHICONE (GAS-X) 125 MG CAPSULE    Take 125 mg by mouth four (4) times daily as needed for Flatulence. These Medications have changed    No medications on file   Stop Taking    ACETAMINOPHEN (TYLENOL EXTRA STRENGTH) 500 MG TABLET    Take 500 mg by mouth every four (4) hours as needed. DICYCLOMINE (BENTYL) 10 MG/5 ML SOLN ORAL SOLUTION    Take 10 mL by mouth four (4) times daily as needed for Pain. PANTOPRAZOLE (PROTONIX) 40 MG TABLET    Take 1 Tab by mouth daily. Disclaimer: Sections of this note are dictated using utilizing voice recognition software.   Minor typographical errors may be present. If questions arise, please do not hesitate to contact me or call our department.

## 2021-09-21 NOTE — ED TRIAGE NOTES
Patient c/o pelvic pain that she states has been ongoing since August, 2021. She states multiple ER visits related to abdominal pain. She states pain worsened two nights ago. She states being very gassy and having pelvic cramping.

## 2021-09-22 NOTE — TELEPHONE ENCOUNTER
Please advise  Last saw Dr. Meredith Zapata 04/15/2021  In ER yesterday treated for diverticulitis. Was discharged with   Cipro 500 mg BID x 7 days  Flagyl 500 mg TID x 7 days   mg every 6 hours as needed.      Patient allergic to Zofran

## 2021-09-22 NOTE — TELEPHONE ENCOUNTER
This patient contacted office for the following prescriptions to be filled:    Last office visit: 8/12/2021  Follow up appointment: 11/15/2021  Medication requested :   Requested Prescriptions     Pending Prescriptions Disp Refills    promethazine (PHENERGAN) 6.25 mg/5 mL syrup       Sig: Take 5 mL by mouth four (4) times daily as needed for Nausea for up to 7 days.      PCP: Josefina Linn order or Local pharmacy name [address] [phone]

## 2021-09-23 ENCOUNTER — OFFICE VISIT (OUTPATIENT)
Dept: FAMILY MEDICINE CLINIC | Age: 41
End: 2021-09-23
Payer: MEDICAID

## 2021-09-23 VITALS
HEIGHT: 60 IN | TEMPERATURE: 97.8 F | RESPIRATION RATE: 16 BRPM | SYSTOLIC BLOOD PRESSURE: 132 MMHG | BODY MASS INDEX: 44.37 KG/M2 | HEART RATE: 64 BPM | WEIGHT: 226 LBS | DIASTOLIC BLOOD PRESSURE: 82 MMHG | OXYGEN SATURATION: 97 %

## 2021-09-23 DIAGNOSIS — K57.92 ACUTE DIVERTICULITIS: Primary | ICD-10-CM

## 2021-09-23 PROCEDURE — 99214 OFFICE O/P EST MOD 30 MIN: CPT | Performed by: FAMILY MEDICINE

## 2021-09-23 RX ORDER — PROMETHAZINE HYDROCHLORIDE 6.25 MG/5ML
6.25 SYRUP ORAL
Qty: 118 ML | Refills: 0 | Status: SHIPPED | OUTPATIENT
Start: 2021-09-23 | End: 2022-03-21 | Stop reason: SDUPTHER

## 2021-09-23 NOTE — PROGRESS NOTES
1. Have you been to the ER, urgent care clinic since your last visit? Hospitalized since your last visit? ER   /  2. Have you seen or consulted any other health care providers outside of the [address] [place] [address] since your last visit? Include any pap smears or colon screening.  No

## 2021-09-23 NOTE — PROGRESS NOTES
Larry Salas, 41 y.o.,  female    SUBJECTIVE  Abdominal pain x 2 days    Acute onset LLQ pain and nausea and vomiting. No fever, similar to previous diverticulitis. ED eval, CT abdomen with wall thickening/stranding mid to distal colon. She was supposed to have surgery with dr. Meredith Zapata for recurrent diverticulitis in May, however cancelled due to family illness  She reports mild improvement of pain, but N/V still persistent. No fever, able to tolerate po. BM pattern is the same. ROS:  See HPI, all others negative        Patient Active Problem List   Diagnosis Code    Tobacco use Z72.0    Obesity E66.9    Hypertension I10    Prediabetes R73.03    Low HDL (under 40) E78.6    Obesity, morbid (HCC) E66.01    ST elevation myocardial infarction (STEMI) ([facility]) I21.3    Iron deficiency anemia due to chronic blood loss D50.0    Coronary artery disease involving native coronary artery of native heart with angina pectoris ([facility]) I25.119    Adjustment disorder with depressed mood F43.21    Epigastric pain R10.13    Calculus of gallbladder without cholecystitis without obstruction K80.20    Hyperlipidemia LDL goal <70 E78.5    Former smoker Z87.891    Chest pain R07.9    Statin intolerance Z78.9       Current Outpatient Medications   Medication Sig Dispense Refill    simethicone (Gas-X) 125 mg capsule Take 125 mg by mouth four (4) times daily as needed for Flatulence.  ciprofloxacin HCl (Cipro) 500 mg tablet Take 1 Tablet by mouth two (2) times a day for 7 days. 14 Tablet 0    metroNIDAZOLE (FlagyL) 500 mg tablet Take 1 Tablet by mouth three (3) times daily for 7 days. 21 Tablet 0    ibuprofen (MOTRIN) 600 mg tablet Take 1 Tablet by mouth every six (6) hours as needed for Pain. 20 Tablet 0    metoprolol succinate (TOPROL-XL) 25 mg XL tablet Take 12.5 mg by mouth daily.  amLODIPine (NORVASC) 5 mg tablet Take 5 mg by mouth nightly.  aspirin 81 mg chewable tablet Take 81 mg by mouth daily.  nitroglycerin (NITROSTAT) 0.4 mg SL tablet 0.4 mg.  promethazine (PHENERGAN) 6.25 mg/5 mL syrup Take 5 mL by mouth four (4) times daily as needed for Nausea for up to 7 days. 118 mL 0       Allergies   Allergen Reactions    Lisinopril Cough    Metoprolol Palpitations     Not allergic     Statins-Hmg-Coa Reductase Inhibitors Other (comments)     Muscle/joint pain    Vaccine Adjuvant Emulsion Combination No. 1 Seizures     MMR    Zofran Odt [Ondansetron] Swelling     migraines    Zofran [Ondansetron Hcl] Swelling     migraines       Past Medical History:   Diagnosis Date    Cardiac echocardiogram 07/21/2016    Sm LV cavity. EF 65-70%. No RWMA. Mod-marked LVH w/dynamic obstruction, mid-cavity obliteration & peak grad of 25 mmHg. Indeterminate diastolic fx. No significant valvular pathology.  Cardiac nuclear imaging test 07/22/2016    Low risk. Very sm distal anterior & apical partially reversible defect, likely artifact, but sm area of ischemia not excluded. No RWMA. EF 66%.   Neg EKG on pharm stress test.    COVID-19     Diverticulitis     Gall stones     GERD (gastroesophageal reflux disease)     Heart attack ([facility] [address]     01/27/2018    Hypertension     Infectious disease     Bacteria vaginosis    Nausea & vomiting     Non-ST elevated myocardial infarction ([facility] [address]     STEMI (ST elevation myocardial infarction) ([facility] [address] 01/2018       Social History     Socioeconomic History    Marital status: SINGLE     Spouse name: Not on file    Number of children: Not on file    Years of education: Not on file    Highest education level: Not on file   Occupational History    Not on file   Tobacco Use    Smoking status: Former Smoker     Packs/day: 0.25     Years: 20.00     Pack years: 5.00     Quit date: 6/27/2018     Years since quitting: 3.2    Smokeless tobacco: Never Used   Vaping Use    Vaping Use: Never used   Substance and Sexual Activity    Alcohol use: No    Drug use: Not Currently Types: Marijuana     Comment: 25 yrs ago    Sexual activity: Yes     Partners: Male     Birth control/protection: Surgical     Comment: tubal ligation   Other Topics Concern    Not on file   Social History Narrative    Not on file     Social Determinants of Health     Financial Resource Strain:     Difficulty of Paying Living Expenses:    Food Insecurity:     Worried About Running Out of Food in the Last Year:     [address] in the Last Year:    Transportation Needs:     Lack of Transportation (Medical):      Lack of Transportation (Non-Medical):    Physical Activity:     Days of Exercise per Week:     Minutes of Exercise per Session:    Stress:     Feeling of Stress :    Social Connections:     Frequency of Communication with Friends and Family:     Frequency of Social Gatherings with Friends and Family:     Attends [employer] Services:     Active Member of Clubs or Organizations:     Attends Club or Organization Meetings:     Marital Status:    Intimate Partner Violence:     Fear of Current or Ex-Partner:     Emotionally Abused:     Physically Abused:     Sexually Abused:        Family History   Problem Relation Age of Onset    Depression Mother     Asthma Mother     Migraines Mother     Hypertension Mother     Stroke Mother     Heart Attack Mother    Eulalioyony Andersoncock Mother     Depression Brother     Alcohol abuse Father     Substance Abuse Father         tobacco    Drug Abuse Father     Hypertension Father     High Cholesterol Father     Stroke Father     Rashes/Skin Problems Father     Substance Abuse Brother         tobacco    Drug Abuse Brother     Asthma Brother     Migraines Brother     Hypertension Brother     High Cholesterol Brother     Hypertension Paternal Grandmother     Diabetes Paternal Grandmother     Hypertension Maternal Grandmother     Colon Cancer Maternal Grandmother          OBJECTIVE    Physical Exam:     Visit Vitals  /82 (BP 1 Location: Left upper arm, BP Patient Position: Sitting, BP Cuff Size: Adult)   Pulse 64   Temp 97.8 °F (36.6 °C) (Oral)   Resp 16   Ht 5' (1.524 m)   Wt 226 lb (102.5 kg)   LMP 09/10/2021   SpO2 97%   BMI 44.14 kg/m²       General: alert, obese, AA, in no apparent distress or my  Neck: supple, no adenopathy palpated  CVS: normal rate, regular rhythm, distinct S1 and S2  Lungs:clear to ausculation bilaterally, no crackles, wheezing or rhonchi noted  Abdomen: normoactive bowel sounds, soft, non-tender  Extremities: no edema, no cyanosis, MSK grossly normal  Skin: warm, no lesions, rashes noted  Psych:  mood and affect normal        ASSESSMENT/PLAN  Diagnoses and all orders for this visit:    1. Acute diverticulitis  Mild, clinically improving  Recurrent- advised to call back dr. Jodi Cruz to discuss surgery  To complete cipro/flagyl  Given phenergan prn for nausea    Follow-up and Dispositions    · Return in about 3 months (around 12/23/2021), or if symptoms worsen or fail to improve, for routine chronic illness care. Patient understands plan of care. Patient has provided input and agrees with goals.

## 2021-10-05 NOTE — PATIENT INSTRUCTIONS
A Healthy Heart: Care Instructions  Your Care Instructions    Heart disease occurs when a substance called plaque builds up in the vessels that supply oxygen-rich blood to your heart. This can narrow the blood vessels and reduce blood flow. A heart attack happens when blood flow is completely blocked. A high-fat diet, smoking, and other factors increase the risk of heart disease. Your doctor has found that you have a chance of having heart disease. You can do lots of things to keep your heart healthy. It may not be easy, but you can change your diet, exercise more, and quit smoking. These steps really work to lower your chance of heart disease. Follow-up care is a key part of your treatment and safety. Be sure to make and go to all appointments, and call your doctor if you are having problems. It's also a good idea to know your test results and keep a list of the medicines you take. How can you care for yourself at home? Diet    · Use less salt when you cook and eat. This helps lower your blood pressure. Taste food before salting. Add only a little salt when you think you need it. With time, your taste buds will adjust to less salt.     · Eat fewer snack items, fast foods, canned soups, and other high-salt, high-fat, processed foods.     · Read food labels and try to avoid saturated and trans fats. They increase your risk of heart disease by raising cholesterol levels.     · Limit the amount of solid fat-butter, margarine, and shortening-you eat. Use olive, peanut, or canola oil when you cook. Bake, broil, and steam foods instead of frying them.     · Eating fish can lower your risk for heart disease. Eat at least 2 servings of fish a week. [place], mackerel, herring, sardines, and chunk light tuna are very good choices. These fish contain omega-3 fatty acids.     · Eat a variety of fruit and vegetables every day. Dark green, deep orange, red, or yellow fruits and vegetables are especially good for you. Examples include spinach, carrots, peaches, and berries.     · Foods high in fiber can reduce your cholesterol and provide important vitamins and minerals. High-fiber foods include whole-grain cereals and breads, oatmeal, beans, brown rice, citrus fruits, and apples.     · Limit drinks and foods with added sugar. These include candy, desserts, and soda pop.    Lifestyle changes    · If your doctor recommends it, get more exercise. Walking is a good choice. Bit by bit, increase the amount you walk every day. Try for at least 30 minutes on most days of the week. You also may want to swim, bike, or do other activities.     · Do not smoke. If you need help quitting, talk to your doctor about stop-smoking programs and medicines. These can increase your chances of quitting for good. Quitting smoking may be the most important step you can take to protect your heart. It is never too late to quit. You will get health benefits right away.     · Limit alcohol to 2 drinks a day for men and 1 drink a day for women. Too much alcohol can cause health problems. Medicines    · Take your medicines exactly as prescribed. Call your doctor if you think you are having a problem with your medicine.     · If your doctor recommends aspirin, take the amount directed each day. Make sure you take aspirin and not another kind of pain reliever, such as acetaminophen (Tylenol). If you take ibuprofen (such as Advil or Motrin) for other problems, take aspirin at least 2 hours before taking ibuprofen. When should you call for help? Call 911 if you have symptoms of a heart attack.  These may include:    · Chest pain or pressure, or a strange feeling in the chest.     · Sweating.     · Shortness of breath.     · Pain, pressure, or a strange feeling in the back, neck, jaw, or upper belly or in one or both shoulders or arms.     · Lightheadedness or sudden weakness.     · A fast or irregular heartbeat.    After you call 911, the  may tell you to chew 1 adult-strength or 2 to 4 low-dose aspirin. Wait for an ambulance. Do not try to drive yourself.   Watch closely for changes in your health, and be sure to contact your doctor if you have any problems. Where can you learn more? Go to http://kim-tristin.info/. Enter N213 in the search box to learn more about \"A Healthy Heart: Care Instructions. \"  Current as of: July 22, 2018  Content Version: 12.1  © 6298-8795 [employer]. Care instructions adapted under license by [employer] (which disclaims liability or warranty for this information). If you have questions about a medical condition or this instruction, always ask your healthcare professional. [address] any warranty or liability for your use of this information. negative -  no rash

## 2021-10-14 NOTE — TELEPHONE ENCOUNTER
Pt states that she missed a call from [place] yesterday about her results and would like a call back . Please advise. Manual Repair Warning Statement: We plan on removing the manually selected variable below in favor of our much easier automatic structured text blocks found in the previous tab. We decided to do this to help make the flow better and give you the full power of structured data. Manual selection is never going to be ideal in our platform and I would encourage you to avoid using manual selection from this point on, especially since I will be sunsetting this feature. It is important that you do one of two things with the customized text below. First, you can save all of the text in a word file so you can have it for future reference. Second, transfer the text to the appropriate area in the Library tab. Lastly, if there is a flap or graft type which we do not have you need to let us know right away so I can add it in before the variable is hidden. No need to panic, we plan to give you roughly 6 months to make the change.

## 2021-11-17 ENCOUNTER — APPOINTMENT (OUTPATIENT)
Dept: GENERAL RADIOLOGY | Age: 41
End: 2021-11-17
Attending: PHYSICIAN ASSISTANT
Payer: MEDICAID

## 2021-11-17 ENCOUNTER — HOSPITAL ENCOUNTER (EMERGENCY)
Age: 41
Discharge: HOME | End: 2021-11-17
Attending: EMERGENCY MEDICINE
Payer: MEDICAID

## 2021-11-17 VITALS
TEMPERATURE: 99 F | RESPIRATION RATE: 18 BRPM | SYSTOLIC BLOOD PRESSURE: 129 MMHG | DIASTOLIC BLOOD PRESSURE: 78 MMHG | HEART RATE: 68 BPM | OXYGEN SATURATION: 99 %

## 2021-11-17 DIAGNOSIS — M25.512 ACUTE PAIN OF LEFT SHOULDER: Primary | ICD-10-CM

## 2021-11-17 PROCEDURE — 73030 X-RAY EXAM OF SHOULDER: CPT

## 2021-11-17 PROCEDURE — 99282 EMERGENCY DEPT VISIT SF MDM: CPT

## 2021-11-17 NOTE — Clinical Note
[address] [facility] EMERGENCY DEPT  [id number] [id number] [phone]    Work/School Note    Date: 11/17/2021    To Whom It May concern:    Radha Lopez was seen and treated today in the emergency room by the following provider(s):  Attending Provider: Michelle Hernández MD.      Radha Lopez is excused from work/school on 11/17/21 and 11/18/21. She is medically clear to return to work/school on 11/19/2021.        Sincerely,          Agustín Griffin MD

## 2021-11-18 NOTE — ED NOTES
Written and verbal discharge instructions given. Patient verbalizes understanding of same. Patient denies  further questions about treatment and discharge instructions. Left ED with patent airway and steady gait. Arm band removed shredded.  Patient left ED with inst to F/U with PCP
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
## 2021-11-22 ENCOUNTER — HOSPITAL ENCOUNTER (EMERGENCY)
Age: 41
Discharge: HOME | End: 2021-11-23
Attending: STUDENT IN AN ORGANIZED HEALTH CARE EDUCATION/TRAINING PROGRAM
Payer: MEDICAID

## 2021-11-22 DIAGNOSIS — R07.9 CHEST PAIN, UNSPECIFIED TYPE: Primary | ICD-10-CM

## 2021-11-22 LAB
BASOPHILS # BLD: 0 K/UL (ref 0–0.1)
BASOPHILS NFR BLD: 0 % (ref 0–2)
DIFFERENTIAL METHOD BLD: ABNORMAL
EOSINOPHIL # BLD: 0.2 K/UL (ref 0–0.4)
EOSINOPHIL NFR BLD: 3 % (ref 0–5)
ERYTHROCYTE [DISTWIDTH] IN BLOOD BY AUTOMATED COUNT: 16.2 % (ref 11.6–14.5)
HCT VFR BLD AUTO: 37.8 % (ref 35–45)
HGB BLD-MCNC: 11.9 G/DL (ref 12–16)
IMM GRANULOCYTES # BLD AUTO: 0 K/UL (ref 0–0.04)
IMM GRANULOCYTES NFR BLD AUTO: 0 % (ref 0–0.5)
LYMPHOCYTES # BLD: 3.4 K/UL (ref 0.9–3.6)
LYMPHOCYTES NFR BLD: 44 % (ref 21–52)
MCH RBC QN AUTO: 24.7 PG (ref 24–34)
MCHC RBC AUTO-ENTMCNC: 31.5 G/DL (ref 31–37)
MCV RBC AUTO: 78.6 FL (ref 78–100)
MONOCYTES # BLD: 0.5 K/UL (ref 0.05–1.2)
MONOCYTES NFR BLD: 7 % (ref 3–10)
NEUTS SEG # BLD: 3.6 K/UL (ref 1.8–8)
NEUTS SEG NFR BLD: 47 % (ref 40–73)
NRBC # BLD: 0 K/UL (ref 0–0.01)
NRBC BLD-RTO: 0 PER 100 WBC
PLATELET # BLD AUTO: 423 K/UL (ref 135–420)
PMV BLD AUTO: 9.7 FL (ref 9.2–11.8)
RBC # BLD AUTO: 4.81 M/UL (ref 4.2–5.3)
WBC # BLD AUTO: 7.7 K/UL (ref 4.6–13.2)

## 2021-11-22 PROCEDURE — 99284 EMERGENCY DEPT VISIT MOD MDM: CPT

## 2021-11-22 PROCEDURE — 85025 COMPLETE CBC W/AUTO DIFF WBC: CPT

## 2021-11-22 PROCEDURE — 93005 ELECTROCARDIOGRAM TRACING: CPT

## 2021-11-22 PROCEDURE — 80048 BASIC METABOLIC PNL TOTAL CA: CPT

## 2021-11-22 PROCEDURE — 82553 CREATINE MB FRACTION: CPT

## 2021-11-23 ENCOUNTER — APPOINTMENT (OUTPATIENT)
Dept: GENERAL RADIOLOGY | Age: 41
End: 2021-11-23
Attending: STUDENT IN AN ORGANIZED HEALTH CARE EDUCATION/TRAINING PROGRAM
Payer: MEDICAID

## 2021-11-23 VITALS
HEIGHT: 60 IN | HEART RATE: 85 BPM | DIASTOLIC BLOOD PRESSURE: 62 MMHG | TEMPERATURE: 98.8 F | SYSTOLIC BLOOD PRESSURE: 104 MMHG | BODY MASS INDEX: 44.17 KG/M2 | RESPIRATION RATE: 18 BRPM | WEIGHT: 225 LBS | OXYGEN SATURATION: 99 %

## 2021-11-23 LAB
ANION GAP SERPL CALC-SCNC: 5 MMOL/L (ref 3–18)
ATRIAL RATE: 88 BPM
BUN SERPL-MCNC: 8 MG/DL (ref 7–18)
BUN/CREAT SERPL: 11 (ref 12–20)
CALCIUM SERPL-MCNC: 8.9 MG/DL (ref 8.5–10.1)
CALCULATED P AXIS, ECG09: 19 DEGREES
CALCULATED R AXIS, ECG10: 49 DEGREES
CALCULATED T AXIS, ECG11: 36 DEGREES
CHLORIDE SERPL-SCNC: 106 MMOL/L (ref 100–111)
CK MB CFR SERPL CALC: NORMAL % (ref 0–4)
CK MB SERPL-MCNC: <1 NG/ML (ref 5–25)
CK SERPL-CCNC: 69 U/L (ref 26–192)
CO2 SERPL-SCNC: 28 MMOL/L (ref 21–32)
CREAT SERPL-MCNC: 0.73 MG/DL (ref 0.6–1.3)
DIAGNOSIS, 93000: NORMAL
GLUCOSE SERPL-MCNC: 81 MG/DL (ref 74–99)
P-R INTERVAL, ECG05: 144 MS
POTASSIUM SERPL-SCNC: 3.4 MMOL/L (ref 3.5–5.5)
Q-T INTERVAL, ECG07: 360 MS
QRS DURATION, ECG06: 80 MS
QTC CALCULATION (BEZET), ECG08: 435 MS
SODIUM SERPL-SCNC: 139 MMOL/L (ref 136–145)
TROPONIN I SERPL-MCNC: <0.02 NG/ML (ref 0–0.04)
VENTRICULAR RATE, ECG03: 88 BPM

## 2021-11-23 PROCEDURE — 71045 X-RAY EXAM CHEST 1 VIEW: CPT

## 2021-11-23 NOTE — ED PROVIDER NOTES
EMERGENCY DEPARTMENT HISTORY AND PHYSICAL EXAM    I have evaluated the patient at 12:15 AM      Date: 11/22/2021  Patient Name: Cricket Burrell    History of Presenting Illness     Chief Complaint   Patient presents with    Chest Pain         History Provided By: Patient  Location/Duration/Severity/Modifying factors   73-year-old female history of CAD with prior stenting, hypertension, hyperlipidemia presenting to the emergency department for evaluation of chest pain. Patient had an episode of chest pain around 430 this afternoon. Reports a \"spasm\" on the left side of her anterior chest wall. States that she was mildly diaphoretic at the time this occurred. She took a single sublingual nitro which completely resolved the pain. She then started to experience some minor discomfort that has since resolved. She is come here for evaluation. Currently is asymptomatic chest pain-free. States that is not similar to her MI in the past.  Denies any recent illness, fevers or chills,, shortness of breath, abdominal pain, NVD. PCP: Natan Carver MD    Current Outpatient Medications   Medication Sig Dispense Refill    simethicone (Gas-X) 125 mg capsule Take 125 mg by mouth four (4) times daily as needed for Flatulence.  ibuprofen (MOTRIN) 600 mg tablet Take 1 Tablet by mouth every six (6) hours as needed for Pain. 20 Tablet 0    metoprolol succinate (TOPROL-XL) 25 mg XL tablet Take 12.5 mg by mouth daily.  amLODIPine (NORVASC) 5 mg tablet Take 5 mg by mouth nightly.  aspirin 81 mg chewable tablet Take 81 mg by mouth daily.  nitroglycerin (NITROSTAT) 0.4 mg SL tablet 0.4 mg. Past History     Past Medical History:  Past Medical History:   Diagnosis Date    Cardiac echocardiogram 07/21/2016    Sm LV cavity. EF 65-70%. No RWMA. Mod-marked LVH w/dynamic obstruction, mid-cavity obliteration & peak grad of 25 mmHg. Indeterminate diastolic fx. No significant valvular pathology.  Cardiac nuclear imaging test 07/22/2016    Low risk. Very sm distal anterior & apical partially reversible defect, likely artifact, but sm area of ischemia not excluded. No RWMA. EF 66%.   Neg EKG on pharm stress test.    COVID-19     Diverticulitis     Gall stones     GERD (gastroesophageal reflux disease)     [facility]     01/27/2018    Hypertension     Infectious disease     Bacteria vaginosis    Nausea & vomiting     Non-ST elevated [facility])     STEMI (ST elevation myocardial infarction) ([facility] 01/2018       Past Surgical History:  Past Surgical History:   Procedure Laterality Date    COLONOSCOPY N/A 6/26/2020    COLONOSCOPY performed by Janice Hairston MD at [address] [facility]  [facility]  09/28/2008    HX CORONARY STENT PLACEMENT      HX TUBAL LIGATION  09/28/2008    RI CARDIAC SURG PROCEDURE UNLIST      stent       Family History:  Family History   Problem Relation Age of Onset    Depression Mother     Asthma Mother    [place] Migraines Mother     Hypertension Mother     Stroke Mother     Heart Attack Mother    Lincoln Harrell Mother     Depression Brother     Alcohol abuse Father     Substance Abuse Father         tobacco    Drug Abuse Father     Hypertension Father     High Cholesterol Father     Stroke Father     Rashes/Skin Problems Father     Substance Abuse Brother         tobacco    Drug Abuse Brother     Asthma Brother     Migraines Brother     Hypertension Brother     High Cholesterol Brother     Hypertension Paternal Grandmother     Diabetes Paternal Grandmother     Hypertension Maternal Grandmother     Colon Cancer Maternal Grandmother        Social History:  Social History     Tobacco Use    Smoking status: Former Smoker     Packs/day: 0.25     Years: 20.00     Pack years: 5.00     Quit date: 6/27/2018     Years since quitting: 3.4    Smokeless tobacco: Never Used   Vaping Use    Vaping Use: Never used   Substance Use Topics    Alcohol use: No    Drug use: Not Currently     Types: Marijuana     Comment: 25 yrs ago       Allergies: Allergies   Allergen Reactions    Lisinopril Cough    Metoprolol Palpitations     Not allergic     Statins-Hmg-Coa Reductase Inhibitors Other (comments)     Muscle/joint pain    Vaccine Adjuvant Emulsion Combination No. 1 Seizures     MMR    Zofran Odt [Ondansetron] Swelling     migraines    Zofran [Ondansetron Hcl] Swelling     migraines         Review of Systems       Review of Systems   Constitutional: Negative for activity change, chills, diaphoresis, fatigue and fever. Respiratory: Negative for cough, chest tightness, shortness of breath, wheezing and stridor. Cardiovascular: Positive for chest pain. Negative for palpitations. Gastrointestinal: Negative for abdominal distention, abdominal pain, constipation, diarrhea, nausea and vomiting. Genitourinary: Negative for difficulty urinating, dysuria and hematuria. Musculoskeletal: Negative for back pain, joint swelling and myalgias. Skin: Negative for rash and wound. Neurological: Negative for dizziness, weakness and headaches. Psychiatric/Behavioral: Negative for agitation. The patient is not nervous/anxious. Physical Exam     Visit Vitals  BP (!) 140/73 (BP 1 Location: Left upper arm, BP Patient Position: At rest)   Pulse 85   Temp 98.8 °F (37.1 °C)   Resp 18   Ht 5' (1.524 m)   Wt 102.1 kg (225 lb)   LMP 11/14/2021   SpO2 100%   BMI 43.94 kg/m²         Physical Exam  Constitutional:       General: She is not in acute distress. Appearance: She is not toxic-appearing. HENT:      Head: Normocephalic and atraumatic. Mouth/Throat:      Mouth: Mucous membranes are moist.   Eyes:      Extraocular Movements: Extraocular movements intact. Pupils: Pupils are equal, round, and reactive to light. Cardiovascular:      Rate and Rhythm: Normal rate and regular rhythm. Heart sounds: Normal heart sounds.  No murmur heard. No friction rub. No gallop. Pulmonary:      Effort: Pulmonary effort is normal.      Breath sounds: Normal breath sounds. Abdominal:      General: There is no distension. Palpations: Abdomen is soft. There is no mass. Tenderness: There is no abdominal tenderness. There is no guarding. Hernia: No hernia is present. Musculoskeletal:         General: No swelling, tenderness or deformity. Cervical back: Normal range of motion and neck supple. Skin:     General: Skin is warm and dry. Findings: No rash. Neurological:      General: No focal deficit present. Mental Status: She is alert and oriented to person, place, and time.    Psychiatric:         Mood and Affect: Mood normal.           Diagnostic Study Results     Labs -  Recent Results (from the past 12 hour(s))   EKG, 12 LEAD, INITIAL    Collection Time: 11/22/21 11:29 PM   Result Value Ref Range    Ventricular Rate 88 BPM    Atrial Rate 88 BPM    P-R Interval 144 ms    QRS Duration 80 ms    Q-T Interval 360 ms    QTC Calculation (Bezet) 435 ms    Calculated P Axis 19 degrees    Calculated R Axis 49 degrees    Calculated T Axis 36 degrees    Diagnosis       Normal sinus rhythm  Inferior infarct (cited on or before 17-MAY-2020)  Abnormal ECG  When compared with ECG of 14-SEP-2020 22:41,  No significant change was found     CBC WITH AUTOMATED DIFF    Collection Time: 11/22/21 11:44 PM   Result Value Ref Range    WBC 7.7 4.6 - 13.2 K/uL    RBC 4.81 4.20 - 5.30 M/uL    HGB 11.9 (L) 12.0 - 16.0 g/dL    HCT 37.8 35.0 - 45.0 %    MCV 78.6 78.0 - 100.0 FL    MCH 24.7 24.0 - 34.0 PG    MCHC 31.5 31.0 - 37.0 g/dL    RDW 16.2 (H) 11.6 - 14.5 %    PLATELET 972 (H) 645 - 420 K/uL    MPV 9.7 9.2 - 11.8 FL    NRBC 0.0 0  WBC    ABSOLUTE NRBC 0.00 0.00 - 0.01 K/uL    NEUTROPHILS 47 40 - 73 %    LYMPHOCYTES 44 21 - 52 %    MONOCYTES 7 3 - 10 %    EOSINOPHILS 3 0 - 5 %    BASOPHILS 0 0 - 2 %    IMMATURE GRANULOCYTES 0 0.0 - 0.5 %    ABS. NEUTROPHILS 3.6 1.8 - 8.0 K/UL    ABS. LYMPHOCYTES 3.4 0.9 - 3.6 K/UL    ABS. MONOCYTES 0.5 0.05 - 1.2 K/UL    ABS. EOSINOPHILS 0.2 0.0 - 0.4 K/UL    ABS. BASOPHILS 0.0 0.0 - 0.1 K/UL    ABS. IMM. GRANS. 0.0 0.00 - 0.04 K/UL    DF AUTOMATED     METABOLIC PANEL, BASIC    Collection Time: 11/22/21 11:44 PM   Result Value Ref Range    Sodium 139 136 - 145 mmol/L    Potassium 3.4 (L) 3.5 - 5.5 mmol/L    Chloride 106 100 - 111 mmol/L    CO2 28 21 - 32 mmol/L    Anion gap 5 3.0 - 18 mmol/L    Glucose 81 74 - 99 mg/dL    BUN 8 7.0 - 18 MG/DL    Creatinine 0.73 0.6 - 1.3 MG/DL    BUN/Creatinine ratio 11 (L) 12 - 20      GFR est AA >60 >60 ml/min/1.73m2    GFR est non-AA >60 >60 ml/min/1.73m2    Calcium 8.9 8.5 - 10.1 MG/DL   CARDIAC PANEL,(CK, CKMB & TROPONIN)    Collection Time: 11/22/21 11:44 PM   Result Value Ref Range    CK - MB <1.0 <3.6 ng/ml    CK-MB Index  0.0 - 4.0 %     CALCULATION NOT PERFORMED WHEN RESULT IS BELOW LINEAR LIMIT    CK 69 26 - 192 U/L    Troponin-I, QT <0.02 0.0 - 0.045 NG/ML       Radiologic Studies -   XR CHEST PORT    (Results Pending)         Medical Decision Making   I am the first provider for this patient. I reviewed the vital signs, available nursing notes, past medical history, past surgical history, family history and social history. Vital Signs-Reviewed the patient's vital signs. Records Reviewed: Nursing Notes, Old Medical Records, Previous electrocardiograms, Previous Radiology Studies and Previous Laboratory Studies (Time of Review: 12:15 AM)    ED Course: Progress Notes, Reevaluation, and Consults:         Provider Notes (Medical Decision Making):   MDM  Number of Diagnoses or Management Options  Chest pain, unspecified type  Diagnosis management comments: 57-year-old female presenting to the emergency department for evaluation of chest pain. Currently asymptomatic and chest pain-free. She appears no acute distress. Vital signs stable.   Saturating well on room air. EKG demonstrates a normal sinus rhythm with a rate of 88 without any evidence of ST elevation or depression. Normal intervals. Normal heart sounds on chest auscultation. Will obtain cardiac enzymes and screening lab work and chest x-ray. 0027:  Patient has a negative troponin. Blood work largely unremarkable. Chest x-ray unremarkable. Currently remains asymptomatic and chest pain-free with stable vitals. I have discussed this with the patient and states that she would like to be discharged and she will follow up with her cardiologist soon as possible. We will give him a call tomorrow I think that this is reasonable. She has been given strict ED return precautions. Patient verbalizes good understanding agreement plan. Diagnosis     Clinical Impression: No diagnosis found. Disposition: home    Follow-up Information    None          Patient's Medications   Start Taking    No medications on file   Continue Taking    AMLODIPINE (NORVASC) 5 MG TABLET    Take 5 mg by mouth nightly. ASPIRIN 81 MG CHEWABLE TABLET    Take 81 mg by mouth daily. IBUPROFEN (MOTRIN) 600 MG TABLET    Take 1 Tablet by mouth every six (6) hours as needed for Pain. METOPROLOL SUCCINATE (TOPROL-XL) 25 MG XL TABLET    Take 12.5 mg by mouth daily. NITROGLYCERIN (NITROSTAT) 0.4 MG SL TABLET    0.4 mg. SIMETHICONE (GAS-X) 125 MG CAPSULE    Take 125 mg by mouth four (4) times daily as needed for Flatulence. These Medications have changed    No medications on file   Stop Taking    No medications on file     Disclaimer: Sections of this note are dictated using utilizing voice recognition software. Minor typographical errors may be present. If questions arise, please do not hesitate to contact me or call our department.

## 2021-11-23 NOTE — ED TRIAGE NOTES
Sudden onset chest pain 1630 today. Relieved by 1 sl Nitro. Hx of cardiac stent in 1/2018. Vitals stable.

## 2021-12-08 ENCOUNTER — TELEPHONE (OUTPATIENT)
Dept: FAMILY MEDICINE CLINIC | Age: 41
End: 2021-12-08

## 2021-12-08 NOTE — TELEPHONE ENCOUNTER
----- Message from Julio César Miller sent at 12/8/2021 12:18 PM EST -----  Subject: Message to Provider    QUESTIONS  Information for Provider? Patient looking to get fasting labs put in   system to complete prior to appt on Dec 23rd.   ---------------------------------------------------------------------------  --------------  Andriy Kenny INFO  What is the best way for the office to contact you? OK to leave message on   voicemail  Preferred Call Back Phone Number? [phone]  ---------------------------------------------------------------------------  --------------  SCRIPT ANSWERS  Relationship to Patient?  Self

## 2021-12-09 DIAGNOSIS — I10 PRIMARY HYPERTENSION: ICD-10-CM

## 2021-12-09 DIAGNOSIS — R73.03 PREDIABETES: Primary | ICD-10-CM

## 2021-12-15 ENCOUNTER — HOSPITAL ENCOUNTER (OUTPATIENT)
Dept: LAB | Age: 41
Discharge: HOME | End: 2021-12-15

## 2021-12-15 LAB — XX-LABCORP SPECIMEN COL,LCBCF: NORMAL

## 2021-12-15 PROCEDURE — 99001 SPECIMEN HANDLING PT-LAB: CPT

## 2021-12-16 LAB
ALBUMIN SERPL-MCNC: 3.9 G/DL (ref 3.8–4.8)
ALBUMIN/GLOB SERPL: 1.3 {RATIO} (ref 1.2–2.2)
ALP SERPL-CCNC: 80 IU/L (ref 44–121)
ALT SERPL-CCNC: 9 IU/L (ref 0–32)
AST SERPL-CCNC: 10 IU/L (ref 0–40)
BASOPHILS # BLD AUTO: 0 X10E3/UL (ref 0–0.2)
BASOPHILS NFR BLD AUTO: 0 %
BILIRUB SERPL-MCNC: 0.2 MG/DL (ref 0–1.2)
BUN SERPL-MCNC: 8 MG/DL (ref 6–24)
BUN/CREAT SERPL: 12 (ref 9–23)
CALCIUM SERPL-MCNC: 9 MG/DL (ref 8.7–10.2)
CHLORIDE SERPL-SCNC: 105 MMOL/L (ref 96–106)
CO2 SERPL-SCNC: 19 MMOL/L (ref 20–29)
CREAT SERPL-MCNC: 0.68 MG/DL (ref 0.57–1)
EOSINOPHIL # BLD AUTO: 0.2 X10E3/UL (ref 0–0.4)
EOSINOPHIL NFR BLD AUTO: 3 %
ERYTHROCYTE [DISTWIDTH] IN BLOOD BY AUTOMATED COUNT: 16.4 % (ref 11.7–15.4)
EST. AVERAGE GLUCOSE BLD GHB EST-MCNC: 123 MG/DL
GLOBULIN SER CALC-MCNC: 3 G/DL (ref 1.5–4.5)
GLUCOSE SERPL-MCNC: 99 MG/DL (ref 65–99)
HBA1C MFR BLD: 5.9 % (ref 4.8–5.6)
HCT VFR BLD AUTO: 36.7 % (ref 34–46.6)
HGB BLD-MCNC: 11.3 G/DL (ref 11.1–15.9)
IMM GRANULOCYTES # BLD AUTO: 0 X10E3/UL (ref 0–0.1)
IMM GRANULOCYTES NFR BLD AUTO: 0 %
LYMPHOCYTES # BLD AUTO: 2.3 X10E3/UL (ref 0.7–3.1)
LYMPHOCYTES NFR BLD AUTO: 45 %
MCH RBC QN AUTO: 24.7 PG (ref 26.6–33)
MCHC RBC AUTO-ENTMCNC: 30.8 G/DL (ref 31.5–35.7)
MCV RBC AUTO: 80 FL (ref 79–97)
MONOCYTES # BLD AUTO: 0.3 X10E3/UL (ref 0.1–0.9)
MONOCYTES NFR BLD AUTO: 6 %
NEUTROPHILS # BLD AUTO: 2.3 X10E3/UL (ref 1.4–7)
NEUTROPHILS NFR BLD AUTO: 46 %
PLATELET # BLD AUTO: 416 X10E3/UL (ref 150–450)
POTASSIUM SERPL-SCNC: 4.3 MMOL/L (ref 3.5–5.2)
PROT SERPL-MCNC: 6.9 G/DL (ref 6–8.5)
RBC # BLD AUTO: 4.57 X10E6/UL (ref 3.77–5.28)
SODIUM SERPL-SCNC: 140 MMOL/L (ref 134–144)
WBC # BLD AUTO: 5.1 X10E3/UL (ref 3.4–10.8)

## 2021-12-23 ENCOUNTER — OFFICE VISIT (OUTPATIENT)
Dept: FAMILY MEDICINE CLINIC | Age: 41
End: 2021-12-23
Payer: MEDICAID

## 2021-12-23 VITALS
OXYGEN SATURATION: 99 % | BODY MASS INDEX: 44.17 KG/M2 | HEIGHT: 60 IN | TEMPERATURE: 97.7 F | DIASTOLIC BLOOD PRESSURE: 84 MMHG | HEART RATE: 78 BPM | SYSTOLIC BLOOD PRESSURE: 126 MMHG | WEIGHT: 225 LBS | RESPIRATION RATE: 16 BRPM

## 2021-12-23 DIAGNOSIS — E78.5 HYPERLIPIDEMIA LDL GOAL <70: ICD-10-CM

## 2021-12-23 DIAGNOSIS — I10 ESSENTIAL HYPERTENSION: Primary | ICD-10-CM

## 2021-12-23 DIAGNOSIS — R73.03 PREDIABETES: ICD-10-CM

## 2021-12-23 DIAGNOSIS — D50.0 IRON DEFICIENCY ANEMIA DUE TO CHRONIC BLOOD LOSS: ICD-10-CM

## 2021-12-23 DIAGNOSIS — N89.8 VAGINAL ODOR: ICD-10-CM

## 2021-12-23 DIAGNOSIS — Z78.9 STATIN INTOLERANCE: ICD-10-CM

## 2021-12-23 DIAGNOSIS — I25.119 CORONARY ARTERY DISEASE INVOLVING NATIVE CORONARY ARTERY OF NATIVE HEART WITH ANGINA PECTORIS (HCC): ICD-10-CM

## 2021-12-23 PROCEDURE — 99214 OFFICE O/P EST MOD 30 MIN: CPT | Performed by: FAMILY MEDICINE

## 2021-12-23 RX ORDER — FLUCONAZOLE 150 MG/1
150 TABLET ORAL DAILY
Qty: 1 TABLET | Refills: 0 | Status: SHIPPED | OUTPATIENT
Start: 2021-12-23 | End: 2021-12-24

## 2021-12-23 RX ORDER — CETIRIZINE HCL 10 MG
10 TABLET ORAL
Qty: 90 TABLET | Refills: 1 | Status: SHIPPED | OUTPATIENT
Start: 2021-12-23 | End: 2022-10-05 | Stop reason: ALTCHOICE

## 2021-12-23 RX ORDER — FLUTICASONE PROPIONATE 50 MCG
2 SPRAY, SUSPENSION (ML) NASAL DAILY
Qty: 1 EACH | Refills: 2 | Status: SHIPPED | OUTPATIENT
Start: 2021-12-23 | End: 2022-10-05 | Stop reason: SDUPTHER

## 2021-12-23 RX ORDER — METRONIDAZOLE 500 MG/1
500 TABLET ORAL 2 TIMES DAILY
Qty: 14 TABLET | Refills: 0 | Status: SHIPPED | OUTPATIENT
Start: 2021-12-23 | End: 2021-12-30

## 2021-12-23 NOTE — PROGRESS NOTES
1. Have you been to the ER, urgent care clinic since your last visit? Hospitalized since your last visit? ER 11/22/2021 Chest pain    2. Have you seen or consulted any other health care providers outside of the [address] [place] [address] since your last visit? Include any pap smears or colon screening.  No

## 2021-12-23 NOTE — PATIENT INSTRUCTIONS
A Healthy Heart: Care Instructions  Your Care Instructions     Coronary artery disease, also called heart disease, occurs when a substance called plaque builds up in the vessels that supply oxygen-rich blood to your heart muscle. This can narrow the blood vessels and reduce blood flow. A heart attack happens when blood flow is completely blocked. A high-fat diet, smoking, and other factors increase the risk of heart disease. Your doctor has found that you have a chance of having heart disease. You can do lots of things to keep your heart healthy. It may not be easy, but you can change your diet, exercise more, and quit smoking. These steps really work to lower your chance of heart disease. Follow-up care is a key part of your treatment and safety. Be sure to make and go to all appointments, and call your doctor if you are having problems. It's also a good idea to know your test results and keep a list of the medicines you take. How can you care for yourself at home? Diet    · Use less salt when you cook and eat. This helps lower your blood pressure. Taste food before salting. Add only a little salt when you think you need it. With time, your taste buds will adjust to less salt.     · Eat fewer snack items, fast foods, canned soups, and other high-salt, high-fat, processed foods.     · Read food labels and try to avoid saturated and trans fats. They increase your risk of heart disease by raising cholesterol levels.     · Limit the amount of solid fat-butter, margarine, and shortening-you eat. Use olive, peanut, or canola oil when you cook. Bake, broil, and steam foods instead of frying them.     · Eat a variety of fruit and vegetables every day. Dark green, deep orange, red, or yellow fruits and vegetables are especially good for you. Examples include spinach, carrots, peaches, and berries.     · Foods high in fiber can reduce your cholesterol and provide important vitamins and minerals.  High-fiber foods include whole-grain cereals and breads, oatmeal, beans, brown rice, citrus fruits, and apples.     · Eat lean proteins. Heart-healthy proteins include seafood, lean meats and poultry, eggs, beans, peas, nuts, seeds, and soy products.     · Limit drinks and foods with added sugar. These include candy, desserts, and soda pop. Lifestyle changes    · If your doctor recommends it, get more exercise. Walking is a good choice. Bit by bit, increase the amount you walk every day. Try for at least 30 minutes on most days of the week. You also may want to swim, bike, or do other activities.     · Do not smoke. If you need help quitting, talk to your doctor about stop-smoking programs and medicines. These can increase your chances of quitting for good. Quitting smoking may be the most important step you can take to protect your heart. It is never too late to quit.     · Limit alcohol to 2 drinks a day for men and 1 drink a day for women. Too much alcohol can cause health problems.     · Manage other health problems such as diabetes, high blood pressure, and high cholesterol. If you think you may have a problem with alcohol or drug use, talk to your doctor. Medicines    · Take your medicines exactly as prescribed. Call your doctor if you think you are having a problem with your medicine.     · If your doctor recommends aspirin, take the amount directed each day. Make sure you take aspirin and not another kind of pain reliever, such as acetaminophen (Tylenol). When should you call for help? Call 911 if you have symptoms of a heart attack. These may include:    · Chest pain or pressure, or a strange feeling in the chest.     · Sweating.     · Shortness of breath.     · Pain, pressure, or a strange feeling in the back, neck, jaw, or upper belly or in one or both shoulders or arms.     · Lightheadedness or sudden weakness.     · A fast or irregular heartbeat.    After you call 911, the  may tell you to chew 1 adult-strength or 2 to 4 low-dose aspirin. Wait for an ambulance. Do not try to drive yourself. Watch closely for changes in your health, and be sure to contact your doctor if you have any problems. Where can you learn more? Go to http://www.[employer].com/  Enter F075 in the search box to learn more about \"A Healthy Heart: Care Instructions. \"  Current as of: April 29, 2021               Content Version: 13.0  © 2006-2021 [employer]. Care instructions adapted under license by [employer] (which disclaims liability or warranty for this information). If you have questions about a medical condition or this instruction, always ask your healthcare professional. [address] any warranty or liability for your use of this information.

## 2021-12-23 NOTE — PROGRESS NOTES
Mami Forte, 35 y.o.,  female    SUBJECTIVE  Ff-up  Pt w/ h/o CAD s/p PCI 2008, cath 9/2019 80% ostial OM1, patent RCA EF 60% on med therapy (small size of OMB and large native LCx, felt OM to be poor target for PCI)  She is currently on BB, ASA, prn nitro. She is intolerant to statins including zetia. [facility] cardiology  November chest pain ED visit likely MSK says spontaneously resolved, occurred after heavy lifting. Neg ED  Cardiac work up  work up    [facility]     HTN- she continues to take BB, CCB, asa     Requesting BV meds, with on and off vaginal odor, no pelvic pain or new partner    ROS:  See HPI, all others negative        Patient Active Problem List   Diagnosis Code    Tobacco use Z72.0    Obesity E66.9    Hypertension I10    Prediabetes R73.03    Low HDL (under 40) E78.6    Obesity, morbid ([facility] E66.01    ST elevation myocardial infarction (STEMI) ([facility] I21.3    Iron deficiency anemia due to chronic blood loss D50.0    Coronary artery disease involving native coronary artery of native heart with angina pectoris (HCC) I25.119    Adjustment disorder with depressed mood F43.21    Epigastric pain R10.13    Calculus of gallbladder without cholecystitis without obstruction K80.20    Hyperlipidemia LDL goal <70 E78.5    Former smoker Z87.891    Chest pain R07.9    Statin intolerance Z78.9       Current Outpatient Medications   Medication Sig Dispense Refill    metroNIDAZOLE (FLAGYL) 500 mg tablet Take 1 Tablet by mouth two (2) times a day for 7 days. 14 Tablet 0    fluconazole (DIFLUCAN) 150 mg tablet Take 1 Tablet by mouth daily for 1 day. FDA advises cautious prescribing of oral fluconazole in pregnancy. 1 Tablet 0    fluticasone propionate (FLONASE) 50 mcg/actuation nasal spray 2 Sprays by Both Nostrils route daily. 1 Each 2    cetirizine (ZYRTEC) 10 mg tablet Take 1 Tablet by mouth daily as needed for Allergies.  90 Tablet 1    ibuprofen (MOTRIN) 600 mg tablet Take 1 Tablet by mouth every six (6) hours as needed for Pain. 20 Tablet 0    metoprolol succinate (TOPROL-XL) 25 mg XL tablet Take 12.5 mg by mouth daily.  amLODIPine (NORVASC) 5 mg tablet Take 5 mg by mouth nightly.  aspirin 81 mg chewable tablet Take 81 mg by mouth daily.  simethicone (Gas-X) 125 mg capsule Take 125 mg by mouth four (4) times daily as needed for Flatulence. (Patient not taking: Reported on 12/23/2021)      nitroglycerin (NITROSTAT) 0.4 mg SL tablet 0.4 mg. Allergies   Allergen Reactions    Lisinopril Cough    Metoprolol Palpitations     Not allergic/ patient is TAKING    Statins-Hmg-Coa Reductase Inhibitors Other (comments)     Muscle/joint pain    Vaccine Adjuvant Emulsion Combination No. 1 Seizures     MMR    Zofran Odt [Ondansetron] Swelling     migraines    Zofran [Ondansetron Hcl] Swelling     migraines       Past Medical History:   Diagnosis Date    Cardiac echocardiogram 07/21/2016    Sm LV cavity. EF 65-70%. No RWMA. Mod-marked LVH w/dynamic obstruction, mid-cavity obliteration & peak grad of 25 mmHg. Indeterminate diastolic fx. No significant valvular pathology.  Cardiac nuclear imaging test 07/22/2016    Low risk. Very sm distal anterior & apical partially reversible defect, likely artifact, but sm area of ischemia not excluded. No RWMA. EF 66%.   Neg EKG on pharm stress test.    COVID-19     Diverticulitis     Gall stones     GERD (gastroesophageal reflux disease)     Heart attack ([facility]     01/27/2018    Hypertension     Infectious disease     Bacteria vaginosis    Nausea & vomiting     Non-ST elevated myocardial infarction ([facility]     STEMI (ST elevation myocardial infarction) ([facility] [facility] 01/2018       Social History     Socioeconomic History    Marital status: SINGLE     Spouse name: Not on file    Number of children: Not on file    Years of education: Not on file    Highest education level: Not on file   Occupational History    Not on file   Tobacco Use    Smoking status: Former Smoker     Packs/day: 0.25     Years: 20.00     Pack years: 5.00     Quit date: 6/27/2018     Years since quitting: 3.4    Smokeless tobacco: Never Used   Vaping Use    Vaping Use: Never used   Substance and Sexual Activity    Alcohol use: No    Drug use: Not Currently     Types: Marijuana     Comment: 25 yrs ago    Sexual activity: Yes     Partners: Male     Birth control/protection: Surgical     Comment: tubal ligation   Other Topics Concern    Not on file   Social History Narrative    Not on file     Social Determinants of Health     Financial Resource Strain:     Difficulty of Paying Living Expenses: Not on file   Food Insecurity:     Worried About Running Out of Food in the Last Year: Not on file    Eddie of Food in the Last Year: Not on file   Transportation Needs:     Lack of Transportation (Medical): Not on file    Lack of Transportation (Non-Medical):  Not on file   Physical Activity:     Days of Exercise per Week: Not on file    Minutes of Exercise per Session: Not on file   Stress:     Feeling of Stress : Not on file   Social Connections:     Frequency of Communication with Friends and Family: Not on file    Frequency of Social Gatherings with Friends and Family: Not on file    Attends Restoration Services: Not on file    Active Member of [address] [place] [address] [employer] or Organizations: Not on file    Attends Club or Organization Meetings: Not on file    Marital Status: Not on file   Intimate Partner Violence:     Fear of Current or Ex-Partner: Not on file    Emotionally Abused: Not on file    Physically Abused: Not on file    Sexually Abused: Not on file   Housing Stability:     Unable to Pay for Housing in the Last Year: Not on file    Number of [place] in the Last Year: Not on file    Unstable Housing in the Last Year: Not on file       Family History   Problem Relation Age of Onset    Depression Mother     Asthma Mother     Migraines Mother     Hypertension Mother     Stroke Mother     Heart Attack Mother    Tyler Watson Mother     Depression Brother     Alcohol abuse Father     Substance Abuse Father         tobacco    Drug Abuse Father     Hypertension Father     High Cholesterol Father     Stroke Father     Rashes/Skin Problems Father     Substance Abuse Brother         tobacco    Drug Abuse Brother     Asthma Brother     Migraines Brother     Hypertension Brother     High Cholesterol Brother     Hypertension Paternal Grandmother     Diabetes Paternal Grandmother     Hypertension Maternal Grandmother     Colon Cancer Maternal Grandmother          OBJECTIVE    Physical Exam:     Visit Vitals  /84 (BP 1 Location: Left upper arm, BP Patient Position: Sitting, BP Cuff Size: Adult)   Pulse 78   Temp 97.7 °F (36.5 °C) (Oral)   Resp 16   Ht 5' (1.524 m)   Wt 225 lb (102.1 kg)   SpO2 99%   BMI 43.94 kg/m²       General: alert, well-appearing, obese, AA, in no apparent distress or pain  Head: atraumatic. Non-tender maxillary and frontal sinuses  Neck: supple, no adenopathy palpated  CVS: normal rate, regular rhythm, distinct S1 and S2  Lungs:clear to ausculation bilaterally, no crackles, wheezing or rhonchi noted  Abdomen: normoactive bowel sounds, soft, non-tender  Extremities: no edema, no cyanosis, MSK grossly normal  Skin: warm, no lesions, rashes noted  Psych:  mood and affect normal        ASSESSMENT/PLAN  Diagnoses and all orders for this visit:    1. Essential hypertension  Controlled  Cont norvasc, BB    2. Prediabetes  Tlcs, monitoring, await results    3. Coronary artery disease involving native coronary artery of native heart with angina pectoris [facility])  S/p PCI 2008  Asymptomatic  Cont asa, BB, intolerant to statin/zetia    4. Obesity, morbid ([address]  Discussed wt loss    5. Hyperlipidemia LDL goal <70    6. Statin intolerance    7.  Vaginal odor  Start flagyl, diflucan  Pt declines pelvic exam today, on her menstural cycle  Ff-up for pelvic exam if not better      Follow-up and Dispositions    · Return in about 6 months (around 6/23/2022), or if symptoms worsen or fail to improve, for fasting labs a week prior to your next visit, plan for Complete physical on next visit. Patient understands plan of care. Patient has provided input and agrees with goals.

## 2021-12-28 NOTE — TELEPHONE ENCOUNTER
This patient contacted office for the following prescriptions to be filled:    Medication requested : metroNIDAZOLE (FLAGYL) 500 mg tablet      PCP: Anthony Crump or Print:  shawna   Mail order or Local pharmacy [phone]    Scheduled appointment if not seen by current providers in office: Shad Cartwright 3/20/2020 LOV2/21/2020 Staged Advancement Flap Text: The defect edges were debeveled with a #15 scalpel blade.  Given the location of the defect, shape of the defect and the proximity to free margins a staged advancement flap was deemed most appropriate.  Using a sterile surgical marker, an appropriate advancement flap was drawn incorporating the defect and placing the expected incisions within the relaxed skin tension lines where possible. The area thus outlined was incised deep to adipose tissue with a #15 scalpel blade.  The skin margins were undermined to an appropriate distance in all directions utilizing iris scissors.

## 2022-01-08 ENCOUNTER — TELEPHONE (OUTPATIENT)
Dept: FAMILY MEDICINE CLINIC | Age: 42
End: 2022-01-08

## 2022-01-08 NOTE — TELEPHONE ENCOUNTER
Spoke with patient who complains of a productive cough with yellowish, tan phlegm. The cough has caused rib and chest pain. Patient has tried OTC Coricidin, Robitussin DM, Tylenol and Ibuprofen. Patient uses [facility] on [address].

## 2022-01-08 NOTE — TELEPHONE ENCOUNTER
----- Message from Sanjeev Sood sent at 1/7/2022  1:23 PM EST -----  Subject: Message to Provider    QUESTIONS  Information for Provider? Shaina Villanueva is requesting a prescription to be called   in for a cough that has caused her rib pain and chest muscle pain. She is   concerned what she is taking over the counter is okay with the medication   she currently takes. Please have someone reach to her.   ---------------------------------------------------------------------------  --------------  CALL BACK INFO  What is the best way for the office to contact you? OK to leave message on   voicemail  Preferred Call Back Phone Number? [phone]  ---------------------------------------------------------------------------  --------------  SCRIPT ANSWERS  Relationship to Patient?  Self

## 2022-01-10 ENCOUNTER — VIRTUAL VISIT (OUTPATIENT)
Dept: FAMILY MEDICINE CLINIC | Age: 42
End: 2022-01-10
Payer: MEDICAID

## 2022-01-10 DIAGNOSIS — J20.9 ACUTE BRONCHITIS, UNSPECIFIED ORGANISM: Primary | ICD-10-CM

## 2022-01-10 PROCEDURE — 99214 OFFICE O/P EST MOD 30 MIN: CPT | Performed by: FAMILY MEDICINE

## 2022-01-10 RX ORDER — NAPROXEN SODIUM 220 MG
220 TABLET ORAL
COMMUNITY

## 2022-01-10 RX ORDER — BENZONATATE 150 MG/1
1 CAPSULE ORAL 2 TIMES DAILY
Qty: 20 CAPSULE | Refills: 0 | Status: SHIPPED | OUTPATIENT
Start: 2022-01-10 | End: 2022-10-05

## 2022-01-10 RX ORDER — AZITHROMYCIN 250 MG/1
TABLET, FILM COATED ORAL
Qty: 6 TABLET | Refills: 0 | Status: SHIPPED | OUTPATIENT
Start: 2022-01-10 | End: 2022-03-21 | Stop reason: ALTCHOICE

## 2022-01-10 RX ORDER — ACETAMINOPHEN 500 MG
TABLET ORAL
COMMUNITY

## 2022-01-10 NOTE — PROGRESS NOTES
Carolyn Terrell is a 35 y.o. female who was seen by synchronous (real-time) audio-video technology on 1/10/2022. Consent: Carolyn Terrell, who was seen by synchronous (real-time) audio-video technology, and/or her healthcare decision maker, is aware that this patient-initiated, Telehealth encounter on 1/10/2022 is a billable service, with coverage as determined by her insurance carrier. She is aware that she may receive a bill and has provided verbal consent to proceed: Yes. 712  Subjective:   Carolyn Terrell is a 35 y.o. female who was seen for Cough (x 6 days - productive - mucus tannish-brown at first and now clear), Chest Pain (due to cough - pain level 9), and Rib Pain (due to cough - pain level 9)  cough x 6 days    Productive cough w/ yellowish sputum and rib discomfort with coughing, no sob, wheezing. She has hx CAD, former smoker, has not received covid vaccine. Prior to Admission medications    Medication Sig Start Date End Date Taking? Authorizing Provider   acetaminophen (Tylenol Extra Strength) 500 mg tablet Take  by mouth every six (6) hours as needed for Pain. Yes Provider, Historical   acetaminophen/chlorpheniramine (CORICIDIN PO) Take  by mouth. Yes Provider, Historical   pseudoephedrine HCl (WAL-PHED D PO) Take  by mouth. Yes Provider, Historical   guaifenesin (MUCINEX PO) Take  by mouth. Yes Provider, Historical   loratadine (WAL-ITIN PO) Take  by mouth. Yes Provider, Historical   azithromycin (ZITHROMAX) 250 mg tablet Take two tablets today then one tablet daily 1/10/22  Yes Phuc Thompson MD   Benzonatate 150 mg cap Take 1 Capsule by mouth two (2) times a day. 1/10/22  Yes Omkar Castle MD   fluticasone propionate (FLONASE) 50 mcg/actuation nasal spray 2 Sprays by Both Nostrils route daily. 12/23/21  Yes Omkar Castle MD   cetirizine (ZYRTEC) 10 mg tablet Take 1 Tablet by mouth daily as needed for Allergies.  12/23/21  Yes Omkar Castle MD metoprolol succinate (TOPROL-XL) 25 mg XL tablet Take 12.5 mg by mouth daily. Yes Provider, Historical   amLODIPine (NORVASC) 5 mg tablet Take 5 mg by mouth nightly. Yes Provider, Historical   aspirin 81 mg chewable tablet Take 81 mg by mouth daily. 1/30/18  Yes Provider, Historical   nitroglycerin (NITROSTAT) 0.4 mg SL tablet 0.4 mg. 7/22/16  Yes Provider, Historical   naproxen sodium (NAPROSYN) 220 mg tablet Take 220 mg by mouth two (2) times daily as needed. Provider, Historical   simethicone (Gas-X) 125 mg capsule Take 125 mg by mouth four (4) times daily as needed for Flatulence. Patient not taking: Reported on 12/23/2021    Other, MD Lissette   ibuprofen (MOTRIN) 600 mg tablet Take 1 Tablet by mouth every six (6) hours as needed for Pain.   Patient not taking: Reported on 1/10/2022 9/21/21   Mack Tay MD     Allergies   Allergen Reactions    Lisinopril Cough    Metoprolol Palpitations     Not allergic/ patient is TAKING    Statins-Hmg-Coa Reductase Inhibitors Other (comments)     Muscle/joint pain    Vaccine Adjuvant Emulsion Combination No. 1 Seizures     MMR    Zofran Odt [Ondansetron] Swelling     migraines    Zofran [Ondansetron Hcl] Swelling     migraines       Patient Active Problem List    Diagnosis Date Noted    Statin intolerance 08/12/2021    Chest pain 05/18/2020    Hyperlipidemia LDL goal <70 08/26/2019    Former smoker 08/26/2019    Adjustment disorder with depressed mood 11/09/2018    Epigastric pain 11/09/2018    Calculus of gallbladder without cholecystitis without obstruction 11/09/2018    Coronary artery disease involving native coronary artery of native heart with angina pectoris ([facility] 07/05/2018    Iron deficiency anemia due to chronic blood loss 06/07/2018    Obesity, morbid ([facility] 04/26/2018    ST [facility] ([facility]) [facility] 04/26/2018    Low HDL (under 40) 01/27/2016    Prediabetes 11/14/2014    Tobacco use 10/30/2014    Obesity 10/30/2014    Hypertension 10/30/2014     Current Outpatient Medications   Medication Sig Dispense Refill    acetaminophen (Tylenol Extra Strength) 500 mg tablet Take  by mouth every six (6) hours as needed for Pain.  acetaminophen/chlorpheniramine (CORICIDIN PO) Take  by mouth.  pseudoephedrine HCl (WAL-PHED D PO) Take  by mouth.  guaifenesin (MUCINEX PO) Take  by mouth.  loratadine (WAL-ITIN PO) Take  by mouth.  azithromycin (ZITHROMAX) 250 mg tablet Take two tablets today then one tablet daily 6 Tablet 0    Benzonatate 150 mg cap Take 1 Capsule by mouth two (2) times a day. 20 Capsule 0    fluticasone propionate (FLONASE) 50 mcg/actuation nasal spray 2 Sprays by Both Nostrils route daily. 1 Each 2    cetirizine (ZYRTEC) 10 mg tablet Take 1 Tablet by mouth daily as needed for Allergies. 90 Tablet 1    metoprolol succinate (TOPROL-XL) 25 mg XL tablet Take 12.5 mg by mouth daily.  amLODIPine (NORVASC) 5 mg tablet Take 5 mg by mouth nightly.  aspirin 81 mg chewable tablet Take 81 mg by mouth daily.  nitroglycerin (NITROSTAT) 0.4 mg SL tablet 0.4 mg.      naproxen sodium (NAPROSYN) 220 mg tablet Take 220 mg by mouth two (2) times daily as needed.  simethicone (Gas-X) 125 mg capsule Take 125 mg by mouth four (4) times daily as needed for Flatulence. (Patient not taking: Reported on 12/23/2021)      ibuprofen (MOTRIN) 600 mg tablet Take 1 Tablet by mouth every six (6) hours as needed for Pain.  (Patient not taking: Reported on 1/10/2022) 20 Tablet 0     Allergies   Allergen Reactions    Lisinopril Cough    Metoprolol Palpitations     Not allergic/ patient is TAKING    Statins-Hmg-Coa Reductase Inhibitors Other (comments)     Muscle/joint pain    Vaccine Adjuvant Emulsion Combination No. 1 Seizures     MMR    Zofran Odt [Ondansetron] Swelling     migraines    Zofran [Ondansetron Hcl] Swelling     migraines     Past Medical History:   Diagnosis Date    Cardiac echocardiogram 07/21/2016    Sm LV cavity. EF 65-70%. No RWMA. Mod-marked LVH w/dynamic obstruction, mid-cavity obliteration & peak grad of 25 mmHg. Indeterminate diastolic fx. No significant valvular pathology.  Cardiac nuclear imaging test 07/22/2016    Low risk. Very sm distal anterior & apical partially reversible defect, likely artifact, but sm area of ischemia not excluded. No RWMA. EF 66%.   Neg EKG on pharm stress test.    COVID-19     Diverticulitis     Gall stones     GERD (gastroesophageal reflux disease)     Heart attack ([facility]     01/27/2018    Hypertension     Infectious disease     Bacteria vaginosis    Nausea & vomiting     Non-ST elevated myocardial infarction [facility])     STEMI (ST elevation myocardial infarction) ([facility] 01/2018     Past Surgical History:   Procedure Laterality Date    COLONOSCOPY N/A 6/26/2020    COLONOSCOPY performed by Emerson Harris MD at [address]  09/28/2008    HX CORONARY STENT PLACEMENT      HX TUBAL LIGATION  09/28/2008    MI CARDIAC SURG PROCEDURE UNLIST      stent     Family History   Problem Relation Age of Onset    Depression Mother     Asthma Mother     Migraines Mother     Hypertension Mother     Stroke Mother     Heart Attack Mother    Shannon Lynn Mother     Depression Brother     Alcohol abuse Father     Substance Abuse Father         tobacco    Drug Abuse Father     Hypertension Father     High Cholesterol Father     Stroke Father     Rashes/Skin Problems Father     Substance Abuse Brother         tobacco    Drug Abuse Brother     Asthma Brother     Migraines Brother     Hypertension Brother     High Cholesterol Brother     Hypertension Paternal Grandmother     Diabetes Paternal Grandmother     Hypertension Maternal Grandmother     Colon Cancer Maternal Grandmother      Social History     Tobacco Use    Smoking status: Former Smoker     Packs/day: 0.25     Years: 20.00     Pack years: 5.00     Quit date: 6/27/2018     Years since quitting: 3.5    Smokeless tobacco: Never Used   Substance Use Topics    Alcohol use: No       ROS      Objective: There were no vitals taken for this visit. General: alert, cooperative, no distress   Mental  status: normal mood, behavior, speech, dress, motor activity, and thought processes, able to follow commands   HENT: NCAT   Neck: no visualized mass   Resp: no respiratory distress   Neuro: no gross deficits   Skin: no discoloration or lesions of concern on visible areas   Psychiatric: normal affect, consistent with stated mood, no evidence of hallucinations     Additional exam findings: We discussed the expected course, resolution and complications of the diagnosis(es) in detail. Medication risks, benefits, costs, interactions, and alternatives were discussed as indicated. I advised her to contact the office if her condition worsens, changes or fails to improve as anticipated. She expressed understanding with the diagnosis(es) and plan. Rolan Stallings is a 35 y.o. female who was evaluated by a video visit encounter for concerns as above. Patient identification was verified prior to start of the visit. A caregiver was present when appropriate. Due to this being a TeleHealth encounter ([facility]), evaluation of the following organ systems was limited: Vitals/Constitutional/EENT/Resp/CV/GI//MS/Neuro/Skin/Heme-Lymph-Imm. Pursuant to the emergency declaration under the [facility] waiver authority and the [employer] and [employer] General Act, this Virtual  Visit was conducted, with patient's (and/or legal guardian's) consent, to reduce the patient's risk of exposure to COVID-19 and provide necessary medical care. Services were provided through a video synchronous discussion virtually to substitute for in-person clinic visit.    Patient and provider were located at their individual homes. Assessment & Plan:   Diagnoses and all orders for this visit:    1. Acute bronchitis, unspecified organism  Mild symptoms, atypical CP  Start:  -     azithromycin (ZITHROMAX) 250 mg tablet; Take two tablets today then one tablet daily  -     Benzonatate 150 mg cap; Take 1 Capsule by mouth two (2) times a day.   Self quarantine for 4 more days to complete 10 days  covid test if able      Ff-up in 2 weeks if not improvement or sooner if with worsening symptoms    Merissa Arizmendi MD

## 2022-01-10 NOTE — PROGRESS NOTES
1. Have you been to the ER, urgent care clinic since your last visit? Hospitalized since your last visit? No    2. Have you seen or consulted any other health care providers outside of the [address] [place] [address] since your last visit? Include any pap smears or colon screening.  No    Chief Complaint   Patient presents with    Cough     x 6 days - productive - mucus tannish-brown at first and now clear    Chest Pain     due to cough - pain level 9    Rib Pain     due to cough - pain level 9

## 2022-03-10 ENCOUNTER — TELEPHONE (OUTPATIENT)
Dept: SURGERY | Age: 42
End: 2022-03-10

## 2022-03-10 DIAGNOSIS — K57.32 DIVERTICULITIS OF COLON: Primary | ICD-10-CM

## 2022-03-10 RX ORDER — METRONIDAZOLE 500 MG/1
500 TABLET ORAL 3 TIMES DAILY
Qty: 30 TABLET | Refills: 0 | Status: SHIPPED | OUTPATIENT
Start: 2022-03-10 | End: 2022-03-20

## 2022-03-10 RX ORDER — CEFUROXIME AXETIL 500 MG/1
500 TABLET ORAL 2 TIMES DAILY
Qty: 20 TABLET | Refills: 0 | Status: SHIPPED | OUTPATIENT
Start: 2022-03-10 | End: 2022-03-20

## 2022-03-10 RX ORDER — PROMETHAZINE HYDROCHLORIDE 12.5 MG/1
12.5 TABLET ORAL
Qty: 10 TABLET | Refills: 0 | Status: SHIPPED | OUTPATIENT
Start: 2022-03-10 | End: 2022-03-21 | Stop reason: SDUPTHER

## 2022-03-10 NOTE — TELEPHONE ENCOUNTER
Pt called stating she needs her promethazine in liquid form ok per dr lilly  called to shawna unable to e-scribe the medication 6.25.milligram in 5ml directions to take 5ml up to 4 times a day as needed 118ml no refills readback completed with pharmacist

## 2022-03-18 PROBLEM — E66.01 OBESITY, MORBID (HCC): Status: ACTIVE | Noted: 2018-04-26

## 2022-03-18 PROBLEM — K80.20 CALCULUS OF GALLBLADDER WITHOUT CHOLECYSTITIS WITHOUT OBSTRUCTION: Status: ACTIVE | Noted: 2018-11-09

## 2022-03-18 PROBLEM — D50.0 IRON DEFICIENCY ANEMIA DUE TO CHRONIC BLOOD LOSS: Status: ACTIVE | Noted: 2018-06-07

## 2022-03-19 PROBLEM — R10.13 EPIGASTRIC PAIN: Status: ACTIVE | Noted: 2018-11-09

## 2022-03-19 PROBLEM — Z78.9 STATIN INTOLERANCE: Status: ACTIVE | Noted: 2021-08-12

## 2022-03-19 PROBLEM — E78.5 HYPERLIPIDEMIA LDL GOAL <70: Status: ACTIVE | Noted: 2019-08-26

## 2022-03-19 PROBLEM — F43.21 ADJUSTMENT DISORDER WITH DEPRESSED MOOD: Status: ACTIVE | Noted: 2018-11-09

## 2022-03-19 PROBLEM — Z87.891 FORMER SMOKER: Status: ACTIVE | Noted: 2019-08-26

## 2022-03-19 PROBLEM — R07.9 CHEST PAIN: Status: ACTIVE | Noted: 2020-05-18

## 2022-03-19 PROBLEM — I21.3 ST ELEVATION MYOCARDIAL INFARCTION (STEMI) (HCC): Status: ACTIVE | Noted: 2018-04-26

## 2022-03-20 PROBLEM — I25.119 CORONARY ARTERY DISEASE INVOLVING NATIVE CORONARY ARTERY OF NATIVE HEART WITH ANGINA PECTORIS (HCC): Status: ACTIVE | Noted: 2018-07-05

## 2022-03-21 ENCOUNTER — OFFICE VISIT (OUTPATIENT)
Dept: SURGERY | Age: 42
End: 2022-03-21
Payer: MEDICAID

## 2022-03-21 VITALS
SYSTOLIC BLOOD PRESSURE: 126 MMHG | RESPIRATION RATE: 18 BRPM | TEMPERATURE: 97.3 F | HEIGHT: 60 IN | OXYGEN SATURATION: 99 % | DIASTOLIC BLOOD PRESSURE: 82 MMHG | HEART RATE: 72 BPM | BODY MASS INDEX: 44.37 KG/M2 | WEIGHT: 226 LBS

## 2022-03-21 DIAGNOSIS — K57.32 DIVERTICULITIS OF COLON: ICD-10-CM

## 2022-03-21 DIAGNOSIS — K57.32 DIVERTICULITIS OF COLON: Primary | ICD-10-CM

## 2022-03-21 PROCEDURE — 99214 OFFICE O/P EST MOD 30 MIN: CPT | Performed by: COLON & RECTAL SURGERY

## 2022-03-21 RX ORDER — PROMETHAZINE HYDROCHLORIDE 6.25 MG/5ML
6.25 SYRUP ORAL
Qty: 118 ML | Refills: 0 | Status: SHIPPED | OUTPATIENT
Start: 2022-03-21 | End: 2022-03-28

## 2022-03-21 RX ORDER — PROMETHAZINE HYDROCHLORIDE 12.5 MG/1
12.5 TABLET ORAL
Qty: 10 TABLET | Refills: 0 | Status: SHIPPED | OUTPATIENT
Start: 2022-03-21 | End: 2022-03-21

## 2022-03-21 RX ORDER — CIPROFLOXACIN 500 MG/1
500 TABLET ORAL 2 TIMES DAILY
Qty: 20 TABLET | Refills: 0 | Status: SHIPPED | OUTPATIENT
Start: 2022-03-21 | End: 2022-03-31

## 2022-03-21 RX ORDER — METRONIDAZOLE 500 MG/1
500 TABLET ORAL 3 TIMES DAILY
Qty: 30 TABLET | Refills: 0 | Status: SHIPPED | OUTPATIENT
Start: 2022-03-21 | End: 2022-03-31

## 2022-03-21 NOTE — PROGRESS NOTES
Edwin Baker is a 35 y.o. female  Chief Complaint   Patient presents with    Follow-up     diverticulitis     1. Have you been to the ER, urgent care clinic since your last visit? Hospitalized since your last visit? Yes When: Dec 2021 Where: [facility]  Reason for visit: flu symptoms    2. Have you seen or consulted any other health care providers outside of the [address] [place] [address] since your last visit? Include any pap smears or colon screening.  No

## 2022-03-21 NOTE — PROGRESS NOTES
Subjective: She has had recurrent left-sided abdominal pain. She cannot tolerate the Ceftin but took the Flagyl. She still has moderate pain. She was to have surgery a year ago but quarantine because her family members had Covid. Past medical history and ROS were reviewed and unchanged. Abdomen: Soft, tender in left lateral region    CT imaging from last fall shows diverticulitis of the mid to distal descending colon    Assessment / Plan    Recurrent simple diverticulitis of the mid to distal descending colon  10 days of Cipro and Flagyl  Follow-up in the office in 10 days  If any difficulty with resolving her symptoms consider repeating CT imaging  Tentatively planning robotic left colectomy for the summer once this episode is resolved  Originally I had consider transrectal extraction, however given that this information is in the descending colon may wish to be able to palpate the diseased area    30 minutes was spent in patient care. The diagnoses and plan were discussed with patient. All questions answered. Plan of care agreed to by all concerned.

## 2022-04-04 ENCOUNTER — OFFICE VISIT (OUTPATIENT)
Dept: SURGERY | Age: 42
End: 2022-04-04
Payer: MEDICAID

## 2022-04-04 VITALS
DIASTOLIC BLOOD PRESSURE: 74 MMHG | BODY MASS INDEX: 44.17 KG/M2 | SYSTOLIC BLOOD PRESSURE: 126 MMHG | WEIGHT: 225 LBS | HEIGHT: 60 IN | TEMPERATURE: 98.3 F | OXYGEN SATURATION: 99 % | RESPIRATION RATE: 17 BRPM | HEART RATE: 76 BPM

## 2022-04-04 DIAGNOSIS — K57.32 DIVERTICULITIS OF COLON: Primary | ICD-10-CM

## 2022-04-04 PROCEDURE — 99213 OFFICE O/P EST LOW 20 MIN: CPT | Performed by: COLON & RECTAL SURGERY

## 2022-04-04 NOTE — PROGRESS NOTES
Subjective: Pain resolved. Past medical history and ROS were reviewed and unchanged. Abdomen: Soft, nontender nondistended    Assessment / Plan    Recurrent simple diverticulitis  Follow-up in 1 month  Surgery over the summer  We will need to review prior imaging and notes to decide on extent of surgery, i.e. sigmoid colon versus sigmoid and descending colon    20 minutes was spent in patient care. The diagnoses and plan were discussed with patient. All questions answered. Plan of care agreed to by all concerned.

## 2022-04-09 ENCOUNTER — HOSPITAL ENCOUNTER (EMERGENCY)
Age: 42
Discharge: HOME | End: 2022-04-09
Attending: STUDENT IN AN ORGANIZED HEALTH CARE EDUCATION/TRAINING PROGRAM
Payer: MEDICAID

## 2022-04-09 VITALS
RESPIRATION RATE: 18 BRPM | HEART RATE: 94 BPM | OXYGEN SATURATION: 99 % | WEIGHT: 225 LBS | SYSTOLIC BLOOD PRESSURE: 179 MMHG | HEIGHT: 62 IN | TEMPERATURE: 98.3 F | BODY MASS INDEX: 41.41 KG/M2 | DIASTOLIC BLOOD PRESSURE: 77 MMHG

## 2022-04-09 DIAGNOSIS — K57.92 DIVERTICULITIS: Primary | ICD-10-CM

## 2022-04-09 LAB
ALBUMIN SERPL-MCNC: 3 G/DL (ref 3.4–5)
ALBUMIN/GLOB SERPL: 0.7 {RATIO} (ref 0.8–1.7)
ALP SERPL-CCNC: 72 U/L (ref 45–117)
ALT SERPL-CCNC: 16 U/L (ref 13–56)
ANION GAP SERPL CALC-SCNC: 7 MMOL/L (ref 3–18)
APPEARANCE UR: CLEAR
AST SERPL-CCNC: 27 U/L (ref 10–38)
BASOPHILS # BLD: 0 K/UL (ref 0–0.1)
BASOPHILS NFR BLD: 0 % (ref 0–2)
BILIRUB SERPL-MCNC: 0.2 MG/DL (ref 0.2–1)
BILIRUB UR QL: NEGATIVE
BUN SERPL-MCNC: 8 MG/DL (ref 7–18)
BUN/CREAT SERPL: 14 (ref 12–20)
CALCIUM SERPL-MCNC: 9 MG/DL (ref 8.5–10.1)
CHLORIDE SERPL-SCNC: 107 MMOL/L (ref 100–111)
CO2 SERPL-SCNC: 26 MMOL/L (ref 21–32)
COLOR UR: YELLOW
CREAT SERPL-MCNC: 0.58 MG/DL (ref 0.6–1.3)
DIFFERENTIAL METHOD BLD: ABNORMAL
EOSINOPHIL # BLD: 0.2 K/UL (ref 0–0.4)
EOSINOPHIL NFR BLD: 3 % (ref 0–5)
ERYTHROCYTE [DISTWIDTH] IN BLOOD BY AUTOMATED COUNT: 17.6 % (ref 11.6–14.5)
GLOBULIN SER CALC-MCNC: 4.3 G/DL (ref 2–4)
GLUCOSE SERPL-MCNC: 97 MG/DL (ref 74–99)
GLUCOSE UR STRIP.AUTO-MCNC: NEGATIVE MG/DL
HCG UR QL: NEGATIVE
HCT VFR BLD AUTO: 39.5 % (ref 35–45)
HGB BLD-MCNC: 12.5 G/DL (ref 12–16)
HGB UR QL STRIP: NEGATIVE
IMM GRANULOCYTES # BLD AUTO: 0.1 K/UL (ref 0–0.04)
IMM GRANULOCYTES NFR BLD AUTO: 1 % (ref 0–0.5)
KETONES UR QL STRIP.AUTO: NEGATIVE MG/DL
LEUKOCYTE ESTERASE UR QL STRIP.AUTO: NEGATIVE
LIPASE SERPL-CCNC: 61 U/L (ref 73–393)
LYMPHOCYTES # BLD: 3 K/UL (ref 0.9–3.6)
LYMPHOCYTES NFR BLD: 40 % (ref 21–52)
MCH RBC QN AUTO: 25.9 PG (ref 24–34)
MCHC RBC AUTO-ENTMCNC: 31.6 G/DL (ref 31–37)
MCV RBC AUTO: 81.8 FL (ref 78–100)
MONOCYTES # BLD: 0.5 K/UL (ref 0.05–1.2)
MONOCYTES NFR BLD: 6 % (ref 3–10)
NEUTS SEG # BLD: 3.8 K/UL (ref 1.8–8)
NEUTS SEG NFR BLD: 50 % (ref 40–73)
NITRITE UR QL STRIP.AUTO: NEGATIVE
NRBC # BLD: 0 K/UL (ref 0–0.01)
NRBC BLD-RTO: 0 PER 100 WBC
PH UR STRIP: 5 [PH] (ref 5–8)
PLATELET # BLD AUTO: 388 K/UL (ref 135–420)
PLATELET COMMENTS,PCOM: ABNORMAL
PMV BLD AUTO: 10.6 FL (ref 9.2–11.8)
POTASSIUM SERPL-SCNC: 4.2 MMOL/L (ref 3.5–5.5)
PROT SERPL-MCNC: 7.3 G/DL (ref 6.4–8.2)
PROT UR STRIP-MCNC: NEGATIVE MG/DL
RBC # BLD AUTO: 4.83 M/UL (ref 4.2–5.3)
RBC MORPH BLD: ABNORMAL
SODIUM SERPL-SCNC: 140 MMOL/L (ref 136–145)
SP GR UR REFRACTOMETRY: 1.02 (ref 1–1.03)
UROBILINOGEN UR QL STRIP.AUTO: 1 EU/DL (ref 0.2–1)
WBC # BLD AUTO: 7.6 K/UL (ref 4.6–13.2)

## 2022-04-09 PROCEDURE — 83690 ASSAY OF LIPASE: CPT

## 2022-04-09 PROCEDURE — 80053 COMPREHEN METABOLIC PANEL: CPT

## 2022-04-09 PROCEDURE — 99283 EMERGENCY DEPT VISIT LOW MDM: CPT

## 2022-04-09 PROCEDURE — 81025 URINE PREGNANCY TEST: CPT

## 2022-04-09 PROCEDURE — 85025 COMPLETE CBC W/AUTO DIFF WBC: CPT

## 2022-04-09 PROCEDURE — 81003 URINALYSIS AUTO W/O SCOPE: CPT

## 2022-04-09 RX ORDER — AMOXICILLIN AND CLAVULANATE POTASSIUM 875; 125 MG/1; MG/1
1 TABLET, FILM COATED ORAL EVERY 8 HOURS
Qty: 15 TABLET | Refills: 0 | Status: SHIPPED | OUTPATIENT
Start: 2022-04-09 | End: 2022-04-14

## 2022-04-09 RX ORDER — METOCLOPRAMIDE HYDROCHLORIDE 5 MG/ML
10 INJECTION INTRAMUSCULAR; INTRAVENOUS ONCE
Status: DISCONTINUED | OUTPATIENT
Start: 2022-04-09 | End: 2022-04-09 | Stop reason: HOSPADM

## 2022-04-09 RX ORDER — METOCLOPRAMIDE 10 MG/1
10 TABLET ORAL
Qty: 12 TABLET | Refills: 0 | Status: SHIPPED | OUTPATIENT
Start: 2022-04-09 | End: 2022-04-19

## 2022-04-09 NOTE — ED PROVIDER NOTES
EMERGENCY DEPARTMENT HISTORY AND PHYSICAL EXAM    I have evaluated the patient at 5:06 PM      Date: 4/9/2022  Patient Name: Josh East    History of Presenting Illness     Chief Complaint   Patient presents with    Abdominal Pain    Vomiting         History Provided By: Patient  Location/Duration/Severity/Modifying factors   This is a 27-year-old female with history of diverticulitis, GERD, previous MI, hypertension presenting to the emergency department for evaluation of left lower quadrant abdominal pain. States been ongoing for the past 2 days. It is crampy and intermittent in nature. Reports that it is consistent with her past diverticulitis flares. She reports associated nausea and has had nonbloody nonbilious emesis. She also reports loose stool. She also reports feeling bloated and having poor appetite. She denies any fevers or chills, dysuria or hematuria, vaginal bleeding or discharge. Denies any chest pain or shortness of breath. PCP: Nidhi Kawn MD    Current Facility-Administered Medications   Medication Dose Route Frequency Provider Last Rate Last Admin    sodium chloride 0.9 % bolus infusion 1,000 mL  1,000 mL IntraVENous ONCE Murtaza Melo, DO        metoclopramide HCl (REGLAN) injection 10 mg  10 mg IntraVENous ONCE Murtaza Melo, DO         Current Outpatient Medications   Medication Sig Dispense Refill    amoxicillin-clavulanate (Augmentin) 875-125 mg per tablet Take 1 Tablet by mouth every eight (8) hours for 5 days. 15 Tablet 0    naproxen sodium (NAPROSYN) 220 mg tablet Take 220 mg by mouth two (2) times daily as needed.  acetaminophen (Tylenol Extra Strength) 500 mg tablet Take  by mouth every six (6) hours as needed for Pain.  loratadine (WAL-ITIN PO) Take  by mouth.  Benzonatate 150 mg cap Take 1 Capsule by mouth two (2) times a day.  (Patient not taking: Reported on 3/21/2022) 20 Capsule 0    fluticasone propionate (FLONASE) 50 mcg/actuation nasal spray 2 Sprays by Both Nostrils route daily. 1 Each 2    cetirizine (ZYRTEC) 10 mg tablet Take 1 Tablet by mouth daily as needed for Allergies. 90 Tablet 1    ibuprofen (MOTRIN) 600 mg tablet Take 1 Tablet by mouth every six (6) hours as needed for Pain. 20 Tablet 0    metoprolol succinate (TOPROL-XL) 25 mg XL tablet Take 12.5 mg by mouth daily.  amLODIPine (NORVASC) 5 mg tablet Take 5 mg by mouth nightly.  aspirin 81 mg chewable tablet Take 81 mg by mouth daily.  nitroglycerin (NITROSTAT) 0.4 mg SL tablet 0.4 mg. Past History     Past Medical History:  Past Medical History:   Diagnosis Date    Cardiac echocardiogram 07/21/2016    Sm LV cavity. EF 65-70%. No RWMA. Mod-marked LVH w/dynamic obstruction, mid-cavity obliteration & peak grad of 25 mmHg. Indeterminate diastolic fx. No significant valvular pathology.  Cardiac nuclear imaging test 07/22/2016    Low risk. Very sm distal anterior & apical partially reversible defect, likely artifact, but sm area of ischemia not excluded. No RWMA. EF 66%.   Neg EKG on pharm stress test.    COVID-19     Diverticulitis     Gall stones     GERD (gastroesophageal reflux disease)     Heart attack ([facility] [address]     01/27/2018    Hypertension     Infectious disease     Bacteria vaginosis    Nausea & vomiting     Non-ST elevated myocardial infarction [facility])     STEMI (ST elevation myocardial infarction) ([facility] [address] 01/2018       Past Surgical History:  Past Surgical History:   Procedure Laterality Date    COLONOSCOPY N/A 6/26/2020    COLONOSCOPY performed by Nerissa Fitzgerald MD at [address]  [facility]  09/28/2008    HX CORONARY STENT PLACEMENT      HX TUBAL LIGATION  09/28/2008    MT CARDIAC SURG PROCEDURE UNLIST      stent       Family History:  Family History   Problem Relation Age of Onset    Depression Mother     Asthma Mother     Migraines Mother     Hypertension Mother     Stroke Mother     Heart Attack Mother     OSTEOARTHRITIS Mother     Depression Brother     Alcohol abuse Father     Substance Abuse Father         tobacco    Drug Abuse Father     Hypertension Father     High Cholesterol Father     Stroke Father     Rashes/Skin Problems Father     Substance Abuse Brother         tobacco    Drug Abuse Brother     Asthma Brother     Migraines Brother     Hypertension Brother     High Cholesterol Brother     Hypertension Paternal Grandmother     Diabetes Paternal Grandmother     Hypertension Maternal Grandmother     Colon Cancer Maternal Grandmother        Social History:  Social History     Tobacco Use    Smoking status: Former Smoker     Packs/day: 0.25     Years: 20.00     Pack years: 5.00     Quit date: 6/27/2018     Years since quitting: 3.7    Smokeless tobacco: Never Used   Vaping Use    Vaping Use: Never used   Substance Use Topics    Alcohol use: No    Drug use: Not Currently     Types: Marijuana     Comment: 25 yrs ago       Allergies: Allergies   Allergen Reactions    Lisinopril Cough    Metoprolol Palpitations     Not allergic/ patient is TAKING    Statins-Hmg-Coa Reductase Inhibitors Other (comments)     Muscle/joint pain    Vaccine Adjuvant Emulsion Combination No. 1 Seizures     MMR    Zofran Odt [Ondansetron] Swelling     migraines    Zofran [Ondansetron Hcl] Swelling     migraines         Review of Systems       Review of Systems   Constitutional: Negative for activity change, chills, diaphoresis, fatigue and fever. Respiratory: Negative for cough, chest tightness, shortness of breath, wheezing and stridor. Cardiovascular: Negative for chest pain and palpitations. Gastrointestinal: Positive for abdominal pain. Negative for abdominal distention, constipation, diarrhea, nausea and vomiting. LLQ abdominal pain   Genitourinary: Negative for difficulty urinating, dysuria, hematuria, vaginal bleeding, vaginal discharge and vaginal pain. Musculoskeletal: Negative for back pain, joint swelling and myalgias. Skin: Negative for rash and wound. Neurological: Negative for dizziness, weakness, light-headedness and headaches. Psychiatric/Behavioral: Negative for agitation. The patient is not nervous/anxious. Physical Exam     Visit Vitals  BP (!) 179/77   Pulse 94   Temp 98.3 °F (36.8 °C)   Resp 18   Ht 5' 2\" (1.575 m)   Wt 102.1 kg (225 lb)   LMP 03/13/2022 (Approximate)   SpO2 99%   BMI 41.15 kg/m²         Physical Exam  Constitutional:       General: She is not in acute distress. Appearance: She is not toxic-appearing. HENT:      Head: Normocephalic and atraumatic. Cardiovascular:      Rate and Rhythm: Normal rate and regular rhythm. Heart sounds: Normal heart sounds. No murmur heard. No friction rub. No gallop. Pulmonary:      Effort: Pulmonary effort is normal.      Breath sounds: Normal breath sounds. Abdominal:      General: There is no distension. Palpations: Abdomen is soft. There is no mass. Tenderness: There is abdominal tenderness in the left lower quadrant. There is no guarding or rebound. Hernia: No hernia is present. Musculoskeletal:         General: No swelling, tenderness or deformity. Cervical back: Normal range of motion and neck supple. Skin:     General: Skin is warm and dry. Capillary Refill: Capillary refill takes less than 2 seconds. Findings: No rash. Neurological:      General: No focal deficit present. Mental Status: She is alert and oriented to person, place, and time.    Psychiatric:         Mood and Affect: Mood normal.           Diagnostic Study Results     Labs -  Recent Results (from the past 12 hour(s))   CBC WITH AUTOMATED DIFF    Collection Time: 04/09/22  5:00 PM   Result Value Ref Range    WBC 7.6 4.6 - 13.2 K/uL    RBC 4.83 4.20 - 5.30 M/uL    HGB 12.5 12.0 - 16.0 g/dL    HCT 39.5 35.0 - 45.0 %    MCV 81.8 78.0 - 100.0 FL    MCH 25.9 24.0 - 34.0 PG    MCHC 31.6 31.0 - 37.0 g/dL    RDW 17.6 (H) 11.6 - 14.5 %    PLATELET PENDING K/uL    MPV 10.6 9.2 - 11.8 FL    NRBC 0.0 0  WBC    ABSOLUTE NRBC 0.00 0.00 - 0.01 K/uL    NEUTROPHILS PENDING %    LYMPHOCYTES PENDING %    MONOCYTES PENDING %    EOSINOPHILS PENDING %    BASOPHILS PENDING %    IMMATURE GRANULOCYTES PENDING %    ABS. NEUTROPHILS PENDING K/UL    ABS. LYMPHOCYTES PENDING K/UL    ABS. MONOCYTES PENDING K/UL    ABS. EOSINOPHILS PENDING K/UL    ABS. BASOPHILS PENDING K/UL    ABS. IMM. GRANS. PENDING K/UL    DF PENDING    METABOLIC PANEL, COMPREHENSIVE    Collection Time: 04/09/22  5:00 PM   Result Value Ref Range    Sodium 140 136 - 145 mmol/L    Potassium 4.2 3.5 - 5.5 mmol/L    Chloride 107 100 - 111 mmol/L    CO2 26 21 - 32 mmol/L    Anion gap 7 3.0 - 18 mmol/L    Glucose 97 74 - 99 mg/dL    BUN 8 7.0 - 18 MG/DL    Creatinine 0.58 (L) 0.6 - 1.3 MG/DL    BUN/Creatinine ratio 14 12 - 20      GFR est AA >60 >60 ml/min/1.73m2    GFR est non-AA >60 >60 ml/min/1.73m2    Calcium 9.0 8.5 - 10.1 MG/DL    Bilirubin, total 0.2 0.2 - 1.0 MG/DL    ALT (SGPT) 16 13 - 56 U/L    AST (SGOT) 27 10 - 38 U/L    Alk.  phosphatase 72 45 - 117 U/L    Protein, total 7.3 6.4 - 8.2 g/dL    Albumin 3.0 (L) 3.4 - 5.0 g/dL    Globulin 4.3 (H) 2.0 - 4.0 g/dL    A-G Ratio 0.7 (L) 0.8 - 1.7     LIPASE    Collection Time: 04/09/22  5:00 PM   Result Value Ref Range    Lipase 61 (L) 73 - 393 U/L   URINALYSIS W/ RFLX MICROSCOPIC    Collection Time: 04/09/22  5:00 PM   Result Value Ref Range    Color YELLOW      Appearance CLEAR      Specific gravity 1.022 1.005 - 1.030      pH (UA) 5.0 5.0 - 8.0      Protein Negative NEG mg/dL    Glucose Negative NEG mg/dL    Ketone Negative NEG mg/dL    Bilirubin Negative NEG      Blood Negative NEG      Urobilinogen 1.0 0.2 - 1.0 EU/dL    Nitrites Negative NEG      Leukocyte Esterase Negative NEG     HCG URINE, QL    Collection Time: 04/09/22  5:00 PM   Result Value Ref Range    HCG urine, QL Negative NEG         Radiologic Studies -   No orders to display         Medical Decision Making   I am the first provider for this patient. I reviewed the vital signs, available nursing notes, past medical history, past surgical history, family history and social history. Vital Signs-Reviewed the patient's vital signs. Records Reviewed: Nursing Notes, Old Medical Records, Previous electrocardiograms, Previous Radiology Studies and Previous Laboratory Studies (Time of Review: 5:06 PM)    ED Course: Progress Notes, Reevaluation, and Consults:         Provider Notes (Medical Decision Making):   MDM  Number of Diagnoses or Management Options  Diverticulitis  Diagnosis management comments: 40-year-old female presenting to the emergency department for evaluation of the lower quadrant abdominal pain x2 days. She appears no acute distress. Vital signs stable. She has mild tenderness to palpation of the left lower quadrant. No guarding or rebound appreciated. This is likely a diverticulitis flare. Will obtain screening lab work and urinalysis. I think that the patient requires imaging today. As long as blood work is unremarkable, will discharge patient home with antibiotics and pain medication and antiemetics. Will have her follow-up with her surgeon. States that she does have a appointment scheduled in May. 1747: Unremarkable bloodwork and urinalysis. Patient reassured. Will discharge home with augmentin. Her to follow-up with her primary care doctor and her general surgeon. She has been ED return precautions. Patient verbalizes good understanding and agreement with plan          Diagnosis     Clinical Impression:   1.  Diverticulitis        Disposition: home    Follow-up Information     Follow up With Specialties Details Why Contact [facility] EMERGENCY DEPT Emergency Medicine  As needed, If symptoms worsen [address] [facility] Meli Ferrer MD [facility] Medicine Call   [address] [facility]  [address]  [address] [facility]  [phone]             Patient's Medications   Start Taking    AMOXICILLIN-CLAVULANATE (AUGMENTIN) 875-125 MG PER TABLET    Take 1 Tablet by mouth every eight (8) hours for 5 days. Continue Taking    ACETAMINOPHEN (TYLENOL EXTRA STRENGTH) 500 MG TABLET    Take  by mouth every six (6) hours as needed for Pain. AMLODIPINE (NORVASC) 5 MG TABLET    Take 5 mg by mouth nightly. ASPIRIN 81 MG CHEWABLE TABLET    Take 81 mg by mouth daily. BENZONATATE 150 MG CAP    Take 1 Capsule by mouth two (2) times a day. CETIRIZINE (ZYRTEC) 10 MG TABLET    Take 1 Tablet by mouth daily as needed for Allergies. FLUTICASONE PROPIONATE (FLONASE) 50 MCG/ACTUATION NASAL SPRAY    2 Sprays by Both Nostrils route daily. IBUPROFEN (MOTRIN) 600 MG TABLET    Take 1 Tablet by mouth every six (6) hours as needed for Pain. LORATADINE (WAL-ITIN PO)    Take  by mouth. METOPROLOL SUCCINATE (TOPROL-XL) 25 MG XL TABLET    Take 12.5 mg by mouth daily. NAPROXEN SODIUM (NAPROSYN) 220 MG TABLET    Take 220 mg by mouth two (2) times daily as needed. NITROGLYCERIN (NITROSTAT) 0.4 MG SL TABLET    0.4 mg. These Medications have changed    No medications on file   Stop Taking    No medications on file     Disclaimer: Sections of this note are dictated using utilizing voice recognition software. Minor typographical errors may be present. If questions arise, please do not hesitate to contact me or call our department.

## 2022-04-09 NOTE — DISCHARGE INSTRUCTIONS
Please call your general surgeon to schedule a follow-up appointment. Take the prescribed antibiotic as needed. Take the Reglan as needed for nausea. Return to the emergency department for any new or worsening symptoms.

## 2022-04-09 NOTE — ED TRIAGE NOTES
Pt.AOx4, steady gait, NAD, c/o abd.pain/nausea/vomitting/loose stool x 2 days, repots having \"diverticulitis attack\", no improvement with motrin use, unable to keep foods/liquids down, reports allergy to zofran

## 2022-05-05 ENCOUNTER — OFFICE VISIT (OUTPATIENT)
Dept: SURGERY | Age: 42
End: 2022-05-05
Payer: MEDICAID

## 2022-05-05 ENCOUNTER — HOSPITAL ENCOUNTER (OUTPATIENT)
Dept: LAB | Age: 42
Discharge: HOME | End: 2022-05-05
Payer: MEDICAID

## 2022-05-05 ENCOUNTER — DOCUMENTATION ONLY (OUTPATIENT)
Dept: OTHER | Age: 42
End: 2022-05-05

## 2022-05-05 VITALS
HEIGHT: 60 IN | HEART RATE: 72 BPM | OXYGEN SATURATION: 100 % | WEIGHT: 225 LBS | RESPIRATION RATE: 18 BRPM | BODY MASS INDEX: 44.17 KG/M2 | DIASTOLIC BLOOD PRESSURE: 76 MMHG | TEMPERATURE: 97.8 F | SYSTOLIC BLOOD PRESSURE: 118 MMHG

## 2022-05-05 DIAGNOSIS — K57.32 DIVERTICULITIS OF COLON: ICD-10-CM

## 2022-05-05 DIAGNOSIS — K57.32 DIVERTICULITIS OF COLON: Primary | ICD-10-CM

## 2022-05-05 PROCEDURE — 93005 ELECTROCARDIOGRAM TRACING: CPT

## 2022-05-05 PROCEDURE — 99214 OFFICE O/P EST MOD 30 MIN: CPT | Performed by: COLON & RECTAL SURGERY

## 2022-05-05 RX ORDER — METRONIDAZOLE 500 MG/1
500 TABLET ORAL 3 TIMES DAILY
Qty: 3 TABLET | Refills: 0 | Status: SHIPPED | OUTPATIENT
Start: 2022-05-05 | End: 2022-05-06

## 2022-05-05 RX ORDER — NEOMYCIN SULFATE 500 MG/1
1000 TABLET ORAL 3 TIMES DAILY
Qty: 6 TABLET | Refills: 0 | Status: SHIPPED | OUTPATIENT
Start: 2022-05-05 | End: 2022-05-06

## 2022-05-05 NOTE — PROGRESS NOTES
Chief Complaint   Patient presents with    Diverticulitis     went to er for abdominal pain left lower quadrant    1. Have you been to the ER, urgent care clinic since your last visit? Hospitalized since your last visit? Yes for lower left quadrant abd pain    2. Have you seen or consulted any other health care providers outside of the [address] [place] [address] since your last visit? Include any pap smears or colon screening.  No

## 2022-05-05 NOTE — PROGRESS NOTES
Met with patient and reviewed preop education booklet. Stressed ambulation early post op. Reviewed Incentive spirometer, scd's, iv fluids, and root catheter information. Reveiwed post op diet and activity. Patient verbalized understanding. Answered all questions.

## 2022-05-05 NOTE — PROGRESS NOTES
Subjective: She had another bout of diverticulitis. She has been off antibiotics for approximately 3 weeks. She has a minor twinge of pain. Levsin helps. Past medical history and ROS were reviewed and unchanged. Abdomen: Soft, minimally tender left lower quadrant    Assessment / Plan    Recurrent simple diverticulitis  OR for robotic left colectomy, possible transrectal extraction  Attention to the recent imaging showing inflammation at the distal descending colon  She will probably require excision into the mid descending colon  We will only do transrectal extraction if I am convinced of the location of her disease  This can be assessed laparoscopically prior to docking the robot    30 minutes was spent in patient care. The diagnoses and plan were discussed with patient. All questions answered. Plan of care agreed to by all concerned.

## 2022-05-06 LAB
ATRIAL RATE: 65 BPM
CALCULATED P AXIS, ECG09: 18 DEGREES
CALCULATED R AXIS, ECG10: 24 DEGREES
CALCULATED T AXIS, ECG11: 51 DEGREES
DIAGNOSIS, 93000: NORMAL
P-R INTERVAL, ECG05: 160 MS
Q-T INTERVAL, ECG07: 400 MS
QRS DURATION, ECG06: 78 MS
QTC CALCULATION (BEZET), ECG08: 416 MS
VENTRICULAR RATE, ECG03: 65 BPM

## 2022-06-24 ENCOUNTER — TELEPHONE (OUTPATIENT)
Dept: SURGERY | Age: 42
End: 2022-06-24

## 2022-06-24 RX ORDER — CIPROFLOXACIN 500 MG/1
500 TABLET ORAL 2 TIMES DAILY
Qty: 20 TABLET | Refills: 0 | Status: SHIPPED | OUTPATIENT
Start: 2022-06-24 | End: 2022-10-05

## 2022-06-24 RX ORDER — METRONIDAZOLE 500 MG/1
TABLET ORAL
Qty: 30 TABLET | Refills: 0 | Status: SHIPPED | OUTPATIENT
Start: 2022-06-24 | End: 2022-10-05

## 2022-06-24 NOTE — TELEPHONE ENCOUNTER
Called with complaints of nausea and vomiting since Wednesday llq abd pain bowels are moving and denies fever requesting antibiotics and liquid nausea medication she had in past discussed with dr lilly patient to stay on clear liquids and start antibiotics as prescribed but to go to er if condition worsens or if she is unable to tolerate liquids as condition improves ok to advance slow to bland low fiber diet rx sent for cipro and flagy as ordered previously and [facility] liquid promethazine suspension 6.25 mg in 5ml ok to take 5ml every 6 hours as needed

## 2022-06-24 NOTE — TELEPHONE ENCOUNTER
Unable to e-scribe promethazine sryup as patient requested for nausea rx called [facility] pharmacist for 6.25 mg in 5ml of promethazine may take 5ml every 6 hours as needed for nausea

## 2022-07-21 ENCOUNTER — TELEPHONE (OUTPATIENT)
Dept: FAMILY MEDICINE CLINIC | Age: 42
End: 2022-07-21

## 2022-07-21 NOTE — TELEPHONE ENCOUNTER
Spoke with patient and she states she has been to the dentist and the dentist has told patient that she has some thrush. Patient is requesting an earlier appt than 08/31/2022.  Patient has been scheduled for 07/26/2022 at 0845 am.

## 2022-08-22 ENCOUNTER — HOSPITAL ENCOUNTER (OUTPATIENT)
Dept: LAB | Age: 42
Discharge: HOME | End: 2022-08-22

## 2022-08-22 LAB — XX-LABCORP SPECIMEN COL,LCBCF: NORMAL

## 2022-08-22 PROCEDURE — 99001 SPECIMEN HANDLING PT-LAB: CPT

## 2022-08-23 LAB
ALBUMIN SERPL-MCNC: 4 G/DL (ref 3.8–4.8)
ALBUMIN/GLOB SERPL: 1.2 {RATIO} (ref 1.2–2.2)
ALP SERPL-CCNC: 80 IU/L (ref 44–121)
ALT SERPL-CCNC: 12 IU/L (ref 0–32)
AST SERPL-CCNC: 12 IU/L (ref 0–40)
BASOPHILS # BLD AUTO: 0 X10E3/UL (ref 0–0.2)
BASOPHILS NFR BLD AUTO: 0 %
BILIRUB SERPL-MCNC: 0.2 MG/DL (ref 0–1.2)
BUN SERPL-MCNC: 10 MG/DL (ref 6–24)
BUN/CREAT SERPL: 17 (ref 9–23)
CALCIUM SERPL-MCNC: 8.8 MG/DL (ref 8.7–10.2)
CHLORIDE SERPL-SCNC: 103 MMOL/L (ref 96–106)
CHOLEST SERPL-MCNC: 131 MG/DL (ref 100–199)
CO2 SERPL-SCNC: 24 MMOL/L (ref 20–29)
CREAT SERPL-MCNC: 0.59 MG/DL (ref 0.57–1)
EGFR: 115 ML/MIN/1.73
EOSINOPHIL # BLD AUTO: 0.2 X10E3/UL (ref 0–0.4)
EOSINOPHIL NFR BLD AUTO: 3 %
ERYTHROCYTE [DISTWIDTH] IN BLOOD BY AUTOMATED COUNT: 16.3 % (ref 11.7–15.4)
EST. AVERAGE GLUCOSE BLD GHB EST-MCNC: 123 MG/DL
GLOBULIN SER CALC-MCNC: 3.4 G/DL (ref 1.5–4.5)
GLUCOSE SERPL-MCNC: 80 MG/DL (ref 65–99)
HBA1C MFR BLD: 5.9 % (ref 4.8–5.6)
HCT VFR BLD AUTO: 39.8 % (ref 34–46.6)
HDLC SERPL-MCNC: 35 MG/DL
HGB BLD-MCNC: 12.1 G/DL (ref 11.1–15.9)
IMM GRANULOCYTES # BLD AUTO: 0 X10E3/UL (ref 0–0.1)
IMM GRANULOCYTES NFR BLD AUTO: 0 %
IMP & REVIEW OF LAB RESULTS: NORMAL
LDLC SERPL CALC-MCNC: 80 MG/DL (ref 0–99)
LYMPHOCYTES # BLD AUTO: 2.4 X10E3/UL (ref 0.7–3.1)
LYMPHOCYTES NFR BLD AUTO: 45 %
MCH RBC QN AUTO: 24.8 PG (ref 26.6–33)
MCHC RBC AUTO-ENTMCNC: 30.4 G/DL (ref 31.5–35.7)
MCV RBC AUTO: 82 FL (ref 79–97)
MONOCYTES # BLD AUTO: 0.4 X10E3/UL (ref 0.1–0.9)
MONOCYTES NFR BLD AUTO: 7 %
NEUTROPHILS # BLD AUTO: 2.4 X10E3/UL (ref 1.4–7)
NEUTROPHILS NFR BLD AUTO: 45 %
PLATELET # BLD AUTO: 399 X10E3/UL (ref 150–450)
POTASSIUM SERPL-SCNC: 4.2 MMOL/L (ref 3.5–5.2)
PROT SERPL-MCNC: 7.4 G/DL (ref 6–8.5)
RBC # BLD AUTO: 4.87 X10E6/UL (ref 3.77–5.28)
SODIUM SERPL-SCNC: 140 MMOL/L (ref 134–144)
TRIGL SERPL-MCNC: 79 MG/DL (ref 0–149)
VLDLC SERPL CALC-MCNC: 16 MG/DL (ref 5–40)
WBC # BLD AUTO: 5.3 X10E3/UL (ref 3.4–10.8)

## 2022-10-05 ENCOUNTER — OFFICE VISIT (OUTPATIENT)
Dept: FAMILY MEDICINE CLINIC | Age: 42
End: 2022-10-05
Payer: MEDICAID

## 2022-10-05 VITALS
HEIGHT: 60 IN | HEART RATE: 88 BPM | WEIGHT: 222 LBS | TEMPERATURE: 98.4 F | OXYGEN SATURATION: 98 % | RESPIRATION RATE: 16 BRPM | SYSTOLIC BLOOD PRESSURE: 128 MMHG | DIASTOLIC BLOOD PRESSURE: 86 MMHG | BODY MASS INDEX: 43.59 KG/M2

## 2022-10-05 DIAGNOSIS — I10 PRIMARY HYPERTENSION: ICD-10-CM

## 2022-10-05 DIAGNOSIS — Z01.419 WELL WOMAN EXAM WITH ROUTINE GYNECOLOGICAL EXAM: Primary | ICD-10-CM

## 2022-10-05 DIAGNOSIS — E78.5 HYPERLIPIDEMIA LDL GOAL <70: ICD-10-CM

## 2022-10-05 DIAGNOSIS — I25.119 CORONARY ARTERY DISEASE INVOLVING NATIVE CORONARY ARTERY OF NATIVE HEART WITH ANGINA PECTORIS (HCC): ICD-10-CM

## 2022-10-05 DIAGNOSIS — R73.03 PREDIABETES: ICD-10-CM

## 2022-10-05 DIAGNOSIS — Z78.9 STATIN INTOLERANCE: ICD-10-CM

## 2022-10-05 PROCEDURE — 99396 PREV VISIT EST AGE 40-64: CPT | Performed by: FAMILY MEDICINE

## 2022-10-05 RX ORDER — GUAIFENESIN 100 MG/5ML
81 LIQUID (ML) ORAL DAILY
Qty: 90 TABLET | Refills: 3 | Status: SHIPPED | OUTPATIENT
Start: 2022-10-05

## 2022-10-05 RX ORDER — NAPROXEN SODIUM 220 MG
220 TABLET ORAL
Status: CANCELLED | OUTPATIENT
Start: 2022-10-05

## 2022-10-05 RX ORDER — FLUTICASONE PROPIONATE 50 MCG
2 SPRAY, SUSPENSION (ML) NASAL DAILY
Qty: 1 EACH | Refills: 2 | Status: SHIPPED | OUTPATIENT
Start: 2022-10-05

## 2022-10-05 RX ORDER — METOPROLOL SUCCINATE 25 MG/1
12.5 TABLET, EXTENDED RELEASE ORAL DAILY
Qty: 45 TABLET | Refills: 1 | Status: SHIPPED | OUTPATIENT
Start: 2022-10-05

## 2022-10-05 RX ORDER — LORATADINE 10 MG/1
10 TABLET ORAL
Qty: 90 TABLET | Refills: 3 | Status: SHIPPED | OUTPATIENT
Start: 2022-10-05

## 2022-10-05 NOTE — PATIENT INSTRUCTIONS
A Healthy Heart: Care Instructions  Your Care Instructions     Coronary artery disease, also called heart disease, occurs when a substance called plaque builds up in the vessels that supply oxygen-rich blood to your heart muscle. This can narrow the blood vessels and reduce blood flow. A heart attack happens when blood flow is completely blocked. A high-fat diet, smoking, and other factors increase the risk of heart disease. Your doctor has found that you have a chance of having heart disease. You can do lots of things to keep your heart healthy. It may not be easy, but you can change your diet, exercise more, and quit smoking. These steps really work to lower your chance of heart disease. Follow-up care is a key part of your treatment and safety. Be sure to make and go to all appointments, and call your doctor if you are having problems. It's also a good idea to know your test results and keep a list of the medicines you take. How can you care for yourself at home? Diet    Use less salt when you cook and eat. This helps lower your blood pressure. Taste food before salting. Add only a little salt when you think you need it. With time, your taste buds will adjust to less salt. Eat fewer snack items, fast foods, canned soups, and other high-salt, high-fat, processed foods. Read food labels and try to avoid saturated and trans fats. They increase your risk of heart disease by raising cholesterol levels. Limit the amount of solid fat-butter, margarine, and shortening-you eat. Use olive, peanut, or canola oil when you cook. Bake, broil, and steam foods instead of frying them. Eat a variety of fruit and vegetables every day. Dark green, deep orange, red, or yellow fruits and vegetables are especially good for you. Examples include spinach, carrots, peaches, and berries. Foods high in fiber can reduce your cholesterol and provide important vitamins and minerals.  High-fiber foods include whole-grain cereals and breads, oatmeal, beans, brown rice, citrus fruits, and apples. Eat lean proteins. Heart-healthy proteins include seafood, lean meats and poultry, eggs, beans, peas, nuts, seeds, and soy products. Limit drinks and foods with added sugar. These include candy, desserts, and soda pop. Lifestyle changes    If your doctor recommends it, get more exercise. Walking is a good choice. Bit by bit, increase the amount you walk every day. Try for at least 30 minutes on most days of the week. You also may want to swim, bike, or do other activities. Do not smoke. If you need help quitting, talk to your doctor about stop-smoking programs and medicines. These can increase your chances of quitting for good. Quitting smoking may be the most important step you can take to protect your heart. It is never too late to quit. Limit alcohol to 2 drinks a day for men and 1 drink a day for women. Too much alcohol can cause health problems. Manage other health problems such as diabetes, high blood pressure, and high cholesterol. If you think you may have a problem with alcohol or drug use, talk to your doctor. Medicines    Take your medicines exactly as prescribed. Call your doctor if you think you are having a problem with your medicine. If your doctor recommends aspirin, take the amount directed each day. Make sure you take aspirin and not another kind of pain reliever, such as acetaminophen (Tylenol). When should you call for help? Call 911 if you have symptoms of a heart attack. These may include:    Chest pain or pressure, or a strange feeling in the chest.     Sweating. Shortness of breath. Pain, pressure, or a strange feeling in the back, neck, jaw, or upper belly or in one or both shoulders or arms. Lightheadedness or sudden weakness. A fast or irregular heartbeat. After you call 911, the  may tell you to chew 1 adult-strength or 2 to 4 low-dose aspirin. Wait for an ambulance. Do not try to drive yourself. Watch closely for changes in your health, and be sure to contact your doctor if you have any problems. Where can you learn more? Go to http://kim-tristin.info/  Enter F075 in the search box to learn more about \"A Healthy Heart: Care Instructions. \"  Current as of: January 10, 2022               Content Version: 13.2  © 2006-2022 [employer]. Care instructions adapted under license by [employer] (which disclaims liability or warranty for this information). If you have questions about a medical condition or this instruction, always ask your healthcare professional. [address] any warranty or liability for your use of this information.

## 2022-10-05 NOTE — PROGRESS NOTES
Subjective:   35 y.o. female for Well Woman Check. Patient's last menstrual period was 10/04/2022. Social History: single partner, contraception - tubal ligation. Pt w/ h/o CAD s/p PCI 2008, cath 9/2019 80% ostial OM1, patent RCA EF 60% on med therapy (small size of OMB and large native LCx, felt OM to be poor target for PCI)  She is currently on BB, ASA, prn nitro. She is intolerant to statins iand zetia. [facility] cardiology yearly     Prediabetes- reviewed labs a1c 5.9     HTN- she continues to take BB, norvasc     Patient Active Problem List    Diagnosis Date Noted    Statin intolerance 08/12/2021    Chest pain 05/18/2020    Hyperlipidemia LDL goal <70 08/26/2019    Former smoker 08/26/2019    Adjustment disorder with depressed mood 11/09/2018    Epigastric pain 11/09/2018    Calculus of gallbladder without cholecystitis without obstruction 11/09/2018    Coronary artery disease involving native coronary artery of native heart with angina pectoris ([facility] [address] 07/05/2018    Iron deficiency anemia due to chronic blood loss 06/07/2018    Obesity, morbid ([facility] [address] 04/26/2018    ST elevation myocardial infarction (STEMI) ([facility] [address] 04/26/2018    Low HDL (under 40) 01/27/2016    Prediabetes 11/14/2014    Tobacco use 10/30/2014    Obesity 10/30/2014    Hypertension 10/30/2014     Current Outpatient Medications   Medication Sig Dispense Refill    metoprolol succinate (TOPROL-XL) 25 mg XL tablet Take 0.5 Tablets by mouth daily. 45 Tablet 1    aspirin 81 mg chewable tablet Take 1 Tablet by mouth daily. 90 Tablet 3    fluticasone propionate (FLONASE) 50 mcg/actuation nasal spray 2 Sprays by Both Nostrils route daily. 1 Each 2    loratadine (Wal-itin) 10 mg tablet Take 1 Tablet by mouth daily as needed for Allergies. 90 Tablet 3    naproxen sodium (NAPROSYN) 220 mg tablet Take 220 mg by mouth two (2) times daily as needed. acetaminophen (TYLENOL) 500 mg tablet Take  by mouth every six (6) hours as needed for Pain.       amLODIPine (NORVASC) 5 mg tablet Take 5 mg by mouth nightly. nitroglycerin (NITROSTAT) 0.4 mg SL tablet 0.4 mg. Allergies   Allergen Reactions    Lisinopril Cough    Metoprolol Palpitations     Not allergic/ patient is TAKING    Statins-Hmg-Coa Reductase Inhibitors Other (comments)     Muscle/joint pain    Vaccine Adjuvant Emulsion As03 Seizures     MMR    Zofran Odt [Ondansetron] Swelling     migraines    Zofran [Ondansetron Hcl] Swelling     migraines     Past Medical History:   Diagnosis Date    Cardiac echocardiogram 07/21/2016    Sm LV cavity. EF 65-70%. No RWMA. Mod-marked LVH w/dynamic obstruction, mid-cavity obliteration & peak grad of 25 mmHg. Indeterminate diastolic fx. No significant valvular pathology. Cardiac nuclear imaging test 07/22/2016    Low risk. Very sm distal anterior & apical partially reversible defect, likely artifact, but sm area of ischemia not excluded. No RWMA. EF 66%.   Neg EKG on pharm stress test.    COVID-19     Diverticulitis     Gall stones     GERD (gastroesophageal reflux disease)     Heart attack ([facility] [address]     01/27/2018    Hypertension     Infectious disease     Bacteria vaginosis    Nausea & vomiting     Non-ST elevated myocardial infarction [facility])     STEMI (ST elevation myocardial infarction) ([facility] [address] 01/2018     Past Surgical History:   Procedure Laterality Date    COLONOSCOPY N/A 6/26/2020    COLONOSCOPY performed by Jaime Bowden MD at [address]  09/28/2008    HX CORONARY STENT PLACEMENT      HX TUBAL LIGATION  09/28/2008    DC CARDIAC SURG PROCEDURE UNLIST      stent     Family History   Problem Relation Age of Onset    Depression Mother     Asthma Mother     Migraines Mother     Hypertension Mother     Stroke Mother     Heart Attack Mother     OSTEOARTHRITIS Mother     Depression Brother     Alcohol abuse Father     Substance Abuse Father         tobacco    Drug Abuse Father     Hypertension Father     High Cholesterol Father     Stroke Father     Rashes/Skin Problems Father     Substance Abuse Brother         tobacco    Drug Abuse Brother     Asthma Brother     Migraines Brother     Hypertension Brother     High Cholesterol Brother     Hypertension Paternal Grandmother     Diabetes Paternal Grandmother     Hypertension Maternal Grandmother     Colon Cancer Maternal Grandmother      Social History     Tobacco Use    Smoking status: Former     Packs/day: 0.25     Years: 20.00     Pack years: 5.00     Types: Cigarettes     Quit date: 2018     Years since quittin.2    Smokeless tobacco: Never   Substance Use Topics    Alcohol use: No        ROS:  Feeling well. No dyspnea or chest pain on exertion. No abdominal pain, change in bowel habits, black or bloody stools. No urinary tract symptoms. GYN ROS: normal menses, no abnormal bleeding, pelvic pain or discharge, no breast pain or new or enlarging lumps on self exam. No neurological complaints. Objective:   Visit Vitals  /86 (BP 1 Location: Left upper arm, BP Patient Position: Sitting, BP Cuff Size: Adult)   Pulse 88   Temp 98.4 °F (36.9 °C) (Temporal)   Resp 16   Ht 5' (1.524 m)   Wt 222 lb (100.7 kg)   LMP 10/04/2022   SpO2 98%   BMI 43.36 kg/m²     The patient appears well, alert, oriented x 3, in no distress. ENT normal.  Neck supple. No adenopathy or thyromegaly. ANNALISA. Lungs are clear, good air entry, no wheezes, rhonchi or rales. S1 and S2 normal, no murmurs, regular rate and rhythm. Abdomen soft without tenderness, guarding, mass or organomegaly. Extremities show no edema, normal peripheral pulses. Neurological is normal, no focal findings. BREAST EXAM: breasts appear normal, no suspicious masses, no skin or nipple changes or axillary nodes    PELVIC EXAM: examination not indicated    Assessment/Plan:   Diagnoses and all orders for this visit:    1.  Well woman exam with routine gynecological exam  well woman  Mammogram 2022  pap smear-  update   return annually or prn  reviewed diet, exercise and weight control. Advised to pursue colorectal consults with recurrent diverticulitis    2. Primary hypertension  Controlled  Cont ccb, bb    3. Prediabetes  Tlcs, monitoring  -     HEMOGLOBIN A1C WITH EAG; Future  -     METABOLIC PANEL, COMPREHENSIVE; Future    4. BMI 40.0-44.9, adult (HCC)  Discussed wt loss strategies    5. Coronary artery disease involving native coronary artery of native heart with angina pectoris ([facility] [address]  Asymptomatic  Cont asa, bb  Intolerant to statin  Congratulated on continued tob abstinence    6. Statin intolerance    7. Hyperlipidemia LDL goal <70    Other orders  -     metoprolol succinate (TOPROL-XL) 25 mg XL tablet; Take 0.5 Tablets by mouth daily. -     aspirin 81 mg chewable tablet; Take 1 Tablet by mouth daily. -     fluticasone propionate (FLONASE) 50 mcg/actuation nasal spray; 2 Sprays by Both Nostrils route daily. -     loratadine (Wal-itin) 10 mg tablet; Take 1 Tablet by mouth daily as needed for Allergies. Ff-up in 6 months or sooner prn    Patient/guardian understands plan of care. Patient has provided input and agrees with goals. Future labs to be discussed on next visit.

## 2022-10-05 NOTE — PROGRESS NOTES
1. Have you been to the ER, urgent care clinic since your last visit? Hospitalized since your last visit? ER 0813/2022 wrist strain    2. Have you seen or consulted any other health care providers outside of the [address] [place] [address] since your last visit? Include any pap smears or colon screening. No    LMP;10/04/2022  Contraception type:tubal lig. Vaginal problems:no  Last mammogram:02/12/2022  Last Pap:12/31/2020  Last Tdap:02/27/2014  Family hx of ovarian ca. no  Family hx of breast ca:no  Family hx of colon ca:  MGM  Colonoscopy 06/26/2020 repeat 10 years 2030 Dr. Narcisa Perez

## 2022-11-23 ENCOUNTER — TELEPHONE (OUTPATIENT)
Dept: FAMILY MEDICINE CLINIC | Age: 42
End: 2022-11-23

## 2022-11-23 RX ORDER — POTASSIUM CHLORIDE 20 MEQ/1
20 TABLET, EXTENDED RELEASE ORAL DAILY
Qty: 5 TABLET | Refills: 0 | Status: CANCELLED | OUTPATIENT
Start: 2022-11-23 | End: 2022-11-28

## 2022-11-23 NOTE — TELEPHONE ENCOUNTER
Pt states that her potassium is running low again and she is having bad muscle craps and she would like to get her RX for potassium called in to the [id number] today if possible. Requested Prescriptions     Pending Prescriptions Disp Refills    potassium chloride (K-DUR, KLOR-CON M20) 20 mEq tablet 5 Tablet 0     Sig: Take 1 Tablet by mouth daily for 5 days.

## 2022-11-25 NOTE — TELEPHONE ENCOUNTER
No evidence on low K on labs done 8/2022, nor is she on any medication that usually causes low K. Last kcl rx was for 5 day 2021.   Suggest OV for eval of muscle cramps

## 2023-02-02 NOTE — DISCHARGE INSTRUCTIONS
Return for pain, swelling, fever not resolving with motrin or tylenol, shortness of breath, vomiting, decreased fluid intake, weakness, numbness, dizziness, or any change or concerns. oral cessation medication/hypnosis

## 2023-02-09 DIAGNOSIS — K57.32 DIVERTICULITIS OF COLON: ICD-10-CM

## 2023-02-09 DIAGNOSIS — K57.92 DIVERTICULITIS: Primary | ICD-10-CM

## 2023-02-09 RX ORDER — PROMETHAZINE HYDROCHLORIDE 12.5 MG/1
12.5 TABLET ORAL
Qty: 10 TABLET | Refills: 0 | Status: SHIPPED | OUTPATIENT
Start: 2023-02-09

## 2023-02-09 NOTE — PROGRESS NOTES
Spoke with patient and she states that she needs a formal order to see one of surgeon regarding her diverticulitis                                                                                                                                                                                                                             Surgeons

## 2023-02-10 NOTE — PROGRESS NOTES
per Teresa Oar request, all demographics, insurance card, last office note, labs fax to [facility] [id number], [address] Phone (if available): [phone]  Fax (if available): [phone]

## 2023-02-13 ENCOUNTER — TELEPHONE (OUTPATIENT)
Age: 43
End: 2023-02-13

## 2023-02-13 RX ORDER — PROMETHAZINE HYDROCHLORIDE 6.25 MG/5ML
6.25 SYRUP ORAL EVERY 6 HOURS PRN
Qty: 100 ML | Refills: 0 | Status: SHIPPED | OUTPATIENT
Start: 2023-02-13 | End: 2023-02-14 | Stop reason: SDUPTHER

## 2023-02-13 NOTE — TELEPHONE ENCOUNTER
Pt called stating she was supposed to receive Promethazine liquid but instead she received the tablets. Pt did not  the medication. Please advise.

## 2023-02-13 NOTE — TELEPHONE ENCOUNTER
Pt called to follow up on medication switch and was told Dr. Priscilla Reed wanted clarification as to why this medication is needed. Pt states she is having active symptoms and does need the medication now. Please advise.

## 2023-02-14 RX ORDER — PROMETHAZINE HYDROCHLORIDE 6.25 MG/5ML
6.25 SYRUP ORAL EVERY 6 HOURS PRN
Qty: 100 ML | Refills: 0 | Status: SHIPPED | OUTPATIENT
Start: 2023-02-14

## 2023-02-14 NOTE — TELEPHONE ENCOUNTER
Called patient. No answer. Left detailed message of Dr. Nina Mcclellan recommendations.    Erx done, discuss alarm s/sx that require emergent evaluation (vomiting/fever, severe abdominal pain)   Cont to follow with dr. Maddy Evans regarding recurrent diverticulitis

## 2023-02-14 NOTE — TELEPHONE ENCOUNTER
Pt's rx did not go though because the pharmacy was not attached. Pharmacy has been updated to [facility] on [address] [facility] in [place]. Please resend rx.

## 2023-03-27 ENCOUNTER — HOSPITAL ENCOUNTER (OUTPATIENT)
Facility: HOSPITAL | Age: 43
Discharge: HOME | End: 2023-03-30

## 2023-03-27 LAB — LABCORP SPECIMEN COLLECTION: NORMAL

## 2023-03-27 PROCEDURE — 99001 SPECIMEN HANDLING PT-LAB: CPT

## 2023-03-28 LAB
ALBUMIN SERPL-MCNC: 4.3 G/DL (ref 3.8–4.8)
ALBUMIN SERPL-MCNC: 4.3 G/DL (ref 3.8–4.8)
ALBUMIN/GLOB SERPL: 1.4 {RATIO} (ref 1.2–2.2)
ALBUMIN/GLOB SERPL: 1.4 {RATIO} (ref 1.2–2.2)
ALP SERPL-CCNC: 73 IU/L (ref 44–121)
ALP SERPL-CCNC: 73 IU/L (ref 44–121)
ALT SERPL-CCNC: 16 IU/L (ref 0–32)
ALT SERPL-CCNC: 16 IU/L (ref 0–32)
AST SERPL-CCNC: 15 IU/L (ref 0–40)
AST SERPL-CCNC: 15 IU/L (ref 0–40)
BILIRUB SERPL-MCNC: <0.2 MG/DL (ref 0–1.2)
BILIRUB SERPL-MCNC: <0.2 MG/DL (ref 0–1.2)
BUN SERPL-MCNC: 11 MG/DL (ref 6–24)
BUN SERPL-MCNC: 11 MG/DL (ref 6–24)
BUN/CREAT SERPL: 15 (ref 9–23)
BUN/CREAT SERPL: 15 (ref 9–23)
CALCIUM SERPL-MCNC: 9.5 MG/DL (ref 8.7–10.2)
CALCIUM SERPL-MCNC: 9.5 MG/DL (ref 8.7–10.2)
CHLORIDE SERPL-SCNC: 107 MMOL/L (ref 96–106)
CHLORIDE SERPL-SCNC: 107 MMOL/L (ref 96–106)
CO2 SERPL-SCNC: 21 MMOL/L (ref 20–29)
CO2 SERPL-SCNC: 21 MMOL/L (ref 20–29)
CREAT SERPL-MCNC: 0.73 MG/DL (ref 0.57–1)
CREAT SERPL-MCNC: 0.73 MG/DL (ref 0.57–1)
EGFRCR SERPLBLD CKD-EPI 2021: 104 ML/MIN/1.73
EGFRCR SERPLBLD CKD-EPI 2021: 104 ML/MIN/1.73
EST. AVERAGE GLUCOSE BLD GHB EST-MCNC: 123 MG/DL
EST. AVERAGE GLUCOSE BLD GHB EST-MCNC: 123 MG/DL
GLOBULIN SER CALC-MCNC: 3.1 G/DL (ref 1.5–4.5)
GLOBULIN SER CALC-MCNC: 3.1 G/DL (ref 1.5–4.5)
GLUCOSE SERPL-MCNC: 94 MG/DL (ref 70–99)
GLUCOSE SERPL-MCNC: 94 MG/DL (ref 70–99)
HBA1C MFR BLD: 5.9 % (ref 4.8–5.6)
HBA1C MFR BLD: 5.9 % (ref 4.8–5.6)
POTASSIUM SERPL-SCNC: 4.4 MMOL/L (ref 3.5–5.2)
POTASSIUM SERPL-SCNC: 4.4 MMOL/L (ref 3.5–5.2)
PROT SERPL-MCNC: 7.4 G/DL (ref 6–8.5)
PROT SERPL-MCNC: 7.4 G/DL (ref 6–8.5)
SODIUM SERPL-SCNC: 142 MMOL/L (ref 134–144)
SODIUM SERPL-SCNC: 142 MMOL/L (ref 134–144)

## 2023-04-02 SDOH — ECONOMIC STABILITY: HOUSING INSECURITY
IN THE LAST 12 MONTHS, WAS THERE A TIME WHEN YOU DID NOT HAVE A STEADY PLACE TO SLEEP OR SLEPT IN A SHELTER (INCLUDING NOW)?: NO

## 2023-04-02 SDOH — ECONOMIC STABILITY: TRANSPORTATION INSECURITY
IN THE PAST 12 MONTHS, HAS LACK OF TRANSPORTATION KEPT YOU FROM MEETINGS, WORK, OR FROM GETTING THINGS NEEDED FOR DAILY LIVING?: NO

## 2023-04-02 SDOH — ECONOMIC STABILITY: FOOD INSECURITY: WITHIN THE PAST 12 MONTHS, THE FOOD YOU BOUGHT JUST DIDN'T LAST AND YOU DIDN'T HAVE MONEY TO GET MORE.: OFTEN TRUE

## 2023-04-02 SDOH — ECONOMIC STABILITY: INCOME INSECURITY: HOW HARD IS IT FOR YOU TO PAY FOR THE VERY BASICS LIKE FOOD, HOUSING, MEDICAL CARE, AND HEATING?: VERY HARD

## 2023-04-02 SDOH — ECONOMIC STABILITY: FOOD INSECURITY: WITHIN THE PAST 12 MONTHS, YOU WORRIED THAT YOUR FOOD WOULD RUN OUT BEFORE YOU GOT MONEY TO BUY MORE.: OFTEN TRUE

## 2023-04-03 NOTE — PROGRESS NOTES
Chief Complaint   Patient presents with    Cholesterol Problem    Coronary Artery Disease    Hypertension    Other     Hx of statin intolerance and Pre-DM     Results     Review of results       1. \"Have you been to the ER, urgent care clinic since your last visit? Hospitalized since your last visit? \" No    2. \"Have you seen or consulted any other health care providers outside of the [address] [place] [address] since your last visit? \" No     3. For patients aged 39-70: Has the patient had a colonoscopy / FIT/ Cologuard? Yes - no Care Gap present 06/26/2020      If the patient is female:    4. For patients aged 41-77: Has the patient had a mammogram within the past 2 years? Yes - Care Gap present. Most recent result on file due 03/18/2022      5. For patients aged 21-65: Has the patient had a pap smear? Yes - Care Gap present.  Most recent result on file last noted 12/31/2020

## 2023-04-04 PROBLEM — I21.02 ACUTE ST ELEVATION MYOCARDIAL INFARCTION (STEMI) INVOLVING LEFT ANTERIOR DESCENDING (LAD) CORONARY ARTERY (HCC): Status: ACTIVE | Noted: 2018-01-27

## 2023-04-04 PROBLEM — Z95.5 STENTED CORONARY ARTERY: Status: ACTIVE | Noted: 2018-07-10

## 2023-04-04 NOTE — PATIENT INSTRUCTIONS
healthcare professional. [address] any warranty or liability for your use of this information. Patient Education        DASH Diet: Care Instructions  Your Care Instructions     The DASH diet is an eating plan that can help lower your blood pressure. DASH stands for Dietary Approaches to Stop Hypertension. Hypertension is high blood pressure. The DASH diet focuses on eating foods that are high in calcium, potassium, and magnesium. These nutrients can lower blood pressure. The foods that are highest in these nutrients are fruits, vegetables, low-fat dairy products, nuts, seeds, and legumes. But taking calcium, potassium, and magnesium supplements instead of eating foods that are high in those nutrients does not have the same effect. The DASH diet also includes whole grains, fish, and poultry. The DASH diet is one of several lifestyle changes your doctor may recommend to lower your high blood pressure. Your doctor may also want you to decrease the amount of sodium in your diet. Lowering sodium while following the DASH diet can lower blood pressure even further than just the DASH diet alone. Follow-up care is a key part of your treatment and safety. Be sure to make and go to all appointments, and call your doctor if you are having problems. It's also a good idea to know your test results and keep a list of the medicines you take. How can you care for yourself at home? Following the DASH diet  Eat 4 to 5 servings of fruit each day. A serving is 1 medium-sized piece of fruit, ½ cup chopped or canned fruit, 1/4 cup dried fruit, or 4 ounces (½ cup) of fruit juice. Choose fruit more often than fruit juice. Eat 4 to 5 servings of vegetables each day. A serving is 1 cup of lettuce or raw leafy vegetables, ½ cup of chopped or cooked vegetables, or 4 ounces (½ cup) of vegetable juice. Choose vegetables more often than vegetable juice.   Get 2 to 3 servings of low-fat and fat-free dairy each

## 2023-04-05 ENCOUNTER — OFFICE VISIT (OUTPATIENT)
Age: 43
End: 2023-04-05
Payer: MEDICAID

## 2023-04-05 VITALS
OXYGEN SATURATION: 98 % | BODY MASS INDEX: 41.82 KG/M2 | HEIGHT: 60 IN | WEIGHT: 213 LBS | DIASTOLIC BLOOD PRESSURE: 72 MMHG | RESPIRATION RATE: 16 BRPM | SYSTOLIC BLOOD PRESSURE: 136 MMHG | HEART RATE: 76 BPM | TEMPERATURE: 98.2 F

## 2023-04-05 DIAGNOSIS — Z78.9 STATIN INTOLERANCE: ICD-10-CM

## 2023-04-05 DIAGNOSIS — I10 PRIMARY HYPERTENSION: Primary | ICD-10-CM

## 2023-04-05 DIAGNOSIS — I10 PRIMARY HYPERTENSION: ICD-10-CM

## 2023-04-05 DIAGNOSIS — E78.2 MIXED HYPERLIPIDEMIA: ICD-10-CM

## 2023-04-05 DIAGNOSIS — R73.03 PREDIABETES: ICD-10-CM

## 2023-04-05 DIAGNOSIS — E78.5 HYPERLIPIDEMIA LDL GOAL <70: ICD-10-CM

## 2023-04-05 DIAGNOSIS — I25.119 ATHEROSCLEROSIS OF NATIVE CORONARY ARTERY OF NATIVE HEART WITH ANGINA PECTORIS (HCC): ICD-10-CM

## 2023-04-05 PROCEDURE — 3078F DIAST BP <80 MM HG: CPT | Performed by: FAMILY MEDICINE

## 2023-04-05 PROCEDURE — 99214 OFFICE O/P EST MOD 30 MIN: CPT | Performed by: FAMILY MEDICINE

## 2023-04-05 PROCEDURE — 3075F SYST BP GE 130 - 139MM HG: CPT | Performed by: FAMILY MEDICINE

## 2023-04-05 ASSESSMENT — PATIENT HEALTH QUESTIONNAIRE - PHQ9
8. MOVING OR SPEAKING SO SLOWLY THAT OTHER PEOPLE COULD HAVE NOTICED. OR THE OPPOSITE, BEING SO FIGETY OR RESTLESS THAT YOU HAVE BEEN MOVING AROUND A LOT MORE THAN USUAL: 0
1. LITTLE INTEREST OR PLEASURE IN DOING THINGS: 0
9. THOUGHTS THAT YOU WOULD BE BETTER OFF DEAD, OR OF HURTING YOURSELF: 0
2. FEELING DOWN, DEPRESSED OR HOPELESS: 0
3. TROUBLE FALLING OR STAYING ASLEEP: 0
4. FEELING TIRED OR HAVING LITTLE ENERGY: 0
10. IF YOU CHECKED OFF ANY PROBLEMS, HOW DIFFICULT HAVE THESE PROBLEMS MADE IT FOR YOU TO DO YOUR WORK, TAKE CARE OF THINGS AT HOME, OR GET ALONG WITH OTHER PEOPLE: 0
SUM OF ALL RESPONSES TO PHQ QUESTIONS 1-9: 0
7. TROUBLE CONCENTRATING ON THINGS, SUCH AS READING THE NEWSPAPER OR WATCHING TELEVISION: 0
SUM OF ALL RESPONSES TO PHQ9 QUESTIONS 1 & 2: 0
SUM OF ALL RESPONSES TO PHQ QUESTIONS 1-9: 0
5. POOR APPETITE OR OVEREATING: 0
6. FEELING BAD ABOUT YOURSELF - OR THAT YOU ARE A FAILURE OR HAVE LET YOURSELF OR YOUR FAMILY DOWN: 0
DEPRESSION UNABLE TO ASSESS: FUNCTIONAL CAPACITY MOTIVATION LIMITS ACCURACY

## 2023-04-05 ASSESSMENT — ANXIETY QUESTIONNAIRES
6. BECOMING EASILY ANNOYED OR IRRITABLE: 0
7. FEELING AFRAID AS IF SOMETHING AWFUL MIGHT HAPPEN: 0
3. WORRYING TOO MUCH ABOUT DIFFERENT THINGS: 0
2. NOT BEING ABLE TO STOP OR CONTROL WORRYING: 0
4. TROUBLE RELAXING: 0
1. FEELING NERVOUS, ANXIOUS, OR ON EDGE: 1
IF YOU CHECKED OFF ANY PROBLEMS ON THIS QUESTIONNAIRE, HOW DIFFICULT HAVE THESE PROBLEMS MADE IT FOR YOU TO DO YOUR WORK, TAKE CARE OF THINGS AT HOME, OR GET ALONG WITH OTHER PEOPLE: NOT DIFFICULT AT ALL
5. BEING SO RESTLESS THAT IT IS HARD TO SIT STILL: 0
GAD7 TOTAL SCORE: 1

## 2023-04-05 NOTE — PROGRESS NOTES
Dianne Herzog, 38 y.o.,  female    SUBJECTIVE  Ff-up    CAD s/p PCI 2008, cath 9/2019 80% ostial OM1, patent RCA EF 60% on med therapy (small size of OMB and large native LCx, felt OM to be poor target for PCI)  She is currently on BB, ASA, prn nitro. She is intolerant to statins iand zetia. [facility] cardiology yearly   and has been cleared for upcoming sigmoidectomy for recurrent diverticulitis    Prediabetes- reviewed labs a1c 5.8, she is on intermittent fasting and lost weight. HTN- she continues to take BB, norvasc. Reports normal BP readings at home and doctor visits.        ROS:  See HPI, all others negative        Patient Active Problem List   Diagnosis    Low HDL (under 40)    Calculus of gallbladder without cholecystitis without obstruction    Hypertension    Obesity    Obesity, morbid (HCC)    Iron deficiency anemia due to chronic blood loss    Tobacco use    Prediabetes    Hyperlipidemia LDL goal <70    Former smoker    Epigastric pain    Statin intolerance    Chest pain    ST elevation myocardial infarction (STEMI) ([facility] [address]    Adjustment disorder with depressed mood    Coronary artery disease involving native coronary artery of native heart with angina pectoris (HCC)    Stented coronary artery    Acute ST elevation myocardial infarction (STEMI) involving left anterior descending (LAD) coronary artery (HCC)       Current Outpatient Medications   Medication Sig Dispense Refill    amLODIPine (NORVASC) 5 MG tablet Take 1 tablet by mouth      aspirin 81 MG chewable tablet Take 1 tablet by mouth daily      loratadine (CLARITIN) 10 MG tablet Take 1 tablet by mouth daily as needed      metoprolol succinate (TOPROL XL) 25 MG extended release tablet Take 0.5 tablets by mouth daily      promethazine (PHENERGAN) 6.25 MG/5ML syrup Take 5 mLs by mouth every 6 hours as needed for Nausea 100 mL 0    acetaminophen (TYLENOL) 500 MG tablet Take by mouth every 6 hours as needed      fluticasone (FLONASE) 50 MCG/ACT nasal

## 2023-05-08 ENCOUNTER — TELEPHONE (OUTPATIENT)
Age: 43
End: 2023-05-08

## 2023-08-15 NOTE — PROGRESS NOTES
1. Have you been to the ER, urgent care clinic since your last visit? Hospitalized since your last visit? No    2. Have you seen or consulted any other health care providers outside of the [address] [place] [address] since your last visit? Include any pap smears or colon screening. [facility] 11/7/18     Chief Complaint   Patient presents with    Abdominal Pain     not improved    Hypertension    Coronary Artery Disease    Other     iron def.  anemia Picato Pregnancy And Lactation Text: This medication is Pregnancy Category C. It is unknown if this medication is excreted in breast milk.

## 2023-09-25 ENCOUNTER — HOSPITAL ENCOUNTER (OUTPATIENT)
Facility: HOSPITAL | Age: 43
Discharge: HOME | End: 2023-09-28
Payer: MEDICAID

## 2023-09-25 LAB
ALBUMIN SERPL-MCNC: 3.2 G/DL (ref 3.4–5)
ALBUMIN/GLOB SERPL: 0.8 (ref 0.8–1.7)
ALP SERPL-CCNC: 82 U/L (ref 45–117)
ALT SERPL-CCNC: 18 U/L (ref 13–56)
ANION GAP SERPL CALC-SCNC: 4 MMOL/L (ref 3–18)
AST SERPL-CCNC: 12 U/L (ref 10–38)
BASOPHILS # BLD: 0 K/UL (ref 0–0.1)
BASOPHILS NFR BLD: 0 % (ref 0–2)
BILIRUB SERPL-MCNC: 0.3 MG/DL (ref 0.2–1)
BUN SERPL-MCNC: 7 MG/DL (ref 7–18)
BUN/CREAT SERPL: 10 (ref 12–20)
CALCIUM SERPL-MCNC: 8.8 MG/DL (ref 8.5–10.1)
CHLORIDE SERPL-SCNC: 105 MMOL/L (ref 100–111)
CHOLEST SERPL-MCNC: 141 MG/DL
CO2 SERPL-SCNC: 30 MMOL/L (ref 21–32)
CREAT SERPL-MCNC: 0.68 MG/DL (ref 0.6–1.3)
DIFFERENTIAL METHOD BLD: ABNORMAL
EOSINOPHIL # BLD: 0.1 K/UL (ref 0–0.4)
EOSINOPHIL NFR BLD: 2 % (ref 0–5)
ERYTHROCYTE [DISTWIDTH] IN BLOOD BY AUTOMATED COUNT: 17.1 % (ref 11.6–14.5)
EST. AVERAGE GLUCOSE BLD GHB EST-MCNC: 108 MG/DL
GLOBULIN SER CALC-MCNC: 4.2 G/DL (ref 2–4)
GLUCOSE SERPL-MCNC: 94 MG/DL (ref 74–99)
HBA1C MFR BLD: 5.4 % (ref 4.2–5.6)
HCT VFR BLD AUTO: 38 % (ref 35–45)
HDLC SERPL-MCNC: 44 MG/DL (ref 40–60)
HDLC SERPL: 3.2 (ref 0–5)
HGB BLD-MCNC: 11.7 G/DL (ref 12–16)
IMM GRANULOCYTES # BLD AUTO: 0 K/UL (ref 0–0.04)
IMM GRANULOCYTES NFR BLD AUTO: 0 % (ref 0–0.5)
LDLC SERPL CALC-MCNC: 82.2 MG/DL (ref 0–100)
LIPID PANEL: NORMAL
LYMPHOCYTES # BLD: 2.4 K/UL (ref 0.9–3.6)
LYMPHOCYTES NFR BLD: 39 % (ref 21–52)
MCH RBC QN AUTO: 24.9 PG (ref 24–34)
MCHC RBC AUTO-ENTMCNC: 30.8 G/DL (ref 31–37)
MCV RBC AUTO: 81 FL (ref 78–100)
MONOCYTES # BLD: 0.4 K/UL (ref 0.05–1.2)
MONOCYTES NFR BLD: 6 % (ref 3–10)
NEUTS SEG # BLD: 3.3 K/UL (ref 1.8–8)
NEUTS SEG NFR BLD: 53 % (ref 40–73)
NRBC # BLD: 0 K/UL (ref 0–0.01)
NRBC BLD-RTO: 0 PER 100 WBC
PLATELET # BLD AUTO: 401 K/UL (ref 135–420)
PMV BLD AUTO: 10.7 FL (ref 9.2–11.8)
POTASSIUM SERPL-SCNC: 4 MMOL/L (ref 3.5–5.5)
PROT SERPL-MCNC: 7.4 G/DL (ref 6.4–8.2)
RBC # BLD AUTO: 4.69 M/UL (ref 4.2–5.3)
SODIUM SERPL-SCNC: 139 MMOL/L (ref 136–145)
TRIGL SERPL-MCNC: 74 MG/DL
VLDLC SERPL CALC-MCNC: 14.8 MG/DL
WBC # BLD AUTO: 6.2 K/UL (ref 4.6–13.2)

## 2023-09-25 PROCEDURE — 80053 COMPREHEN METABOLIC PANEL: CPT

## 2023-09-25 PROCEDURE — 80061 LIPID PANEL: CPT

## 2023-09-25 PROCEDURE — 85025 COMPLETE CBC W/AUTO DIFF WBC: CPT

## 2023-09-25 PROCEDURE — 36415 COLL VENOUS BLD VENIPUNCTURE: CPT

## 2023-09-25 PROCEDURE — 83036 HEMOGLOBIN GLYCOSYLATED A1C: CPT

## 2023-10-05 ENCOUNTER — OFFICE VISIT (OUTPATIENT)
Age: 43
End: 2023-10-05
Payer: MEDICAID

## 2023-10-05 VITALS
OXYGEN SATURATION: 100 % | BODY MASS INDEX: 41.82 KG/M2 | TEMPERATURE: 99.1 F | SYSTOLIC BLOOD PRESSURE: 134 MMHG | HEART RATE: 82 BPM | WEIGHT: 213 LBS | HEIGHT: 60 IN | RESPIRATION RATE: 16 BRPM | DIASTOLIC BLOOD PRESSURE: 78 MMHG

## 2023-10-05 DIAGNOSIS — I10 PRIMARY HYPERTENSION: ICD-10-CM

## 2023-10-05 DIAGNOSIS — R10.9 ABDOMINAL CRAMPING: ICD-10-CM

## 2023-10-05 DIAGNOSIS — E78.5 HYPERLIPIDEMIA LDL GOAL <70: ICD-10-CM

## 2023-10-05 DIAGNOSIS — Z12.4 CERVICAL CANCER SCREENING: ICD-10-CM

## 2023-10-05 DIAGNOSIS — R10.13 EPIGASTRIC PAIN: ICD-10-CM

## 2023-10-05 DIAGNOSIS — R73.03 PREDIABETES: ICD-10-CM

## 2023-10-05 DIAGNOSIS — I25.119 CORONARY ARTERY DISEASE INVOLVING NATIVE CORONARY ARTERY OF NATIVE HEART WITH ANGINA PECTORIS (HCC): ICD-10-CM

## 2023-10-05 DIAGNOSIS — Z01.419 WELL WOMAN EXAM WITH ROUTINE GYNECOLOGICAL EXAM: Primary | ICD-10-CM

## 2023-10-05 DIAGNOSIS — Z78.9 STATIN INTOLERANCE: ICD-10-CM

## 2023-10-05 PROCEDURE — 3078F DIAST BP <80 MM HG: CPT | Performed by: FAMILY MEDICINE

## 2023-10-05 PROCEDURE — 3075F SYST BP GE 130 - 139MM HG: CPT | Performed by: FAMILY MEDICINE

## 2023-10-05 PROCEDURE — 99396 PREV VISIT EST AGE 40-64: CPT | Performed by: FAMILY MEDICINE

## 2023-10-05 RX ORDER — PANTOPRAZOLE SODIUM 40 MG/1
40 TABLET, DELAYED RELEASE ORAL
Qty: 90 TABLET | Refills: 1 | Status: SHIPPED | OUTPATIENT
Start: 2023-10-05

## 2023-10-06 ENCOUNTER — HOSPITAL ENCOUNTER (OUTPATIENT)
Facility: HOSPITAL | Age: 43
Discharge: HOME | End: 2023-10-09

## 2023-10-06 LAB — LABCORP SPECIMEN COLLECTION: NORMAL

## 2023-10-07 LAB — LIPASE SERPL-CCNC: 32 U/L (ref 14–72)

## 2023-10-10 LAB
ENDOMYSIUM IGA SER QL: NEGATIVE
IGA SERPL-MCNC: 350 MG/DL (ref 87–352)
TTG IGA SER-ACNC: <2 U/ML (ref 0–3)

## 2023-10-11 ENCOUNTER — TELEPHONE (OUTPATIENT)
Age: 43
End: 2023-10-11

## 2023-10-11 NOTE — TELEPHONE ENCOUNTER
Pt states that she has not heard from the Gastroenterologist referral yet. Please advise.  Thank you

## 2023-10-13 LAB
HPV DNA HIGH RISK: NEGATIVE
HPV GENOTYPE: NEGATIVE
HPV, GENOTYPE 18: NEGATIVE
PAP IMAGE GUIDED: NORMAL

## 2023-11-07 RX ORDER — ASPIRIN 81 MG/1
TABLET, CHEWABLE ORAL
Qty: 90 TABLET | OUTPATIENT
Start: 2023-11-07

## 2023-11-07 NOTE — TELEPHONE ENCOUNTER
This pharmacy faxed over request for the following prescriptions to be filled:    Medication requested :   Requested Prescriptions     Pending Prescriptions Disp Refills    aspirin 81 MG chewable tablet [Pharmacy Med Name: ASPIRIN 81MG CHEWABLE TABLETS] 90 tablet      Sig: CHEW AND SWALLOW 1 TABLET BY MOUTH DAILY     PCP: [address] or Print: [facility]  Mail order or Local pharmacy [address] [place] [address]     Scheduled appointment if not seen by current providers in office: LOV 10/5/2023 ARMINDA 1/8/2024

## 2024-01-03 ENCOUNTER — HOSPITAL ENCOUNTER (EMERGENCY)
Facility: HOSPITAL | Age: 44
Discharge: LEFT BEFORE DISCHARGE | End: 2024-01-03
Attending: STUDENT IN AN ORGANIZED HEALTH CARE EDUCATION/TRAINING PROGRAM
Payer: MEDICAID

## 2024-01-03 VITALS
OXYGEN SATURATION: 100 % | SYSTOLIC BLOOD PRESSURE: 134 MMHG | HEIGHT: 60 IN | DIASTOLIC BLOOD PRESSURE: 77 MMHG | WEIGHT: 212 LBS | BODY MASS INDEX: 41.62 KG/M2 | HEART RATE: 98 BPM | RESPIRATION RATE: 18 BRPM | TEMPERATURE: 99 F

## 2024-01-03 LAB
FLUAV AG NPH QL IA: POSITIVE
FLUBV AG NOSE QL IA: NEGATIVE
SARS-COV-2 RDRP RESP QL NAA+PROBE: DETECTED
SOURCE: ABNORMAL

## 2024-01-03 PROCEDURE — 87635 SARS-COV-2 COVID-19 AMP PRB: CPT

## 2024-01-03 PROCEDURE — 87804 INFLUENZA ASSAY W/OPTIC: CPT

## 2024-01-03 PROCEDURE — 4500000002 HC ER NO CHARGE

## 2024-01-03 ASSESSMENT — PAIN DESCRIPTION - LOCATION: LOCATION: GENERALIZED

## 2024-01-03 ASSESSMENT — LIFESTYLE VARIABLES
HOW OFTEN DO YOU HAVE A DRINK CONTAINING ALCOHOL: MONTHLY OR LESS
HOW MANY STANDARD DRINKS CONTAINING ALCOHOL DO YOU HAVE ON A TYPICAL DAY: 1 OR 2

## 2024-01-03 ASSESSMENT — PAIN SCALES - GENERAL: PAINLEVEL_OUTOF10: 8

## 2024-01-03 ASSESSMENT — PAIN - FUNCTIONAL ASSESSMENT: PAIN_FUNCTIONAL_ASSESSMENT: 0-10

## 2024-01-08 ENCOUNTER — OFFICE VISIT (OUTPATIENT)
Age: 44
End: 2024-01-08
Payer: MEDICAID

## 2024-01-08 VITALS
WEIGHT: 211 LBS | OXYGEN SATURATION: 98 % | HEIGHT: 60 IN | SYSTOLIC BLOOD PRESSURE: 128 MMHG | RESPIRATION RATE: 17 BRPM | BODY MASS INDEX: 41.43 KG/M2 | TEMPERATURE: 98.2 F | DIASTOLIC BLOOD PRESSURE: 82 MMHG | HEART RATE: 82 BPM

## 2024-01-08 DIAGNOSIS — E78.5 HYPERLIPIDEMIA LDL GOAL <70: ICD-10-CM

## 2024-01-08 DIAGNOSIS — Z12.31 BREAST CANCER SCREENING BY MAMMOGRAM: ICD-10-CM

## 2024-01-08 DIAGNOSIS — Z78.9 STATIN INTOLERANCE: ICD-10-CM

## 2024-01-08 DIAGNOSIS — I25.119 CORONARY ARTERY DISEASE INVOLVING NATIVE CORONARY ARTERY OF NATIVE HEART WITH ANGINA PECTORIS (HCC): ICD-10-CM

## 2024-01-08 DIAGNOSIS — I10 PRIMARY HYPERTENSION: Primary | ICD-10-CM

## 2024-01-08 DIAGNOSIS — R73.03 PREDIABETES: ICD-10-CM

## 2024-01-08 PROCEDURE — 99214 OFFICE O/P EST MOD 30 MIN: CPT | Performed by: FAMILY MEDICINE

## 2024-01-08 PROCEDURE — 3074F SYST BP LT 130 MM HG: CPT | Performed by: FAMILY MEDICINE

## 2024-01-08 PROCEDURE — 3079F DIAST BP 80-89 MM HG: CPT | Performed by: FAMILY MEDICINE

## 2024-01-08 ASSESSMENT — PATIENT HEALTH QUESTIONNAIRE - PHQ9
SUM OF ALL RESPONSES TO PHQ QUESTIONS 1-9: 0
SUM OF ALL RESPONSES TO PHQ QUESTIONS 1-9: 0
SUM OF ALL RESPONSES TO PHQ9 QUESTIONS 1 & 2: 0
1. LITTLE INTEREST OR PLEASURE IN DOING THINGS: 0
SUM OF ALL RESPONSES TO PHQ QUESTIONS 1-9: 0
2. FEELING DOWN, DEPRESSED OR HOPELESS: 0
SUM OF ALL RESPONSES TO PHQ QUESTIONS 1-9: 0

## 2024-01-08 NOTE — PROGRESS NOTES
Grant Moon is a 43 y.o. female  Chief Complaint   Patient presents with    3 Month Follow-Up     HTN, Prediabetes, Hyperlipidemia     Vitals:    01/08/24 0943   BP: 128/82   Pulse: 82   Resp: 17   Temp: 98.2 °F (36.8 °C)   SpO2: 98%     \"Have you been to the ER, urgent care clinic since your last visit?  Hospitalized since your last visit?\"    YES - When: approximately 3  weeks ago.  Where and Why: [facility] for cholestectomy 12/13/2023.    “Have you seen or consulted any other health care providers outside of [facility] since your last visit?”    NO           
results found.   JOSÉ DIGITAL SCREEN W OR WO CAD BILATERAL    (Results Pending)       A1c:   Hemoglobin A1C   Date Value Ref Range Status   09/25/2023 5.4 4.2 - 5.6 % Final     Comment:     (NOTE)  HbA1C Interpretive Ranges  <5.7              Normal  5.7 - 6.4         Consider Prediabetes  >6.5              Consider Diabetes     03/27/2023 5.9 (H) 4.8 - 5.6 % Final     Comment:              Prediabetes: 5.7 - 6.4           Diabetes: >6.4           Glycemic control for adults with diabetes: <7.0     03/27/2023 5.9 (H) 4.8 - 5.6 % Final     Comment:              Prediabetes: 5.7 - 6.4           Diabetes: >6.4           Glycemic control for adults with diabetes: <7.0           ASSESSMENT/PLAN    Primary hypertension  Controlled  Cont ccb, bb   cbc/cmp prior to next visit    Prediabetes  Tlcs, monitoring  A1c, cmp prior to next visit     BMI 40.0-44.9, adult (HCC)  Discussed wt loss strategies   provided resource for wt loss clinic    Coronary artery disease involving native coronary artery of native heart with angina pectoris (HCC)  Asymptomatic  Cont asa, bb  Intolerant to statin  Congratulated on continued tob abstinence      Statin intolerance     Hyperlipidemia LDL goal <70    Breast cancer screening by mammogram  -     JOSÉ DIGITAL SCREEN W OR WO CAD BILATERAL; Future    Ff-up in 3 months or sooner prn       Patient understands plan of care. Patient has provided input and agrees with goals.

## 2024-02-21 PROBLEM — I25.2 HISTORY OF ACUTE MYOCARDIAL INFARCTION: Status: ACTIVE | Noted: 2018-04-26

## 2024-02-21 PROBLEM — I21.02 ACUTE ST ELEVATION MYOCARDIAL INFARCTION (STEMI) INVOLVING LEFT ANTERIOR DESCENDING (LAD) CORONARY ARTERY (HCC): Status: RESOLVED | Noted: 2018-01-27 | Resolved: 2024-02-21

## 2024-02-22 ENCOUNTER — HOSPITAL ENCOUNTER (EMERGENCY)
Facility: HOSPITAL | Age: 44
Discharge: HOME | End: 2024-02-22
Attending: EMERGENCY MEDICINE
Payer: MEDICAID

## 2024-02-22 ENCOUNTER — APPOINTMENT (OUTPATIENT)
Facility: HOSPITAL | Age: 44
End: 2024-02-22
Payer: MEDICAID

## 2024-02-22 VITALS
SYSTOLIC BLOOD PRESSURE: 166 MMHG | DIASTOLIC BLOOD PRESSURE: 99 MMHG | BODY MASS INDEX: 41.82 KG/M2 | HEIGHT: 60 IN | RESPIRATION RATE: 17 BRPM | OXYGEN SATURATION: 96 % | WEIGHT: 213 LBS | TEMPERATURE: 98.6 F | HEART RATE: 70 BPM

## 2024-02-22 DIAGNOSIS — S39.012A STRAIN OF LUMBAR REGION, INITIAL ENCOUNTER: Primary | ICD-10-CM

## 2024-02-22 PROCEDURE — 6370000000 HC RX 637 (ALT 250 FOR IP): Performed by: EMERGENCY MEDICINE

## 2024-02-22 PROCEDURE — 72100 X-RAY EXAM L-S SPINE 2/3 VWS: CPT

## 2024-02-22 PROCEDURE — 99283 EMERGENCY DEPT VISIT LOW MDM: CPT

## 2024-02-22 RX ORDER — IBUPROFEN 600 MG/1
600 TABLET ORAL 3 TIMES DAILY PRN
Qty: 30 TABLET | Refills: 0 | Status: SHIPPED | OUTPATIENT
Start: 2024-02-22

## 2024-02-22 RX ORDER — IBUPROFEN 400 MG/1
800 TABLET ORAL
Status: COMPLETED | OUTPATIENT
Start: 2024-02-22 | End: 2024-02-22

## 2024-02-22 RX ORDER — IBUPROFEN 600 MG/1
600 TABLET ORAL EVERY 6 HOURS PRN
Status: CANCELLED | OUTPATIENT
Start: 2024-02-22

## 2024-02-22 RX ADMIN — IBUPROFEN 800 MG: 400 TABLET, FILM COATED ORAL at 01:28

## 2024-02-22 ASSESSMENT — ENCOUNTER SYMPTOMS
BACK PAIN: 1
RESPIRATORY NEGATIVE: 1
GASTROINTESTINAL NEGATIVE: 1

## 2024-02-22 ASSESSMENT — PAIN DESCRIPTION - LOCATION: LOCATION: BACK

## 2024-02-22 ASSESSMENT — PAIN DESCRIPTION - ORIENTATION: ORIENTATION: LOWER

## 2024-02-22 ASSESSMENT — PAIN SCALES - GENERAL: PAINLEVEL_OUTOF10: 8

## 2024-02-22 ASSESSMENT — PAIN DESCRIPTION - DESCRIPTORS: DESCRIPTORS: ACHING

## 2024-02-22 NOTE — ED PROVIDER NOTES
Pulmonary effort is normal.      Breath sounds: Normal breath sounds.   Abdominal:      Palpations: Abdomen is soft.   Musculoskeletal:      Cervical back: Normal range of motion and neck supple.      Comments: L spine: (+) mild tenderness, neg SLR, normal pulses and sensory.   Neurological:      Mental Status: She is alert.         No results found for this or any previous visit (from the past 24 hour(s)).    PROCEDURES:      EMERGENCY DEPARTMENT COURSE and DIFFERENTIALDIAGNOSIS/ MDM:   Vitals:    Vitals:    02/22/24 0047 02/22/24 0048 02/22/24 0053   BP:  (!) 162/94    Pulse:  76    Resp:  20    Temp:  98.6 °F (37 °C)    SpO2:   96%   Weight: 96.6 kg (213 lb)     Height: 1.524 m (5')         MDM    Differential diagnosis: Lumbar strain, fracture, anxiety    XR LUMBAR SPINE (2-3 VIEWS)    (Results Pending)     1:02 AM  Upon re-evaluation the patient's symptoms have improved. Pt has non-toxic appearance and condition is stable for discharge. Patient was informed of tests & results, instructed to f/u with PCP and return to the ED upon worsening of symptoms. All questions and concerns were addressed.       Procedures    FINAL IMPRESSION      Acute lumbar strain    DISPOSITION/PLAN   DISPOSITION      DC home     DISCHARGE MEDICATIONS:    motrin       PATIENT REFERRED TO:  PCP in 3 to 4 days.         (Please note that portions of this note were completed with a voice recognitionprogram.  Efforts were made to edit the dictations but occasionally words are mis-transcribed.)    Iraj Gannon MD(electronically signed)  Attending Emergency Physician          Iraj Gannon MD  02/22/24 [id number]
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
## 2024-02-22 NOTE — ED NOTES
Pt ambulatory to room, MD at bedside, no distress noted, no limited ROM to extremities at this time, will continue to monitor.

## 2024-04-05 ENCOUNTER — HOSPITAL ENCOUNTER (OUTPATIENT)
Facility: HOSPITAL | Age: 44
Discharge: HOME | End: 2024-04-08

## 2024-04-05 LAB — SENTARA SPECIMEN COLLECTION: NORMAL

## 2024-04-05 PROCEDURE — 99001 SPECIMEN HANDLING PT-LAB: CPT

## 2024-04-05 SDOH — ECONOMIC STABILITY: FOOD INSECURITY: WITHIN THE PAST 12 MONTHS, THE FOOD YOU BOUGHT JUST DIDN'T LAST AND YOU DIDN'T HAVE MONEY TO GET MORE.: PATIENT DECLINED

## 2024-04-05 SDOH — ECONOMIC STABILITY: INCOME INSECURITY: HOW HARD IS IT FOR YOU TO PAY FOR THE VERY BASICS LIKE FOOD, HOUSING, MEDICAL CARE, AND HEATING?: PATIENT DECLINED

## 2024-04-05 SDOH — ECONOMIC STABILITY: FOOD INSECURITY: WITHIN THE PAST 12 MONTHS, YOU WORRIED THAT YOUR FOOD WOULD RUN OUT BEFORE YOU GOT MONEY TO BUY MORE.: PATIENT DECLINED

## 2024-04-05 SDOH — ECONOMIC STABILITY: TRANSPORTATION INSECURITY
IN THE PAST 12 MONTHS, HAS LACK OF TRANSPORTATION KEPT YOU FROM MEETINGS, WORK, OR FROM GETTING THINGS NEEDED FOR DAILY LIVING?: PATIENT DECLINED
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
## 2024-04-06 LAB
A/G RATIO: 1.1 RATIO (ref 1.1–2.6)
ALBUMIN SERPL-MCNC: 3.7 G/DL (ref 3.5–5)
ALP BLD-CCNC: 78 U/L (ref 25–115)
ALT SERPL-CCNC: 11 U/L (ref 5–40)
ANION GAP SERPL CALCULATED.3IONS-SCNC: 13 MMOL/L (ref 3–15)
AST SERPL-CCNC: 15 U/L (ref 10–37)
BASOPHILS # BLD: 0 % (ref 0–2)
BASOPHILS ABSOLUTE: 0 K/UL (ref 0–0.2)
BILIRUB SERPL-MCNC: 0.3 MG/DL (ref 0.2–1.2)
BUN BLDV-MCNC: 6 MG/DL (ref 6–22)
CALCIUM SERPL-MCNC: 9.1 MG/DL (ref 8.4–10.5)
CHLORIDE BLD-SCNC: 101 MMOL/L (ref 98–110)
CO2: 27 MMOL/L (ref 20–32)
CREAT SERPL-MCNC: 0.5 MG/DL (ref 0.5–1.2)
EOSINOPHIL # BLD: 2 % (ref 0–6)
EOSINOPHILS ABSOLUTE: 0.1 K/UL (ref 0–0.5)
ESTIMATED AVERAGE GLUCOSE: 115 MG/DL (ref 91–123)
GLOBULIN: 3.5 G/DL (ref 2–4)
GLOMERULAR FILTRATION RATE: >60 ML/MIN/1.73 SQ.M.
GLUCOSE: 82 MG/DL (ref 70–99)
HBA1C MFR BLD: 5.6 % (ref 4.8–5.6)
HCT VFR BLD CALC: 40.4 % (ref 35.1–48)
HEMOGLOBIN: 12 G/DL (ref 11.7–16)
LYMPHOCYTES # BLD: 46 % (ref 20–45)
LYMPHOCYTES ABSOLUTE: 2.7 K/UL (ref 1–4.8)
MCH RBC QN AUTO: 25 PG (ref 26–34)
MCHC RBC AUTO-ENTMCNC: 30 G/DL (ref 31–36)
MCV RBC AUTO: 85 FL (ref 80–99)
MONOCYTES ABSOLUTE: 0.3 K/UL (ref 0.1–1)
MONOCYTES: 5 % (ref 3–12)
NEUTROPHILS ABSOLUTE: 2.7 K/UL (ref 1.8–7.7)
NEUTROPHILS: 46 % (ref 40–75)
PDW BLD-RTO: 17.2 % (ref 10–15.5)
PLATELET # BLD: 405 K/UL (ref 140–440)
PMV BLD AUTO: 10.7 FL (ref 9–13)
POTASSIUM SERPL-SCNC: 4.3 MMOL/L (ref 3.5–5.5)
RBC: 4.77 M/UL (ref 3.8–5.2)
SODIUM BLD-SCNC: 141 MMOL/L (ref 133–145)
TOTAL PROTEIN: 7.2 G/DL (ref 6.4–8.3)
WBC: 5.9 K/UL (ref 4–11)

## 2024-04-08 ENCOUNTER — OFFICE VISIT (OUTPATIENT)
Age: 44
End: 2024-04-08
Payer: MEDICAID

## 2024-04-08 VITALS
RESPIRATION RATE: 16 BRPM | OXYGEN SATURATION: 98 % | WEIGHT: 213 LBS | TEMPERATURE: 98.2 F | DIASTOLIC BLOOD PRESSURE: 82 MMHG | HEART RATE: 79 BPM | SYSTOLIC BLOOD PRESSURE: 126 MMHG | BODY MASS INDEX: 41.82 KG/M2 | HEIGHT: 60 IN

## 2024-04-08 DIAGNOSIS — E78.5 HYPERLIPIDEMIA LDL GOAL <70: ICD-10-CM

## 2024-04-08 DIAGNOSIS — Z78.9 STATIN INTOLERANCE: ICD-10-CM

## 2024-04-08 DIAGNOSIS — I25.119 CORONARY ARTERY DISEASE INVOLVING NATIVE CORONARY ARTERY OF NATIVE HEART WITH ANGINA PECTORIS (HCC): ICD-10-CM

## 2024-04-08 DIAGNOSIS — R73.03 PREDIABETES: ICD-10-CM

## 2024-04-08 DIAGNOSIS — I10 PRIMARY HYPERTENSION: Primary | ICD-10-CM

## 2024-04-08 PROCEDURE — 3074F SYST BP LT 130 MM HG: CPT | Performed by: FAMILY MEDICINE

## 2024-04-08 PROCEDURE — 99214 OFFICE O/P EST MOD 30 MIN: CPT | Performed by: FAMILY MEDICINE

## 2024-04-08 PROCEDURE — 3079F DIAST BP 80-89 MM HG: CPT | Performed by: FAMILY MEDICINE

## 2024-04-08 RX ORDER — LOPERAMIDE HYDROCHLORIDE 2 MG/1
2 CAPSULE ORAL 4 TIMES DAILY PRN
COMMUNITY
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
## 2024-04-22 NOTE — TELEPHONE ENCOUNTER
This patient contacted office for the following prescriptions to be filled:    Medication requested :   ibuprofen (ADVIL;MOTRIN) 600 MG tablet  QTY 90   PCP: Tom  Pharmacy or Print: [facility]   Mail order or Local pharmacy [address]     Scheduled appointment if not seen by current providers in office: LOV 4/8/2024 ARMINDA 10/8/2024

## 2024-04-22 NOTE — TELEPHONE ENCOUNTER
Last OV 04/08/2024  Next OV 10/08/2024       Last filled 02/22/2024  30 tabs, 0 RF

## 2024-04-23 RX ORDER — IBUPROFEN 600 MG/1
600 TABLET ORAL 3 TIMES DAILY PRN
Qty: 30 TABLET | Refills: 0 | Status: SHIPPED | OUTPATIENT
Start: 2024-04-23

## 2024-05-20 NOTE — TELEPHONE ENCOUNTER
This patient contacted office for the following prescriptions to be filled:    Medication requested : Loratadine 10 mg  PCP: Tom  Pharmacy or Print: [place]  Mail order or Local pharmacy [address]    Scheduled appointment if not seen by current providers in office: lov 4/8/2024 becca 10/8/2024

## 2024-05-21 RX ORDER — LORATADINE 10 MG/1
10 TABLET ORAL DAILY PRN
Qty: 90 TABLET | Refills: 3 | Status: SHIPPED | OUTPATIENT
Start: 2024-05-21 | End: 2024-05-21 | Stop reason: SDUPTHER

## 2024-05-22 RX ORDER — LORATADINE 10 MG/1
10 TABLET ORAL DAILY PRN
Qty: 90 TABLET | Refills: 3 | Status: SHIPPED | OUTPATIENT
Start: 2024-05-22

## 2024-07-25 RX ORDER — LORATADINE 10 MG/1
10 TABLET ORAL DAILY PRN
Qty: 90 TABLET | Refills: 3 | Status: SHIPPED | OUTPATIENT
Start: 2024-07-25

## 2024-09-09 ENCOUNTER — HOSPITAL ENCOUNTER (OUTPATIENT)
Facility: HOSPITAL | Age: 44
Discharge: HOME | End: 2024-09-12

## 2024-09-09 LAB — SENTARA SPECIMEN COLLECTION: NORMAL

## 2024-09-09 PROCEDURE — 99001 SPECIMEN HANDLING PT-LAB: CPT

## 2024-09-10 LAB
A/G RATIO: 1.5 RATIO (ref 1.1–2.6)
ALBUMIN: 4.2 G/DL (ref 3.5–5)
ALP BLD-CCNC: 83 U/L (ref 25–115)
ALT SERPL-CCNC: 15 U/L (ref 5–40)
ANION GAP SERPL CALCULATED.3IONS-SCNC: 12 MMOL/L (ref 3–15)
AST SERPL-CCNC: 14 U/L (ref 10–37)
BILIRUB SERPL-MCNC: 0.2 MG/DL (ref 0.2–1.2)
BUN BLDV-MCNC: 8 MG/DL (ref 6–22)
CALCIUM SERPL-MCNC: 9.2 MG/DL (ref 8.4–10.5)
CHLORIDE BLD-SCNC: 102 MMOL/L (ref 98–110)
CHOLESTEROL, TOTAL: 132 MG/DL (ref 110–200)
CHOLESTEROL/HDL RATIO: 3.4 (ref 0–5)
CO2: 27 MMOL/L (ref 20–32)
CREAT SERPL-MCNC: 0.6 MG/DL (ref 0.5–1.2)
GFR, ESTIMATED: >60 ML/MIN/1.73 SQ.M.
GLOBULIN: 2.8 G/DL (ref 2–4)
GLUCOSE: 85 MG/DL (ref 70–99)
HDLC SERPL-MCNC: 39 MG/DL
LDL CHOLESTEROL: 82 MG/DL (ref 50–99)
LDL/HDL RATIO: 2.1
NON-HDL CHOLESTEROL: 93 MG/DL
POTASSIUM SERPL-SCNC: 3.9 MMOL/L (ref 3.5–5.5)
SODIUM BLD-SCNC: 141 MMOL/L (ref 133–145)
TOTAL PROTEIN: 7 G/DL (ref 6.4–8.3)
TRIGL SERPL-MCNC: 53 MG/DL (ref 40–149)
VLDLC SERPL CALC-MCNC: 11 MG/DL (ref 8–30)

## 2024-09-30 ENCOUNTER — TELEPHONE (OUTPATIENT)
Facility: CLINIC | Age: 44
End: 2024-09-30

## 2024-10-01 NOTE — TELEPHONE ENCOUNTER
Patient identified with 2 identifiers (name and ).  Patient was concerned about her Kidney function on her labs.   Advised patient thatn kidney function is normal.  Advised patient to keep her appt on 10/08/2024 so she can discuss with Dr. Santos in detail.

## 2024-10-08 ENCOUNTER — OFFICE VISIT (OUTPATIENT)
Facility: CLINIC | Age: 44
End: 2024-10-08
Payer: MEDICAID

## 2024-10-08 VITALS
DIASTOLIC BLOOD PRESSURE: 84 MMHG | TEMPERATURE: 98.4 F | HEART RATE: 78 BPM | BODY MASS INDEX: 40.37 KG/M2 | HEIGHT: 60 IN | OXYGEN SATURATION: 99 % | WEIGHT: 205.6 LBS | RESPIRATION RATE: 16 BRPM | SYSTOLIC BLOOD PRESSURE: 138 MMHG

## 2024-10-08 DIAGNOSIS — Z78.9 STATIN INTOLERANCE: ICD-10-CM

## 2024-10-08 DIAGNOSIS — I10 PRIMARY HYPERTENSION: ICD-10-CM

## 2024-10-08 DIAGNOSIS — I10 PRIMARY HYPERTENSION: Primary | ICD-10-CM

## 2024-10-08 DIAGNOSIS — R10.32 LLQ PAIN: ICD-10-CM

## 2024-10-08 DIAGNOSIS — R73.03 PREDIABETES: ICD-10-CM

## 2024-10-08 DIAGNOSIS — E78.5 HYPERLIPIDEMIA LDL GOAL <70: ICD-10-CM

## 2024-10-08 DIAGNOSIS — I25.119 CORONARY ARTERY DISEASE INVOLVING NATIVE CORONARY ARTERY OF NATIVE HEART WITH ANGINA PECTORIS (HCC): ICD-10-CM

## 2024-10-08 PROCEDURE — 99214 OFFICE O/P EST MOD 30 MIN: CPT | Performed by: FAMILY MEDICINE

## 2024-10-08 PROCEDURE — 3079F DIAST BP 80-89 MM HG: CPT | Performed by: FAMILY MEDICINE

## 2024-10-08 PROCEDURE — 3075F SYST BP GE 130 - 139MM HG: CPT | Performed by: FAMILY MEDICINE

## 2024-10-08 RX ORDER — METRONIDAZOLE 500 MG/1
500 TABLET ORAL 2 TIMES DAILY
Qty: 28 TABLET | Refills: 0 | Status: SHIPPED | OUTPATIENT
Start: 2024-10-08 | End: 2024-10-22

## 2024-10-08 RX ORDER — EZETIMIBE 10 MG/1
10 TABLET ORAL DAILY
Qty: 90 TABLET | Refills: 0 | Status: SHIPPED | OUTPATIENT
Start: 2024-10-08

## 2024-10-08 NOTE — PROGRESS NOTES
Grant Moon, 43 y.o.,  female    SUBJECTIVE  Ff-up    CAD s/p PCI 2018, cath 9/2019 80% ostial OM1, patent RCA EF 60% on med therapy (small size of OMB and large native LCx, felt OM to be poor target for PCI)  She is currently on BB, ASA, prn nitro.  She is intolerant to statins and zetia. Follows cardiology yearly    Prediabetes- has resumed intermittent fasting and losing weight.      HTN- she continues to take BB, norvasc. Reports normal BP readings at home and doctor visits.     Hx of cholecystectomy 12/13.2023 reports improved abdominal pain however with loose stools, has upcoming GI appt in dec    Complaining of left lower quadrant pain similar to her previous diverticulitis.  Usually she goes to the emergency room response to outpatient treatment with oral aches specifically metronidazole and Augmentin.  Requesting refills of this medications.  She does report acute diverticulitis on the left side about twice a year for the past 2-3 years.  Denies any hospitalization, she says colonoscopy is up-to-date and has been discussing with GI regarding this concern.    ROS:  See HPI, all others negative        Patient Active Problem List   Diagnosis    Low HDL (under 40)    Calculus of gallbladder without cholecystitis without obstruction    Hypertension    Obesity    Obesity, morbid    Iron deficiency anemia due to chronic blood loss    Tobacco use    Prediabetes    Hyperlipidemia LDL goal <70    Former smoker    Epigastric pain    Statin intolerance    Chest pain    History of acute myocardial infarction    Adjustment disorder with depressed mood    Coronary artery disease involving native coronary artery of native heart with angina pectoris (HCC)    Stented coronary artery    BMI 40.0-44.9, adult       Current Outpatient Medications   Medication Sig Dispense Refill    Pediatric Multiple Vitamins (FLINSTONES GUMMIES OMEGA-3 DHA PO) Take 2 tablets by mouth daily      Probiotic Product (CULTURELLE ABDOMINAL

## 2024-10-10 ENCOUNTER — HOSPITAL ENCOUNTER (OUTPATIENT)
Facility: HOSPITAL | Age: 44
Discharge: HOME | End: 2024-10-13
Attending: FAMILY MEDICINE
Payer: MEDICAID

## 2024-10-10 DIAGNOSIS — R10.32 LLQ PAIN: ICD-10-CM

## 2024-10-10 PROCEDURE — 74177 CT ABD & PELVIS W/CONTRAST: CPT

## 2024-10-10 PROCEDURE — 6360000004 HC RX CONTRAST MEDICATION: Performed by: FAMILY MEDICINE

## 2024-10-10 PROCEDURE — 2500000003 HC RX 250 WO HCPCS: Performed by: FAMILY MEDICINE

## 2024-10-10 RX ORDER — IOPAMIDOL 612 MG/ML
100 INJECTION, SOLUTION INTRAVASCULAR
Status: COMPLETED | OUTPATIENT
Start: 2024-10-10 | End: 2024-10-10

## 2024-10-10 RX ADMIN — IOPAMIDOL 100 ML: 612 INJECTION, SOLUTION INTRAVENOUS at 09:28

## 2024-10-10 RX ADMIN — BARIUM SULFATE 450 ML: 20 SUSPENSION ORAL at 07:48

## 2024-10-11 RX ORDER — PROMETHAZINE HYDROCHLORIDE 6.25 MG/5ML
6.25 SYRUP ORAL EVERY 6 HOURS PRN
Qty: 100 ML | Refills: 0 | Status: SHIPPED | OUTPATIENT
Start: 2024-10-11

## 2024-10-11 NOTE — TELEPHONE ENCOUNTER
Incoming Call pt called and needs a refill on Promethazine.. However medication has been discontinued. Transfer the patient to Nurse.

## 2024-10-11 NOTE — TELEPHONE ENCOUNTER
Spoke with patient and she is requesting medication for nausea and vomiting.  Patient states that the nausea and voimitng started yesterday. More nausea than vomiting.   Patient is requesting Promethazine 6.25 mg/5ml syrup  Patient also is requesting results of the CT scan  Please advise

## 2024-10-11 NOTE — TELEPHONE ENCOUNTER
Patient identified with 2 identifiers (name and ).   Patient aware of CT scan results and Promthazine was e scribed to pharmacy.

## 2024-10-17 ENCOUNTER — OFFICE VISIT (OUTPATIENT)
Facility: CLINIC | Age: 44
End: 2024-10-17
Payer: MEDICAID

## 2024-10-17 VITALS
HEIGHT: 60 IN | TEMPERATURE: 97.8 F | OXYGEN SATURATION: 99 % | BODY MASS INDEX: 40.52 KG/M2 | RESPIRATION RATE: 16 BRPM | WEIGHT: 206.4 LBS | DIASTOLIC BLOOD PRESSURE: 86 MMHG | HEART RATE: 70 BPM | SYSTOLIC BLOOD PRESSURE: 134 MMHG

## 2024-10-17 DIAGNOSIS — Z90.49 S/P CHOLECYSTECTOMY: ICD-10-CM

## 2024-10-17 DIAGNOSIS — K57.32 DIVERTICULITIS OF LARGE INTESTINE WITHOUT PERFORATION OR ABSCESS WITHOUT BLEEDING: Primary | ICD-10-CM

## 2024-10-17 DIAGNOSIS — K52.9 CHRONIC DIARRHEA: ICD-10-CM

## 2024-10-17 PROCEDURE — 99214 OFFICE O/P EST MOD 30 MIN: CPT | Performed by: FAMILY MEDICINE

## 2024-10-17 PROCEDURE — 3075F SYST BP GE 130 - 139MM HG: CPT | Performed by: FAMILY MEDICINE

## 2024-10-17 PROCEDURE — 3079F DIAST BP 80-89 MM HG: CPT | Performed by: FAMILY MEDICINE

## 2024-10-17 RX ORDER — MONTELUKAST SODIUM 4 MG/1
1 TABLET, CHEWABLE ORAL 2 TIMES DAILY
Qty: 60 TABLET | Refills: 1 | Status: SHIPPED | OUTPATIENT
Start: 2024-10-17

## 2024-10-17 RX ORDER — PROMETHAZINE HYDROCHLORIDE 6.25 MG/5ML
6.25 SYRUP ORAL EVERY 6 HOURS PRN
Qty: 100 ML | Refills: 0 | Status: SHIPPED | OUTPATIENT
Start: 2024-10-17

## 2024-10-17 NOTE — PROGRESS NOTES
\"Have you been to the ER, urgent care clinic since your last visit?  Hospitalized since your last visit?\"    {YES/NO:834424267}    “Have you seen or consulted any other health care providers outside our system since your last visit?”    {YES/NO:[id number]}        {Click Here for Release of Records Request :[id number]}   
appointment with GI in December will await recs  -     promethazine (PHENERGAN) 6.25 MG/5ML syrup; Take 5 mLs by mouth every 6 hours as needed for Nausea  -     colestipol (COLESTID) 1 g tablet; Take 1 tablet by mouth 2 times daily    S/P cholecystectomy    Other orders  -     promethazine (PHENERGAN) 6.25 MG/5ML syrup; Take 5 mLs by mouth every 6 hours as needed for Nausea  -     colestipol (COLESTID) 1 g tablet; Take 1 tablet by mouth 2 times daily          Patient understands plan of care. Patient has provided input and agrees with goals.

## 2024-11-06 NOTE — TELEPHONE ENCOUNTER
Naproxen 500 mg one tablet every 12 hours uses for cramping during her cycle only (was given to her by ER)    [id number] - [place], [place] - [facility] [address] - P [phone]   Please advise

## 2024-11-11 RX ORDER — NAPROXEN SODIUM 220 MG/1
220 TABLET, FILM COATED ORAL 2 TIMES DAILY PRN
Qty: 60 TABLET | Refills: 0 | Status: SHIPPED | OUTPATIENT
Start: 2024-11-11

## 2024-11-12 ENCOUNTER — OFFICE VISIT (OUTPATIENT)
Facility: CLINIC | Age: 44
End: 2024-11-12
Payer: MEDICAID

## 2024-11-12 VITALS
DIASTOLIC BLOOD PRESSURE: 68 MMHG | WEIGHT: 203 LBS | TEMPERATURE: 97.6 F | RESPIRATION RATE: 16 BRPM | HEART RATE: 77 BPM | BODY MASS INDEX: 39.85 KG/M2 | SYSTOLIC BLOOD PRESSURE: 118 MMHG | HEIGHT: 60 IN | OXYGEN SATURATION: 98 %

## 2024-11-12 DIAGNOSIS — H92.01 EARACHE ON RIGHT: Primary | ICD-10-CM

## 2024-11-12 PROCEDURE — 99213 OFFICE O/P EST LOW 20 MIN: CPT | Performed by: FAMILY MEDICINE

## 2024-11-12 PROCEDURE — 3078F DIAST BP <80 MM HG: CPT | Performed by: FAMILY MEDICINE

## 2024-11-12 PROCEDURE — 3074F SYST BP LT 130 MM HG: CPT | Performed by: FAMILY MEDICINE

## 2024-11-12 NOTE — PROGRESS NOTES
\"Have you been to the ER, urgent care clinic since your last visit?  Hospitalized since your last visit?\"    NO    “Have you seen or consulted any other health care providers outside our system since your last visit?”    NO    Chief Complaint   Patient presents with    Ear Pain

## 2024-11-12 NOTE — PROGRESS NOTES
Grant Moon (:  1980) is a 43 y.o. female, Established patient, here for evaluation of the following chief complaint(s):  Ear Pain         Assessment & Plan  1. Right ear pain.  The pain is described as similar to a toothache and rated at 7 out of 10, peaking at 8.5. There is no toothache, headache, cold symptoms, or fever. The tympanic membrane appears normal with no signs of infection or TMJ issues. The pain may be due to pressure or inflammation from allergies. She is advised to continue taking Tylenol and her allergy medication, either Claritin or Zyrtec, at night. Symptomatic treatment will be maintained unless there are changes in her condition.    Follow-up  Return in 1 to 2 weeks for follow up.    Results    1. Earache on right    Return in about 2 weeks (around 2024), or if symptoms worsen or fail to improve.       Subjective   History of Present Illness  The patient presents for evaluation of right ear pain.  Dr. Santos patient     She reports waking up late last night with severe pain in her right ear, which she describes as similar to a toothache. She confirms that she does not have a toothache. The pain, which she rates as 7 out of 10, was more intense this morning, reaching an 8.5 out of 10. The pain is intermittent.    She mentions a recent episode of sneezing and a runny nose. She has been taking Claritin and Tylenol but reports no relief from the Tylenol. She has not attempted to clean her ears and has not experienced any fever or sore throat. Her primary concern is the possibility of an ear infection. She is not experiencing any headaches, cold symptoms, or hearing difficulties.    Review of Systems       Objective   Blood pressure 118/68, pulse 77, temperature 97.6 °F (36.4 °C), temperature source Temporal, resp. rate 16, height 1.524 m (5'), weight 92.1 kg (203 lb), last menstrual period 10/21/2024, SpO2 98%.  Physical Exam  Bilateral ear exam: Tympanic membrane is visible.

## 2024-11-18 ENCOUNTER — TELEPHONE (OUTPATIENT)
Age: 44
End: 2024-11-18

## 2024-11-18 ENCOUNTER — OFFICE VISIT (OUTPATIENT)
Age: 44
End: 2024-11-18
Payer: MEDICAID

## 2024-11-18 VITALS
HEIGHT: 60 IN | DIASTOLIC BLOOD PRESSURE: 74 MMHG | RESPIRATION RATE: 16 BRPM | SYSTOLIC BLOOD PRESSURE: 118 MMHG | WEIGHT: 201 LBS | BODY MASS INDEX: 39.46 KG/M2 | TEMPERATURE: 97.3 F

## 2024-11-18 DIAGNOSIS — K57.92 DIVERTICULITIS: Primary | ICD-10-CM

## 2024-11-18 PROCEDURE — 3078F DIAST BP <80 MM HG: CPT | Performed by: COLON & RECTAL SURGERY

## 2024-11-18 PROCEDURE — 99213 OFFICE O/P EST LOW 20 MIN: CPT | Performed by: COLON & RECTAL SURGERY

## 2024-11-18 PROCEDURE — 3074F SYST BP LT 130 MM HG: CPT | Performed by: COLON & RECTAL SURGERY

## 2024-11-18 RX ORDER — CIPROFLOXACIN 500 MG/1
500 TABLET, FILM COATED ORAL 2 TIMES DAILY
Qty: 14 TABLET | Refills: 0 | Status: SHIPPED | OUTPATIENT
Start: 2024-11-18 | End: 2024-11-25

## 2024-11-18 RX ORDER — PROMETHAZINE HYDROCHLORIDE 6.25 MG/5ML
6.25 SYRUP ORAL EVERY 6 HOURS PRN
Qty: 100 ML | Refills: 0 | Status: SHIPPED | OUTPATIENT
Start: 2024-11-18

## 2024-11-18 RX ORDER — METRONIDAZOLE 500 MG/1
500 TABLET ORAL 3 TIMES DAILY
Qty: 21 TABLET | Refills: 0 | Status: SHIPPED | OUTPATIENT
Start: 2024-11-18 | End: 2024-11-25

## 2024-11-18 NOTE — TELEPHONE ENCOUNTER
Called patient back, she is requesting liquid Phenergan to be sent to her pharmacy. Request sent to Dr. Matos.

## 2024-11-18 NOTE — PROGRESS NOTES
Subjective: Recurrent diverticulitis with left-sided abdominal pain.  Was placed on 1 week of antibiotics by her PCP.  Pain is persisting.    Past medical history and ROS were reviewed and unchanged.     Abdomen: Soft, mildly tender in the left lower quadrant though she describes it as 7 out of 10    CT personally visualized; inflammation at the junction of sigmoid and descending colon    Assessment / Plan    Recurrent simple diverticulitis  Antibiotics for another week  Follow-up in the office in 2 weeks  Will discuss surgery in January with patient at her next visit, though she does appear interested in proceeding    20 minutes was spent in patient care.      The diagnoses and plan were discussed with patient.  All questions answered.  Plan of care agreed to by all concerned.

## 2024-11-18 NOTE — TELEPHONE ENCOUNTER
Patient states she was seen this morning by Dr. Matos and had a question for him that she forgot to ask while she was here.

## 2024-12-12 ENCOUNTER — HOSPITAL ENCOUNTER (OUTPATIENT)
Facility: HOSPITAL | Age: 44
Setting detail: SPECIMEN
Discharge: HOME | End: 2024-12-15

## 2024-12-12 ENCOUNTER — OFFICE VISIT (OUTPATIENT)
Facility: CLINIC | Age: 44
End: 2024-12-12
Payer: MEDICAID

## 2024-12-12 VITALS
TEMPERATURE: 97.1 F | OXYGEN SATURATION: 99 % | RESPIRATION RATE: 16 BRPM | HEART RATE: 71 BPM | HEIGHT: 60 IN | WEIGHT: 202.6 LBS | DIASTOLIC BLOOD PRESSURE: 80 MMHG | SYSTOLIC BLOOD PRESSURE: 126 MMHG | BODY MASS INDEX: 39.78 KG/M2

## 2024-12-12 DIAGNOSIS — R53.83 OTHER FATIGUE: ICD-10-CM

## 2024-12-12 DIAGNOSIS — K58.0 IRRITABLE BOWEL SYNDROME WITH DIARRHEA: ICD-10-CM

## 2024-12-12 DIAGNOSIS — R53.83 OTHER FATIGUE: Primary | ICD-10-CM

## 2024-12-12 DIAGNOSIS — R25.2 LEG CRAMP: ICD-10-CM

## 2024-12-12 DIAGNOSIS — E66.01 SEVERE OBESITY (BMI 35.0-39.9) WITH COMORBIDITY: ICD-10-CM

## 2024-12-12 PROBLEM — R07.9 CHEST PAIN: Status: RESOLVED | Noted: 2020-05-18 | Resolved: 2024-12-12

## 2024-12-12 PROBLEM — F43.21 ADJUSTMENT DISORDER WITH DEPRESSED MOOD: Status: RESOLVED | Noted: 2018-11-09 | Resolved: 2024-12-12

## 2024-12-12 PROBLEM — R10.13 EPIGASTRIC PAIN: Status: RESOLVED | Noted: 2018-11-09 | Resolved: 2024-12-12

## 2024-12-12 PROBLEM — K80.20 CALCULUS OF GALLBLADDER WITHOUT CHOLECYSTITIS WITHOUT OBSTRUCTION: Status: RESOLVED | Noted: 2018-11-09 | Resolved: 2024-12-12

## 2024-12-12 LAB — SENTARA SPECIMEN COLLECTION: NORMAL

## 2024-12-12 PROCEDURE — 99214 OFFICE O/P EST MOD 30 MIN: CPT | Performed by: FAMILY MEDICINE

## 2024-12-12 PROCEDURE — 99001 SPECIMEN HANDLING PT-LAB: CPT

## 2024-12-12 PROCEDURE — 3079F DIAST BP 80-89 MM HG: CPT | Performed by: FAMILY MEDICINE

## 2024-12-12 PROCEDURE — 3074F SYST BP LT 130 MM HG: CPT | Performed by: FAMILY MEDICINE

## 2024-12-12 RX ORDER — NAPROXEN SODIUM 220 MG/1
550 TABLET, FILM COATED ORAL 2 TIMES DAILY PRN
Qty: 60 TABLET | Refills: 0 | Status: SHIPPED | OUTPATIENT
Start: 2024-12-12

## 2024-12-12 RX ORDER — DICYCLOMINE HYDROCHLORIDE 10 MG/1
CAPSULE ORAL
COMMUNITY
Start: 2024-12-02

## 2024-12-12 NOTE — PROGRESS NOTES
\"Have you been to the ER, urgent care clinic since your last visit?  Hospitalized since your last visit?\"    NO    “Have you seen or consulted any other health care providers outside our system since your last visit?”    YES - When: approximately 1  weeks ago.  Where and Why: gastro .  Requested office notes, added gastro provider into chart.          
Substance Abuse Father         tobacco    Drug Abuse Father     Hypertension Father     High Cholesterol Father     Stroke Father     Rashes/Skin Problems Father     Substance Abuse Brother         tobacco    Hypertension Maternal Grandmother     Colon Cancer Maternal Grandmother     Drug Abuse Brother          OBJECTIVE    Physical Exam:     /80   Pulse 71   Temp 97.1 °F (36.2 °C) (Temporal)   Resp 16   Ht 1.524 m (5')   Wt 91.9 kg (202 lb 9.6 oz)   LMP  (LMP Unknown)   SpO2 99%   BMI 39.57 kg/m²     General: alert, well-appearing, , in no apparent distress or pain  CVS: normal rate, regular rhythm, distinct S1 and S2  Lungs:clear to ausculation bilaterally, no crackles, wheezing or rhonchi noted  Abdomen: normoactive bowel sounds, soft, non-tender  Extremities: No edema, no calf tenderness  Skin: warm, no lesions, rashes noted  Psych:  mood and affect normal        ASSESSMENT/PLAN  Grant was seen today for fatigue, dizziness and other.    Diagnoses and all orders for this visit:    Other fatigue  Nonspecific  Start workup  -     CBC with Auto Differential; Future  -     TSH; Future  -     Basic Metabolic Panel; Future  -     Vitamin D 25 Hydroxy; Future  Consider sleeps study evaluation    Leg cramp  -     Magnesium; Future    Severe obesity (BMI 35.0-39.9) with comorbidity    Irritable bowel syndrome with diarrhea  Recent diagnosis with GIs trying her on Bentyl  Also ongoing care for recurrent diverticulitis with Dr. Frankel    Other orders  -     naproxen sodium (ANAPROX) 220 MG tablet; Take 2.5 tablets by mouth 2 times daily as needed for Pain          Keep appt in jessica      Patient understands plan of care. Patient has provided input and agrees with goals.

## 2024-12-13 ENCOUNTER — TELEPHONE (OUTPATIENT)
Facility: CLINIC | Age: 44
End: 2024-12-13

## 2024-12-13 LAB
ANION GAP SERPL CALCULATED.3IONS-SCNC: 10 MMOL/L (ref 3–15)
BASOPHILS ABSOLUTE: 0 K/UL (ref 0–0.2)
BASOPHILS RELATIVE PERCENT: 0 % (ref 0–2)
BUN BLDV-MCNC: 8 MG/DL (ref 6–22)
CALCIUM SERPL-MCNC: 8.9 MG/DL (ref 8.4–10.5)
CHLORIDE BLD-SCNC: 103 MMOL/L (ref 98–110)
CO2: 25 MMOL/L (ref 20–32)
CREAT SERPL-MCNC: 0.6 MG/DL (ref 0.5–1.2)
EOSINOPHIL # BLD: 3 % (ref 0–6)
EOSINOPHILS ABSOLUTE: 0.1 K/UL (ref 0–0.5)
GFR, ESTIMATED: >60 ML/MIN/1.73 SQ.M.
GLUCOSE: 90 MG/DL (ref 70–99)
HCT VFR BLD CALC: 39.6 % (ref 35.1–48)
HEMOGLOBIN: 11.3 G/DL (ref 11.7–16)
LYMPHOCYTES # BLD: 37 % (ref 20–45)
LYMPHOCYTES ABSOLUTE: 1.9 K/UL (ref 1–4.8)
MAGNESIUM: 2 MG/DL (ref 1.6–2.5)
MCH RBC QN AUTO: 25 PG (ref 26–34)
MCHC RBC AUTO-ENTMCNC: 29 G/DL (ref 31–36)
MCV RBC AUTO: 87 FL (ref 80–99)
MONOCYTES ABSOLUTE: 0.3 K/UL (ref 0.1–1)
MONOCYTES: 6 % (ref 3–12)
NEUTROPHILS ABSOLUTE: 2.7 K/UL (ref 1.8–7.7)
NEUTROPHILS: 54 % (ref 40–75)
PDW BLD-RTO: 17.8 % (ref 10–15.5)
PLATELET # BLD: 368 K/UL (ref 140–440)
PMV BLD AUTO: 10.7 FL (ref 9–13)
POTASSIUM SERPL-SCNC: 4.5 MMOL/L (ref 3.5–5.5)
RBC # BLD: 4.57 M/UL (ref 3.8–5.2)
SODIUM BLD-SCNC: 138 MMOL/L (ref 133–145)
TSH SERPL DL<=0.05 MIU/L-ACNC: 1.06 MCU/ML (ref 0.27–4.2)
VITAMIN D 25-HYDROXY: 20 NG/ML (ref 32–100)
WBC # BLD: 5.1 K/UL (ref 4–11)

## 2024-12-13 NOTE — TELEPHONE ENCOUNTER
Spoke with patient and she is aware Dr. Santos is not in the office today.   Will send message for Dr. Santos to review labs.

## 2024-12-13 NOTE — TELEPHONE ENCOUNTER
Incoming call Ms. Grant Moon called about her labs that came back abdominal. She wants Dr. Santos to give her some meds.

## 2024-12-16 ENCOUNTER — PREP FOR PROCEDURE (OUTPATIENT)
Age: 44
End: 2024-12-16

## 2024-12-16 ENCOUNTER — OFFICE VISIT (OUTPATIENT)
Age: 44
End: 2024-12-16
Payer: MEDICAID

## 2024-12-16 VITALS
DIASTOLIC BLOOD PRESSURE: 62 MMHG | HEIGHT: 60 IN | WEIGHT: 201 LBS | RESPIRATION RATE: 16 BRPM | BODY MASS INDEX: 39.46 KG/M2 | SYSTOLIC BLOOD PRESSURE: 118 MMHG | TEMPERATURE: 97.1 F

## 2024-12-16 DIAGNOSIS — K57.92 DIVERTICULITIS: ICD-10-CM

## 2024-12-16 DIAGNOSIS — K57.92 DIVERTICULITIS: Primary | ICD-10-CM

## 2024-12-16 PROCEDURE — 3074F SYST BP LT 130 MM HG: CPT | Performed by: COLON & RECTAL SURGERY

## 2024-12-16 PROCEDURE — 99214 OFFICE O/P EST MOD 30 MIN: CPT | Performed by: COLON & RECTAL SURGERY

## 2024-12-16 PROCEDURE — 3078F DIAST BP <80 MM HG: CPT | Performed by: COLON & RECTAL SURGERY

## 2024-12-16 RX ORDER — NEOMYCIN SULFATE 500 MG/1
1000 TABLET ORAL 3 TIMES DAILY
Qty: 6 TABLET | Refills: 0 | Status: SHIPPED | OUTPATIENT
Start: 2024-12-16 | End: 2024-12-17

## 2024-12-16 RX ORDER — ERGOCALCIFEROL 1.25 MG/1
50000 CAPSULE, LIQUID FILLED ORAL WEEKLY
Qty: 12 CAPSULE | Refills: 0 | Status: SHIPPED | OUTPATIENT
Start: 2024-12-16

## 2024-12-16 RX ORDER — METRONIDAZOLE 500 MG/1
500 TABLET ORAL 3 TIMES DAILY
Qty: 3 TABLET | Refills: 0 | Status: SHIPPED | OUTPATIENT
Start: 2024-12-16 | End: 2024-12-17

## 2024-12-16 RX ORDER — FERROUS SULFATE 325(65) MG
325 TABLET ORAL
Qty: 90 TABLET | Refills: 0 | Status: SHIPPED | OUTPATIENT
Start: 2024-12-16

## 2024-12-16 NOTE — PROGRESS NOTES
Subjective: She developed recurrent pain last week.  It lasted 2 to 3 days and has largely resolved but not fully.    Past medical history and ROS were reviewed and unchanged.     Abdomen: Soft, mildly tender in the left lower quadrant    Assessment / Plan    Recurrent simple diverticulitis of the distal descending colon  Robotic sigmoid colectomy  Patient would like to proceed she has had multiple recurrent attacks  I told her to call the office if pain gets substantially worse and we can place her on antibiotics prior to surgery  Cardiac clearance     30 minutes was spent in patient care.    The diagnoses and plan were discussed with patient.  All questions answered.  Plan of care agreed to by all concerned.

## 2024-12-19 ENCOUNTER — CLINICAL DOCUMENTATION (OUTPATIENT)
Age: 44
End: 2024-12-19

## 2024-12-19 ENCOUNTER — TELEPHONE (OUTPATIENT)
Age: 44
End: 2024-12-19

## 2024-12-19 DIAGNOSIS — K57.92 DIVERTICULITIS: Primary | ICD-10-CM

## 2024-12-19 RX ORDER — CIPROFLOXACIN 500 MG/1
500 TABLET, FILM COATED ORAL 2 TIMES DAILY
Qty: 28 TABLET | Refills: 0 | Status: SHIPPED | OUTPATIENT
Start: 2024-12-19 | End: 2025-01-02

## 2024-12-19 RX ORDER — PROMETHAZINE HYDROCHLORIDE 6.25 MG/5ML
6.25 SYRUP ORAL EVERY 6 HOURS PRN
Qty: 100 ML | Refills: 0 | Status: SHIPPED | OUTPATIENT
Start: 2024-12-19

## 2024-12-19 RX ORDER — METRONIDAZOLE 500 MG/1
500 TABLET ORAL 2 TIMES DAILY
Qty: 28 TABLET | Refills: 0 | Status: SHIPPED | OUTPATIENT
Start: 2024-12-19 | End: 2025-01-02

## 2024-12-19 NOTE — TELEPHONE ENCOUNTER
Spoke to patient regarding refills. Patient explains she has been having abdominal discomfort that put her down yesterday. Based on her last visit you said she could call if she was having pain and you would medicate her until surgery. She is requesting a refill of antibiotics and nausea medication to get her through until surgery. Pharmacy is on file.

## 2024-12-20 ENCOUNTER — TELEPHONE (OUTPATIENT)
Age: 44
End: 2024-12-20

## 2024-12-20 NOTE — TELEPHONE ENCOUNTER
Called patient to notify her of Neomycin rx approval. Left message for patient. Approval scanned into chart.

## 2025-01-08 ENCOUNTER — OFFICE VISIT (OUTPATIENT)
Facility: CLINIC | Age: 45
End: 2025-01-08
Payer: MEDICAID

## 2025-01-08 VITALS
SYSTOLIC BLOOD PRESSURE: 120 MMHG | OXYGEN SATURATION: 98 % | BODY MASS INDEX: 39.03 KG/M2 | WEIGHT: 198.8 LBS | DIASTOLIC BLOOD PRESSURE: 70 MMHG | TEMPERATURE: 98 F | RESPIRATION RATE: 16 BRPM | HEIGHT: 60 IN | HEART RATE: 64 BPM

## 2025-01-08 DIAGNOSIS — D50.0 IRON DEFICIENCY ANEMIA DUE TO CHRONIC BLOOD LOSS: ICD-10-CM

## 2025-01-08 DIAGNOSIS — I10 PRIMARY HYPERTENSION: ICD-10-CM

## 2025-01-08 DIAGNOSIS — E78.5 HYPERLIPIDEMIA LDL GOAL <70: ICD-10-CM

## 2025-01-08 DIAGNOSIS — R73.03 PREDIABETES: ICD-10-CM

## 2025-01-08 DIAGNOSIS — I25.119 CORONARY ARTERY DISEASE INVOLVING NATIVE CORONARY ARTERY OF NATIVE HEART WITH ANGINA PECTORIS (HCC): ICD-10-CM

## 2025-01-08 DIAGNOSIS — Z78.9 STATIN INTOLERANCE: ICD-10-CM

## 2025-01-08 DIAGNOSIS — Z01.419 WELL WOMAN EXAM WITH ROUTINE GYNECOLOGICAL EXAM: Primary | ICD-10-CM

## 2025-01-08 DIAGNOSIS — Z12.31 BREAST CANCER SCREENING BY MAMMOGRAM: ICD-10-CM

## 2025-01-08 DIAGNOSIS — K58.0 IRRITABLE BOWEL SYNDROME WITH DIARRHEA: ICD-10-CM

## 2025-01-08 DIAGNOSIS — E55.9 VITAMIN D DEFICIENCY: ICD-10-CM

## 2025-01-08 PROBLEM — I25.2 HISTORY OF ACUTE MYOCARDIAL INFARCTION: Status: RESOLVED | Noted: 2018-04-26 | Resolved: 2025-01-08

## 2025-01-08 PROCEDURE — 99396 PREV VISIT EST AGE 40-64: CPT | Performed by: FAMILY MEDICINE

## 2025-01-08 PROCEDURE — 3074F SYST BP LT 130 MM HG: CPT | Performed by: FAMILY MEDICINE

## 2025-01-08 PROCEDURE — 3078F DIAST BP <80 MM HG: CPT | Performed by: FAMILY MEDICINE

## 2025-01-08 RX ORDER — EZETIMIBE 10 MG/1
10 TABLET ORAL DAILY
Qty: 90 TABLET | Refills: 1 | Status: SHIPPED | OUTPATIENT
Start: 2025-01-08

## 2025-01-08 RX ORDER — ERGOCALCIFEROL 1.25 MG/1
50000 CAPSULE, LIQUID FILLED ORAL WEEKLY
Qty: 12 CAPSULE | Refills: 0 | Status: SHIPPED | OUTPATIENT
Start: 2025-01-08

## 2025-01-08 RX ORDER — NAPROXEN 500 MG/1
500 TABLET ORAL 2 TIMES DAILY WITH MEALS
Qty: 60 TABLET | Refills: 3 | Status: SHIPPED | OUTPATIENT
Start: 2025-01-08

## 2025-01-08 RX ORDER — FERROUS SULFATE 325(65) MG
325 TABLET ORAL
Qty: 90 TABLET | Refills: 1 | Status: SHIPPED | OUTPATIENT
Start: 2025-01-08

## 2025-01-08 SDOH — ECONOMIC STABILITY: FOOD INSECURITY: WITHIN THE PAST 12 MONTHS, THE FOOD YOU BOUGHT JUST DIDN'T LAST AND YOU DIDN'T HAVE MONEY TO GET MORE.: NEVER TRUE

## 2025-01-08 SDOH — ECONOMIC STABILITY: FOOD INSECURITY: WITHIN THE PAST 12 MONTHS, YOU WORRIED THAT YOUR FOOD WOULD RUN OUT BEFORE YOU GOT MONEY TO BUY MORE.: NEVER TRUE

## 2025-01-08 ASSESSMENT — PATIENT HEALTH QUESTIONNAIRE - PHQ9
SUM OF ALL RESPONSES TO PHQ9 QUESTIONS 1 & 2: 0
SUM OF ALL RESPONSES TO PHQ QUESTIONS 1-9: 0
SUM OF ALL RESPONSES TO PHQ QUESTIONS 1-9: 0
2. FEELING DOWN, DEPRESSED OR HOPELESS: NOT AT ALL
SUM OF ALL RESPONSES TO PHQ QUESTIONS 1-9: 0
1. LITTLE INTEREST OR PLEASURE IN DOING THINGS: NOT AT ALL
SUM OF ALL RESPONSES TO PHQ QUESTIONS 1-9: 0

## 2025-01-08 NOTE — PROGRESS NOTES
\"Have you been to the ER, urgent care clinic since your last visit?  Hospitalized since your last visit?\"    NO    “Have you seen or consulted any other health care providers outside our system since your last visit?”    NO           
Abuse Brother         tobacco    Hypertension Maternal Grandmother     Colon Cancer Maternal Grandmother     Drug Abuse Brother          OBJECTIVE    Physical Exam:     /70   Pulse 64   Temp 98 °F (36.7 °C) (Temporal)   Resp 16   Ht 1.524 m (5')   Wt 90.2 kg (198 lb 12.8 oz)   LMP 01/04/2025   SpO2 98%   BMI 38.83 kg/m²     General: alert, well-appearing, obese, AA, in no apparent distress or pain  Head: atraumatic. Non-tender maxillary and frontal sinuses  Eyes: Lids with no discharge, no matting, conjunctivae clear and non injected, full EOMs, PERLLA  Ears: pinna non-tender, external auditory canal patent, TM intact  Breasts: breasts appear normal, no suspicious masses, no skin or nipple changes or axillary nodes.  CVS: normal rate, regular rhythm, distinct S1 and S2  Lungs:clear to ausculation bilaterally, no crackles, wheezing or rhonchi noted  Abdomen: normoactive bowel sounds, soft, non-tender  :  Normal appearing external genitalia, vagina and cervix.  No discharge, no CMT or adnexal masses palpated  Extremities: no edema, no cyanosis, MSK grossly normal  Skin: warm, no lesions, rashes noted  Psych:  mood and affect normal    CMP:   Lab Results   Component Value Date/Time     12/12/2024 09:43 AM    K 4.5 12/12/2024 09:43 AM     12/12/2024 09:43 AM    CO2 25 12/12/2024 09:43 AM    BUN 8 12/12/2024 09:43 AM    CREATININE 0.6 12/12/2024 09:43 AM    GLUCOSE 90 12/12/2024 09:43 AM    CALCIUM 8.9 12/12/2024 09:43 AM    BILITOT 0.2 09/09/2024 09:00 AM    AST 14 09/09/2024 09:00 AM    ALT 15 09/09/2024 09:00 AM        CBC:   Lab Results   Component Value Date/Time    WBC 5.1 12/12/2024 09:43 AM    RBC 4.57 12/12/2024 09:43 AM    RBC 4.77 04/05/2024 12:39 PM    HGB 11.3 12/12/2024 09:43 AM    HCT 39.6 12/12/2024 09:43 AM    MCV 87 12/12/2024 09:43 AM    MCH 25 12/12/2024 09:43 AM    MCHC 29 12/12/2024 09:43 AM    RDW 17.8 12/12/2024 09:43 AM     12/12/2024 09:43 AM    MPV 10.7

## 2025-01-24 ENCOUNTER — TELEPHONE (OUTPATIENT)
Age: 45
End: 2025-01-24

## 2025-01-24 NOTE — TELEPHONE ENCOUNTER
[facility] for pt to return call today, 01/24/25. Pt has not been cleared by cardiology, pt has not completed labs or EKG and did not show for the preadmit appointment on 01/23/25.    Pt cancelled her appointment with cardiology and is now scheduled on 04/02/25 for cardiac clearance.

## 2025-01-24 NOTE — TELEPHONE ENCOUNTER
Left 2nd message for pt to return call. Pt's surgery needs to be postponed due to no cardiac clearance.

## 2025-02-06 NOTE — TELEPHONE ENCOUNTER
Incoming call received from Ms. Sarai wants to know that she does not have any more refills on Vitamin D.  Patient wants to know if Dr. Santos wants her to stop until her next appointment which is 04/08/2025? If not then she will need a refill

## 2025-02-10 RX ORDER — ERGOCALCIFEROL 1.25 MG/1
50000 CAPSULE, LIQUID FILLED ORAL WEEKLY
Qty: 12 CAPSULE | Refills: 0 | Status: SHIPPED | OUTPATIENT
Start: 2025-02-10

## 2025-02-11 NOTE — TELEPHONE ENCOUNTER
This patient contacted office for the following prescriptions to be filled:    Medication requested : aspirin 81 MG chewable tablet QTY 90   PCP:  Tom   Pharmacy or Print: [facility]   Mail order or Local pharmacy [address]     Scheduled appointment if not seen by current providers in office: LOV 1/8/2025 ARMINDA 4/8/2025

## 2025-02-12 RX ORDER — ASPIRIN 81 MG/1
81 TABLET, CHEWABLE ORAL DAILY
Qty: 90 TABLET | Refills: 3 | Status: SHIPPED | OUTPATIENT
Start: 2025-02-12

## 2025-02-13 ENCOUNTER — TELEPHONE (OUTPATIENT)
Age: 45
End: 2025-02-13

## 2025-03-05 ENCOUNTER — HOSPITAL ENCOUNTER (OUTPATIENT)
Facility: HOSPITAL | Age: 45
Setting detail: SPECIMEN
Discharge: HOME | End: 2025-03-08

## 2025-03-05 LAB — SENTARA SPECIMEN COLLECTION: NORMAL

## 2025-03-05 PROCEDURE — 99001 SPECIMEN HANDLING PT-LAB: CPT

## 2025-03-06 LAB
A/G RATIO: 1.4 RATIO (ref 1.1–2.6)
ALBUMIN: 4.1 G/DL (ref 3.5–5)
ALP BLD-CCNC: 74 U/L (ref 25–115)
ALT SERPL-CCNC: 11 U/L (ref 5–40)
ANION GAP SERPL CALCULATED.3IONS-SCNC: 12 MMOL/L (ref 3–15)
AST SERPL-CCNC: 12 U/L (ref 10–37)
BASOPHILS # BLD: 0 % (ref 0–2)
BASOPHILS ABSOLUTE: 0 K/UL (ref 0–0.2)
BILIRUB SERPL-MCNC: 0.1 MG/DL (ref 0.2–1.2)
BUN BLDV-MCNC: 8 MG/DL (ref 6–22)
CALCIUM SERPL-MCNC: 8.9 MG/DL (ref 8.4–10.5)
CHLORIDE BLD-SCNC: 103 MMOL/L (ref 98–110)
CHOLESTEROL, TOTAL: 134 MG/DL (ref 110–200)
CHOLESTEROL/HDL RATIO: 3.3 (ref 0–5)
CO2: 26 MMOL/L (ref 20–32)
CREAT SERPL-MCNC: 0.6 MG/DL (ref 0.5–1.2)
EOSINOPHIL # BLD: 3 % (ref 0–6)
EOSINOPHILS ABSOLUTE: 0.1 K/UL (ref 0–0.5)
ESTIMATED AVERAGE GLUCOSE: 109 MG/DL (ref 91–123)
GFR, ESTIMATED: >60 ML/MIN/1.73 SQ.M.
GLOBULIN: 2.9 G/DL (ref 2–4)
GLUCOSE: 100 MG/DL (ref 70–99)
HBA1C MFR BLD: 5.4 % (ref 4.8–5.6)
HCT VFR BLD CALC: 40.8 % (ref 35.1–48)
HDLC SERPL-MCNC: 41 MG/DL
HEMOGLOBIN: 12.1 G/DL (ref 11.7–16)
LDL CHOLESTEROL: 80 MG/DL (ref 50–99)
LDL/HDL RATIO: 2
LYMPHOCYTES # BLD: 49 % (ref 20–45)
LYMPHOCYTES ABSOLUTE: 2.3 K/UL (ref 1–4.8)
MCH RBC QN AUTO: 26 PG (ref 26–34)
MCHC RBC AUTO-ENTMCNC: 30 G/DL (ref 31–36)
MCV RBC AUTO: 87 FL (ref 80–99)
MONOCYTES ABSOLUTE: 0.4 K/UL (ref 0.1–1)
MONOCYTES: 8 % (ref 3–12)
NEUTROPHILS ABSOLUTE: 1.9 K/UL (ref 1.8–7.7)
NEUTROPHILS SEGMENTED: 40 % (ref 40–75)
NON-HDL CHOLESTEROL: 93 MG/DL
PDW BLD-RTO: 17.4 % (ref 10–15.5)
PLATELET # BLD: 347 K/UL (ref 140–440)
PMV BLD AUTO: 10.9 FL (ref 9–13)
POTASSIUM SERPL-SCNC: 4.1 MMOL/L (ref 3.5–5.5)
RBC # BLD: 4.7 M/UL (ref 3.8–5.2)
SODIUM BLD-SCNC: 141 MMOL/L (ref 133–145)
TOTAL PROTEIN: 7 G/DL (ref 6.4–8.3)
TRIGL SERPL-MCNC: 63 MG/DL (ref 40–149)
VITAMIN D 25-HYDROXY: 44.8 NG/ML (ref 32–100)
VLDLC SERPL CALC-MCNC: 13 MG/DL (ref 8–30)
WBC # BLD: 4.7 K/UL (ref 4–11)

## 2025-03-11 ENCOUNTER — RESULTS FOLLOW-UP (OUTPATIENT)
Facility: CLINIC | Age: 45
End: 2025-03-11

## 2025-03-24 DIAGNOSIS — Z12.31 BREAST CANCER SCREENING BY MAMMOGRAM: ICD-10-CM

## 2025-04-08 ENCOUNTER — OFFICE VISIT (OUTPATIENT)
Facility: CLINIC | Age: 45
End: 2025-04-08
Payer: MEDICAID

## 2025-04-08 VITALS
OXYGEN SATURATION: 98 % | WEIGHT: 201.4 LBS | SYSTOLIC BLOOD PRESSURE: 136 MMHG | RESPIRATION RATE: 16 BRPM | BODY MASS INDEX: 39.54 KG/M2 | HEIGHT: 60 IN | TEMPERATURE: 97.8 F | DIASTOLIC BLOOD PRESSURE: 82 MMHG | HEART RATE: 65 BPM

## 2025-04-08 DIAGNOSIS — E78.5 HYPERLIPIDEMIA LDL GOAL <70: ICD-10-CM

## 2025-04-08 DIAGNOSIS — Z78.9 STATIN INTOLERANCE: ICD-10-CM

## 2025-04-08 DIAGNOSIS — I10 PRIMARY HYPERTENSION: ICD-10-CM

## 2025-04-08 DIAGNOSIS — K57.92 DIVERTICULITIS: ICD-10-CM

## 2025-04-08 DIAGNOSIS — E66.01 SEVERE OBESITY (BMI 35.0-39.9) WITH COMORBIDITY: ICD-10-CM

## 2025-04-08 DIAGNOSIS — E55.9 VITAMIN D DEFICIENCY: ICD-10-CM

## 2025-04-08 DIAGNOSIS — I10 PRIMARY HYPERTENSION: Primary | ICD-10-CM

## 2025-04-08 DIAGNOSIS — R73.03 PREDIABETES: ICD-10-CM

## 2025-04-08 DIAGNOSIS — I25.119 CORONARY ARTERY DISEASE INVOLVING NATIVE CORONARY ARTERY OF NATIVE HEART WITH ANGINA PECTORIS: ICD-10-CM

## 2025-04-08 PROCEDURE — 3079F DIAST BP 80-89 MM HG: CPT | Performed by: FAMILY MEDICINE

## 2025-04-08 PROCEDURE — 3075F SYST BP GE 130 - 139MM HG: CPT | Performed by: FAMILY MEDICINE

## 2025-04-08 PROCEDURE — 99214 OFFICE O/P EST MOD 30 MIN: CPT | Performed by: FAMILY MEDICINE

## 2025-04-08 RX ORDER — METOPROLOL SUCCINATE 25 MG/1
12.5 TABLET, EXTENDED RELEASE ORAL DAILY
Qty: 45 TABLET | Refills: 1 | Status: SHIPPED | OUTPATIENT
Start: 2025-04-08

## 2025-04-08 RX ORDER — METRONIDAZOLE 500 MG/1
TABLET ORAL
Qty: 28 TABLET | Refills: 0 | OUTPATIENT
Start: 2025-04-08

## 2025-04-08 RX ORDER — METRONIDAZOLE 500 MG/1
TABLET ORAL
COMMUNITY
End: 2025-04-08 | Stop reason: SDUPTHER

## 2025-04-08 RX ORDER — METRONIDAZOLE 500 MG/1
500 TABLET ORAL 3 TIMES DAILY
Qty: 42 TABLET | Refills: 0 | Status: SHIPPED | OUTPATIENT
Start: 2025-04-08 | End: 2025-04-22

## 2025-04-08 NOTE — TELEPHONE ENCOUNTER
This patient contacted office for the following prescriptions to be filled:    Medication requested : Metronidazole 500 MG  PCP: Dr Santos   Pharmacy or Print: [place]  Mail order or Local pharmacy [address]     Scheduled appointment if not seen by current providers in office: 10/08/2025

## 2025-04-08 NOTE — PROGRESS NOTES
\"Have you been to the ER, urgent care clinic since your last visit?  Hospitalized since your last visit?\"    YES - When: approximately 2 months ago.  Where and Why: [facility] ed leg pain.    “Have you seen or consulted any other health care providers outside our system since your last visit?”    YES - When: approximately 1 months ago.  Where and Why: gastro for diarrhea f.u .          
visit    Prediabetes  Tlcs, monitoring    BMI 35 .0- 39 .9, adult ([facility])  Discussed wt loss strategies     Coronary artery disease involving native coronary artery of native heart with angina pectoris ([facility])  S/p PCI  Asymptomatic  Cont asa, bb  Intolerant to statin  Congratulated on continued tob abstinence      Statin intolerance     Hyperlipidemia LDL goal <70  Cont zetia    Abdominal cramping  W/ hx of recurrent diverticulitis and history of IBS-D  Improved with Imodium and Bentyl following GI  Per pt plan for colectomy for recurrent diverticulitis in the future      Iron deficiency anemia due to chronic blood loss  Hx of, Now wnl  likely due to menstrual cycle   continue oral iron 325 mg daily  Monitoring  -     CBC with Auto Differential; Future    Vitamin D deficiency  Now wnl  To reduce ergocalciferol 50k weekly  to cholecalciferol 1000 iu daily  -     Vitamin D 25 Hydroxy; Future      Ff-up in 6 months or sooner prn       Patient understands plan of care. Patient has provided input and agrees with goals.

## 2025-04-28 ENCOUNTER — HOSPITAL ENCOUNTER (OUTPATIENT)
Facility: HOSPITAL | Age: 45
Setting detail: SPECIMEN
Discharge: HOME | End: 2025-05-01
Payer: MEDICAID

## 2025-04-28 ENCOUNTER — OFFICE VISIT (OUTPATIENT)
Facility: CLINIC | Age: 45
End: 2025-04-28
Payer: MEDICAID

## 2025-04-28 VITALS
HEIGHT: 60 IN | WEIGHT: 200.8 LBS | RESPIRATION RATE: 16 BRPM | SYSTOLIC BLOOD PRESSURE: 132 MMHG | TEMPERATURE: 97.4 F | BODY MASS INDEX: 39.42 KG/M2 | DIASTOLIC BLOOD PRESSURE: 84 MMHG

## 2025-04-28 DIAGNOSIS — R82.90 BAD ODOR OF URINE: ICD-10-CM

## 2025-04-28 DIAGNOSIS — R82.90 BAD ODOR OF URINE: Primary | ICD-10-CM

## 2025-04-28 LAB
BILIRUBIN, URINE, POC: NEGATIVE
BLOOD URINE, POC: NEGATIVE
GLUCOSE URINE, POC: NEGATIVE
KETONES, URINE, POC: NEGATIVE
LEUKOCYTE ESTERASE, URINE, POC: NEGATIVE
NITRITE, URINE, POC: NEGATIVE
PH, URINE, POC: 6.5 (ref 4.6–8)
PROTEIN,URINE, POC: NEGATIVE
SPECIFIC GRAVITY, URINE, POC: 1.02 (ref 1–1.03)
URINALYSIS CLARITY, POC: NORMAL
URINALYSIS COLOR, POC: YELLOW
UROBILINOGEN, POC: NORMAL

## 2025-04-28 PROCEDURE — 99213 OFFICE O/P EST LOW 20 MIN: CPT | Performed by: FAMILY MEDICINE

## 2025-04-28 PROCEDURE — 87086 URINE CULTURE/COLONY COUNT: CPT

## 2025-04-28 PROCEDURE — 81003 URINALYSIS AUTO W/O SCOPE: CPT | Performed by: FAMILY MEDICINE

## 2025-04-28 PROCEDURE — 3075F SYST BP GE 130 - 139MM HG: CPT | Performed by: FAMILY MEDICINE

## 2025-04-28 PROCEDURE — 3079F DIAST BP 80-89 MM HG: CPT | Performed by: FAMILY MEDICINE

## 2025-04-28 NOTE — PROGRESS NOTES
Grant Moon (:  1980) is a 44 y.o. female, Established patient, here for evaluation of the following chief complaint(s):  Urinary odor  (X 1 week)         Assessment & Plan  1. Urine with a strong ammonia-like odor.  - No fever, nausea, vomiting, stomach pain, or burning, urgency, frequency, or blood in the urine.  - Urinalysis results show no signs of infection, no blood, and no bacteria.  - Urine culture will be conducted to check for any bacterial growth.  - Increase water intake and start taking a probiotic supplement containing >1 billion CFUs. If no improvement in symptoms within a week, schedule another appointment with the primary care physician.    2. Irritable bowel syndrome (IBS).  - Drinking a lot of water exacerbates IBS symptoms, acting as a laxative.  - No current use of probiotics.  - Advised to start taking a probiotic supplement containing >1 billion CFUs to help manage IBS symptoms.  - Follow a low fiber diet and monitor the response to probiotics and water intake adjustments.      Pt understood and agree with the plan     Results  Labs   - Urinalysis: No signs of infection, no blood, no bacteria      1. Bad odor of urine  -     AMB POC URINALYSIS DIP STICK AUTO W/O MICRO  -     Culture, Urine; Future    Return if symptoms worsen or fail to improve, for with pcp .       Subjective   History of Present Illness  The patient presents for evaluation of urine with a strong ammonia-like odor.    The unusual odor in the urine has been present for the past week. No associated symptoms such as fever, nausea, vomiting, abdominal pain, or dysuria are reported. Chronic back pain is noted, but there have been no recent changes in its intensity or character. There is no history of renal calculi or hematuria. The last sexual activity occurred several months ago.    She believes she has irritable bowel syndrome (IBS) and maintains a low-fiber diet. Increased water intake tends to have a laxative

## 2025-04-30 ENCOUNTER — RESULTS FOLLOW-UP (OUTPATIENT)
Facility: CLINIC | Age: 45
End: 2025-04-30

## 2025-04-30 LAB
BACTERIA SPEC CULT: NORMAL
SERVICE CMNT-IMP: NORMAL

## 2025-06-24 RX ORDER — NAPROXEN 500 MG/1
500 TABLET ORAL 2 TIMES DAILY WITH MEALS
Qty: 60 TABLET | Refills: 3 | Status: SHIPPED | OUTPATIENT
Start: 2025-06-24

## 2025-06-30 NOTE — PROGRESS NOTES
Have you been to the ER, urgent care clinic since your last visit?  Hospitalized since your last visit?   YES - When: approximately 2 months ago.  Where and Why: leg pain .    Have you seen or consulted any other health care providers outside our system since your last visit?   NO

## 2025-07-01 ENCOUNTER — OFFICE VISIT (OUTPATIENT)
Facility: CLINIC | Age: 45
End: 2025-07-01
Payer: MEDICAID

## 2025-07-01 VITALS
HEART RATE: 68 BPM | TEMPERATURE: 98.2 F | WEIGHT: 203.6 LBS | HEIGHT: 60 IN | DIASTOLIC BLOOD PRESSURE: 84 MMHG | OXYGEN SATURATION: 99 % | BODY MASS INDEX: 39.97 KG/M2 | SYSTOLIC BLOOD PRESSURE: 124 MMHG | RESPIRATION RATE: 18 BRPM

## 2025-07-01 DIAGNOSIS — I25.119 CORONARY ARTERY DISEASE INVOLVING NATIVE CORONARY ARTERY OF NATIVE HEART WITH ANGINA PECTORIS: ICD-10-CM

## 2025-07-01 DIAGNOSIS — E78.5 HYPERLIPIDEMIA LDL GOAL <70: ICD-10-CM

## 2025-07-01 DIAGNOSIS — N64.4 BREAST PAIN IN FEMALE: Primary | ICD-10-CM

## 2025-07-01 PROCEDURE — 3079F DIAST BP 80-89 MM HG: CPT | Performed by: FAMILY MEDICINE

## 2025-07-01 PROCEDURE — 3074F SYST BP LT 130 MM HG: CPT | Performed by: FAMILY MEDICINE

## 2025-07-01 PROCEDURE — 99214 OFFICE O/P EST MOD 30 MIN: CPT | Performed by: FAMILY MEDICINE

## 2025-07-01 NOTE — PROGRESS NOTES
Grant Moon (:  1980) is a 44 y.o. female, Established patient, here for evaluation of the following chief complaint(s):  Breast Problem (Left breast nipple, swollen, knot and sensitive touch )         Assessment & Plan  1. Left outer nipple swelling.  - Symptoms include swelling, tenderness, and warmth without fever or discharge.  - Physical exam reveals tenderness in the left upper nipple area.  - Augmentin prescribed, to be taken twice daily for a week. Ultrasound and mammogram of the left breast ordered to rule out malignancy.    2. Coronary artery disease.  - Intolerant to statins and Zetia due to leg pain.  - Advised to consult cardiologist regarding alternative medications, such as PCSK9 inhibitors, to manage cholesterol levels.  - Last cardiac stent placement was on 2018.    Results    1. Breast pain in female  -     US BREAST LIMITED LEFT; Future  -     [facility] PATRICK DIGITAL DIAGNOSTIC UNILATERAL LEFT; Future  2. Hyperlipidemia LDL goal <70  3. Coronary artery disease involving native coronary artery of native heart with angina pectoris    Return in about 4 weeks (around 2025), or if symptoms worsen or fail to improve, for for acute concern.       Subjective   History of Present Illness  The patient presents for evaluation of nipple swelling.    She first observed the swelling in her left outer nipple two nights ago. The area was warm to touch yesterday, but she reports no fever or discharge from the nipple. The pain is described as tolerable and sore, with no recent scratching of the area. There is no family history of breast cancer. Her last mammogram screening was normal.    She discontinued Zetia due to leg pain, which improved after stopping the medication. She was seen in the ER in 2025 for leg aches while on Zetia. She is currently taking vitamin D 1000 IU daily. She is under the care of a cardiologist and was screened for PCSK9 inhibitors a few years ago, but her insurance

## 2025-07-07 ENCOUNTER — HOSPITAL ENCOUNTER (OUTPATIENT)
Facility: HOSPITAL | Age: 45
Discharge: HOME | End: 2025-07-10
Attending: FAMILY MEDICINE
Payer: MEDICAID

## 2025-07-07 VITALS — WEIGHT: 200 LBS | HEIGHT: 60 IN | BODY MASS INDEX: 39.27 KG/M2

## 2025-07-07 DIAGNOSIS — N64.4 BREAST PAIN IN FEMALE: ICD-10-CM

## 2025-07-07 PROCEDURE — 76642 ULTRASOUND BREAST LIMITED: CPT

## 2025-07-07 PROCEDURE — G0279 TOMOSYNTHESIS, MAMMO: HCPCS

## 2025-08-05 RX ORDER — LORATADINE 10 MG/1
10 TABLET ORAL DAILY PRN
Qty: 90 TABLET | Refills: 3 | Status: SHIPPED | OUTPATIENT
Start: 2025-08-05

## 2025-08-29 DIAGNOSIS — E55.9 VITAMIN D DEFICIENCY: ICD-10-CM

## 2025-09-02 DIAGNOSIS — E55.9 VITAMIN D DEFICIENCY: ICD-10-CM

## 2025-09-02 RX ORDER — CHOLECALCIFEROL (VITAMIN D3) 25 MCG
1 TABLET ORAL DAILY
Qty: 90 TABLET | Refills: 3 | Status: SHIPPED | OUTPATIENT
Start: 2025-09-02 | End: 2025-09-02 | Stop reason: SDUPTHER

## 2025-09-02 RX ORDER — CHOLECALCIFEROL (VITAMIN D3) 25 MCG
1 TABLET ORAL DAILY
Qty: 90 TABLET | Refills: 3 | Status: SHIPPED | OUTPATIENT
Start: 2025-09-02

## 2025-09-05 ENCOUNTER — OFFICE VISIT (OUTPATIENT)
Facility: CLINIC | Age: 45
End: 2025-09-05
Payer: MEDICAID

## 2025-09-05 VITALS
SYSTOLIC BLOOD PRESSURE: 132 MMHG | RESPIRATION RATE: 16 BRPM | OXYGEN SATURATION: 99 % | HEIGHT: 60 IN | HEART RATE: 69 BPM | WEIGHT: 206.4 LBS | BODY MASS INDEX: 40.52 KG/M2 | DIASTOLIC BLOOD PRESSURE: 80 MMHG | TEMPERATURE: 97.5 F

## 2025-09-05 DIAGNOSIS — E55.9 VITAMIN D DEFICIENCY: ICD-10-CM

## 2025-09-05 DIAGNOSIS — R53.83 FATIGUE, UNSPECIFIED TYPE: Primary | ICD-10-CM

## 2025-09-05 DIAGNOSIS — I10 PRIMARY HYPERTENSION: ICD-10-CM

## 2025-09-05 LAB
A/G RATIO: 1.1 RATIO (ref 1.1–2.6)
ALBUMIN: 3.9 G/DL (ref 3.5–5)
ALP BLD-CCNC: 74 U/L (ref 25–115)
ALT SERPL-CCNC: 10 U/L (ref 5–40)
ANION GAP SERPL CALCULATED.3IONS-SCNC: 9 MMOL/L (ref 3–15)
AST SERPL-CCNC: 13 U/L (ref 10–37)
BASOPHILS # BLD: 1 % (ref 0–2)
BASOPHILS ABSOLUTE: 0 K/UL (ref 0–0.2)
BILIRUB SERPL-MCNC: 0.3 MG/DL (ref 0.2–1.2)
BUN BLDV-MCNC: 10 MG/DL (ref 6–22)
CALCIUM SERPL-MCNC: 9.5 MG/DL (ref 8.4–10.5)
CHLORIDE BLD-SCNC: 101 MMOL/L (ref 98–110)
CO2: 30 MMOL/L (ref 20–32)
CREAT SERPL-MCNC: 0.7 MG/DL (ref 0.5–1.2)
EOSINOPHIL # BLD: 3 % (ref 0–6)
EOSINOPHILS ABSOLUTE: 0.1 K/UL (ref 0–0.5)
FERRITIN: 25 NG/ML (ref 10–291)
GFR, ESTIMATED: >90 ML/MIN/1.73 SQ.M.
GLOBULIN: 3.5 G/DL (ref 2–4)
GLUCOSE: 92 MG/DL (ref 70–99)
HCT VFR BLD CALC: 41.3 % (ref 35.1–48)
HEMOGLOBIN: 12.5 G/DL (ref 11.7–16)
IRON % SATURATION: 13 % (ref 20–50)
IRON: 39 MCG/DL (ref 30–160)
LYMPHOCYTES # BLD: 50 % (ref 20–45)
LYMPHOCYTES ABSOLUTE: 2.1 K/UL (ref 1–4.8)
MCH RBC QN AUTO: 26 PG (ref 26–34)
MCHC RBC AUTO-ENTMCNC: 30 G/DL (ref 31–36)
MCV RBC AUTO: 86 FL (ref 80–99)
MONOCYTES ABSOLUTE: 0.3 K/UL (ref 0.1–1)
MONOCYTES: 6 % (ref 3–12)
NEUTROPHILS ABSOLUTE: 1.7 K/UL (ref 1.8–7.7)
NEUTROPHILS SEGMENTED: 40 % (ref 40–75)
PDW BLD-RTO: 16.2 % (ref 10–15.5)
PLATELET # BLD: 364 K/UL (ref 140–440)
PMV BLD AUTO: 10.8 FL (ref 9–13)
POTASSIUM SERPL-SCNC: 4.5 MMOL/L (ref 3.5–5.5)
RBC # BLD: 4.81 M/UL (ref 3.8–5.2)
SODIUM BLD-SCNC: 140 MMOL/L (ref 133–145)
TOTAL IRON BINDING CAPACITY: 301 MCG/DL (ref 228–428)
TOTAL PROTEIN: 7.4 G/DL (ref 6.4–8.3)
TSH SERPL DL<=0.05 MIU/L-ACNC: 1.24 MCU/ML (ref 0.27–4.2)
UNSATURATED IRON BINDING CAPACITY: 262 MCG/DL (ref 110–370)
VITAMIN D 25-HYDROXY: 36 NG/ML (ref 32–100)
WBC # BLD: 4.1 K/UL (ref 4–11)

## 2025-09-05 PROCEDURE — 3075F SYST BP GE 130 - 139MM HG: CPT | Performed by: FAMILY MEDICINE

## 2025-09-05 PROCEDURE — 99214 OFFICE O/P EST MOD 30 MIN: CPT | Performed by: FAMILY MEDICINE

## 2025-09-05 PROCEDURE — 3079F DIAST BP 80-89 MM HG: CPT | Performed by: FAMILY MEDICINE

## (undated) DEVICE — MEDI-VAC SUCTION HIGH CAPACITY: Brand: CARDINAL HEALTH

## (undated) DEVICE — GAUZE,SPONGE,4"X4",16PLY,STRL,LF,10/TRAY: Brand: MEDLINE

## (undated) DEVICE — SYR 10ML LUER LOK 1/5ML GRAD --

## (undated) DEVICE — SYR 50ML SLIP TIP NSAF LF STRL --

## (undated) DEVICE — FLUFF AND POLYMER UNDERPAD,EXTRA HEAVY: Brand: WINGS

## (undated) DEVICE — FLEX ADVANTAGE 3000CC: Brand: FLEX ADVANTAGE

## (undated) DEVICE — SOLUTION IRRIG 1000ML H2O STRL BLT

## (undated) DEVICE — CATHETER SUCT TR FL TIP 14FR W/ O CTRL

## (undated) DEVICE — AIRLIFE™ NASAL OXYGEN CANNULA CURVED, NONFLARED TIP WITH 14 FOOT (4.3 M) CRUSH-RESISTANT TUBING, OVER-THE-EAR STYLE: Brand: AIRLIFE™

## (undated) DEVICE — SNARE POLYP M W27MMXL240CM OVL STIFF DISP CAPTIVATOR

## (undated) DEVICE — MEDI-VAC NON-CONDUCTIVE SUCTION TUBING: Brand: CARDINAL HEALTH

## (undated) DEVICE — SYRINGE MED 25GA 3ML L5/8IN SUBQ PLAS W/ DETACH NDL SFTY

## (undated) DEVICE — FORCEPS BX L240CM JAW DIA2.8MM L CAP W/ NDL MIC MESH TOOTH

## (undated) DEVICE — CANNULA ORIG TL CLR W FOAM CUSHIONS AND 14FT SUPL TB 3 CHN

## (undated) DEVICE — CATHETER THOR 36FR DIA10.7MM POLYVI CHL TRCR TIP STR SFT

## (undated) DEVICE — SYR 20ML LL STRL LF --

## (undated) DEVICE — ENDOSCOPY PUMP TUBING/ CAP SET: Brand: ERBE

## (undated) DEVICE — GOWN ISOL IMPERV UNIV, DISP, OPEN BACK, BLUE --